# Patient Record
Sex: FEMALE | Race: WHITE | Employment: OTHER | URBAN - METROPOLITAN AREA
[De-identification: names, ages, dates, MRNs, and addresses within clinical notes are randomized per-mention and may not be internally consistent; named-entity substitution may affect disease eponyms.]

---

## 2017-01-06 ENCOUNTER — HOSPITAL ENCOUNTER (OUTPATIENT)
Dept: GENERAL RADIOLOGY | Facility: HOSPITAL | Age: 72
Discharge: HOME | End: 2017-01-06
Attending: FAMILY MEDICINE
Payer: MEDICARE

## 2017-01-06 DIAGNOSIS — R05.9 COUGH: ICD-10-CM

## 2017-01-06 DIAGNOSIS — J18.9 PNEUMONIA: ICD-10-CM

## 2017-01-06 PROCEDURE — 71020 XR CHEST PA + LAT CHEST (CPT=71020): CPT

## 2017-03-21 ENCOUNTER — HOSPITAL ENCOUNTER (OUTPATIENT)
Dept: ULTRASOUND IMAGING | Facility: HOSPITAL | Age: 72
Discharge: HOME | End: 2017-03-21
Attending: FAMILY MEDICINE
Payer: MEDICARE

## 2017-03-21 DIAGNOSIS — R42 DIZZINESS: ICD-10-CM

## 2017-03-21 PROCEDURE — 93880 EXTRACRANIAL BILAT STUDY: CPT

## 2017-04-13 PROBLEM — M70.62 GREATER TROCHANTERIC BURSITIS OF LEFT HIP: Status: ACTIVE | Noted: 2017-04-13

## 2017-04-13 PROBLEM — M17.12 PRIMARY OSTEOARTHRITIS OF LEFT KNEE: Status: ACTIVE | Noted: 2017-04-13

## 2017-05-03 ENCOUNTER — HOSPITAL ENCOUNTER (OUTPATIENT)
Dept: CT IMAGING | Facility: HOSPITAL | Age: 72
Discharge: HOME | End: 2017-05-03
Attending: FAMILY MEDICINE
Payer: MEDICARE

## 2017-05-03 DIAGNOSIS — I65.22 CAROTID STENOSIS, ASYMPTOMATIC, LEFT: ICD-10-CM

## 2017-05-03 PROCEDURE — 70498 CT ANGIOGRAPHY NECK: CPT

## 2017-06-26 ENCOUNTER — HOSPITAL ENCOUNTER (OUTPATIENT)
Dept: MAMMOGRAPHY | Age: 72
Discharge: HOME | End: 2017-06-26
Attending: OBSTETRICS & GYNECOLOGY
Payer: MEDICARE

## 2017-06-26 DIAGNOSIS — Z12.31 ENCOUNTER FOR SCREENING MAMMOGRAM FOR MALIGNANT NEOPLASM OF BREAST: ICD-10-CM

## 2017-06-26 DIAGNOSIS — Z12.31 SCREENING MAMMOGRAM, ENCOUNTER FOR: ICD-10-CM

## 2017-06-26 PROCEDURE — 77067 SCR MAMMO BI INCL CAD: CPT | Performed by: OBSTETRICS & GYNECOLOGY

## 2017-07-26 ENCOUNTER — HOSPITAL ENCOUNTER (OUTPATIENT)
Facility: HOSPITAL | Age: 72
Setting detail: HOSPITAL OUTPATIENT SURGERY
Discharge: HOME | End: 2017-07-26
Attending: INTERNAL MEDICINE | Admitting: INTERNAL MEDICINE
Payer: MEDICARE

## 2017-07-26 ENCOUNTER — SURGERY (OUTPATIENT)
Age: 72
End: 2017-07-26

## 2017-07-26 VITALS
DIASTOLIC BLOOD PRESSURE: 68 MMHG | OXYGEN SATURATION: 95 % | WEIGHT: 170 LBS | HEIGHT: 64 IN | SYSTOLIC BLOOD PRESSURE: 108 MMHG | TEMPERATURE: 98 F | BODY MASS INDEX: 29.02 KG/M2 | HEART RATE: 107 BPM | RESPIRATION RATE: 20 BRPM

## 2017-07-26 DIAGNOSIS — K62.5 RECTAL BLEEDING: ICD-10-CM

## 2017-07-26 LAB — GLUCOSE BLD-MCNC: 129 MG/DL (ref 65–99)

## 2017-07-26 PROCEDURE — 88305 TISSUE EXAM BY PATHOLOGIST: CPT | Performed by: INTERNAL MEDICINE

## 2017-07-26 PROCEDURE — 0DBM8ZX EXCISION OF DESCENDING COLON, VIA NATURAL OR ARTIFICIAL OPENING ENDOSCOPIC, DIAGNOSTIC: ICD-10-PCS | Performed by: INTERNAL MEDICINE

## 2017-07-26 PROCEDURE — 82962 GLUCOSE BLOOD TEST: CPT

## 2017-07-26 RX ORDER — SODIUM CHLORIDE, SODIUM LACTATE, POTASSIUM CHLORIDE, CALCIUM CHLORIDE 600; 310; 30; 20 MG/100ML; MG/100ML; MG/100ML; MG/100ML
INJECTION, SOLUTION INTRAVENOUS CONTINUOUS
Status: DISCONTINUED | OUTPATIENT
Start: 2017-07-26 | End: 2017-07-26

## 2017-07-26 RX ORDER — MIDAZOLAM HYDROCHLORIDE 1 MG/ML
INJECTION INTRAMUSCULAR; INTRAVENOUS
Status: DISCONTINUED | OUTPATIENT
Start: 2017-07-26 | End: 2017-07-26

## 2017-07-26 NOTE — H&P
[facility]  Pre-procedure History and Physical      Samantha Ferguson Patient Status:  Hospital Outpatient Surgery    1945 MRN [id number]   [facility] ENDOSCOPY Attending Henry Salmon MD   Hosp Day # 0 PCP Sue Langford MD       7

## 2017-07-26 NOTE — OPERATIVE REPORT
[facility]  Colonoscopy Report      Andrew Oneill Patient Status:  Hospital Outpatient Surgery    1945 MRN [id number]   [facility] ENDOSCOPY Attending Mendy Nunez MD       DATE OF OPERATION: 2017     PREOPERATIVE DIAGNOS

## 2017-08-01 NOTE — PROGRESS NOTES
Please send to the patient:    Dear  [facility],    I reviewed the results from your colonoscopy. You had one polyp removed. It is a benign polyp called a hyperplastic polyp. This polyp has no clinical significance.      I recommend increasing fiber intake and u

## 2017-08-15 ENCOUNTER — HOSPITAL ENCOUNTER (OUTPATIENT)
Dept: PHYSICAL THERAPY | Facility: HOSPITAL | Age: 72
Setting detail: THERAPIES SERIES
Discharge: HOME | End: 2017-08-15
Attending: INTERNAL MEDICINE
Payer: MEDICARE

## 2017-08-15 DIAGNOSIS — M62.89 PELVIC FLOOR DYSFUNCTION: ICD-10-CM

## 2017-08-15 PROCEDURE — 97112 NEUROMUSCULAR REEDUCATION: CPT

## 2017-08-15 PROCEDURE — 97110 THERAPEUTIC EXERCISES: CPT

## 2017-08-15 PROCEDURE — 97163 PT EVAL HIGH COMPLEX 45 MIN: CPT

## 2017-08-15 NOTE — PROGRESS NOTES
MUSCULOSKELETAL AND PELVIC FLOOR EVALUATION:   Referring Physician: Dr. Brien Mcdonald  Diagnosis: Pelvic floor dysfunction     Date of Service: 8/15/2017     PATIENT SUMMARY   Samantha Ferguson is a 74year old y/o female who presents to therapy today with complai relaxation, weakness, nocturia, poor toileting habits, UI, LUCIA, prolapse, and fecal incontinence with IBS that is triggered by stress which are affecting pt's ability to perform normal activities.  Pt also has multiple comorbidities including back issues wi floor isolation.    Charges: PT Eval High Complexity, NM Rohini      Total Timed Treatment: 70 min     Total Treatment Time: 70 min  In agreement with FOTO score and clinical rationale, this evaluation involved High Complexity decision making due to 3+ persona care.    X___________________________________________________ Date____________________    Certification From: 7/50/0006  To:11/13/2017

## 2017-08-21 ENCOUNTER — HOSPITAL ENCOUNTER (OUTPATIENT)
Dept: PHYSICAL THERAPY | Facility: HOSPITAL | Age: 72
Setting detail: THERAPIES SERIES
Discharge: HOME | End: 2017-08-21
Attending: INTERNAL MEDICINE
Payer: MEDICARE

## 2017-08-21 PROCEDURE — 97112 NEUROMUSCULAR REEDUCATION: CPT

## 2017-08-21 PROCEDURE — 97110 THERAPEUTIC EXERCISES: CPT

## 2017-08-21 NOTE — PROGRESS NOTES
Dx: Pelvic floor dysfunction         Authorized # of Visits:  -         Next MD visit: none scheduled  Fall Risk: standard         Precautions: n/a             Subjective: Had a stressful weekend due to 's eye issues.   Able to hold urine,but difficu

## 2017-09-06 ENCOUNTER — HOSPITAL ENCOUNTER (OUTPATIENT)
Dept: PHYSICAL THERAPY | Facility: HOSPITAL | Age: 72
Setting detail: THERAPIES SERIES
Discharge: HOME | End: 2017-09-06
Attending: INTERNAL MEDICINE
Payer: MEDICARE

## 2017-09-06 PROCEDURE — 97112 NEUROMUSCULAR REEDUCATION: CPT

## 2017-09-06 PROCEDURE — 97110 THERAPEUTIC EXERCISES: CPT

## 2017-09-06 NOTE — PROGRESS NOTES
Dx: Pelvic floor dysfunction         Authorized # of Visits:  -         Next MD visit: none scheduled  Fall Risk: standard         Precautions: L hip pain,  R shoulder RC    Subjective: Having less pain in on abdomen on the right.  Doing ILU massage but see

## 2017-09-07 ENCOUNTER — APPOINTMENT (OUTPATIENT)
Dept: PHYSICAL THERAPY | Facility: HOSPITAL | Age: 72
End: 2017-09-07
Attending: INTERNAL MEDICINE
Payer: MEDICARE

## 2017-09-11 ENCOUNTER — HOSPITAL ENCOUNTER (OUTPATIENT)
Dept: PHYSICAL THERAPY | Facility: HOSPITAL | Age: 72
Setting detail: THERAPIES SERIES
Discharge: HOME | End: 2017-09-11
Attending: INTERNAL MEDICINE
Payer: MEDICARE

## 2017-09-11 PROCEDURE — 97110 THERAPEUTIC EXERCISES: CPT

## 2017-09-11 PROCEDURE — 97112 NEUROMUSCULAR REEDUCATION: CPT

## 2017-09-11 NOTE — PROGRESS NOTES
Dx: Pelvic floor dysfunction         Authorized # of Visits:  -         Next MD visit: none scheduled  Fall Risk: standard         Precautions: L hip pain,  R shoulder RC    Subjective: No pain with urinating until yesterday afternoon, used to hurt every d ea    aeromat 3 laps       Shuttle  25# DLP w iso hip add x30 Shuttle  25# DLP w iso hip add x30 Shuttle  25# DLP w iso hip add x50        HSS x30 ILU massage          discussed variations with HEP SS, monster, retro YTB x10 ea         Charges: TEx2 NMx1

## 2017-09-18 ENCOUNTER — HOSPITAL ENCOUNTER (OUTPATIENT)
Dept: PHYSICAL THERAPY | Facility: HOSPITAL | Age: 72
Setting detail: THERAPIES SERIES
Discharge: HOME | End: 2017-09-18
Attending: INTERNAL MEDICINE
Payer: MEDICARE

## 2017-09-18 PROCEDURE — 97112 NEUROMUSCULAR REEDUCATION: CPT

## 2017-09-18 PROCEDURE — 97110 THERAPEUTIC EXERCISES: CPT

## 2017-09-18 NOTE — PROGRESS NOTES
Dx: Pelvic floor dysfunction         Authorized # of Visits:  -         Next MD visit: none scheduled  Fall Risk: standard         Precautions: L hip pain,  R shoulder RC, diverticulitis, DM    Subjective: Doing Kegel's throughout the day.  Able to defer donya laps kegel + abd brace     Supine-    iso hip add + bridge x15    hookly-RTB resisted ER x15    Sit on ball  U/LE lift x10    aeromat-  SS  3 laps    aeromat heel toe 3 laps      Shuttle  25# DLP w iso hip add x30 Shuttle  25# DLP w iso hip add x30 Shuttle

## 2017-09-25 ENCOUNTER — HOSPITAL ENCOUNTER (OUTPATIENT)
Dept: PHYSICAL THERAPY | Facility: HOSPITAL | Age: 72
Setting detail: THERAPIES SERIES
Discharge: HOME | End: 2017-09-25
Attending: INTERNAL MEDICINE
Payer: MEDICARE

## 2017-09-25 PROCEDURE — 97112 NEUROMUSCULAR REEDUCATION: CPT

## 2017-09-25 PROCEDURE — 97110 THERAPEUTIC EXERCISES: CPT

## 2017-09-25 NOTE — PROGRESS NOTES
Dx: Pelvic floor dysfunction         Authorized # of Visits:  -         Next MD visit: none scheduled  Fall Risk: standard         Precautions: L hip pain,  R shoulder RC, diverticulitis, DM    Subjective: Was standing a lot on Saturday, preparing for comp Shuttle  25# DLP w iso hip add x50 Shuttle  25# DLP w iso hip add x50      HSS x30 ILU massage   ILU massage aeromat-  SS  3 laps    aeromat heel toe 3 laps      discussed variations with HEP GEORGE, monster, retro YTB x10 ea airex march x10 airex march x10

## 2017-10-04 ENCOUNTER — HOSPITAL ENCOUNTER (OUTPATIENT)
Dept: PHYSICAL THERAPY | Facility: HOSPITAL | Age: 72
Setting detail: THERAPIES SERIES
Discharge: HOME | End: 2017-10-04
Attending: INTERNAL MEDICINE
Payer: MEDICARE

## 2017-10-04 PROCEDURE — 97112 NEUROMUSCULAR REEDUCATION: CPT

## 2017-10-04 PROCEDURE — 97110 THERAPEUTIC EXERCISES: CPT

## 2017-10-04 NOTE — PROGRESS NOTES
Discharge Summary    Pt has attended 7 visits in Physical Therapy. Dx: Pelvic floor dysfunction             Subjective: Doing HEP and has learned how pelvic floor is kacy via biofeedback. Bowel frequency still 8-10x/day.  Has done a dietary log (was:decreased)  Involuntary contraction: improved (was:decreased)  Involuntary relaxation: improved (was:decreased)     Skin condition: wnl     Sensory/Reflex:  Vestibule: normal bilaterally  Anal Salt Lick: normal bilaterally     Tissue Laxity Test:  Anterior physical therapy.   Pt instructed to call clinic if he/she has any further questions and to continue home exercise program.     Patient/Family/Caregiver was advised of these findings, precautions, and treatment options and has agreed to actively participate sitting  10/10  2/4  Baseline    Sit<>stand    w/wo iso hip add       biofeebackNM Re-ed-    kegel + abd brace     Supine and sitting  10/10  2/4  Baseline    Sit<>stand    w/wo iso hip add   Shuttle  25# DLP w iso hip add x30 Shuttle  25# DLP w iso hip ad

## 2017-10-06 ENCOUNTER — APPOINTMENT (OUTPATIENT)
Dept: ULTRASOUND IMAGING | Facility: HOSPITAL | Age: 72
End: 2017-10-06
Attending: EMERGENCY MEDICINE
Payer: MEDICARE

## 2017-10-06 ENCOUNTER — HOSPITAL ENCOUNTER (EMERGENCY)
Facility: HOSPITAL | Age: 72
Discharge: HOME | End: 2017-10-06
Attending: EMERGENCY MEDICINE
Payer: MEDICARE

## 2017-10-06 ENCOUNTER — APPOINTMENT (OUTPATIENT)
Dept: GENERAL RADIOLOGY | Facility: HOSPITAL | Age: 72
End: 2017-10-06
Attending: EMERGENCY MEDICINE
Payer: MEDICARE

## 2017-10-06 VITALS
TEMPERATURE: 97 F | RESPIRATION RATE: 16 BRPM | SYSTOLIC BLOOD PRESSURE: 110 MMHG | OXYGEN SATURATION: 96 % | WEIGHT: 170 LBS | HEIGHT: 64 IN | BODY MASS INDEX: 29.02 KG/M2 | HEART RATE: 78 BPM | DIASTOLIC BLOOD PRESSURE: 60 MMHG

## 2017-10-06 DIAGNOSIS — M54.9 BACK PAIN WITHOUT RADICULOPATHY: Primary | ICD-10-CM

## 2017-10-06 PROCEDURE — 99285 EMERGENCY DEPT VISIT HI MDM: CPT

## 2017-10-06 PROCEDURE — 72110 X-RAY EXAM L-2 SPINE 4/>VWS: CPT | Performed by: EMERGENCY MEDICINE

## 2017-10-06 PROCEDURE — 99284 EMERGENCY DEPT VISIT MOD MDM: CPT

## 2017-10-06 PROCEDURE — 76770 US EXAM ABDO BACK WALL COMP: CPT | Performed by: EMERGENCY MEDICINE

## 2017-10-06 PROCEDURE — 76706 US ABDL AORTA SCREEN AAA: CPT | Performed by: EMERGENCY MEDICINE

## 2017-10-06 PROCEDURE — 81003 URINALYSIS AUTO W/O SCOPE: CPT | Performed by: EMERGENCY MEDICINE

## 2017-10-06 RX ORDER — TRAMADOL HYDROCHLORIDE 50 MG/1
TABLET ORAL EVERY 4 HOURS PRN
Qty: 20 TABLET | Refills: 0 | Status: SHIPPED | OUTPATIENT
Start: 2017-10-06 | End: 2017-10-13

## 2017-10-06 RX ORDER — TRAMADOL HYDROCHLORIDE 50 MG/1
50 TABLET ORAL ONCE
Status: COMPLETED | OUTPATIENT
Start: 2017-10-06 | End: 2017-10-06

## 2017-10-06 RX ORDER — DIAZEPAM 5 MG/1
5 TABLET ORAL 3 TIMES DAILY PRN
Qty: 20 TABLET | Refills: 0 | Status: SHIPPED | OUTPATIENT
Start: 2017-10-06 | End: 2017-10-16

## 2017-10-06 RX ORDER — IBUPROFEN 600 MG/1
600 TABLET ORAL ONCE
Status: COMPLETED | OUTPATIENT
Start: 2017-10-06 | End: 2017-10-06

## 2017-10-06 RX ORDER — DIAZEPAM 5 MG/1
5 TABLET ORAL ONCE
Status: COMPLETED | OUTPATIENT
Start: 2017-10-06 | End: 2017-10-06

## 2017-10-06 RX ORDER — LIDOCAINE 50 MG/G
1 PATCH TOPICAL ONCE
Status: DISCONTINUED | OUTPATIENT
Start: 2017-10-06 | End: 2017-10-06

## 2017-10-06 RX ORDER — NAPROXEN 500 MG/1
500 TABLET ORAL 2 TIMES DAILY PRN
Qty: 20 TABLET | Refills: 0 | Status: SHIPPED | OUTPATIENT
Start: 2017-10-06 | End: 2018-03-12

## 2017-10-06 NOTE — ED INITIAL ASSESSMENT (HPI)
Pt c/o left mid-lower back pain x 2 days. Pt woke up with pain yesterday. Pt denies radiation down leg.

## 2017-10-06 NOTE — ED NOTES
Pt reevaluated by er physician. Informed of all test reports and plan of care. Pt verbalizing understanding.

## 2017-10-06 NOTE — ED PROVIDER NOTES
Patient Seen in: [facility] Emergency Department    History   Patient presents with:  Back Pain (musculoskeletal)    Stated Complaint: back pain    HPI    38-year-old female presents to the emergency department with complaints of left-sided lower ba MD;  Location: [facility] ENDOSCOPY  1999: OTHER      Comment: Sommer lopez right hand  1995: OTHER SURGICAL HISTORY      Comment: Fatty tumor from neck  1999: OTHER SURGICAL HISTORY      Comment: Arthroscopy right knee  2001: OTHER SURGICAL HISTORY      Comment: breath sounds normal. No stridor. She has no wheezes. Abdominal: Soft. Bowel sounds are normal. There is no tenderness. There is no rebound. Musculoskeletal: She exhibits no edema.         Lumbar back: She exhibits decreased range of motion, tenderness, diagnosis)    Disposition:  Discharge    Follow-up:  Torres Hernandez [address]  [phone]    Schedule an appointment as soon as possible for a visit        Medications Prescribed:  Current Discharge Medication List    START

## 2017-12-22 ENCOUNTER — HOSPITAL ENCOUNTER (OUTPATIENT)
Dept: CV DIAGNOSTICS | Facility: HOSPITAL | Age: 72
Discharge: HOME | End: 2017-12-22
Attending: FAMILY MEDICINE
Payer: MEDICARE

## 2017-12-22 DIAGNOSIS — I50.9 ACUTE CONGESTIVE HEART FAILURE, UNSPECIFIED CONGESTIVE HEART FAILURE TYPE: ICD-10-CM

## 2017-12-22 PROCEDURE — 93306 TTE W/DOPPLER COMPLETE: CPT | Performed by: FAMILY MEDICINE

## 2018-03-12 PROBLEM — I71.40 ABDOMINAL AORTIC ANEURYSM (AAA) WITHOUT RUPTURE (HCC): Status: ACTIVE | Noted: 2018-03-12

## 2018-03-12 PROBLEM — I71.40 ABDOMINAL AORTIC ANEURYSM (AAA) WITHOUT RUPTURE: Status: ACTIVE | Noted: 2018-03-12

## 2018-03-12 PROBLEM — I10 ESSENTIAL HYPERTENSION: Status: ACTIVE | Noted: 2018-03-12

## 2018-03-12 PROBLEM — I71.4 ABDOMINAL AORTIC ANEURYSM (AAA) WITHOUT RUPTURE (HCC): Status: ACTIVE | Noted: 2018-03-12

## 2018-03-12 PROBLEM — I65.23 CAROTID STENOSIS, ASYMPTOMATIC, BILATERAL: Status: ACTIVE | Noted: 2018-03-12

## 2018-06-29 ENCOUNTER — HOSPITAL ENCOUNTER (OUTPATIENT)
Dept: MAMMOGRAPHY | Age: 73
Discharge: HOME | End: 2018-06-29
Attending: OBSTETRICS & GYNECOLOGY
Payer: MEDICARE

## 2018-06-29 DIAGNOSIS — Z12.39 BREAST CANCER SCREENING: ICD-10-CM

## 2018-06-29 PROCEDURE — 77063 BREAST TOMOSYNTHESIS BI: CPT | Performed by: OBSTETRICS & GYNECOLOGY

## 2018-06-29 PROCEDURE — 77067 SCR MAMMO BI INCL CAD: CPT | Performed by: OBSTETRICS & GYNECOLOGY

## 2018-10-01 PROBLEM — I73.9 PAD (PERIPHERAL ARTERY DISEASE): Status: ACTIVE | Noted: 2018-10-01

## 2018-10-01 PROBLEM — I73.9 PAD (PERIPHERAL ARTERY DISEASE) (HCC): Status: ACTIVE | Noted: 2018-10-01

## 2019-03-25 ENCOUNTER — HOSPITAL ENCOUNTER (EMERGENCY)
Facility: HOSPITAL | Age: 74
Discharge: HOME | End: 2019-03-25
Attending: EMERGENCY MEDICINE
Payer: MEDICARE

## 2019-03-25 VITALS
OXYGEN SATURATION: 97 % | TEMPERATURE: 98 F | HEIGHT: 64 IN | BODY MASS INDEX: 29.02 KG/M2 | DIASTOLIC BLOOD PRESSURE: 72 MMHG | RESPIRATION RATE: 18 BRPM | HEART RATE: 92 BPM | SYSTOLIC BLOOD PRESSURE: 128 MMHG | WEIGHT: 170 LBS

## 2019-03-25 DIAGNOSIS — R33.9 URINARY RETENTION: Primary | ICD-10-CM

## 2019-03-25 DIAGNOSIS — N30.01 ACUTE CYSTITIS WITH HEMATURIA: ICD-10-CM

## 2019-03-25 LAB
BILIRUB UR QL STRIP.AUTO: NEGATIVE
COLOR UR AUTO: YELLOW
GLUCOSE UR STRIP.AUTO-MCNC: NEGATIVE MG/DL
KETONES UR STRIP.AUTO-MCNC: NEGATIVE MG/DL
NITRITE UR QL STRIP.AUTO: NEGATIVE
PH UR STRIP.AUTO: 6 [PH] (ref 4.5–8)
PROT UR STRIP.AUTO-MCNC: NEGATIVE MG/DL
RBC UR QL AUTO: NEGATIVE
SP GR UR STRIP.AUTO: 1.01 (ref 1–1.03)
UROBILINOGEN UR STRIP.AUTO-MCNC: <2 MG/DL
WBC CLUMPS UR QL AUTO: PRESENT

## 2019-03-25 PROCEDURE — 87186 SC STD MICRODIL/AGAR DIL: CPT | Performed by: EMERGENCY MEDICINE

## 2019-03-25 PROCEDURE — 81001 URINALYSIS AUTO W/SCOPE: CPT | Performed by: EMERGENCY MEDICINE

## 2019-03-25 PROCEDURE — 99283 EMERGENCY DEPT VISIT LOW MDM: CPT

## 2019-03-25 PROCEDURE — 87086 URINE CULTURE/COLONY COUNT: CPT | Performed by: EMERGENCY MEDICINE

## 2019-03-25 RX ORDER — SULFAMETHOXAZOLE AND TRIMETHOPRIM 800; 160 MG/1; MG/1
1 TABLET ORAL 2 TIMES DAILY
Qty: 20 TABLET | Refills: 0 | Status: SHIPPED | OUTPATIENT
Start: 2019-03-25 | End: 2019-04-04

## 2019-03-25 NOTE — ED PROVIDER NOTES
Patient Seen in: [facility] Emergency Department    History   Patient presents with:  Urinary Symptoms (urologic)    Stated Complaint: unable to urinate, no procudures    HPI    49-year-old female who presents to the emergency department complaining Tobacco Use      Smoking status: Former Smoker        Packs/day: 0.50        Quit date: 10/22/2014        Years since quittin.4      Smokeless tobacco: Never Used    Alcohol use: No      Alcohol/week: 0.0 oz    Drug use: No      Review of Systems    Po straight catheter-year-old be sent for urinalysis. The likely etiology is related to her recent use of pseudoephedrine/phenylephrine. I did explain how these medications can lead to urinary retention. She should refrain from their use.   Her urinalysis s

## 2019-03-25 NOTE — ED INITIAL ASSESSMENT (HPI)
Pt presents to ed ambulatory with steady gait c/o urinary retention. Pt states scant urine is cloudy, denies burning with urination. Pt is a&ox4, moves all extremities well, resps easy.

## 2019-04-18 ENCOUNTER — HOSPITAL ENCOUNTER (OUTPATIENT)
Dept: GENERAL RADIOLOGY | Age: 74
Discharge: HOME | End: 2019-04-18
Attending: PODIATRIST
Payer: MEDICARE

## 2019-04-18 ENCOUNTER — OFFICE VISIT (OUTPATIENT)
Dept: PODIATRY CLINIC | Facility: CLINIC | Age: 74
End: 2019-04-18
Payer: MEDICARE

## 2019-04-18 DIAGNOSIS — M20.41 HAMMER TOES OF BOTH FEET: ICD-10-CM

## 2019-04-18 DIAGNOSIS — M20.42 HAMMER TOES OF BOTH FEET: ICD-10-CM

## 2019-04-18 DIAGNOSIS — M20.41 HAMMER TOES OF BOTH FEET: Primary | ICD-10-CM

## 2019-04-18 DIAGNOSIS — L84 HELOMA DURUM: ICD-10-CM

## 2019-04-18 DIAGNOSIS — M79.675 PAIN OF TOE OF LEFT FOOT: ICD-10-CM

## 2019-04-18 DIAGNOSIS — M20.42 HAMMER TOES OF BOTH FEET: Primary | ICD-10-CM

## 2019-04-18 PROCEDURE — 99203 OFFICE O/P NEW LOW 30 MIN: CPT | Performed by: PODIATRIST

## 2019-04-18 PROCEDURE — 73630 X-RAY EXAM OF FOOT: CPT | Performed by: PODIATRIST

## 2019-04-18 RX ORDER — SIMVASTATIN 40 MG
TABLET ORAL
COMMUNITY
Start: 2019-03-21 | End: 2020-10-09

## 2019-04-25 NOTE — PROGRESS NOTES
Molly Ma is a 74year old female. Patient presents with:  Consult: Left middle toe pain -- Onset 4-6 weeks and denies injury. No xrays taken. Rates pain 5-6/10 with movement.          HPI:   This pleasant patient presents to the clinic with a chief c Laterality Date   • COLONOSCOPY  2003,     Diverticulosis   • COLONOSCOPY  2012    decreased anal sphincter tone, diverticulosis, internal hemorrhoids, normal random colon biopsies   • COLONOSCOPY N/A 7/26/2017    Performed by Bob Crisostomo MD at [facility] ENDO toe left foot   2. Vascular: Patient has palpable pulses both dorsalis pedis and posterior tibial bilateral feet   3. Neurologic: Patient has intact pain sensation   4.  Musculoskeletal: Patient has hammertoes on both feet the third toe on the left is the w

## 2019-05-16 ENCOUNTER — OFFICE VISIT (OUTPATIENT)
Dept: PODIATRY CLINIC | Facility: CLINIC | Age: 74
End: 2019-05-16
Payer: MEDICARE

## 2019-05-16 DIAGNOSIS — M79.675 PAIN OF TOE OF LEFT FOOT: ICD-10-CM

## 2019-05-16 DIAGNOSIS — M20.41 HAMMER TOES OF BOTH FEET: Primary | ICD-10-CM

## 2019-05-16 DIAGNOSIS — M20.42 HAMMER TOES OF BOTH FEET: Primary | ICD-10-CM

## 2019-05-16 DIAGNOSIS — L84 HELOMA DURUM: ICD-10-CM

## 2019-05-16 PROCEDURE — 99212 OFFICE O/P EST SF 10 MIN: CPT | Performed by: PODIATRIST

## 2019-05-16 NOTE — PROGRESS NOTES
Lashay Hayes is a 74year old female. Patient presents with: Toe Pain: left middle toe -- States pain is 99 % better. Occasional pain with pressure. Denies any pain today in toe.          HPI:   This pleasant patient returns to the clinic stating that s Diverticulosis   • COLONOSCOPY  2012    decreased anal sphincter tone, diverticulosis, internal hemorrhoids, normal random colon biopsies   • COLONOSCOPY N/A 7/26/2017    Performed by Ute Paredes MD at [address] [facility] toe left foot. There was no hemorrhaging. 2. Vascular: Patient has intact pedal circulation   3. Neurologic: Patient has good sensation   4.  Musculoskeletal: Again has rigid hammertoes 2-5 left foot which do not fully reduce on forefoot loading and anila

## 2019-07-03 ENCOUNTER — HOSPITAL ENCOUNTER (OUTPATIENT)
Dept: MAMMOGRAPHY | Age: 74
Discharge: HOME | End: 2019-07-03
Attending: OBSTETRICS & GYNECOLOGY
Payer: MEDICARE

## 2019-07-03 DIAGNOSIS — Z12.31 ENCOUNTER FOR SCREENING MAMMOGRAM FOR MALIGNANT NEOPLASM OF BREAST: ICD-10-CM

## 2019-07-03 PROCEDURE — 77063 BREAST TOMOSYNTHESIS BI: CPT | Performed by: OBSTETRICS & GYNECOLOGY

## 2019-07-03 PROCEDURE — 77067 SCR MAMMO BI INCL CAD: CPT | Performed by: OBSTETRICS & GYNECOLOGY

## 2019-12-01 ENCOUNTER — HOSPITAL ENCOUNTER (INPATIENT)
Facility: HOSPITAL | Age: 74
LOS: 2 days | Discharge: HOME | DRG: 191 | End: 2019-12-03
Attending: EMERGENCY MEDICINE | Admitting: FAMILY MEDICINE
Payer: MEDICARE

## 2019-12-01 ENCOUNTER — APPOINTMENT (OUTPATIENT)
Dept: GENERAL RADIOLOGY | Facility: HOSPITAL | Age: 74
DRG: 191 | End: 2019-12-01
Attending: EMERGENCY MEDICINE
Payer: MEDICARE

## 2019-12-01 DIAGNOSIS — R09.02 HYPOXIA: Primary | ICD-10-CM

## 2019-12-01 DIAGNOSIS — J20.9 ACUTE BRONCHITIS, UNSPECIFIED ORGANISM: ICD-10-CM

## 2019-12-01 DIAGNOSIS — J98.01 ACUTE BRONCHOSPASM: ICD-10-CM

## 2019-12-01 DIAGNOSIS — M25.50 ARTHRALGIA, UNSPECIFIED JOINT: ICD-10-CM

## 2019-12-01 PROCEDURE — 82962 GLUCOSE BLOOD TEST: CPT

## 2019-12-01 PROCEDURE — 85379 FIBRIN DEGRADATION QUANT: CPT | Performed by: FAMILY MEDICINE

## 2019-12-01 PROCEDURE — 87486 CHLMYD PNEUM DNA AMP PROBE: CPT | Performed by: FAMILY MEDICINE

## 2019-12-01 PROCEDURE — 87798 DETECT AGENT NOS DNA AMP: CPT | Performed by: FAMILY MEDICINE

## 2019-12-01 PROCEDURE — 94640 AIRWAY INHALATION TREATMENT: CPT

## 2019-12-01 PROCEDURE — 96375 TX/PRO/DX INJ NEW DRUG ADDON: CPT

## 2019-12-01 PROCEDURE — 96374 THER/PROPH/DIAG INJ IV PUSH: CPT

## 2019-12-01 PROCEDURE — 80053 COMPREHEN METABOLIC PANEL: CPT | Performed by: EMERGENCY MEDICINE

## 2019-12-01 PROCEDURE — 99285 EMERGENCY DEPT VISIT HI MDM: CPT

## 2019-12-01 PROCEDURE — 71046 X-RAY EXAM CHEST 2 VIEWS: CPT | Performed by: EMERGENCY MEDICINE

## 2019-12-01 PROCEDURE — 87999 UNLISTED MICROBIOLOGY PX: CPT

## 2019-12-01 PROCEDURE — 87581 M.PNEUMON DNA AMP PROBE: CPT | Performed by: FAMILY MEDICINE

## 2019-12-01 PROCEDURE — 94645 CONT INHLJ TX EACH ADDL HOUR: CPT

## 2019-12-01 PROCEDURE — 87633 RESP VIRUS 12-25 TARGETS: CPT | Performed by: FAMILY MEDICINE

## 2019-12-01 PROCEDURE — 85025 COMPLETE CBC W/AUTO DIFF WBC: CPT | Performed by: EMERGENCY MEDICINE

## 2019-12-01 RX ORDER — ACETAMINOPHEN 500 MG
500 TABLET ORAL EVERY 6 HOURS PRN
Status: DISCONTINUED | OUTPATIENT
Start: 2019-12-01 | End: 2019-12-03

## 2019-12-01 RX ORDER — DILTIAZEM HYDROCHLORIDE 180 MG/1
360 CAPSULE, EXTENDED RELEASE ORAL NIGHTLY
Status: DISCONTINUED | OUTPATIENT
Start: 2019-12-01 | End: 2019-12-03

## 2019-12-01 RX ORDER — IPRATROPIUM BROMIDE AND ALBUTEROL SULFATE 2.5; .5 MG/3ML; MG/3ML
3 SOLUTION RESPIRATORY (INHALATION)
Status: DISCONTINUED | OUTPATIENT
Start: 2019-12-01 | End: 2019-12-02

## 2019-12-01 RX ORDER — ATORVASTATIN CALCIUM 10 MG/1
20 TABLET, FILM COATED ORAL NIGHTLY
Status: DISCONTINUED | OUTPATIENT
Start: 2019-12-01 | End: 2019-12-03

## 2019-12-01 RX ORDER — KETOROLAC TROMETHAMINE 30 MG/ML
15 INJECTION, SOLUTION INTRAMUSCULAR; INTRAVENOUS EVERY 6 HOURS PRN
Status: DISCONTINUED | OUTPATIENT
Start: 2019-12-01 | End: 2019-12-01

## 2019-12-01 RX ORDER — GLIMEPIRIDE 2 MG/1
2 TABLET ORAL 2 TIMES DAILY
Status: DISCONTINUED | OUTPATIENT
Start: 2019-12-01 | End: 2019-12-03

## 2019-12-01 RX ORDER — KETOROLAC TROMETHAMINE 30 MG/ML
15 INJECTION, SOLUTION INTRAMUSCULAR; INTRAVENOUS ONCE
Status: COMPLETED | OUTPATIENT
Start: 2019-12-01 | End: 2019-12-01

## 2019-12-01 RX ORDER — SODIUM CHLORIDE 9 MG/ML
INJECTION, SOLUTION INTRAVENOUS CONTINUOUS
Status: DISCONTINUED | OUTPATIENT
Start: 2019-12-01 | End: 2019-12-03

## 2019-12-01 RX ORDER — IPRATROPIUM BROMIDE AND ALBUTEROL SULFATE 2.5; .5 MG/3ML; MG/3ML
3 SOLUTION RESPIRATORY (INHALATION) EVERY 2 HOUR PRN
Status: DISCONTINUED | OUTPATIENT
Start: 2019-12-01 | End: 2019-12-03

## 2019-12-01 RX ORDER — IPRATROPIUM BROMIDE AND ALBUTEROL SULFATE 2.5; .5 MG/3ML; MG/3ML
3 SOLUTION RESPIRATORY (INHALATION) ONCE
Status: COMPLETED | OUTPATIENT
Start: 2019-12-01 | End: 2019-12-01

## 2019-12-01 RX ORDER — ALBUTEROL SULFATE 2.5 MG/3ML
10 SOLUTION RESPIRATORY (INHALATION) CONTINUOUS
Status: DISCONTINUED | OUTPATIENT
Start: 2019-12-01 | End: 2019-12-03

## 2019-12-01 RX ORDER — PREDNISONE 20 MG/1
20 TABLET ORAL 2 TIMES DAILY WITH MEALS
Status: DISCONTINUED | OUTPATIENT
Start: 2019-12-02 | End: 2019-12-03

## 2019-12-01 RX ORDER — ENOXAPARIN SODIUM 100 MG/ML
40 INJECTION SUBCUTANEOUS DAILY
Status: DISCONTINUED | OUTPATIENT
Start: 2019-12-01 | End: 2019-12-03

## 2019-12-01 RX ORDER — METHYLPREDNISOLONE SODIUM SUCCINATE 125 MG/2ML
125 INJECTION, POWDER, LYOPHILIZED, FOR SOLUTION INTRAMUSCULAR; INTRAVENOUS ONCE
Status: COMPLETED | OUTPATIENT
Start: 2019-12-01 | End: 2019-12-01

## 2019-12-01 RX ORDER — KETOROLAC TROMETHAMINE 30 MG/ML
15 INJECTION, SOLUTION INTRAMUSCULAR; INTRAVENOUS EVERY 6 HOURS PRN
Status: DISPENSED | OUTPATIENT
Start: 2019-12-01 | End: 2019-12-03

## 2019-12-01 NOTE — ED NOTES
Alarm notification. Round on pt. Pt sleeping SPO2 83% on RA. MD notified. Pt placed on 2L O2.    Pt sts she does not wear oxygen at home

## 2019-12-01 NOTE — PROGRESS NOTES
[facility] Note:  Renal Dose Adjustment for Enoxaparin (LOVENOX)    Willi Rocha has been prescribed Enoxaparin (LOVENOX) 30 mg subcutaneously every 24 hours. Estimated Creatinine Clearance: 52.3 mL/min (based on SCr of 0.79 mg/dL).     Her calculat

## 2019-12-01 NOTE — H&P
[facility]    History & Physical    [facility]:  [facility]    1945 MRN [id number]   Location [address] [facility] Attending Liliana Coppola, [address] [facility] Day # 0 PCP Owen Cervantes MD     Date:  2019  Date of Adm Arthroscopy right knee   • OTHER SURGICAL HISTORY  2001    Carpal tunnel both hand     Family History   Problem Relation Age of Onset   • Cancer Other         Stomach cancer   • Heart Disease Father    • Hypertension Father    • High Blood Pressure Father Arm pain     RCT (rotator cuff tear)     Primary osteoarthritis of left knee     Greater trochanteric bursitis of left hip     Essential hypertension     Carotid stenosis, asymptomatic, bilateral     Abdominal aortic aneurysm (AAA) without rupture ([address]

## 2019-12-01 NOTE — ED PROVIDER NOTES
Patient Seen in: [facility] Emergency Department      History   Patient presents with:  Pain (neurologic)    Stated Complaint: Shoulder pain, cough, on antibx, swelling to hands and knees, forearm    HPI    70-year-old female presents emergency room tunnel both hand                    Social History    Tobacco Use      Smoking status: Former Smoker        Packs/day: 0.50        Quit date: 10/22/2014        Years since quittin.1      Smokeless tobacco: Never Used    Alcohol use: No      Alcohol/wee Ratio 0.8 (*)     All other components within normal limits   CBC W/ DIFFERENTIAL - Abnormal; Notable for the following components:    WBC 13.1 (*)     Neutrophil Absolute Prelim 9.44 (*)     Neutrophil Absolute 9.44 (*)     Monocyte Absolute 1.46 (*) bronchitis, unspecified organism  Arthralgia, unspecified joint    Disposition:  Admit  12/1/2019 11:26 am    Follow-up:  No follow-up provider specified.       Medications Prescribed:  Current Discharge Medication List                   Present on Admissio

## 2019-12-01 NOTE — ED NOTES
Call for report, RN sts not available for report. Request to give report to charge RN. Sts she will only need 2 minutes.

## 2019-12-01 NOTE — PROGRESS NOTES
NURSING ADMISSION NOTE      Patient admitted via Cart  Oriented to room. Safety precautions initiated. Bed in low position. Call light in reach. Received pt from ER. Admission navigator completed. VSS. Maintaining 02 sats on 2L per nasal cannula.

## 2019-12-01 NOTE — ED INITIAL ASSESSMENT (HPI)
Pt to ED with c/o pain to joints that started on Friday. +flu shot. Pt was started on levofloxacin on 11/26 for bronchitis. Afebrile. Pt took tylenol this am for pain.

## 2019-12-01 NOTE — ED NOTES
Round on pt. No distress noted. Pt updated on room number and eta to floor.  Denies any needs at this time   Sts back pain and joint pain have improved

## 2019-12-02 PROCEDURE — 94640 AIRWAY INHALATION TREATMENT: CPT

## 2019-12-02 PROCEDURE — 82962 GLUCOSE BLOOD TEST: CPT

## 2019-12-02 RX ORDER — FLUTICASONE PROPIONATE 50 MCG
1 SPRAY, SUSPENSION (ML) NASAL 2 TIMES DAILY
Status: DISCONTINUED | OUTPATIENT
Start: 2019-12-02 | End: 2019-12-03

## 2019-12-02 RX ORDER — IPRATROPIUM BROMIDE AND ALBUTEROL SULFATE 2.5; .5 MG/3ML; MG/3ML
3 SOLUTION RESPIRATORY (INHALATION)
Status: DISCONTINUED | OUTPATIENT
Start: 2019-12-02 | End: 2019-12-03

## 2019-12-02 RX ORDER — GUAIFENESIN 600 MG
600 TABLET, EXTENDED RELEASE 12 HR ORAL 2 TIMES DAILY
Status: DISCONTINUED | OUTPATIENT
Start: 2019-12-02 | End: 2019-12-03

## 2019-12-02 NOTE — PLAN OF CARE
Pt is A&Ox4. O2 sats WNL on Room air. Room air is baseline. Carb control diet. QID accu checks. Voids to bathroom with standby. IV fluids. Pt blood sugar was 318 paged doctor. Insulin pen ordered and insulin administered.  Pt c/o pain to the shoulder gave activity based on assessment  - Modify environment to reduce risk of injury  - Provide assistive devices as appropriate  - Consider OT/PT consult to assist with strengthening/mobility  - Encourage toileting schedule  Outcome: Progressing     Problem: Kristin Echeverria

## 2019-12-02 NOTE — CONSULTS
[facility]  Pulmonary/Critical Care Consult Note  [facility]  Report of Consultation    Tristen Lei Patient Status:  Inpatient    1945 MRN [id number]   [facility] 5NW-A Attending Vijay Forman Gallbladder disease 03/22/2013    thickened gallbladder with distended bile duct   • High blood pressure    • High cholesterol    • HYPERLIPIDEMIA    • HYPOTHYROIDISM    • IBS (irritable bowel syndrome)    • Osteoarthritis     hips, back and knees   • Visu for anorexia, chills, fatigue, fevers, malaise, night sweats and weight loss. Eyes: Negative for visual disturbance, irritation and redness.   Ears, nose, mouth, throat, and face: Negative for hearing loss, tinnitus, nasal congestion, snoring, sore throat, symmetric, no visible masses or scars, no crepitus, normal flexion/extension  CHEST: Normal chest excursion, no visible deformity or scars, no tenderness to palpation  PULMONARY: diminished breath sounds bilaterally, but clear.  No wheeze, rhonchi or crackl COPD given long standing tobacco abuse  · Transient hypoxia - now resolved  · Joint/ shoulder pains - improved, recent fluoroquinolone use  · Active tobacco abuse, 50+ pack years  · DM  · HTN  · chronic sinusitis    PLAN    · Continue bronchodilators nebul

## 2019-12-02 NOTE — PLAN OF CARE
Problem: Patient/Family Goals  Goal: Patient/Family Long Term Goal  Description  Patient's Long Term Goal:  To be discharged    Interventions:  - Follow MD orders  - See additional Care Plan goals for specific interventions   Outcome: Progressing  Goal: function  Description  INTERVENTIONS:  - Assess patient stability and activity tolerance for standing, transferring and ambulating w/ or w/o assistive devices  - Assist with transfers and ambulation using safe patient handling equipment as needed  - Ensure

## 2019-12-02 NOTE — PROGRESS NOTES
12/02/19 1359   Clinical Encounter Type   Referral To Nurse  (Arabella Gallo made a referral to the [employer] for [employer] as requested. )   [facility] Encounters   Spiritual Requests During Visit / Hospitalization [facility]

## 2019-12-03 ENCOUNTER — APPOINTMENT (OUTPATIENT)
Dept: GENERAL RADIOLOGY | Facility: HOSPITAL | Age: 74
DRG: 191 | End: 2019-12-03
Attending: INTERNAL MEDICINE
Payer: MEDICARE

## 2019-12-03 VITALS
WEIGHT: 172 LBS | SYSTOLIC BLOOD PRESSURE: 144 MMHG | HEIGHT: 64 IN | RESPIRATION RATE: 20 BRPM | TEMPERATURE: 98 F | DIASTOLIC BLOOD PRESSURE: 47 MMHG | OXYGEN SATURATION: 91 % | BODY MASS INDEX: 29.37 KG/M2 | HEART RATE: 98 BPM

## 2019-12-03 PROCEDURE — 94640 AIRWAY INHALATION TREATMENT: CPT

## 2019-12-03 PROCEDURE — 71045 X-RAY EXAM CHEST 1 VIEW: CPT | Performed by: INTERNAL MEDICINE

## 2019-12-03 PROCEDURE — 82962 GLUCOSE BLOOD TEST: CPT

## 2019-12-03 RX ORDER — FLUTICASONE PROPIONATE 50 MCG
1 SPRAY, SUSPENSION (ML) NASAL 2 TIMES DAILY
Qty: 1 BOTTLE | Refills: 1 | Status: SHIPPED | OUTPATIENT
Start: 2019-12-03

## 2019-12-03 RX ORDER — PREDNISONE 1 MG/1
TABLET ORAL
Qty: 63 TABLET | Refills: 0 | Status: SHIPPED | OUTPATIENT
Start: 2019-12-03 | End: 2020-08-19

## 2019-12-03 RX ORDER — IPRATROPIUM BROMIDE AND ALBUTEROL SULFATE 2.5; .5 MG/3ML; MG/3ML
3 SOLUTION RESPIRATORY (INHALATION)
Qty: 90 CONTAINER | Refills: 3 | Status: SHIPPED | OUTPATIENT
Start: 2019-12-03

## 2019-12-03 NOTE — PROGRESS NOTES
Pt reporting diarrhea this AM, cdiff ordered per protocol. Pt not able to provide sample for several hours, states she is not having diarrhea anymore. MD notified pt not able to provide sample. Per MD, pt still OK to discharge home without sample.

## 2019-12-03 NOTE — PROGRESS NOTES
NURSING DISCHARGE NOTE    Discharged Home via Wheelchair. Accompanied by Support staff  Belongings Taken by patient/family. Pt discharged home. Discharge instructions reviewed with pt at bedside. Prescriptions given to pt.  Nebulizer delivered by Ed

## 2019-12-03 NOTE — PROGRESS NOTES
[facility] Pulmonary/Critical Care Progress Note     SUBJECTIVE/Interval history: All events, procedures, notes reviewed.  No acute events overnight, per nursing, she had slight desaturation to 87-88% overnight and place CTAB no wheeze  Chest wall:No tenderness or deformity. Heart:RRR no murmur  Abdomen:Soft, non-tender. No masses. No guarding. No rebound. Extremities:no edema   Skin:No rashes or lesions.   Lymph nodes:Not examined          Lab Data Review:   Recent La meals   • dilTIAZem HCl ER Coated Beads  360 mg Oral Nightly   • glimepiride  2 mg Oral BID   • atorvastatin  20 mg Oral Nightly   • Insulin Aspart Pen  5-10 Units Subcutaneous TID CC   • Insulin Aspart Pen  5 Units Subcutaneous Once     ipratropium-albute

## 2019-12-03 NOTE — CM/SW NOTE
Spoke with patient regarding home nebulizer. States she is anxious to leave, spouse en route to . Offered to send order to DME company to bill under her medicare vs $40 cash price through [employer], delivery to room.  Patient requesting cash pa

## 2019-12-03 NOTE — PLAN OF CARE
Problem: Patient/Family Goals  Goal: Patient/Family Long Term Goal  Description  Patient's Long Term Goal:  To be discharged    Interventions:  - Follow MD orders  - See additional Care Plan goals for specific interventions   Outcome: Progressing  Goal: safest level of function  Description  INTERVENTIONS:  - Assess patient stability and activity tolerance for standing, transferring and ambulating w/ or w/o assistive devices  - Assist with transfers and ambulation using safe patient handling equipment as

## 2019-12-08 NOTE — DISCHARGE SUMMARY
[facility]    Discharge Summary    Eric Johnson Patient Status:  Inpatient    1945 MRN [id number]   [facility] 5NW-A Attending No att. providers found   [facility] Day # 2 PCP Rip Varner MD     Date of Admission: 2019 Dispos gets cortisone injections at times to control the pain , toda she is not aware of shortness of breath bur was desaturating down to 87 and improved dramatically on duo neb treatment and agreed to be admitted for therapy . initially did not want to be admitte Tabs  Commonly known as:  ZOCOR       Refills:  0     SUDAFED OR      Take by mouth as needed. Refills:  0     VITAMIN B COMPLEX OR      Take by mouth.    Refills:  0     vitamin C 1000 MG Tabs      1 TABLET DAILY   Refills:  0     VITAMIN D-3 OR      Marc

## 2019-12-11 ENCOUNTER — HOSPITAL ENCOUNTER (OUTPATIENT)
Dept: GENERAL RADIOLOGY | Facility: HOSPITAL | Age: 74
Discharge: HOME | End: 2019-12-11
Attending: INTERNAL MEDICINE
Payer: MEDICARE

## 2019-12-11 DIAGNOSIS — R09.02 HYPOXIA: ICD-10-CM

## 2019-12-11 DIAGNOSIS — J20.9 ACUTE BRONCHITIS, UNSPECIFIED ORGANISM: ICD-10-CM

## 2019-12-11 PROCEDURE — 71046 X-RAY EXAM CHEST 2 VIEWS: CPT | Performed by: INTERNAL MEDICINE

## 2019-12-19 ENCOUNTER — ORDER TRANSCRIPTION (OUTPATIENT)
Dept: ADMINISTRATIVE | Facility: HOSPITAL | Age: 74
End: 2019-12-19

## 2019-12-19 DIAGNOSIS — J44.1 COPD WITH EXACERBATION (HCC): Primary | ICD-10-CM

## 2020-03-16 ENCOUNTER — HOSPITAL ENCOUNTER (OUTPATIENT)
Dept: GENERAL RADIOLOGY | Age: 75
Discharge: HOME | End: 2020-03-16
Attending: INTERNAL MEDICINE
Payer: MEDICARE

## 2020-03-16 DIAGNOSIS — Z72.0 TOBACCO ABUSE: ICD-10-CM

## 2020-03-16 DIAGNOSIS — J44.1 COPD WITH EXACERBATION (HCC): ICD-10-CM

## 2020-03-16 PROCEDURE — 71046 X-RAY EXAM CHEST 2 VIEWS: CPT | Performed by: INTERNAL MEDICINE

## 2020-04-28 ENCOUNTER — ORDER TRANSCRIPTION (OUTPATIENT)
Dept: HEMATOLOGY/ONCOLOGY | Facility: HOSPITAL | Age: 75
End: 2020-04-28

## 2020-04-28 DIAGNOSIS — Z87.891 PERSONAL HISTORY OF TOBACCO USE, PRESENTING HAZARDS TO HEALTH: Primary | ICD-10-CM

## 2020-08-21 ENCOUNTER — HOSPITAL ENCOUNTER (OUTPATIENT)
Dept: CT IMAGING | Facility: HOSPITAL | Age: 75
Discharge: HOME | End: 2020-08-21
Attending: CLINICAL NURSE SPECIALIST
Payer: MEDICARE

## 2020-08-21 DIAGNOSIS — Z87.891 PERSONAL HISTORY OF TOBACCO USE, PRESENTING HAZARDS TO HEALTH: ICD-10-CM

## 2020-11-17 PROBLEM — N32.3 BLADDER DIVERTICULUM: Status: ACTIVE | Noted: 2020-11-17

## 2021-02-01 DIAGNOSIS — Z23 NEED FOR VACCINATION: ICD-10-CM

## 2021-02-15 ENCOUNTER — IMMUNIZATION (OUTPATIENT)
Dept: LAB | Age: 76
End: 2021-02-15
Attending: HOSPITALIST
Payer: MEDICARE

## 2021-02-15 DIAGNOSIS — Z23 NEED FOR VACCINATION: Primary | ICD-10-CM

## 2021-02-15 PROCEDURE — 0001A SARSCOV2 VAC 30MCG/0.3ML IM: CPT

## 2021-03-08 ENCOUNTER — IMMUNIZATION (OUTPATIENT)
Dept: LAB | Age: 76
End: 2021-03-08
Attending: HOSPITALIST
Payer: MEDICARE

## 2021-03-08 DIAGNOSIS — Z23 NEED FOR VACCINATION: Primary | ICD-10-CM

## 2021-03-08 PROCEDURE — 0002A SARSCOV2 VAC 30MCG/0.3ML IM: CPT

## 2021-04-26 ENCOUNTER — HOSPITAL ENCOUNTER (OUTPATIENT)
Dept: MRI IMAGING | Age: 76
Discharge: HOME | End: 2021-04-26
Attending: PHYSICIAN ASSISTANT
Payer: MEDICARE

## 2021-04-26 DIAGNOSIS — M47.816 LUMBAR SPONDYLOSIS: ICD-10-CM

## 2021-04-26 DIAGNOSIS — M43.16 SPONDYLOLISTHESIS OF LUMBAR REGION: ICD-10-CM

## 2021-04-26 DIAGNOSIS — M54.16 LUMBAR RADICULOPATHY: ICD-10-CM

## 2021-04-26 PROCEDURE — 72148 MRI LUMBAR SPINE W/O DYE: CPT | Performed by: PHYSICIAN ASSISTANT

## 2021-05-18 PROBLEM — M47.816 LUMBAR FACET ARTHROPATHY: Status: ACTIVE | Noted: 2021-05-18

## 2021-05-18 PROBLEM — M51.369 DDD (DEGENERATIVE DISC DISEASE), LUMBAR: Status: ACTIVE | Noted: 2021-05-18

## 2021-05-18 PROBLEM — M48.061 SPINAL STENOSIS OF LUMBAR REGION, UNSPECIFIED WHETHER NEUROGENIC CLAUDICATION PRESENT: Status: ACTIVE | Noted: 2021-05-18

## 2021-05-18 PROBLEM — M51.36 DDD (DEGENERATIVE DISC DISEASE), LUMBAR: Status: ACTIVE | Noted: 2021-05-18

## 2021-05-24 PROBLEM — M47.899 FACET SYNDROME: Status: ACTIVE | Noted: 2021-05-24

## 2021-09-27 ENCOUNTER — HOSPITAL ENCOUNTER (OUTPATIENT)
Dept: GENERAL RADIOLOGY | Age: 76
Discharge: HOME | End: 2021-09-27
Attending: FAMILY MEDICINE
Payer: MEDICARE

## 2021-09-27 DIAGNOSIS — M19.91 PRIMARY OSTEOARTHRITIS, UNSPECIFIED SITE: ICD-10-CM

## 2021-09-27 PROCEDURE — 73565 X-RAY EXAM OF KNEES: CPT | Performed by: FAMILY MEDICINE

## 2021-09-27 PROCEDURE — 73610 X-RAY EXAM OF ANKLE: CPT | Performed by: FAMILY MEDICINE

## 2021-10-19 ENCOUNTER — HOSPITAL ENCOUNTER (OUTPATIENT)
Age: 76
Discharge: HOME | End: 2021-10-19
Payer: MEDICARE

## 2021-10-19 VITALS
TEMPERATURE: 97 F | OXYGEN SATURATION: 95 % | HEART RATE: 85 BPM | WEIGHT: 200 LBS | RESPIRATION RATE: 18 BRPM | DIASTOLIC BLOOD PRESSURE: 74 MMHG | SYSTOLIC BLOOD PRESSURE: 130 MMHG | BODY MASS INDEX: 34 KG/M2

## 2021-10-19 DIAGNOSIS — M25.472 LEFT ANKLE SWELLING: ICD-10-CM

## 2021-10-19 DIAGNOSIS — M25.572 LEFT ANKLE PAIN, UNSPECIFIED CHRONICITY: Primary | ICD-10-CM

## 2021-10-19 PROCEDURE — 99203 OFFICE O/P NEW LOW 30 MIN: CPT | Performed by: NURSE PRACTITIONER

## 2021-10-19 RX ORDER — CETIRIZINE HYDROCHLORIDE 10 MG/1
10 TABLET ORAL DAILY
COMMUNITY

## 2021-10-19 RX ORDER — REVEFENACIN 175 UG/3ML
175 SOLUTION RESPIRATORY (INHALATION) DAILY
COMMUNITY

## 2021-10-19 RX ORDER — HYDROCODONE BITARTRATE AND ACETAMINOPHEN 5; 325 MG/1; MG/1
1 TABLET ORAL EVERY 6 HOURS PRN
Qty: 10 TABLET | Refills: 0 | Status: SHIPPED | OUTPATIENT
Start: 2021-10-19

## 2021-10-19 NOTE — ED INITIAL ASSESSMENT (HPI)
Pt c/o L ankle pain and swelling starting in early September pt treted for gout without improvement, xray was taken at that time showing arthritis, pain and swelling worsening and going into foot

## 2021-10-19 NOTE — ED PROVIDER NOTES
Patient Seen in: Immediate [address]      History   Patient presents with:  Leg or Foot Injury    Stated Complaint: left ankle pain/problem    Subjective: This is a 73-year-old female with below stated medical history.   [facility]   • OTHER SURGICAL HISTORY  2001    Carpal tunnel both hand   • OTHER SURGICAL HISTORY  12/22/2020    Cystoscopy, Dr Fuentes Camera History    Tobacco Use      Smoking status: Former Smoker        Packs/day: 0.50        Quit date: 10/22/201 Right Ear: External ear normal.      Left Ear: External ear normal.      Nose: Nose normal.      Mouth/Throat:      Mouth: Mucous membranes are moist.      Pharynx: Oropharynx is clear. Eyes:      General:         Right eye: No discharge.          Left ey this time. Likely the cause of symptoms is osteoarthritis in the ankle. I do not believe that repeating x-rays at this time would be beneficial.    DC home. Rice instructions given.   It was highly recommended patient to follow-up with orthopedics and/or

## 2021-10-21 PROBLEM — M81.0 AGE-RELATED OSTEOPOROSIS WITHOUT CURRENT PATHOLOGICAL FRACTURE: Status: ACTIVE | Noted: 2021-10-21

## 2021-10-26 ENCOUNTER — ORDER TRANSCRIPTION (OUTPATIENT)
Dept: ADMINISTRATIVE | Facility: HOSPITAL | Age: 76
End: 2021-10-26

## 2021-10-26 DIAGNOSIS — Z01.818 PREOP EXAMINATION: ICD-10-CM

## 2021-10-26 DIAGNOSIS — Z11.59 ENCOUNTER FOR SCREENING FOR OTHER VIRAL DISEASES: Primary | ICD-10-CM

## 2021-11-29 ENCOUNTER — OFFICE VISIT (OUTPATIENT)
Dept: PODIATRY CLINIC | Facility: CLINIC | Age: 76
End: 2021-11-29
Payer: MEDICARE

## 2021-11-29 ENCOUNTER — TELEPHONE (OUTPATIENT)
Dept: ORTHOPEDICS CLINIC | Facility: CLINIC | Age: 76
End: 2021-11-29

## 2021-11-29 VITALS — DIASTOLIC BLOOD PRESSURE: 81 MMHG | SYSTOLIC BLOOD PRESSURE: 132 MMHG

## 2021-11-29 DIAGNOSIS — M25.572 SINUS TARSI SYNDROME, LEFT: Primary | ICD-10-CM

## 2021-11-29 DIAGNOSIS — M77.50 TENDONITIS OF FOOT: ICD-10-CM

## 2021-11-29 PROCEDURE — 20605 DRAIN/INJ JOINT/BURSA W/O US: CPT | Performed by: PODIATRIST

## 2021-11-29 RX ORDER — TRIAMCINOLONE ACETONIDE 40 MG/ML
40 INJECTION, SUSPENSION INTRA-ARTICULAR; INTRAMUSCULAR ONCE
Status: COMPLETED | OUTPATIENT
Start: 2021-11-29 | End: 2021-11-29

## 2021-11-29 NOTE — PROGRESS NOTES
Trey Plunkett is a 64year old female. Patient presents with: Foot Pain: left foot pain, has been for 5 weeks now, xray in system, pain at 8/10 today        HPI:   This pleasant patient presents to the clinic with a chief complaint of left ankle pain. tablet (15 mg total) by mouth daily. (Patient not taking: Reported on 11/29/2021) 30 tablet 1   • gabapentin (NEURONTIN) 100 MG Oral Cap Take 1 capsule (100 mg total) by mouth 3 (three) times daily.  for 30 days (Patient not taking: Reported on 11/29/2021) 10/19/2021)     • Probiotic Product (PROBIOTIC DAILY OR) Take by mouth daily.    (Patient not taking: Reported on 10/19/2021)        Past Medical History:   Diagnosis Date   • Back problem    • Cataract    • Cholelithiasis    • DIABETES    • Diabetes ([facility] [address] Vaping Use      Vaping Use: Never used    Substance and Sexual Activity      Alcohol use: No        Alcohol/week: 0.0 standard drinks      Drug use: No      Sexual activity: Not Currently          REVIEW OF SYSTEMS:   Today reviewed systens as documented b consisting of 40 mg Kenalog and 1.0 cc of 0.5% Marcaine plain was injected into the symptomatic sinus tarsi of the left foot using aseptic technique and appropriate approach. A Band-Aid was applied to the injection site.   Patient was encouraged to wear a

## 2021-11-29 NOTE — TELEPHONE ENCOUNTER
Pt has an appt for Rt knee pain w/Dr Danette Canela. Last imaging 9/21. Please advice, thanks.   Future Appointments   Date Time Provider Ade Farias   11/29/2021  3:00 PM Samantha [id number]   12/15/2021  2:20 PM Ish Horton MD R

## 2021-12-13 ENCOUNTER — OFFICE VISIT (OUTPATIENT)
Dept: PODIATRY CLINIC | Facility: CLINIC | Age: 76
End: 2021-12-13
Payer: MEDICARE

## 2021-12-13 DIAGNOSIS — M25.572 SINUS TARSI SYNDROME, LEFT: ICD-10-CM

## 2021-12-13 DIAGNOSIS — M77.50 TENDONITIS OF FOOT: Primary | ICD-10-CM

## 2021-12-13 PROCEDURE — 99213 OFFICE O/P EST LOW 20 MIN: CPT | Performed by: PODIATRIST

## 2021-12-13 NOTE — PROGRESS NOTES
Samantha Ferguson is a 64year old female. Patient presents with: Foot Pain: Left foot pain, pt states is getting better, cortisone inj giving 11/29/21 with improvement, pt still has sharp pain on/off.         HPI:   Patient returns to the clinic stating dayana 800-160 MG Oral Tab per tablet      • ANORO ELLIPTA 62.5-25 MCG/INH Inhalation Aerosol Powder, Breath Activated      • LORazepam 1 MG Oral Tab Take 1 mg by mouth every 4 (four) hours as needed for Anxiety.      • Azelastine HCl 0.1 % Nasal Solution 2 sprays Diverticulosis   • COLONOSCOPY  2012    decreased anal sphincter tone, diverticulosis, internal hemorrhoids, normal random colon biopsies   • COLONOSCOPY N/A 7/26/2017    Procedure: COLONOSCOPY;  Surgeon: Thomas Guy MD;  Location: [address] [place] [address] ENDOSCOPY   • OTH Musculoskeletal: Only very mild tenderness on palpation of the sinus tarsi.     ASSESSMENT AND PLAN:   Diagnoses and all orders for this visit:    Tendonitis of foot    Sinus tarsi syndrome, left        Plan: Recommended to the patient that if it starts to

## 2022-01-12 ENCOUNTER — OFFICE VISIT (OUTPATIENT)
Dept: ORTHOPEDICS CLINIC | Facility: CLINIC | Age: 77
End: 2022-01-12
Payer: MEDICARE

## 2022-01-12 VITALS — HEART RATE: 96 BPM | OXYGEN SATURATION: 96 % | WEIGHT: 200.81 LBS | HEIGHT: 63.5 IN | BODY MASS INDEX: 35.14 KG/M2

## 2022-01-12 DIAGNOSIS — M17.0 PRIMARY OSTEOARTHRITIS OF BOTH KNEES: Primary | ICD-10-CM

## 2022-01-12 PROCEDURE — 20610 DRAIN/INJ JOINT/BURSA W/O US: CPT | Performed by: ORTHOPAEDIC SURGERY

## 2022-01-12 PROCEDURE — 99203 OFFICE O/P NEW LOW 30 MIN: CPT | Performed by: ORTHOPAEDIC SURGERY

## 2022-01-12 RX ORDER — TRIAMCINOLONE ACETONIDE 40 MG/ML
80 INJECTION, SUSPENSION INTRA-ARTICULAR; INTRAMUSCULAR ONCE
Status: COMPLETED | OUTPATIENT
Start: 2022-01-12 | End: 2022-01-12

## 2022-01-12 RX ADMIN — TRIAMCINOLONE ACETONIDE 80 MG: 40 INJECTION, SUSPENSION INTRA-ARTICULAR; INTRAMUSCULAR at 14:54:00

## 2022-01-12 NOTE — H&P
[facility] New Patient Note    CC: Patient presents with:  Knee Pain: bilateral knee pain the right knee is worse per patient       HPI: This 64year old female presents today with complaints of bilateral knee pain right worse than left.   Patient rep Arthroscopy right knee   • OTHER SURGICAL HISTORY  2001    Carpal tunnel both hand   • OTHER SURGICAL HISTORY  12/22/2020    Cystoscopy, Dr Corrinne Shoe     Current Outpatient Medications   Medication Sig Dispense Refill   • Meloxicam 15 MG Oral Tab Take 1 tablet ( Sodium 75 MG Oral Tab EC      • Fluticasone Propionate 50 MCG/ACT Nasal Suspension 1 spray by Each Nare route 2 (two) times daily. 1 Bottle 1   • ipratropium-albuterol 0.5-2.5 (3) MG/3ML Inhalation Solution Take 3 mL by nebulization 4 (four) times daily.  9 Unlabored breathing.   Bilateral lower extremity:  • Inspection: skin intact, mild effusion to the knees  • Palpation: Tender to palpation medial more than lateral joint line  • Range of motion: Extension of the knees is just about full with both knees  • K anti-inflammatory medication, viscosupplementation, or potentially radiofrequency ablation. Patient has had RFA for her back and feels this helped very much. She can follow-up in as little as 3 months as needed for repeat injection as long as it helps.

## 2022-01-19 ENCOUNTER — OFFICE VISIT (OUTPATIENT)
Dept: PODIATRY CLINIC | Facility: CLINIC | Age: 77
End: 2022-01-19
Payer: MEDICARE

## 2022-01-19 DIAGNOSIS — M77.50 TENDONITIS OF FOOT: Primary | ICD-10-CM

## 2022-01-19 DIAGNOSIS — M25.572 SINUS TARSI SYNDROME, LEFT: ICD-10-CM

## 2022-01-19 PROCEDURE — 99213 OFFICE O/P EST LOW 20 MIN: CPT | Performed by: PODIATRIST

## 2022-01-23 NOTE — PROGRESS NOTES
Rory Session is a 64year old female. Patient presents with: Foot Pain: Left foot pain f/u, pt had a cortisone inj, last visit per pt helped, still has pain rates 3/10.         HPI:   Patient had a cortisone injection for her left foot and ankle pain th Tab      • Sulfamethoxazole-TMP -160 MG Oral Tab per tablet      • ANORO ELLIPTA 62.5-25 MCG/INH Inhalation Aerosol Powder, Breath Activated      • LORazepam 1 MG Oral Tab Take 1 mg by mouth every 4 (four) hours as needed for Anxiety.      • Azelastin Thyroid disease    • Visual impairment     reading glasses      Past Surgical History:   Procedure Laterality Date   • CARPAL TUNNEL RELEASE     • COLONOSCOPY  2003,     Diverticulosis   • COLONOSCOPY  2012    decreased anal sphincter tone, diverticulosis, No defects are noted. 2. Vascular: The patient has palpable pulses on the left foot   3. Neurologic: The patient has intact sensorium on the left   4.  Musculoskeletal: Patient has good muscle strength but she has a little restricted range of motion inver

## 2022-03-15 ENCOUNTER — OFFICE VISIT (OUTPATIENT)
Dept: PHYSICAL THERAPY | Facility: HOSPITAL | Age: 77
End: 2022-03-15
Attending: ORTHOPAEDIC SURGERY
Payer: MEDICARE

## 2022-03-15 DIAGNOSIS — M17.0 PRIMARY OSTEOARTHRITIS OF BOTH KNEES: ICD-10-CM

## 2022-03-15 PROCEDURE — 97161 PT EVAL LOW COMPLEX 20 MIN: CPT

## 2022-03-15 PROCEDURE — 97530 THERAPEUTIC ACTIVITIES: CPT

## 2022-03-17 ENCOUNTER — OFFICE VISIT (OUTPATIENT)
Dept: PHYSICAL THERAPY | Facility: HOSPITAL | Age: 77
End: 2022-03-17
Attending: ORTHOPAEDIC SURGERY
Payer: MEDICARE

## 2022-03-17 PROCEDURE — 97140 MANUAL THERAPY 1/> REGIONS: CPT

## 2022-03-17 PROCEDURE — 97110 THERAPEUTIC EXERCISES: CPT

## 2022-03-22 ENCOUNTER — OFFICE VISIT (OUTPATIENT)
Dept: PHYSICAL THERAPY | Facility: HOSPITAL | Age: 77
End: 2022-03-22
Attending: ORTHOPAEDIC SURGERY
Payer: MEDICARE

## 2022-03-22 PROCEDURE — 97110 THERAPEUTIC EXERCISES: CPT

## 2022-03-22 PROCEDURE — 97530 THERAPEUTIC ACTIVITIES: CPT

## 2022-03-24 ENCOUNTER — OFFICE VISIT (OUTPATIENT)
Dept: PHYSICAL THERAPY | Facility: HOSPITAL | Age: 77
End: 2022-03-24
Attending: ORTHOPAEDIC SURGERY
Payer: MEDICARE

## 2022-03-24 PROCEDURE — 97110 THERAPEUTIC EXERCISES: CPT

## 2022-03-29 ENCOUNTER — APPOINTMENT (OUTPATIENT)
Dept: PHYSICAL THERAPY | Facility: HOSPITAL | Age: 77
End: 2022-03-29
Attending: ORTHOPAEDIC SURGERY
Payer: MEDICARE

## 2022-03-29 ENCOUNTER — TELEPHONE (OUTPATIENT)
Dept: PHYSICAL THERAPY | Facility: HOSPITAL | Age: 77
End: 2022-03-29

## 2022-03-31 ENCOUNTER — OFFICE VISIT (OUTPATIENT)
Dept: PHYSICAL THERAPY | Facility: HOSPITAL | Age: 77
End: 2022-03-31
Attending: ORTHOPAEDIC SURGERY
Payer: MEDICARE

## 2022-03-31 PROCEDURE — 97110 THERAPEUTIC EXERCISES: CPT

## 2022-04-06 ENCOUNTER — OFFICE VISIT (OUTPATIENT)
Dept: PHYSICAL THERAPY | Facility: HOSPITAL | Age: 77
End: 2022-04-06
Attending: FAMILY MEDICINE
Payer: MEDICARE

## 2022-04-06 PROCEDURE — 97140 MANUAL THERAPY 1/> REGIONS: CPT

## 2022-04-06 PROCEDURE — 97110 THERAPEUTIC EXERCISES: CPT

## 2022-04-13 ENCOUNTER — OFFICE VISIT (OUTPATIENT)
Dept: PHYSICAL THERAPY | Facility: HOSPITAL | Age: 77
End: 2022-04-13
Attending: FAMILY MEDICINE
Payer: MEDICARE

## 2022-04-13 PROCEDURE — 97110 THERAPEUTIC EXERCISES: CPT

## 2022-04-20 ENCOUNTER — OFFICE VISIT (OUTPATIENT)
Dept: ORTHOPEDICS CLINIC | Facility: CLINIC | Age: 77
End: 2022-04-20
Payer: MEDICARE

## 2022-04-20 VITALS — HEART RATE: 96 BPM | OXYGEN SATURATION: 90 %

## 2022-04-20 DIAGNOSIS — M17.0 PRIMARY OSTEOARTHRITIS OF BOTH KNEES: Primary | ICD-10-CM

## 2022-04-20 PROCEDURE — 20610 DRAIN/INJ JOINT/BURSA W/O US: CPT | Performed by: ORTHOPAEDIC SURGERY

## 2022-04-20 RX ORDER — TRIAMCINOLONE ACETONIDE 40 MG/ML
80 INJECTION, SUSPENSION INTRA-ARTICULAR; INTRAMUSCULAR ONCE
Status: COMPLETED | OUTPATIENT
Start: 2022-04-20 | End: 2022-04-20

## 2022-04-20 RX ADMIN — TRIAMCINOLONE ACETONIDE 80 MG: 40 INJECTION, SUSPENSION INTRA-ARTICULAR; INTRAMUSCULAR at 15:24:00

## 2022-04-20 NOTE — PROCEDURES
After informed consent, the patient's right and left knees were marked, locally anesthetized with skin refrigerant, prepped with topical antiseptic, and injected with a mixture of 1mL 40mg/mL Kenalog, 2mL 1% lidocaine and 2mL 0.5% marcaine through the inferolateral portal.  A band-aid was applied. The patient tolerated the procedure well.     Ramin Rdz MD, [address] Orthopedic Surgery  Phone [phone]  Fax [phone]

## 2022-04-26 ENCOUNTER — LAB ENCOUNTER (OUTPATIENT)
Dept: LAB | Age: 77
End: 2022-04-26
Attending: INTERNAL MEDICINE
Payer: MEDICARE

## 2022-04-26 DIAGNOSIS — Z11.59 ENCOUNTER FOR SCREENING FOR OTHER VIRAL DISEASES: ICD-10-CM

## 2022-04-26 DIAGNOSIS — Z01.818 PREOP EXAMINATION: ICD-10-CM

## 2022-04-27 LAB — SARS-COV-2 RNA RESP QL NAA+PROBE: NOT DETECTED

## 2022-07-25 ENCOUNTER — OFFICE VISIT (OUTPATIENT)
Dept: ORTHOPEDICS CLINIC | Facility: CLINIC | Age: 77
End: 2022-07-25
Payer: MEDICARE

## 2022-07-25 VITALS — HEART RATE: 71 BPM | OXYGEN SATURATION: 95 %

## 2022-07-25 DIAGNOSIS — M17.0 PRIMARY OSTEOARTHRITIS OF BOTH KNEES: Primary | ICD-10-CM

## 2022-07-25 RX ORDER — TRIAMCINOLONE ACETONIDE 40 MG/ML
80 INJECTION, SUSPENSION INTRA-ARTICULAR; INTRAMUSCULAR ONCE
Status: COMPLETED | OUTPATIENT
Start: 2022-07-25 | End: 2022-07-25

## 2022-07-25 RX ADMIN — TRIAMCINOLONE ACETONIDE 80 MG: 40 INJECTION, SUSPENSION INTRA-ARTICULAR; INTRAMUSCULAR at 11:48:00

## 2022-07-25 NOTE — PROCEDURES
After informed consent, the patient's right and left knees were marked, locally anesthetized with skin refrigerant, prepped with topical antiseptic, and injected with a mixture of 1mL 40mg/mL Kenalog, 2mL 1% lidocaine and 2mL 0.5% marcaine through the inferolateral portal.  A band-aid was applied. The patient tolerated the procedure well.     Diane Mary PA-C  [address] Orthopedic Surgery

## 2022-09-06 ENCOUNTER — LAB ENCOUNTER (OUTPATIENT)
Dept: LAB | Age: 77
End: 2022-09-06
Attending: INTERNAL MEDICINE
Payer: MEDICARE

## 2022-09-06 DIAGNOSIS — Z11.59 SCREENING FOR VIRAL DISEASE: ICD-10-CM

## 2022-09-06 DIAGNOSIS — Z01.818 PREOP EXAMINATION: ICD-10-CM

## 2022-09-07 LAB — SARS-COV-2 RNA RESP QL NAA+PROBE: NOT DETECTED

## 2022-09-08 ENCOUNTER — HOSPITAL ENCOUNTER (OUTPATIENT)
Dept: CV DIAGNOSTICS | Facility: HOSPITAL | Age: 77
Discharge: HOME | End: 2022-09-08
Attending: INTERNAL MEDICINE
Payer: MEDICARE

## 2022-09-08 DIAGNOSIS — I27.20 PULMONARY HTN (HCC): ICD-10-CM

## 2022-09-08 PROCEDURE — 93306 TTE W/DOPPLER COMPLETE: CPT | Performed by: INTERNAL MEDICINE

## 2022-09-09 ENCOUNTER — RT VISIT (OUTPATIENT)
Dept: RESPIRATORY THERAPY | Facility: HOSPITAL | Age: 77
End: 2022-09-09
Attending: INTERNAL MEDICINE
Payer: MEDICARE

## 2022-09-09 PROCEDURE — 94726 PLETHYSMOGRAPHY LUNG VOLUMES: CPT

## 2022-09-09 PROCEDURE — 94729 DIFFUSING CAPACITY: CPT

## 2022-09-09 PROCEDURE — 94060 EVALUATION OF WHEEZING: CPT

## 2022-09-12 NOTE — PROCEDURES
Findings:  Postbronchodilator FEV1 is 1.45L, 75% predicted. Postbronchodilator FVC is 2.30L, 91% predicted. FEV1/ FVC ratio is 0.63. There is no significant bronchodilator response after   administration of albuterol. The flow-volume loop suggests an obstructive pattern. The TLC is 5.34L, 110% predicted. The residual volume 2.82L, 129% predicted. The diffusion capacity is 32% predicted and 43% predicted when corrected for alveolar volume. Impression:  There is moderate airway obstruction on spirometry and visualized on flow-volume loop. There is no significant bronchodilator response, but this does not preclude treatment with bronchodilators. There is evidence of air trapping. Diffusion capacity is severely reduced with DLCO of 32%. This reduced diffusion capacity is out of proportion to the observed airway obstruction and can be seen in interstitial lung disease, pulmonary vascular disease (such as pulmonary hypertension) and anemia. If not already performed, would suggest further evaluation as determined clinically. There are no  previous pulmonary function tests available for comparison.

## 2022-10-17 ENCOUNTER — OFFICE VISIT (OUTPATIENT)
Facility: CLINIC | Age: 77
End: 2022-10-17
Payer: MEDICARE

## 2022-10-17 VITALS
SYSTOLIC BLOOD PRESSURE: 114 MMHG | WEIGHT: 198 LBS | RESPIRATION RATE: 16 BRPM | DIASTOLIC BLOOD PRESSURE: 64 MMHG | OXYGEN SATURATION: 96 % | HEIGHT: 64 IN | BODY MASS INDEX: 33.8 KG/M2 | HEART RATE: 83 BPM

## 2022-10-17 DIAGNOSIS — J43.2 CENTRILOBULAR EMPHYSEMA (HCC): Primary | ICD-10-CM

## 2022-10-17 DIAGNOSIS — R91.8 MULTIPLE PULMONARY NODULES: ICD-10-CM

## 2022-10-17 DIAGNOSIS — Z72.0 TOBACCO ABUSE: ICD-10-CM

## 2022-10-17 DIAGNOSIS — I27.20 PULMONARY HYPERTENSION (HCC): ICD-10-CM

## 2022-10-17 RX ORDER — ALBUTEROL SULFATE 90 UG/1
2 AEROSOL, METERED RESPIRATORY (INHALATION) EVERY 6 HOURS PRN
Qty: 1 EACH | Refills: 11 | Status: SHIPPED | OUTPATIENT
Start: 2022-10-17

## 2022-10-17 RX ORDER — AZELASTINE 1 MG/ML
1 SPRAY, METERED NASAL 2 TIMES DAILY
Qty: 1 EACH | Refills: 11 | Status: SHIPPED | OUTPATIENT
Start: 2022-10-17

## 2022-10-17 RX ORDER — PIOGLITAZONEHYDROCHLORIDE 15 MG/1
15 TABLET ORAL DAILY
COMMUNITY
Start: 2022-10-05

## 2022-10-17 RX ORDER — GLIMEPIRIDE 2 MG/1
2 TABLET ORAL 2 TIMES DAILY
COMMUNITY
Start: 2022-10-04

## 2022-10-19 ENCOUNTER — TELEPHONE (OUTPATIENT)
Facility: CLINIC | Age: 77
End: 2022-10-19

## 2022-10-19 NOTE — TELEPHONE ENCOUNTER
Pt calling back. Pt had questions about use of spacer with inhaler. All questions answered, pt verbalized understanding.

## 2022-10-26 ENCOUNTER — OFFICE VISIT (OUTPATIENT)
Dept: ORTHOPEDICS CLINIC | Facility: CLINIC | Age: 77
End: 2022-10-26
Payer: MEDICARE

## 2022-10-26 VITALS — HEART RATE: 85 BPM | OXYGEN SATURATION: 93 %

## 2022-10-26 DIAGNOSIS — M17.0 PRIMARY OSTEOARTHRITIS OF BOTH KNEES: Primary | ICD-10-CM

## 2022-10-26 PROCEDURE — 20610 DRAIN/INJ JOINT/BURSA W/O US: CPT | Performed by: ORTHOPAEDIC SURGERY

## 2022-10-26 RX ORDER — INDOMETHACIN 50 MG/1
50 CAPSULE ORAL 3 TIMES DAILY
COMMUNITY
Start: 2022-10-21

## 2022-10-26 RX ORDER — TRIAMCINOLONE ACETONIDE 40 MG/ML
80 INJECTION, SUSPENSION INTRA-ARTICULAR; INTRAMUSCULAR ONCE
Status: COMPLETED | OUTPATIENT
Start: 2022-10-26 | End: 2022-10-26

## 2022-10-26 RX ORDER — COLCHICINE 0.6 MG/1
0.6 TABLET ORAL DAILY
COMMUNITY
Start: 2022-10-21

## 2022-10-26 RX ADMIN — TRIAMCINOLONE ACETONIDE 80 MG: 40 INJECTION, SUSPENSION INTRA-ARTICULAR; INTRAMUSCULAR at 15:17:00

## 2022-10-26 NOTE — PROCEDURES
Last injections did not help as much. Pain started returning and was more severe or especially over the past 3 weeks. She also notes pain and swelling in both ankles. She was started on a medication for gout from her primary care doctor. After informed consent, the patient's right and left knees were marked, locally anesthetized with skin refrigerant, prepped with topical antiseptic, and injected with a mixture of 1mL 40mg/mL Kenalog, 2mL 1% lidocaine and 2mL 0.5% marcaine through the inferolateral portal.  A band-aid was applied. The patient tolerated the procedure well. Provided information for my partner Dr. Camelia Coley as patient does states she wants to be evaluated for her ankles and ankle pain. Advised her to contact us if knee injection does not provide sufficient relief, neck step would be to repeat imaging of both knees.     Tristan Chew MD, [address] [place] [address] Orthopedic Surgery  Phone [phone]  Fax [phone]

## 2022-11-01 ENCOUNTER — OFFICE VISIT (OUTPATIENT)
Dept: PODIATRY CLINIC | Facility: CLINIC | Age: 77
End: 2022-11-01
Payer: MEDICARE

## 2022-11-01 DIAGNOSIS — M19.072 ARTHRITIS OF BOTH SUBTALAR JOINTS: ICD-10-CM

## 2022-11-01 DIAGNOSIS — M19.071 ARTHRITIS OF BOTH SUBTALAR JOINTS: ICD-10-CM

## 2022-11-01 DIAGNOSIS — M19.079 ARTHRITIS OF ANKLE JOINT: ICD-10-CM

## 2022-11-01 DIAGNOSIS — M25.571 SINUS TARSI SYNDROME OF BOTH ANKLES: Primary | ICD-10-CM

## 2022-11-01 DIAGNOSIS — M25.572 SINUS TARSI SYNDROME OF BOTH ANKLES: Primary | ICD-10-CM

## 2022-11-01 PROCEDURE — 73610 X-RAY EXAM OF ANKLE: CPT | Performed by: PODIATRIST

## 2022-11-01 PROCEDURE — 99213 OFFICE O/P EST LOW 20 MIN: CPT | Performed by: PODIATRIST

## 2022-11-01 RX ORDER — METHYLPREDNISOLONE 4 MG/1
TABLET ORAL
Qty: 21 TABLET | Refills: 0 | Status: SHIPPED | OUTPATIENT
Start: 2022-11-01

## 2022-11-15 ENCOUNTER — OFFICE VISIT (OUTPATIENT)
Dept: PODIATRY CLINIC | Facility: CLINIC | Age: 77
End: 2022-11-15
Payer: MEDICARE

## 2022-11-15 DIAGNOSIS — M25.571 SINUS TARSI SYNDROME OF BOTH ANKLES: Primary | ICD-10-CM

## 2022-11-15 DIAGNOSIS — M19.072 ARTHRITIS OF BOTH SUBTALAR JOINTS: ICD-10-CM

## 2022-11-15 DIAGNOSIS — M77.50 TENDONITIS OF FOOT: ICD-10-CM

## 2022-11-15 DIAGNOSIS — M19.071 ARTHRITIS OF BOTH SUBTALAR JOINTS: ICD-10-CM

## 2022-11-15 DIAGNOSIS — M25.572 SINUS TARSI SYNDROME OF BOTH ANKLES: Primary | ICD-10-CM

## 2022-11-15 DIAGNOSIS — M19.079 ARTHRITIS OF ANKLE JOINT: ICD-10-CM

## 2022-11-15 DIAGNOSIS — M25.572 SINUS TARSI SYNDROME, LEFT: ICD-10-CM

## 2022-11-15 PROCEDURE — 99213 OFFICE O/P EST LOW 20 MIN: CPT | Performed by: PODIATRIST

## 2022-12-12 DIAGNOSIS — J43.2 CENTRILOBULAR EMPHYSEMA (HCC): Primary | ICD-10-CM

## 2022-12-13 RX ORDER — FLUTICASONE PROPIONATE 50 MCG
SPRAY, SUSPENSION (ML) NASAL
Qty: 16 G | Refills: 0 | Status: SHIPPED | OUTPATIENT
Start: 2022-12-13

## 2022-12-14 ENCOUNTER — OFFICE VISIT (OUTPATIENT)
Dept: ORTHOPEDICS CLINIC | Facility: CLINIC | Age: 77
End: 2022-12-14
Payer: MEDICARE

## 2022-12-14 VITALS — WEIGHT: 203.19 LBS | HEIGHT: 64 IN | BODY MASS INDEX: 34.69 KG/M2

## 2022-12-14 DIAGNOSIS — M17.11 PRIMARY OSTEOARTHRITIS OF RIGHT KNEE: Primary | ICD-10-CM

## 2022-12-14 PROCEDURE — 99213 OFFICE O/P EST LOW 20 MIN: CPT | Performed by: ORTHOPAEDIC SURGERY

## 2022-12-14 NOTE — PROGRESS NOTES
[facility] Progress Note    Subjective: Return patient for right knee clicking and pain. The steroid injection did give great relief for a few weeks, however symptoms returned. Her main issue today is popping and clicking on the outside of her knee. She reports that his present when she sits down, feels it anterolateral.  She does report multiple pains throughout the body, swelling and inflammation throughout the feet and ankles. She does report she is set up with a rheumatologist, however does not for 3 months. Objective: Patient is awake alert no distress. Right knee without effusion. She has full extension of the knee, there is some popping along the anterolateral and lateral aspect through range of motion, Becky's negative. Assessment/Plan: 17-year-old female with moderate right knee osteoarthritis and complaint of right knee mechanical symptoms. I did discuss with the patient that this would be expected given her degree of knee osteoarthritis. We did discuss treatment options again for knee osteoarthritis including injections, joint replacement surgery. She would like to avoid joint replacement surgery if possible. Did discuss that primary indication for injections is pain, and popping/clicking may not reliably be alleviated. She would like to attempt something if possible. We did discuss possible viscosupplementation which she is open to and this has been ordered. She will turn for the injection when it is approved and obtained. Did also encourage her to follow through with rheumatology as she does have multiple joint pains and swelling, and reports that she was told by her primary care that lab work-up thus far may indicate rheumatoid arthritis.     Araceli Dent MD, [address] Orthopedic Surgery  Phone [phone]  Fax [phone]

## 2022-12-26 ENCOUNTER — APPOINTMENT (OUTPATIENT)
Dept: GENERAL RADIOLOGY | Facility: HOSPITAL | Age: 77
End: 2022-12-26
Attending: EMERGENCY MEDICINE
Payer: MEDICARE

## 2022-12-26 ENCOUNTER — HOSPITAL ENCOUNTER (EMERGENCY)
Facility: HOSPITAL | Age: 77
Discharge: HOME | End: 2022-12-27
Attending: EMERGENCY MEDICINE
Payer: MEDICARE

## 2022-12-26 DIAGNOSIS — S92.002A CLOSED DISPLACED FRACTURE OF LEFT CALCANEUS, UNSPECIFIED PORTION OF CALCANEUS, INITIAL ENCOUNTER: Primary | ICD-10-CM

## 2022-12-26 LAB — SARS-COV-2 RNA RESP QL NAA+PROBE: NOT DETECTED

## 2022-12-26 PROCEDURE — 99285 EMERGENCY DEPT VISIT HI MDM: CPT

## 2022-12-26 PROCEDURE — 73610 X-RAY EXAM OF ANKLE: CPT | Performed by: EMERGENCY MEDICINE

## 2022-12-26 PROCEDURE — 73630 X-RAY EXAM OF FOOT: CPT | Performed by: EMERGENCY MEDICINE

## 2022-12-26 RX ORDER — GLIMEPIRIDE 2 MG/1
2 TABLET ORAL ONCE
Status: COMPLETED | OUTPATIENT
Start: 2022-12-26 | End: 2022-12-26

## 2022-12-26 RX ORDER — HYDROCODONE BITARTRATE AND ACETAMINOPHEN 5; 325 MG/1; MG/1
1 TABLET ORAL ONCE
Status: COMPLETED | OUTPATIENT
Start: 2022-12-26 | End: 2022-12-26

## 2022-12-26 RX ORDER — ROSUVASTATIN CALCIUM 10 MG/1
10 TABLET, COATED ORAL ONCE
Status: COMPLETED | OUTPATIENT
Start: 2022-12-26 | End: 2022-12-26

## 2022-12-26 RX ORDER — METOPROLOL SUCCINATE 25 MG/1
25 TABLET, EXTENDED RELEASE ORAL ONCE
Status: COMPLETED | OUTPATIENT
Start: 2022-12-26 | End: 2022-12-26

## 2022-12-26 NOTE — ED INITIAL ASSESSMENT (HPI)
Pt to ED via EMS from home due to left ankle injury. Pt reports she was walking from her kitchen to front foor and tripped over her boots, pt did not see them. No dizziness, no fall. A&Ox4. Pain 9/10.

## 2022-12-27 VITALS
BODY MASS INDEX: 34.15 KG/M2 | SYSTOLIC BLOOD PRESSURE: 133 MMHG | OXYGEN SATURATION: 100 % | HEIGHT: 64 IN | TEMPERATURE: 98 F | DIASTOLIC BLOOD PRESSURE: 74 MMHG | WEIGHT: 200 LBS | HEART RATE: 85 BPM | RESPIRATION RATE: 20 BRPM

## 2022-12-27 PROCEDURE — 97530 THERAPEUTIC ACTIVITIES: CPT

## 2022-12-27 PROCEDURE — 97161 PT EVAL LOW COMPLEX 20 MIN: CPT

## 2022-12-27 RX ORDER — METOPROLOL SUCCINATE 25 MG/1
25 TABLET, EXTENDED RELEASE ORAL ONCE
Status: DISCONTINUED | OUTPATIENT
Start: 2022-12-27 | End: 2022-12-27

## 2022-12-27 RX ORDER — PIOGLITAZONEHYDROCHLORIDE 15 MG/1
15 TABLET ORAL ONCE
Status: COMPLETED | OUTPATIENT
Start: 2022-12-27 | End: 2022-12-27

## 2022-12-27 RX ORDER — HYDROCODONE BITARTRATE AND ACETAMINOPHEN 5; 325 MG/1; MG/1
1 TABLET ORAL ONCE
Status: COMPLETED | OUTPATIENT
Start: 2022-12-27 | End: 2022-12-27

## 2022-12-27 RX ORDER — GLIMEPIRIDE 2 MG/1
2 TABLET ORAL ONCE
Status: COMPLETED | OUTPATIENT
Start: 2022-12-27 | End: 2022-12-27

## 2022-12-27 RX ORDER — LORATADINE 10 MG/1
10 TABLET ORAL DAILY
COMMUNITY

## 2022-12-27 NOTE — CM/SW NOTE
Received a call from Zainab Suazo requesting to see pt re safe dc planning. EDCM spoke to pt and discussed [facility] placement and [facility]PT. Pt was adamant on going to [facility] since Medicare will not cover the transportation from SNF going to her Ortho appt (pt will need a cast but still need appt w/ Ortho first). Another option was Home Health PT/OT was discussed but unable to decide at this pt and want this writer to comeback when pt's son arrives. Pt stated son unable to give her a ride from SNF to Ortho appts bec he lives far and also works. Gonzalez Connelly RN was made aware of the above.

## 2022-12-27 NOTE — CM/SW NOTE
Patient is going to [facility]. Aguila Salgado should be arranged to pick patient up at 1:30PM.  RN report to be called to [phone]. Patient is going to .

## 2022-12-27 NOTE — CM/SW NOTE
PT note sent to facilities. It looks like [facility] can accept patient and that is patient's first choice. Awaiting confirmation from [facility].

## 2022-12-27 NOTE — CM/SW NOTE
EDCM spoke to pt and pt's son re safe dc planning. Pt was able to ambulate w/ walker but was concerned that she has stairs at home, and her bathroom is upstairs. Pt and son decided to go to [facility]. Lucius Perez RN & Shelbie Snow RN made aware of the dc plan. PASRR completed. [facility] referral sent. [facility], [facility] called and left a message to [facility] ([phone]) voicemail at [phone] if they are able to take pt tonight. Awaiting for return call. Pt made aware [facility] is available but still waiting for a room number. Pt does not like [facility]. Pt's preference- #1 [facility], #2 [facility]. Will endorse the above to the incoming [facility] in the morning.

## 2022-12-28 ENCOUNTER — TELEPHONE (OUTPATIENT)
Dept: PODIATRY CLINIC | Facility: CLINIC | Age: 77
End: 2022-12-28

## 2022-12-28 ENCOUNTER — TELEPHONE (OUTPATIENT)
Dept: ORTHOPEDICS CLINIC | Facility: CLINIC | Age: 77
End: 2022-12-28

## 2022-12-28 NOTE — TELEPHONE ENCOUNTER
Lvm for patient to call back and schedule Right knee Synvisc Injection, with  or Jose Francisco at the [facility]. Patient needs to be scheduled 4 weeks ahead to assure that the medication will be there for the appt. Please forward TE back to me with Date,Time and Location.     Kaitlynn Rich

## 2022-12-28 NOTE — TELEPHONE ENCOUNTER
Per Mayank Mullins, from [facility], calling to schedule casting for Closed displaced fracture of left calcaneus. Asking for pt to be seen as soon as possible.  Please advise

## 2022-12-29 ENCOUNTER — OFFICE VISIT (OUTPATIENT)
Dept: PODIATRY CLINIC | Facility: CLINIC | Age: 77
End: 2022-12-29
Payer: MEDICARE

## 2022-12-29 DIAGNOSIS — S92.012A CLOSED DISPLACED FRACTURE OF BODY OF LEFT CALCANEUS, INITIAL ENCOUNTER: Primary | ICD-10-CM

## 2022-12-29 PROCEDURE — 99213 OFFICE O/P EST LOW 20 MIN: CPT | Performed by: PODIATRIST

## 2023-01-03 ENCOUNTER — TELEPHONE (OUTPATIENT)
Dept: ORTHOPEDICS CLINIC | Facility: CLINIC | Age: 78
End: 2023-01-03

## 2023-01-03 NOTE — TELEPHONE ENCOUNTER
Patient is requesting a call back from a nurse. She received a message to schedule her gel injection but is currently in a nursing home because she fractured a bone in foot. She has some questions before she schedules gel injection.  Please advise

## 2023-01-04 NOTE — TELEPHONE ENCOUNTER
Three voicemails have been left for patient, should patient be contacted again or should encounter be closed?

## 2023-01-12 ENCOUNTER — OFFICE VISIT (OUTPATIENT)
Dept: PODIATRY CLINIC | Facility: CLINIC | Age: 78
End: 2023-01-12

## 2023-01-12 DIAGNOSIS — S92.012D CLOSED DISPLACED FRACTURE OF BODY OF LEFT CALCANEUS WITH ROUTINE HEALING, SUBSEQUENT ENCOUNTER: Primary | ICD-10-CM

## 2023-01-12 PROCEDURE — 99213 OFFICE O/P EST LOW 20 MIN: CPT | Performed by: PODIATRIST

## 2023-01-25 ENCOUNTER — TELEPHONE (OUTPATIENT)
Dept: ORTHOPEDICS CLINIC | Facility: CLINIC | Age: 78
End: 2023-01-25

## 2023-01-25 ENCOUNTER — OFFICE VISIT (OUTPATIENT)
Dept: ORTHOPEDICS CLINIC | Facility: CLINIC | Age: 78
End: 2023-01-25
Payer: MEDICARE

## 2023-01-25 DIAGNOSIS — M17.12 PRIMARY OSTEOARTHRITIS OF LEFT KNEE: ICD-10-CM

## 2023-01-25 DIAGNOSIS — M17.11 PRIMARY OSTEOARTHRITIS OF RIGHT KNEE: Primary | ICD-10-CM

## 2023-01-25 PROCEDURE — 20610 DRAIN/INJ JOINT/BURSA W/O US: CPT | Performed by: ORTHOPAEDIC SURGERY

## 2023-01-25 NOTE — TELEPHONE ENCOUNTER
Patient saw Dr. Roslyn Severance this morning 1/25/2023 didn't receive an after visit summary and nurse at [address] [place] [address] wants to know what type of treatment patient received.     Requesting after visit summary fax to facility [phone]

## 2023-01-25 NOTE — PROCEDURES
After informed consent, the patient's right knee was marked, locally anesthetized with skin refrigerant, prepped with topical antiseptic, and injected with 6mL of 48mg/6mL Synvisc One through the inferolateral portal.  A band-aid was applied. The patient tolerated the procedure well.     Mckinley Hernandez MD, [address] Orthopedic Surgery  Phone [phone]  Fax [phone]

## 2023-01-25 NOTE — TELEPHONE ENCOUNTER
Spoke to Skip Kimble with [facility] and informed that patient was seen for administration of Synvisc One today. She will make a notation of this on patients chart. No further questions at this time.

## 2023-01-26 ENCOUNTER — OFFICE VISIT (OUTPATIENT)
Dept: PODIATRY CLINIC | Facility: CLINIC | Age: 78
End: 2023-01-26

## 2023-01-26 ENCOUNTER — TELEPHONE (OUTPATIENT)
Dept: PODIATRY CLINIC | Facility: CLINIC | Age: 78
End: 2023-01-26

## 2023-01-26 DIAGNOSIS — S92.012D CLOSED DISPLACED FRACTURE OF BODY OF LEFT CALCANEUS WITH ROUTINE HEALING, SUBSEQUENT ENCOUNTER: Primary | ICD-10-CM

## 2023-01-26 PROCEDURE — 99213 OFFICE O/P EST LOW 20 MIN: CPT | Performed by: PODIATRIST

## 2023-01-26 NOTE — TELEPHONE ENCOUNTER
Spoke with Paradise Barron at [facility] requesting we fax over patients note from office visit today 1/26/23. [place] stated the fax number is [phone]. Office note sent.

## 2023-01-26 NOTE — TELEPHONE ENCOUNTER
Pt is at [facility] trying to sched appt for physical therapy but they did not receive order from our office and they will also need to get clinical notes and find out about the weight bearing? Fax # [phone]. I tried to reach RN but not avail.

## 2023-01-30 ENCOUNTER — TELEPHONE (OUTPATIENT)
Dept: PODIATRY CLINIC | Facility: CLINIC | Age: 78
End: 2023-01-30

## 2023-01-30 NOTE — TELEPHONE ENCOUNTER
S/w pt and she is at [facility] and they have chairs in the shower and someone can assist her getting into shower and then she can sit. She denies any open wounds. Informed her ok to shower as long as she can sit in the shower chair and doesn't WB on the left foot. She verbalized understanding.

## 2023-02-06 ENCOUNTER — TELEPHONE (OUTPATIENT)
Dept: ORTHOPEDICS CLINIC | Facility: CLINIC | Age: 78
End: 2023-02-06

## 2023-02-06 NOTE — TELEPHONE ENCOUNTER
Lvm for patient to call back and schedule Lt knee synvisc Injection,Patient needs to be scheduled 2 weeks ahead to assure that the medication will be there for the appt. Please forward TE back to me with Date,Time and Location.     Lilly Blood

## 2023-02-09 ENCOUNTER — OFFICE VISIT (OUTPATIENT)
Dept: PODIATRY CLINIC | Facility: CLINIC | Age: 78
End: 2023-02-09

## 2023-02-09 ENCOUNTER — TELEPHONE (OUTPATIENT)
Dept: PODIATRY CLINIC | Facility: CLINIC | Age: 78
End: 2023-02-09

## 2023-02-09 DIAGNOSIS — S92.012D CLOSED DISPLACED FRACTURE OF BODY OF LEFT CALCANEUS WITH ROUTINE HEALING, SUBSEQUENT ENCOUNTER: Primary | ICD-10-CM

## 2023-02-09 PROCEDURE — 99213 OFFICE O/P EST LOW 20 MIN: CPT | Performed by: PODIATRIST

## 2023-02-09 NOTE — TELEPHONE ENCOUNTER
Per [facility] pt was seen today, states orders brought in with pt do not state weightbearing status, asking what status is. Please call thank you.

## 2023-02-11 NOTE — TELEPHONE ENCOUNTER
Patient can get as tolerated supportive stability shoe any pain discomfort swelling patient should return to the cam boot

## 2023-02-27 ENCOUNTER — OFFICE VISIT (OUTPATIENT)
Dept: RHEUMATOLOGY | Facility: CLINIC | Age: 78
End: 2023-02-27
Payer: MEDICARE

## 2023-02-27 VITALS
WEIGHT: 203 LBS | SYSTOLIC BLOOD PRESSURE: 126 MMHG | TEMPERATURE: 98 F | OXYGEN SATURATION: 94 % | RESPIRATION RATE: 22 BRPM | HEART RATE: 89 BPM | BODY MASS INDEX: 35 KG/M2 | DIASTOLIC BLOOD PRESSURE: 72 MMHG

## 2023-02-27 DIAGNOSIS — Z51.81 THERAPEUTIC DRUG MONITORING: ICD-10-CM

## 2023-02-27 DIAGNOSIS — M25.541 ARTHRALGIA OF BOTH HANDS: ICD-10-CM

## 2023-02-27 DIAGNOSIS — L60.1 ONYCHOLYSIS: ICD-10-CM

## 2023-02-27 DIAGNOSIS — M25.542 ARTHRALGIA OF BOTH HANDS: ICD-10-CM

## 2023-02-27 DIAGNOSIS — R76.8 CENTROMERE ANTIBODY POSITIVE: Primary | ICD-10-CM

## 2023-02-27 DIAGNOSIS — J84.9 ILD (INTERSTITIAL LUNG DISEASE) (HCC): ICD-10-CM

## 2023-02-27 RX ORDER — RIBOFLAVIN (VITAMIN B2) 100 MG
1000 TABLET ORAL DAILY
COMMUNITY

## 2023-02-27 RX ORDER — CETIRIZINE HYDROCHLORIDE 10 MG/1
10 TABLET ORAL DAILY
COMMUNITY

## 2023-03-01 ENCOUNTER — TELEPHONE (OUTPATIENT)
Dept: PODIATRY CLINIC | Facility: CLINIC | Age: 78
End: 2023-03-01

## 2023-03-01 NOTE — TELEPHONE ENCOUNTER
I do not know if I have any openings tomorrow we will try to get her in double book if necessary thank you

## 2023-03-01 NOTE — TELEPHONE ENCOUNTER
Patient c/o left foot pain increasing. States as soon as she starts doing activities her pain increases. States she started putting boot back on last week. Pain is felt even with the boot. Denies any re injuries or falls. is wearing ankle brace as well. Rates pain 8/10. States pain goes away when resting and or when not walking. Currently icing and elevating. Patient takes Tramadol 50mg PRN for her knee pain, but states this does not help with her foot pain. Patient requesting pain medication to be sent to pharmacy. Please advise. Thank you.

## 2023-03-01 NOTE — TELEPHONE ENCOUNTER
S/w pt and offered her appt on 3/2 and she states she cannot get a ride for an appt. I advised her that Dr Wm Espinoza would need to see her to evaluate her increase in pain. Pt was willing to accept appt and if she cannot find a ride she will cancel appt on 3/2 morning.  appt made on 3/2 @ 245pm.

## 2023-03-01 NOTE — TELEPHONE ENCOUNTER
Per pt she is in a lot of pain and states tramadol is not helping. Per pt asking for pain medication.  Please advise

## 2023-03-09 ENCOUNTER — OFFICE VISIT (OUTPATIENT)
Dept: PODIATRY CLINIC | Facility: CLINIC | Age: 78
End: 2023-03-09

## 2023-03-09 DIAGNOSIS — S92.012D CLOSED DISPLACED FRACTURE OF BODY OF LEFT CALCANEUS WITH ROUTINE HEALING, SUBSEQUENT ENCOUNTER: Primary | ICD-10-CM

## 2023-03-09 PROCEDURE — 73650 X-RAY EXAM OF HEEL: CPT | Performed by: PODIATRIST

## 2023-03-09 PROCEDURE — 99213 OFFICE O/P EST LOW 20 MIN: CPT | Performed by: PODIATRIST

## 2023-03-15 ENCOUNTER — HOSPITAL ENCOUNTER (OUTPATIENT)
Dept: GENERAL RADIOLOGY | Age: 78
Discharge: HOME | End: 2023-03-15
Attending: INTERNAL MEDICINE
Payer: MEDICARE

## 2023-03-15 DIAGNOSIS — M25.542 ARTHRALGIA OF BOTH HANDS: ICD-10-CM

## 2023-03-15 DIAGNOSIS — M25.541 ARTHRALGIA OF BOTH HANDS: ICD-10-CM

## 2023-03-15 PROCEDURE — 73130 X-RAY EXAM OF HAND: CPT | Performed by: INTERNAL MEDICINE

## 2023-03-28 ENCOUNTER — HOSPITAL ENCOUNTER (OUTPATIENT)
Dept: CT IMAGING | Facility: HOSPITAL | Age: 78
Discharge: HOME | End: 2023-03-28
Attending: INTERNAL MEDICINE
Payer: MEDICARE

## 2023-03-28 DIAGNOSIS — M25.541 ARTHRALGIA OF BOTH HANDS: ICD-10-CM

## 2023-03-28 DIAGNOSIS — M19.041 ARTHRITIS OF RIGHT HAND: ICD-10-CM

## 2023-03-28 DIAGNOSIS — M25.542 ARTHRALGIA OF BOTH HANDS: ICD-10-CM

## 2023-03-28 DIAGNOSIS — M1A.0411 IDIOPATHIC CHRONIC GOUT OF RIGHT HAND WITH TOPHUS: ICD-10-CM

## 2023-03-28 PROCEDURE — 73200 CT UPPER EXTREMITY W/O DYE: CPT | Performed by: INTERNAL MEDICINE

## 2023-03-29 RX ORDER — REVEFENACIN 175 UG/3ML
SOLUTION RESPIRATORY (INHALATION)
Qty: 90 ML | Refills: 11 | Status: SHIPPED | OUTPATIENT
Start: 2023-03-29

## 2023-03-29 NOTE — TELEPHONE ENCOUNTER
Call placed to pt to find out if she is using Yupelri or Anoro as she was instructed by Dr. Leona Lamas at her visit on 10-17-22 not to use both simultaneously. Pt states that she switches back and forth between the two when the Anoro does not seem pt be working. Pt reminded not to use both at the same time. Pt reminded regarding follow up appt in one year in October. Rx for Jacey Molina pended for Dr. Kong Witt.

## 2023-04-04 ENCOUNTER — OFFICE VISIT (OUTPATIENT)
Dept: RHEUMATOLOGY | Facility: CLINIC | Age: 78
End: 2023-04-04
Payer: MEDICARE

## 2023-04-04 VITALS
RESPIRATION RATE: 16 BRPM | WEIGHT: 200.81 LBS | BODY MASS INDEX: 34.28 KG/M2 | HEART RATE: 84 BPM | HEIGHT: 64 IN | DIASTOLIC BLOOD PRESSURE: 58 MMHG | SYSTOLIC BLOOD PRESSURE: 134 MMHG | TEMPERATURE: 97 F

## 2023-04-04 DIAGNOSIS — G89.29 CHRONIC MUSCULOSKELETAL PAIN: ICD-10-CM

## 2023-04-04 DIAGNOSIS — R76.8 CENTROMERE ANTIBODY POSITIVE: ICD-10-CM

## 2023-04-04 DIAGNOSIS — M10.9 GOUT, UNSPECIFIED CAUSE, UNSPECIFIED CHRONICITY, UNSPECIFIED SITE: ICD-10-CM

## 2023-04-04 DIAGNOSIS — M79.18 CHRONIC MUSCULOSKELETAL PAIN: ICD-10-CM

## 2023-04-04 DIAGNOSIS — M25.541 ARTHRALGIA OF BOTH HANDS: Primary | ICD-10-CM

## 2023-04-04 DIAGNOSIS — Z51.81 THERAPEUTIC DRUG MONITORING: ICD-10-CM

## 2023-04-04 DIAGNOSIS — J84.9 ILD (INTERSTITIAL LUNG DISEASE) (HCC): ICD-10-CM

## 2023-04-04 DIAGNOSIS — M25.542 ARTHRALGIA OF BOTH HANDS: Primary | ICD-10-CM

## 2023-04-04 RX ORDER — PREDNISONE 1 MG/1
TABLET ORAL
Qty: 32 TABLET | Refills: 0 | Status: SHIPPED | OUTPATIENT
Start: 2023-04-04 | End: 2023-04-14

## 2023-04-04 RX ORDER — COLCHICINE 0.6 MG/1
0.6 TABLET ORAL DAILY
Qty: 90 TABLET | Refills: 1 | Status: SHIPPED | OUTPATIENT
Start: 2023-04-04

## 2023-04-04 RX ORDER — DULOXETIN HYDROCHLORIDE 30 MG/1
CAPSULE, DELAYED RELEASE ORAL
Qty: 150 CAPSULE | Refills: 0 | Status: SHIPPED | OUTPATIENT
Start: 2023-04-04 | End: 2023-07-03

## 2023-04-04 RX ORDER — ALLOPURINOL 300 MG/1
TABLET ORAL
Qty: 195 TABLET | Refills: 1 | Status: SHIPPED | OUTPATIENT
Start: 2023-04-04 | End: 2023-07-03

## 2023-04-04 NOTE — PATIENT INSTRUCTIONS
Repeat blood work at [facility] in mid-June    For your pain: Start cymbalta (duloxetine) 30 mg daily x 4 weeks, then 60 mg daily. I prescribed it to your pharmacy. See the reading below. Sometimes the medication will give you more energy to complete acts, including suicide, in the first month, this risk goes down after the first month. Be very careful of this. If you have these thoughts, then stop the medication.       ==============================================================================================================      Allopurinol 300 mg tablet up-titration (this will cure your gout)  1/2 tablet (150 mg total) daily for 60 days  Then a full pill (300 mg) daily thereafter    Colchicine (to prevent gout flares while starting on allopurinol). Generally, you will need to be on colchicine for 6-12 months, when the risk of gout flares is highest during allopurinol initiation. Colchicine 0.6 mg tab: 1 tablet daily. If you have diarrhea, then stop this medication. Prednisone taper given (only as needed for gout flares):    6 tabs (30 mg) daily for 2 days  THEN 4 tabs (20 mg) daily for 2 days,   THEN 3 tabs (15 mg) daily for 2 day  THEN 2 tabs (10 mg ) daily for 2 days  THEN 1 tablet (5 mg total) daily for 2 days.

## 2023-04-06 ENCOUNTER — TELEPHONE (OUTPATIENT)
Dept: RHEUMATOLOGY | Facility: CLINIC | Age: 78
End: 2023-04-06

## 2023-04-06 NOTE — TELEPHONE ENCOUNTER
Pt called office, wondering if she will need to take her medication at a certain time of day. Explained pt can take medication at any time,but should be consistent at time of day, and should take prednisone with food. Pt voices understanding.

## 2023-05-03 ENCOUNTER — APPOINTMENT (OUTPATIENT)
Dept: CT IMAGING | Facility: HOSPITAL | Age: 78
End: 2023-05-03
Attending: EMERGENCY MEDICINE
Payer: MEDICARE

## 2023-05-03 ENCOUNTER — HOSPITAL ENCOUNTER (INPATIENT)
Facility: HOSPITAL | Age: 78
LOS: 1 days | Discharge: HOME | End: 2023-05-05
Attending: EMERGENCY MEDICINE | Admitting: INTERNAL MEDICINE
Payer: MEDICARE

## 2023-05-03 ENCOUNTER — APPOINTMENT (OUTPATIENT)
Dept: GENERAL RADIOLOGY | Facility: HOSPITAL | Age: 78
End: 2023-05-03
Attending: EMERGENCY MEDICINE
Payer: MEDICARE

## 2023-05-03 DIAGNOSIS — A04.72 INTESTINAL INFECTION DUE TO CLOSTRIDIUM DIFFICILE: ICD-10-CM

## 2023-05-03 DIAGNOSIS — R10.9 ABDOMINAL PAIN, ACUTE: Primary | ICD-10-CM

## 2023-05-03 DIAGNOSIS — K29.80 DUODENITIS: ICD-10-CM

## 2023-05-03 DIAGNOSIS — E86.0 DEHYDRATION: ICD-10-CM

## 2023-05-03 LAB
ADENOVIRUS F 40/41 PCR: NEGATIVE
ALBUMIN SERPL-MCNC: 3.5 G/DL (ref 3.4–5)
ALBUMIN/GLOB SERPL: 1.1 {RATIO} (ref 1–2)
ALP LIVER SERPL-CCNC: 108 U/L
ALT SERPL-CCNC: 17 U/L
ANION GAP SERPL CALC-SCNC: 7 MMOL/L (ref 0–18)
AST SERPL-CCNC: 16 U/L (ref 15–37)
ASTROVIRUS PCR: NEGATIVE
ATRIAL RATE: 91 BPM
BASOPHILS # BLD AUTO: 0.09 X10(3) UL (ref 0–0.2)
BASOPHILS NFR BLD AUTO: 1 %
BILIRUB SERPL-MCNC: 0.6 MG/DL (ref 0.1–2)
BILIRUB UR QL STRIP.AUTO: NEGATIVE
BUN BLD-MCNC: 23 MG/DL (ref 7–18)
C CAYETANENSIS DNA SPEC QL NAA+PROBE: NEGATIVE
C DIFF GDH + TOXINS A+B STL QL IA.RAPID: DETECTED
C DIFF TOX B STL QL: POSITIVE
CALCIUM BLD-MCNC: 9.2 MG/DL (ref 8.5–10.1)
CAMPY SP DNA.DIARRHEA STL QL NAA+PROBE: NEGATIVE
CHLORIDE SERPL-SCNC: 106 MMOL/L (ref 98–112)
CO2 SERPL-SCNC: 20 MMOL/L (ref 21–32)
COLOR UR AUTO: YELLOW
CREAT BLD-MCNC: 1.17 MG/DL
CRYPTOSP DNA SPEC QL NAA+PROBE: NEGATIVE
EAEC PAA PLAS AGGR+AATA ST NAA+NON-PRB: NEGATIVE
EC STX1+STX2 + H7 FLIC SPEC NAA+PROBE: NEGATIVE
ENTAMOEBA HISTOLYTICA PCR: NEGATIVE
EOSINOPHIL # BLD AUTO: 0.21 X10(3) UL (ref 0–0.7)
EOSINOPHIL NFR BLD AUTO: 2.4 %
EPEC EAE GENE STL QL NAA+NON-PROBE: NEGATIVE
ERYTHROCYTE [DISTWIDTH] IN BLOOD BY AUTOMATED COUNT: 14.6 %
EST. AVERAGE GLUCOSE BLD GHB EST-MCNC: 131 MG/DL (ref 68–126)
ETEC LTA+ST1A+ST1B TOX ST NAA+NON-PROBE: NEGATIVE
GFR SERPLBLD BASED ON 1.73 SQ M-ARVRAT: 47 ML/MIN/1.73M2 (ref 60–?)
GIARDIA LAMBLIA PCR: NEGATIVE
GLOBULIN PLAS-MCNC: 3.3 G/DL (ref 2.8–4.4)
GLUCOSE BLD-MCNC: 113 MG/DL (ref 70–99)
GLUCOSE BLD-MCNC: 187 MG/DL (ref 70–99)
GLUCOSE BLD-MCNC: 218 MG/DL (ref 70–99)
GLUCOSE BLD-MCNC: 32 MG/DL (ref 70–99)
GLUCOSE BLD-MCNC: 46 MG/DL (ref 70–99)
GLUCOSE BLD-MCNC: 46 MG/DL (ref 70–99)
GLUCOSE BLD-MCNC: 97 MG/DL (ref 70–99)
GLUCOSE UR STRIP.AUTO-MCNC: NEGATIVE MG/DL
HBA1C MFR BLD: 6.2 % (ref ?–5.7)
HCT VFR BLD AUTO: 43.5 %
HGB BLD-MCNC: 14 G/DL
IMM GRANULOCYTES # BLD AUTO: 0.03 X10(3) UL (ref 0–1)
IMM GRANULOCYTES NFR BLD: 0.3 %
KETONES UR STRIP.AUTO-MCNC: NEGATIVE MG/DL
LIPASE SERPL-CCNC: 34 U/L (ref 13–75)
LYMPHOCYTES # BLD AUTO: 1.02 X10(3) UL (ref 1–4)
LYMPHOCYTES NFR BLD AUTO: 11.5 %
MCH RBC QN AUTO: 27.9 PG (ref 26–34)
MCHC RBC AUTO-ENTMCNC: 32.2 G/DL (ref 31–37)
MCV RBC AUTO: 86.7 FL
MONOCYTES # BLD AUTO: 0.87 X10(3) UL (ref 0.1–1)
MONOCYTES NFR BLD AUTO: 9.8 %
NEUTROPHILS # BLD AUTO: 6.66 X10 (3) UL (ref 1.5–7.7)
NEUTROPHILS # BLD AUTO: 6.66 X10(3) UL (ref 1.5–7.7)
NEUTROPHILS NFR BLD AUTO: 75 %
NITRITE UR QL STRIP.AUTO: NEGATIVE
NOROVIRUS GI/GII PCR: NEGATIVE
OSMOLALITY SERPL CALC.SUM OF ELEC: 286 MOSM/KG (ref 275–295)
P AXIS: 140 DEGREES
P SHIGELLOIDES DNA STL QL NAA+PROBE: NEGATIVE
P-R INTERVAL: 128 MS
PH UR STRIP.AUTO: 5 [PH] (ref 5–8)
PLATELET # BLD AUTO: 210 10(3)UL (ref 150–450)
POTASSIUM SERPL-SCNC: 4.7 MMOL/L (ref 3.5–5.1)
PROT SERPL-MCNC: 6.8 G/DL (ref 6.4–8.2)
PROT UR STRIP.AUTO-MCNC: NEGATIVE MG/DL
Q-T INTERVAL: 346 MS
QRS DURATION: 84 MS
QTC CALCULATION (BEZET): 425 MS
R AXIS: 22 DEGREES
RBC # BLD AUTO: 5.02 X10(6)UL
RBC #/AREA URNS AUTO: >10 /HPF
ROTAVIRUS A PCR: NEGATIVE
SALMONELLA DNA SPEC QL NAA+PROBE: NEGATIVE
SAPOVIRUS PCR: NEGATIVE
SHIGELLA SP+EIEC IPAH ST NAA+NON-PROBE: NEGATIVE
SODIUM SERPL-SCNC: 133 MMOL/L (ref 136–145)
SP GR UR STRIP.AUTO: >1.03 (ref 1–1.03)
T AXIS: 138 DEGREES
TROPONIN I HIGH SENSITIVITY: 11 NG/L
UROBILINOGEN UR STRIP.AUTO-MCNC: <2 MG/DL
V CHOLERAE DNA SPEC QL NAA+PROBE: NEGATIVE
VENTRICULAR RATE: 91 BPM
VIBRIO DNA SPEC NAA+PROBE: NEGATIVE
WBC # BLD AUTO: 8.9 X10(3) UL (ref 4–11)
WBC #/AREA URNS AUTO: >50 /HPF
YERSINIA DNA SPEC NAA+PROBE: NEGATIVE

## 2023-05-03 PROCEDURE — 99223 1ST HOSP IP/OBS HIGH 75: CPT | Performed by: INTERNAL MEDICINE

## 2023-05-03 PROCEDURE — 74177 CT ABD & PELVIS W/CONTRAST: CPT | Performed by: EMERGENCY MEDICINE

## 2023-05-03 PROCEDURE — 71045 X-RAY EXAM CHEST 1 VIEW: CPT | Performed by: EMERGENCY MEDICINE

## 2023-05-03 RX ORDER — TRAMADOL HYDROCHLORIDE 50 MG/1
50 TABLET ORAL EVERY 12 HOURS PRN
Status: DISCONTINUED | OUTPATIENT
Start: 2023-05-03 | End: 2023-05-05

## 2023-05-03 RX ORDER — HEPARIN SODIUM 5000 [USP'U]/ML
5000 INJECTION, SOLUTION INTRAVENOUS; SUBCUTANEOUS EVERY 12 HOURS SCHEDULED
Status: DISCONTINUED | OUTPATIENT
Start: 2023-05-03 | End: 2023-05-05

## 2023-05-03 RX ORDER — DULOXETIN HYDROCHLORIDE 30 MG/1
30 CAPSULE, DELAYED RELEASE ORAL DAILY
Status: DISCONTINUED | OUTPATIENT
Start: 2023-05-03 | End: 2023-05-03

## 2023-05-03 RX ORDER — ROSUVASTATIN CALCIUM 10 MG/1
10 TABLET, COATED ORAL NIGHTLY
Status: DISCONTINUED | OUTPATIENT
Start: 2023-05-03 | End: 2023-05-05

## 2023-05-03 RX ORDER — DULOXETIN HYDROCHLORIDE 30 MG/1
30 CAPSULE, DELAYED RELEASE ORAL ONCE
Status: COMPLETED | OUTPATIENT
Start: 2023-05-03 | End: 2023-05-03

## 2023-05-03 RX ORDER — DULOXETIN HYDROCHLORIDE 30 MG/1
60 CAPSULE, DELAYED RELEASE ORAL DAILY
Status: DISCONTINUED | OUTPATIENT
Start: 2023-05-04 | End: 2023-05-05

## 2023-05-03 RX ORDER — ALLOPURINOL 300 MG/1
150 TABLET ORAL DAILY
Status: DISCONTINUED | OUTPATIENT
Start: 2023-05-03 | End: 2023-05-05

## 2023-05-03 RX ORDER — NICOTINE POLACRILEX 4 MG
15 LOZENGE BUCCAL
Status: DISCONTINUED | OUTPATIENT
Start: 2023-05-03 | End: 2023-05-05

## 2023-05-03 RX ORDER — VANCOMYCIN HYDROCHLORIDE 125 MG/1
125 CAPSULE ORAL 4 TIMES DAILY
Status: DISCONTINUED | OUTPATIENT
Start: 2023-05-03 | End: 2023-05-05

## 2023-05-03 RX ORDER — FLUTICASONE PROPIONATE 50 MCG
2 SPRAY, SUSPENSION (ML) NASAL DAILY
Status: DISCONTINUED | OUTPATIENT
Start: 2023-05-03 | End: 2023-05-05

## 2023-05-03 RX ORDER — VANCOMYCIN HYDROCHLORIDE 125 MG/1
250 CAPSULE ORAL ONCE
Status: DISCONTINUED | OUTPATIENT
Start: 2023-05-03 | End: 2023-05-03

## 2023-05-03 RX ORDER — LORAZEPAM 1 MG/1
1 TABLET ORAL NIGHTLY
Status: DISCONTINUED | OUTPATIENT
Start: 2023-05-03 | End: 2023-05-05

## 2023-05-03 RX ORDER — UMECLIDINIUM BROMIDE AND VILANTEROL TRIFENATATE 62.5; 25 UG/1; UG/1
POWDER RESPIRATORY (INHALATION)
Qty: 1 EACH | Refills: 5 | Status: SHIPPED | OUTPATIENT
Start: 2023-05-03

## 2023-05-03 RX ORDER — DEXTROSE MONOHYDRATE 25 G/50ML
50 INJECTION, SOLUTION INTRAVENOUS
Status: DISCONTINUED | OUTPATIENT
Start: 2023-05-03 | End: 2023-05-05

## 2023-05-03 RX ORDER — ACETAMINOPHEN 500 MG
500 TABLET ORAL EVERY 4 HOURS PRN
Status: DISCONTINUED | OUTPATIENT
Start: 2023-05-03 | End: 2023-05-05

## 2023-05-03 RX ORDER — ALBUTEROL SULFATE 90 UG/1
2 AEROSOL, METERED RESPIRATORY (INHALATION) EVERY 6 HOURS PRN
Status: DISCONTINUED | OUTPATIENT
Start: 2023-05-03 | End: 2023-05-05

## 2023-05-03 RX ORDER — CETIRIZINE HYDROCHLORIDE 10 MG/1
10 TABLET ORAL DAILY
Status: DISCONTINUED | OUTPATIENT
Start: 2023-05-03 | End: 2023-05-05

## 2023-05-03 RX ORDER — VANCOMYCIN HYDROCHLORIDE 125 MG/1
125 CAPSULE ORAL ONCE
Status: COMPLETED | OUTPATIENT
Start: 2023-05-03 | End: 2023-05-03

## 2023-05-03 RX ORDER — KETOROLAC TROMETHAMINE 15 MG/ML
15 INJECTION, SOLUTION INTRAMUSCULAR; INTRAVENOUS ONCE
Status: COMPLETED | OUTPATIENT
Start: 2023-05-03 | End: 2023-05-03

## 2023-05-03 RX ORDER — METOPROLOL SUCCINATE 25 MG/1
25 TABLET, EXTENDED RELEASE ORAL DAILY
Status: DISCONTINUED | OUTPATIENT
Start: 2023-05-03 | End: 2023-05-05

## 2023-05-03 RX ORDER — NICOTINE POLACRILEX 4 MG
30 LOZENGE BUCCAL
Status: DISCONTINUED | OUTPATIENT
Start: 2023-05-03 | End: 2023-05-05

## 2023-05-03 RX ORDER — SODIUM CHLORIDE 9 MG/ML
INJECTION, SOLUTION INTRAVENOUS CONTINUOUS
Status: DISCONTINUED | OUTPATIENT
Start: 2023-05-03 | End: 2023-05-05

## 2023-05-03 NOTE — TELEPHONE ENCOUNTER
Received request for Anoro refill. Last office visit:10/17/22- per Dr Jimbo South office note, \"Will proceed with daily anoro and use albuterol prn with spacer\"  Patient last refilled Anoro- 08/05/22. Patients next scheduled office visit:10/18/23. Order pended and routed to dr Jimbo South for Review.

## 2023-05-03 NOTE — ED INITIAL ASSESSMENT (HPI)
PT states that she has been having pain on her upper abdomen and lower chest since Sunday ( three days ago). PT states that shew is also having diarrhea and she thinks that it is caused by prescribed antibiotics to treat a sinus infection. PT states that she did stop the antibiotics a few days ago due to these symptoms.

## 2023-05-03 NOTE — PLAN OF CARE
Pt is a&ox4. Glasses. [place]. RA sating >90%. Tele NSR, [facility] (cardiac). PO vanco. Heparin. IVF's/ Voids per restroom, up SBA w/ cane. Tolerating diabetic diet w/ QID accuchecks. Pt had a critical blood glucose result of 47- MD notified PRN meds given see MAR. Pt updated on poc. Safety precautions in place. No further needs at this time.     Problem: Diabetes/Glucose Control  Goal: Glucose maintained within prescribed range  Description: INTERVENTIONS:  - Monitor Blood Glucose as ordered  - Assess for signs and symptoms of hyperglycemia and hypoglycemia  - Administer ordered medications to maintain glucose within target range  - Assess barriers to adequate nutritional intake and initiate nutrition consult as needed  - Instruct patient on self management of diabetes  Outcome: Progressing

## 2023-05-04 LAB
ANION GAP SERPL CALC-SCNC: 6 MMOL/L (ref 0–18)
BASOPHILS # BLD AUTO: 0.08 X10(3) UL (ref 0–0.2)
BASOPHILS NFR BLD AUTO: 1.2 %
BUN BLD-MCNC: 21 MG/DL (ref 7–18)
CALCIUM BLD-MCNC: 9 MG/DL (ref 8.5–10.1)
CHLORIDE SERPL-SCNC: 113 MMOL/L (ref 98–112)
CO2 SERPL-SCNC: 20 MMOL/L (ref 21–32)
CREAT BLD-MCNC: 0.92 MG/DL
EOSINOPHIL # BLD AUTO: 0.33 X10(3) UL (ref 0–0.7)
EOSINOPHIL NFR BLD AUTO: 5 %
ERYTHROCYTE [DISTWIDTH] IN BLOOD BY AUTOMATED COUNT: 14.5 %
GFR SERPLBLD BASED ON 1.73 SQ M-ARVRAT: 63 ML/MIN/1.73M2 (ref 60–?)
GLUCOSE BLD-MCNC: 106 MG/DL (ref 70–99)
GLUCOSE BLD-MCNC: 115 MG/DL (ref 70–99)
GLUCOSE BLD-MCNC: 134 MG/DL (ref 70–99)
GLUCOSE BLD-MCNC: 137 MG/DL (ref 70–99)
GLUCOSE BLD-MCNC: 140 MG/DL (ref 70–99)
HCT VFR BLD AUTO: 41.2 %
HGB BLD-MCNC: 12.4 G/DL
IMM GRANULOCYTES # BLD AUTO: 0.03 X10(3) UL (ref 0–1)
IMM GRANULOCYTES NFR BLD: 0.5 %
LYMPHOCYTES # BLD AUTO: 1.36 X10(3) UL (ref 1–4)
LYMPHOCYTES NFR BLD AUTO: 20.6 %
MAGNESIUM SERPL-MCNC: 2 MG/DL (ref 1.6–2.6)
MCH RBC QN AUTO: 27.2 PG (ref 26–34)
MCHC RBC AUTO-ENTMCNC: 30.1 G/DL (ref 31–37)
MCV RBC AUTO: 90.4 FL
MONOCYTES # BLD AUTO: 0.71 X10(3) UL (ref 0.1–1)
MONOCYTES NFR BLD AUTO: 10.8 %
NEUTROPHILS # BLD AUTO: 4.08 X10 (3) UL (ref 1.5–7.7)
NEUTROPHILS # BLD AUTO: 4.08 X10(3) UL (ref 1.5–7.7)
NEUTROPHILS NFR BLD AUTO: 61.9 %
OSMOLALITY SERPL CALC.SUM OF ELEC: 293 MOSM/KG (ref 275–295)
PLATELET # BLD AUTO: 181 10(3)UL (ref 150–450)
POTASSIUM SERPL-SCNC: 4.6 MMOL/L (ref 3.5–5.1)
RBC # BLD AUTO: 4.56 X10(6)UL
SODIUM SERPL-SCNC: 139 MMOL/L (ref 136–145)
WBC # BLD AUTO: 6.6 X10(3) UL (ref 4–11)

## 2023-05-04 PROCEDURE — 99232 SBSQ HOSP IP/OBS MODERATE 35: CPT | Performed by: INTERNAL MEDICINE

## 2023-05-04 NOTE — PLAN OF CARE
Pt is AOx4, Hard of hearing, on RA sats>90, tele NSR, heparin. Pt is strict I/O and has a hat in the toilet. Denies pain. PA [place], PO samsono, IV fluids [email or web address].  On isolation for CDiff  Problem: Diabetes/Glucose Control  Goal: Glucose maintained within prescribed range  Description: INTERVENTIONS:  - Monitor Blood Glucose as ordered  - Assess for signs and symptoms of hyperglycemia and hypoglycemia  - Administer ordered medications to maintain glucose within target range  - Assess barriers to adequate nutritional intake and initiate nutrition consult as needed  - Instruct patient on self management of diabetes  Outcome: Progressing     Problem: Patient/Family Goals  Goal: Patient/Family Long Term Goal  Description: Patient's Long Term Goal: To dc home    Interventions:  - Increase strength  - continue antibiotics  - See additional Care Plan goals for specific interventions  Outcome: Progressing  Goal: Patient/Family Short Term Goal  Description: Patient's Short Term Goal: Increase strength    Interventions:   - ambulate TID  - Use IS/Flutter  - See additional Care Plan goals for specific interventions  Outcome: Progressing

## 2023-05-04 NOTE — CM/SW NOTE
05/04/23 1100   CM/SW Referral Data   Referral Source Physician   Reason for Referral Advance Directives/POLST   Informant Patient;Clinical Staff Member;EMR   Patient Info   Patient's Current Mental Status at Time of Assessment Oriented; Alert   Patient's [address]   Patient lives with Alone   Patient Status Prior to Admission   Independent with ADLs and Mobility Yes   Discharge Needs   Anticipated D/C needs No anticipated discharge needs     Pt is a 76year old female admitted with abdominal pain. SW received order for POLST. Noted PT recommendation for home. SW met with pt at bedside. Pt stated she completed DNR form 4 years ago whilst at [place] [address] and does not wish to complete a new POLST at this time. Pt stated she provided a copy of her DNR to [facility], DNR form not on chart. POLST form provided to pt. Pt denied any further needs at this time. SW will continue to remain available.      DAVID Montana  Discharge Planner

## 2023-05-04 NOTE — PLAN OF CARE
Pt is a&ox4. Glasses. [place]. RA sating >92%. Raynauds syndrome in hands noted. Tele NSR,  [facility] (cardiac). PO vanco. Heparin. IVF's. Voids per restroom, up SBA w/ cane. Tolerating diabetic diet w/ QID accuchecks. Safety precautions in place. No further needs at this time.   Problem: Diabetes/Glucose Control  Goal: Glucose maintained within prescribed range  Description: INTERVENTIONS:  - Monitor Blood Glucose as ordered  - Assess for signs and symptoms of hyperglycemia and hypoglycemia  - Administer ordered medications to maintain glucose within target range  - Assess barriers to adequate nutritional intake and initiate nutrition consult as needed  - Instruct patient on self management of diabetes  Outcome: Progressing

## 2023-05-05 VITALS
TEMPERATURE: 98 F | SYSTOLIC BLOOD PRESSURE: 137 MMHG | HEIGHT: 64 IN | WEIGHT: 187.31 LBS | DIASTOLIC BLOOD PRESSURE: 63 MMHG | HEART RATE: 81 BPM | OXYGEN SATURATION: 91 % | RESPIRATION RATE: 18 BRPM | BODY MASS INDEX: 31.98 KG/M2

## 2023-05-05 LAB
GLUCOSE BLD-MCNC: 116 MG/DL (ref 70–99)
GLUCOSE BLD-MCNC: 129 MG/DL (ref 70–99)

## 2023-05-05 PROCEDURE — 99239 HOSP IP/OBS DSCHRG MGMT >30: CPT | Performed by: HOSPITALIST

## 2023-05-05 RX ORDER — PANTOPRAZOLE SODIUM 40 MG/1
40 TABLET, DELAYED RELEASE ORAL
Qty: 30 TABLET | Refills: 0 | Status: SHIPPED | OUTPATIENT
Start: 2023-06-02

## 2023-05-05 RX ORDER — PANTOPRAZOLE SODIUM 40 MG/1
40 TABLET, DELAYED RELEASE ORAL
Qty: 60 TABLET | Refills: 0 | Status: SHIPPED | OUTPATIENT
Start: 2023-05-05

## 2023-05-05 RX ORDER — VANCOMYCIN HYDROCHLORIDE 125 MG/1
125 CAPSULE ORAL 4 TIMES DAILY
Qty: 36 CAPSULE | Refills: 0 | Status: SHIPPED | OUTPATIENT
Start: 2023-05-05 | End: 2023-05-05

## 2023-05-05 RX ORDER — VANCOMYCIN HYDROCHLORIDE 125 MG/1
125 CAPSULE ORAL 4 TIMES DAILY
Qty: 52 CAPSULE | Refills: 0 | Status: SHIPPED | OUTPATIENT
Start: 2023-05-05 | End: 2023-05-18

## 2023-05-05 NOTE — PLAN OF CARE
Pt is AOx4, Hard of hearing, on RA sats>90, tele NSR, heparin. Pt is strict I/O and has a hat in the toilet. Denies pain. PA [place], MADHU schmitt, IV fluids [email or web address]  On isolation for CDiff  Problem: Diabetes/Glucose Control  Goal: Glucose maintained within prescribed range  Description: INTERVENTIONS:  - Monitor Blood Glucose as ordered  - Assess for signs and symptoms of hyperglycemia and hypoglycemia  - Administer ordered medications to maintain glucose within target range  - Assess barriers to adequate nutritional intake and initiate nutrition consult as needed  - Instruct patient on self management of diabetes  Outcome: Progressing     Problem: Patient/Family Goals  Goal: Patient/Family Long Term Goal  Description: Patient's Long Term Goal: To dc home    Interventions:  - Increase strength  - continue antibiotics  - See additional Care Plan goals for specific interventions  Outcome: Progressing  Goal: Patient/Family Short Term Goal  Description: Patient's Short Term Goal: Increase strength    Interventions:   - ambulate TID  - Use IS/Flutter  - See additional Care Plan goals for specific interventions  Outcome: Progressing

## 2023-05-05 NOTE — DISCHARGE INSTRUCTIONS
40mg pantoprazole twice daily for 4 weeks, then go to 40mg daily, as per GI's instructions. Any changes from this regimen should come from your PCP or GI.

## 2023-05-05 NOTE — PROGRESS NOTES
Patient seen and examined. Discharge if ok with consultants. Hospitalist portion of med rec completed.     Duncan Blanc MD  [facility]  Internal Medicine Hospitalist

## 2023-05-05 NOTE — CM/SW NOTE
05/05/23 1200   Discharge disposition   Expected discharge disposition Home or Self   Discharge transportation Private car     Pt cleared for DC today, if tolerates diet. Pt eating soft lunch and reports she is feeling good. Pt reports [facility] and will pick her up when she is ready to dc. RN informed.     Moose Newell, JUNGN, [facility]    [id number]

## 2023-05-18 NOTE — PROGRESS NOTES
[address] Patient Status:  Inpatient    1945 MRN [id number]   [facility] 5NW-A Attending Rico Rees MD   [facility] Day # 2 PCP MD [place] Mofaheem is a 66year old female patient.     Patient Acti (36.7 °C), temperature source Oral, resp. rate 20, height 162.6 cm (5' 4\"), weight 172 lb (78 kg), last menstrual period 06/07/1999, SpO2 97 %, not currently breastfeeding.        Subjective:  Symptoms:  She reports weakness and anxiety.  (C/o being unable Azithromycin Pregnancy And Lactation Text: This medication is considered safe during pregnancy and is also secreted in breast milk.

## 2023-05-19 ENCOUNTER — TELEPHONE (OUTPATIENT)
Dept: ORTHOPEDICS CLINIC | Facility: CLINIC | Age: 78
End: 2023-05-19

## 2023-05-19 DIAGNOSIS — M25.562 PAIN IN BOTH KNEES, UNSPECIFIED CHRONICITY: Primary | ICD-10-CM

## 2023-05-19 DIAGNOSIS — M25.561 PAIN IN BOTH KNEES, UNSPECIFIED CHRONICITY: Primary | ICD-10-CM

## 2023-05-19 NOTE — TELEPHONE ENCOUNTER
xr's ordered and scheduled for upcoming appointment please call and inform her to come 15-20 minutes earlier to complete images

## 2023-05-22 ENCOUNTER — OFFICE VISIT (OUTPATIENT)
Dept: ORTHOPEDICS CLINIC | Facility: CLINIC | Age: 78
End: 2023-05-22
Payer: MEDICARE

## 2023-05-22 ENCOUNTER — HOSPITAL ENCOUNTER (OUTPATIENT)
Dept: GENERAL RADIOLOGY | Age: 78
Discharge: HOME | End: 2023-05-22
Attending: ORTHOPAEDIC SURGERY
Payer: MEDICARE

## 2023-05-22 VITALS — WEIGHT: 195 LBS | HEIGHT: 64 IN | BODY MASS INDEX: 33.29 KG/M2

## 2023-05-22 DIAGNOSIS — M25.562 PAIN IN BOTH KNEES, UNSPECIFIED CHRONICITY: ICD-10-CM

## 2023-05-22 DIAGNOSIS — M25.561 PAIN IN BOTH KNEES, UNSPECIFIED CHRONICITY: ICD-10-CM

## 2023-05-22 DIAGNOSIS — M17.0 PRIMARY OSTEOARTHRITIS OF BOTH KNEES: Primary | ICD-10-CM

## 2023-05-22 PROCEDURE — 73564 X-RAY EXAM KNEE 4 OR MORE: CPT | Performed by: ORTHOPAEDIC SURGERY

## 2023-05-22 RX ORDER — TRIAMCINOLONE ACETONIDE 40 MG/ML
80 INJECTION, SUSPENSION INTRA-ARTICULAR; INTRAMUSCULAR ONCE
Status: COMPLETED | OUTPATIENT
Start: 2023-05-22 | End: 2023-05-22

## 2023-05-22 RX ADMIN — TRIAMCINOLONE ACETONIDE 80 MG: 40 INJECTION, SUSPENSION INTRA-ARTICULAR; INTRAMUSCULAR at 15:39:00

## 2023-05-22 NOTE — PROGRESS NOTES
[facility] Progress Note    Subjective: Patient returns to discuss her knees. She states her symptoms of gotten significantly worse. The Visco injection performed in February did not provide any relief. Right knee is gotten significantly worse especially, more clicking and pain. She has begun using a cane for ambulation. Objective: Patient is very pleasant. She demonstrates full extension of both knees, flexion to around 110 120. Right knee with valgus alignment, incompletely correctable. Imaging: X-rays of both knees personally viewed, independently interpreted and radiology report read. Right knee with severe valgus osteoarthritis progressed from films 1 to 2 years ago. She has complete loss of lateral joint space/bone-on-bone, tricompartmental osteophytes and subchondral sclerosis. Left knee with mild to moderate joint space narrowing. Assessment/Plan: 64-year-old female with symptomatic radiographically severe right and moderate left knee osteoarthritis. We revisited the discussion of treatment options. She has not had relief with Visco.  Therapy, assistive ice and medications have not proven for providing relief. We did discuss surgery with knee replacement. Discussed expectations and timeframe for recovery. She does not feel ready for this yet. She would like to attempt steroid injection, this was performed in clinic today for both knees. Advised that this can be repeated as often as every 3 months as needed. Also advised that surgery should not be performed within 3 months of injection given increased risk of infection. She expressed understanding and agreement. Should the injection not provide sufficient relief, she may want to consider knee replacement.   I did discuss risks and benefits of surgery, with risks including but not limited to infection, blood clots, fracture, bleeding/need for blood transfusion, injury to blood vessels and nerves, loosening or failure of components, stiffness, continued pain, need for further intervention or revision surgery. Additionally I discussed expectations with regards to the postoperative recovery timeframe, the possibility of mechanical clicking with the knee, numbness on the lateral side of the knee/leg from sacrifice of the infrapatellar branch of the saphenous nerve, and potential for difficulty/pain with kneeling and performing deep flexion activities. The patient understands this discussion. If she wants to proceed she will contact us to schedule surgery. We can do this over the phone. Paul Riki was provided today. She would need primary care as well as pulmonary clearance as she reports history of COPD. Additionally we would get full-length lower extremity standing films.     Forest Ramirez MD, [address] [facility]  Phone [phone]  Fax [phone]

## 2023-05-22 NOTE — PROCEDURES
After informed consent, the patient's right and left knees were marked, locally anesthetized with skin refrigerant, prepped with topical antiseptic, and injected with a mixture of 1mL 40mg/mL Kenalog, 2mL 1% lidocaine and 2mL 0.5% marcaine through the inferolateral portal.  A band-aid was applied. The patient tolerated the procedure well.     Irene Bonilla MD, [address] [place] [facility]  Phone [phone]  Fax [phone]

## 2023-05-30 DIAGNOSIS — J43.2 CENTRILOBULAR EMPHYSEMA (HCC): ICD-10-CM

## 2023-05-30 RX ORDER — FLUTICASONE PROPIONATE 50 MCG
SPRAY, SUSPENSION (ML) NASAL
Qty: 16 G | Refills: 5 | Status: SHIPPED | OUTPATIENT
Start: 2023-05-30

## 2023-05-30 NOTE — TELEPHONE ENCOUNTER
Received RX refill Request for FLUTICASONE PROPIONATE 50 MCG/ACT Nasal Suspension. Pt last office visit 10/17/23. Next office visit scheduled 10/18/23. RX pended and routed to Dr. Marisela Haas for review.

## 2023-05-31 ENCOUNTER — TELEPHONE (OUTPATIENT)
Dept: PODIATRY CLINIC | Facility: CLINIC | Age: 78
End: 2023-05-31

## 2023-05-31 NOTE — TELEPHONE ENCOUNTER
Patient states the one week ago, she fell and felt that her left foot got injured when she took the fall. She states that there is bruising by proximal portions of the toe. She is able to walk on it, but states that it is swollen. She has been using ice with good relief. We had seen patient in clinic on 3/9/23 for left foot. Patient wondering if she would be able to be seen next week to have foot assessed.      Please advise on if okay to OB next week

## 2023-05-31 NOTE — TELEPHONE ENCOUNTER
Yes but its better if she goes to an urgent care for x-ray to make sure that she is not walking on a fractured foot or ankle

## 2023-06-01 ENCOUNTER — OFFICE VISIT (OUTPATIENT)
Dept: PODIATRY CLINIC | Facility: CLINIC | Age: 78
End: 2023-06-01

## 2023-06-01 DIAGNOSIS — S99.922A FOOT INJURY, LEFT, INITIAL ENCOUNTER: ICD-10-CM

## 2023-06-01 DIAGNOSIS — T14.90XA INJURY: Primary | ICD-10-CM

## 2023-06-01 PROCEDURE — 1111F DSCHRG MED/CURRENT MED MERGE: CPT | Performed by: PODIATRIST

## 2023-06-01 PROCEDURE — 99213 OFFICE O/P EST LOW 20 MIN: CPT | Performed by: PODIATRIST

## 2023-06-15 LAB
ALBUMIN/GLOBULIN RATIO: 1.7 (CALC) (ref 1–2.5)
ALBUMIN: 3.9 G/DL (ref 3.6–5.1)
ALKALINE PHOSPHATASE: 119 U/L (ref 37–153)
ALT: 22 U/L (ref 6–29)
AST: 19 U/L (ref 10–35)
BILIRUBIN, TOTAL: 0.5 MG/DL (ref 0.2–1.2)
BUN/CREATININE RATIO: 32 (CALC) (ref 6–22)
BUN: 39 MG/DL (ref 7–25)
CALCIUM: 9.4 MG/DL (ref 8.6–10.4)
CARBON DIOXIDE: 30 MMOL/L (ref 20–32)
CHLORIDE: 103 MMOL/L (ref 98–110)
CREATININE: 1.22 MG/DL (ref 0.6–1)
EGFR: 45 ML/MIN/1.73M2
GLOBULIN: 2.3 G/DL (CALC) (ref 1.9–3.7)
GLUCOSE: 123 MG/DL (ref 65–99)
POTASSIUM: 4.9 MMOL/L (ref 3.5–5.3)
PROTEIN, TOTAL: 6.2 G/DL (ref 6.1–8.1)
SODIUM: 139 MMOL/L (ref 135–146)
URIC ACID: 3.5 MG/DL (ref 2.5–7)

## 2023-06-29 ENCOUNTER — OFFICE VISIT (OUTPATIENT)
Dept: PODIATRY CLINIC | Facility: CLINIC | Age: 78
End: 2023-06-29

## 2023-06-29 DIAGNOSIS — T14.90XA INJURY: Primary | ICD-10-CM

## 2023-06-29 DIAGNOSIS — S92.012A CLOSED DISPLACED FRACTURE OF BODY OF LEFT CALCANEUS, INITIAL ENCOUNTER: ICD-10-CM

## 2023-06-29 DIAGNOSIS — S92.012D CLOSED DISPLACED FRACTURE OF BODY OF LEFT CALCANEUS WITH ROUTINE HEALING, SUBSEQUENT ENCOUNTER: ICD-10-CM

## 2023-06-29 DIAGNOSIS — S99.922A FOOT INJURY, LEFT, INITIAL ENCOUNTER: ICD-10-CM

## 2023-06-29 PROCEDURE — 99213 OFFICE O/P EST LOW 20 MIN: CPT | Performed by: PODIATRIST

## 2023-06-30 ENCOUNTER — OFFICE VISIT (OUTPATIENT)
Dept: RHEUMATOLOGY | Facility: CLINIC | Age: 78
End: 2023-06-30
Payer: MEDICARE

## 2023-06-30 VITALS
SYSTOLIC BLOOD PRESSURE: 124 MMHG | TEMPERATURE: 98 F | BODY MASS INDEX: 33 KG/M2 | WEIGHT: 190 LBS | DIASTOLIC BLOOD PRESSURE: 84 MMHG | OXYGEN SATURATION: 99 % | HEART RATE: 83 BPM

## 2023-06-30 DIAGNOSIS — J84.9 ILD (INTERSTITIAL LUNG DISEASE) (HCC): ICD-10-CM

## 2023-06-30 DIAGNOSIS — M10.9 GOUT, UNSPECIFIED CAUSE, UNSPECIFIED CHRONICITY, UNSPECIFIED SITE: Primary | ICD-10-CM

## 2023-06-30 DIAGNOSIS — R76.8 CENTROMERE ANTIBODY POSITIVE: ICD-10-CM

## 2023-06-30 DIAGNOSIS — G89.29 CHRONIC MUSCULOSKELETAL PAIN: ICD-10-CM

## 2023-06-30 DIAGNOSIS — M79.18 CHRONIC MUSCULOSKELETAL PAIN: ICD-10-CM

## 2023-06-30 PROCEDURE — 99214 OFFICE O/P EST MOD 30 MIN: CPT | Performed by: INTERNAL MEDICINE

## 2023-06-30 RX ORDER — CLONAZEPAM 1 MG/1
1 TABLET ORAL NIGHTLY PRN
COMMUNITY
Start: 2023-06-20

## 2023-06-30 RX ORDER — BLOOD SUGAR DIAGNOSTIC
STRIP MISCELLANEOUS 2 TIMES DAILY
COMMUNITY
Start: 2023-03-22

## 2023-06-30 RX ORDER — DICLOFENAC SODIUM 75 MG/1
TABLET, DELAYED RELEASE ORAL
COMMUNITY
Start: 2023-06-30

## 2023-07-14 DIAGNOSIS — M79.18 CHRONIC MUSCULOSKELETAL PAIN: Primary | ICD-10-CM

## 2023-07-14 DIAGNOSIS — G89.29 CHRONIC MUSCULOSKELETAL PAIN: Primary | ICD-10-CM

## 2023-07-14 RX ORDER — DULOXETIN HYDROCHLORIDE 30 MG/1
60 CAPSULE, DELAYED RELEASE ORAL DAILY
Qty: 180 CAPSULE | Refills: 2 | Status: SHIPPED | OUTPATIENT
Start: 2023-07-14 | End: 2024-04-09

## 2023-07-14 NOTE — TELEPHONE ENCOUNTER
Pt called office,  Pt did not feel the cymbalta was helping and medication discontinued. However after stopping the medication, has noticed that it was helping. Pt would like to restart medication , and would like a new prescription sent to [facility].    Future Appointments   Date Time Provider Ade Farias   8/28/2023 10:40 AM Kwame Neal MD [facility]   10/18/2023 11:00 AM Ricardo Reed MD  [facility]   4/1/2024  1:00 PM Joelle Cohen MD [facility]     LOV 7/2/23  RTO in 4/24

## 2023-08-09 ENCOUNTER — OFFICE VISIT (OUTPATIENT)
Dept: WOUND CARE | Facility: HOSPITAL | Age: 78
End: 2023-08-09
Attending: INTERNAL MEDICINE
Payer: MEDICARE

## 2023-08-09 VITALS
RESPIRATION RATE: 16 BRPM | HEART RATE: 88 BPM | BODY MASS INDEX: 33.29 KG/M2 | TEMPERATURE: 99 F | DIASTOLIC BLOOD PRESSURE: 73 MMHG | WEIGHT: 195 LBS | HEIGHT: 64 IN | SYSTOLIC BLOOD PRESSURE: 114 MMHG

## 2023-08-09 DIAGNOSIS — M79.89 LEFT LEG SWELLING: ICD-10-CM

## 2023-08-09 DIAGNOSIS — L97.222 NON-PRESSURE CHRONIC ULCER OF LEFT CALF WITH FAT LAYER EXPOSED (HCC): Primary | ICD-10-CM

## 2023-08-09 DIAGNOSIS — L98.499 DIABETES MELLITUS WITH SKIN ULCER (HCC): ICD-10-CM

## 2023-08-09 DIAGNOSIS — I87.332 IDIOPATHIC CHRONIC VENOUS HYPERTENSION OF LEFT LOWER EXTREMITY WITH ULCER AND INFLAMMATION (HCC): ICD-10-CM

## 2023-08-09 DIAGNOSIS — E11.622 DIABETES MELLITUS WITH SKIN ULCER (HCC): ICD-10-CM

## 2023-08-09 LAB — GLUCOSE BLD-MCNC: 173 MG/DL (ref 70–99)

## 2023-08-09 PROCEDURE — 87070 CULTURE OTHR SPECIMN AEROBIC: CPT | Performed by: INTERNAL MEDICINE

## 2023-08-09 PROCEDURE — 29581 APPL MULTLAYER CMPRN SYS LEG: CPT

## 2023-08-09 PROCEDURE — 87205 SMEAR GRAM STAIN: CPT | Performed by: INTERNAL MEDICINE

## 2023-08-09 PROCEDURE — 87147 CULTURE TYPE IMMUNOLOGIC: CPT | Performed by: INTERNAL MEDICINE

## 2023-08-09 PROCEDURE — 87186 SC STD MICRODIL/AGAR DIL: CPT | Performed by: INTERNAL MEDICINE

## 2023-08-09 PROCEDURE — 11042 DBRDMT SUBQ TIS 1ST 20SQCM/<: CPT | Performed by: INTERNAL MEDICINE

## 2023-08-09 PROCEDURE — 99214 OFFICE O/P EST MOD 30 MIN: CPT

## 2023-08-09 PROCEDURE — 82962 GLUCOSE BLOOD TEST: CPT | Performed by: INTERNAL MEDICINE

## 2023-08-09 NOTE — PATIENT INSTRUCTIONS
Wound Cleaning and Dressings:    Shower with protection   DRESSINGS: endoform collagen / HF transfer / kerramax  Change dressing only in clinic    Compression Therapy : coflex-2    Compression Therapy Instructions:  1. Put on first thing in the morning and may remove at bedside. Okay to wear overnight      if comfortable. Do not let stockings roll up/down and kink. Hand wash stockings and      hang dry as needed. 2.  Avoid prolonged standing in one place. It is better to have your calf muscles moving       to pump fluid out of the legs. 3.  Elevate leg(s) above the level of the heart when sitting or as much as possible. 4.  Take your diuretics as directed by your provider. Do not skip doses or change doses      unless instructed to do so by your provider. 5. Do not get leg(s) with compression wrap wet. If wraps are too tight as indicated        By pain, numbness/tingling or discoloration of toes remove wrap completely       and call the   wound center. Miscellaneous Instructions:  Supplement with a daily multivitamin   Low salt diet  Intense blood sugar control - Goal Blood sugar below 180 at all times recommended. Increase protein intake / consider protein supplements - see below  Elevate extremities at all times when sitting / laying down. DIETARY MODIFICATIONS TO HELP WITH WOUND HEALING:    Protein: Meats, beans, eggs, milk and yogurt particularly [place] yogurt), tofu, soy nuts, soy protein products    Vitamin C: Citrus fruits and juices, strawberries, tomatoes, tomato juice, peppers, baked potatoes, spinach, broccoli, cauliflower, [place] sprouts, cabbage    Vitamin A: Dark green, leafy vegetables, orange or yellow vegetables, cantaloupe, fortified dairy products, liver, fortified cereals    Zinc: Fortified cereals, red meats, seafood    Consider Jacob by [employer] (These are essential branch chain amino acids that help with tissue building and wound healing) and take 2 packets/day. you can order online at [employer] or [employer] [facility] [employer] Ch[employer]one REMINDERS:    The treatment plan has been discussed at length with you and your provider. Follow all instructions carefully, it is very important. If you do not follow all instructions, you are at  risk of your wound not healing, infection, possible loss of limb and even end of life. Please call the clinic during regular business hours ( 7:30 AM - 5:30 PM) if you notice increased bleeding, redness, warmth, pain or pus like drainage or start running a fever greater than 100.3. For after hour emergencies, please call your primary physician or go to the nearest emergency room.

## 2023-08-09 NOTE — PROGRESS NOTES
Patient ID: Martina Caban is a [de-identified]year old female. Debridement   Wound 08/09/23 #1 Left anterior leg Venous Ulcer Leg Left; Anterior    Consent obtained? verbal  Consent given by: patient  Risks discussed? procedural risks discussed  Performed by: provider  Debridement type: surgical  Level of debridement: subcutaneous tissue  Pain control: lidocaine 4%  Pre-debridement measurements  Length (cm): 2  Width (cm): 1.5  Depth (cm): 0.1  Surface Area (cm^2):  3  Volume (cm^3): 0.3    Post-debridement measurements  Length (cm): 2.1  Width (cm): 1.5  Depth (cm): 0.2  Percent debrided: 100%  Surface Area (cm^2): 3.2  Area debrided (cm^2): 3.2  Volume (cm^3): 0.6  Tissue and other material debrided: subcutaneous tissue  Devitalized tissue debrided: biofilm, necrotic debris and slough  Instrument(s) utilized: curette  Bleeding: medium  Hemostasis obtained with: pressure  Procedural pain (0-10): 3  Post-procedural pain: 0   Response to treatment: procedure was tolerated well

## 2023-08-09 NOTE — PROGRESS NOTES
Weekly Wound Education Note    Teaching Provided To: Patient  Training Topics: Cleasing and general instructions; Compression; Discharge instructions;Dressing;Edema control  Training Method: Explain/Verbal  Training Response: Patient responds and understands; Reinforcement needed        Notes: Initial visit: for wound to left leg. wound cultured. Endoform, hydrofera transfer, kerramax, coflex2. Went over importance of compression and compression wrap handout provided, spandagrip E and bordered gauze also provided if wrap were to be removed.

## 2023-08-11 ENCOUNTER — OFFICE VISIT (OUTPATIENT)
Dept: WOUND CARE | Facility: HOSPITAL | Age: 78
End: 2023-08-11
Attending: INTERNAL MEDICINE
Payer: MEDICARE

## 2023-08-11 VITALS
SYSTOLIC BLOOD PRESSURE: 121 MMHG | TEMPERATURE: 98 F | DIASTOLIC BLOOD PRESSURE: 74 MMHG | RESPIRATION RATE: 16 BRPM | HEART RATE: 89 BPM

## 2023-08-11 DIAGNOSIS — E11.622 DIABETES MELLITUS WITH SKIN ULCER (HCC): ICD-10-CM

## 2023-08-11 DIAGNOSIS — L97.222 NON-PRESSURE CHRONIC ULCER OF LEFT CALF WITH FAT LAYER EXPOSED (HCC): Primary | ICD-10-CM

## 2023-08-11 DIAGNOSIS — L98.499 DIABETES MELLITUS WITH SKIN ULCER (HCC): ICD-10-CM

## 2023-08-11 LAB — GLUCOSE BLD-MCNC: 164 MG/DL (ref 70–99)

## 2023-08-11 PROCEDURE — 29581 APPL MULTLAYER CMPRN SYS LEG: CPT

## 2023-08-11 PROCEDURE — 82962 GLUCOSE BLOOD TEST: CPT

## 2023-08-14 NOTE — PROGRESS NOTES
Spoke to patient and informed her of culture results and new topical ABT. Pt stated understanding and will bring it to her next appointment.

## 2023-08-16 ENCOUNTER — OFFICE VISIT (OUTPATIENT)
Dept: WOUND CARE | Facility: HOSPITAL | Age: 78
End: 2023-08-16
Attending: INTERNAL MEDICINE
Payer: MEDICARE

## 2023-08-16 ENCOUNTER — APPOINTMENT (OUTPATIENT)
Dept: GENERAL RADIOLOGY | Facility: HOSPITAL | Age: 78
End: 2023-08-16
Payer: MEDICARE

## 2023-08-16 ENCOUNTER — APPOINTMENT (OUTPATIENT)
Dept: GENERAL RADIOLOGY | Facility: HOSPITAL | Age: 78
DRG: 196 | End: 2023-08-16
Payer: MEDICARE

## 2023-08-16 ENCOUNTER — HOSPITAL ENCOUNTER (INPATIENT)
Facility: HOSPITAL | Age: 78
LOS: 4 days | Discharge: HOME | End: 2023-08-20
Attending: EMERGENCY MEDICINE | Admitting: HOSPITALIST
Payer: MEDICARE

## 2023-08-16 ENCOUNTER — HOSPITAL ENCOUNTER (INPATIENT)
Facility: HOSPITAL | Age: 78
LOS: 4 days | Discharge: HOME | DRG: 196 | End: 2023-08-20
Attending: EMERGENCY MEDICINE | Admitting: HOSPITALIST
Payer: MEDICARE

## 2023-08-16 VITALS
SYSTOLIC BLOOD PRESSURE: 117 MMHG | RESPIRATION RATE: 18 BRPM | DIASTOLIC BLOOD PRESSURE: 56 MMHG | HEART RATE: 100 BPM | TEMPERATURE: 98 F

## 2023-08-16 DIAGNOSIS — J84.9 ILD (INTERSTITIAL LUNG DISEASE) (HCC): ICD-10-CM

## 2023-08-16 DIAGNOSIS — G89.29 CHRONIC MUSCULOSKELETAL PAIN: ICD-10-CM

## 2023-08-16 DIAGNOSIS — L98.499 DIABETES MELLITUS WITH SKIN ULCER (HCC): ICD-10-CM

## 2023-08-16 DIAGNOSIS — E11.622 DIABETES MELLITUS WITH SKIN ULCER (HCC): ICD-10-CM

## 2023-08-16 DIAGNOSIS — M79.18 CHRONIC MUSCULOSKELETAL PAIN: ICD-10-CM

## 2023-08-16 DIAGNOSIS — L97.222 NON-PRESSURE CHRONIC ULCER OF LEFT CALF WITH FAT LAYER EXPOSED (HCC): Primary | ICD-10-CM

## 2023-08-16 DIAGNOSIS — J44.1 COPD EXACERBATION (HCC): Primary | ICD-10-CM

## 2023-08-16 DIAGNOSIS — R09.02 HYPOXIA: ICD-10-CM

## 2023-08-16 DIAGNOSIS — M10.9 GOUT, UNSPECIFIED CAUSE, UNSPECIFIED CHRONICITY, UNSPECIFIED SITE: ICD-10-CM

## 2023-08-16 LAB
ALBUMIN SERPL-MCNC: 3.2 G/DL (ref 3.4–5)
ALBUMIN/GLOB SERPL: 0.8 {RATIO} (ref 1–2)
ALP LIVER SERPL-CCNC: 112 U/L
ALT SERPL-CCNC: 18 U/L
ANION GAP SERPL CALC-SCNC: 2 MMOL/L (ref 0–18)
AST SERPL-CCNC: 19 U/L (ref 15–37)
ATRIAL RATE: 98 BPM
BASOPHILS # BLD AUTO: 0.05 X10(3) UL (ref 0–0.2)
BASOPHILS NFR BLD AUTO: 0.6 %
BILIRUB SERPL-MCNC: 0.6 MG/DL (ref 0.1–2)
BUN BLD-MCNC: 24 MG/DL (ref 7–18)
CALCIUM BLD-MCNC: 9.2 MG/DL (ref 8.5–10.1)
CHLORIDE SERPL-SCNC: 107 MMOL/L (ref 98–112)
CO2 SERPL-SCNC: 26 MMOL/L (ref 21–32)
CREAT BLD-MCNC: 1.03 MG/DL
EGFRCR SERPLBLD CKD-EPI 2021: 55 ML/MIN/1.73M2 (ref 60–?)
EOSINOPHIL # BLD AUTO: 1.15 X10(3) UL (ref 0–0.7)
EOSINOPHIL NFR BLD AUTO: 13.1 %
ERYTHROCYTE [DISTWIDTH] IN BLOOD BY AUTOMATED COUNT: 14.6 %
GLOBULIN PLAS-MCNC: 3.8 G/DL (ref 2.8–4.4)
GLUCOSE BLD-MCNC: 139 MG/DL (ref 70–99)
GLUCOSE BLD-MCNC: 144 MG/DL (ref 70–99)
GLUCOSE BLD-MCNC: 259 MG/DL (ref 70–99)
HCT VFR BLD AUTO: 41.6 %
HGB BLD-MCNC: 13 G/DL
IMM GRANULOCYTES # BLD AUTO: 0.07 X10(3) UL (ref 0–1)
IMM GRANULOCYTES NFR BLD: 0.8 %
LYMPHOCYTES # BLD AUTO: 1.15 X10(3) UL (ref 1–4)
LYMPHOCYTES NFR BLD AUTO: 13.1 %
MCH RBC QN AUTO: 29.4 PG (ref 26–34)
MCHC RBC AUTO-ENTMCNC: 31.3 G/DL (ref 31–37)
MCV RBC AUTO: 94.1 FL
MONOCYTES # BLD AUTO: 0.97 X10(3) UL (ref 0.1–1)
MONOCYTES NFR BLD AUTO: 11 %
NEUTROPHILS # BLD AUTO: 5.39 X10 (3) UL (ref 1.5–7.7)
NEUTROPHILS # BLD AUTO: 5.39 X10(3) UL (ref 1.5–7.7)
NEUTROPHILS NFR BLD AUTO: 61.4 %
OSMOLALITY SERPL CALC.SUM OF ELEC: 286 MOSM/KG (ref 275–295)
P AXIS: 65 DEGREES
P-R INTERVAL: 136 MS
PLATELET # BLD AUTO: 179 10(3)UL (ref 150–450)
POTASSIUM SERPL-SCNC: 4.5 MMOL/L (ref 3.5–5.1)
PROT SERPL-MCNC: 7 G/DL (ref 6.4–8.2)
Q-T INTERVAL: 346 MS
QRS DURATION: 76 MS
QTC CALCULATION (BEZET): 441 MS
R AXIS: 27 DEGREES
RBC # BLD AUTO: 4.42 X10(6)UL
SARS-COV-2 RNA RESP QL NAA+PROBE: NOT DETECTED
SODIUM SERPL-SCNC: 135 MMOL/L (ref 136–145)
T AXIS: 31 DEGREES
TROPONIN I HIGH SENSITIVITY: 15 NG/L
VENTRICULAR RATE: 98 BPM
WBC # BLD AUTO: 8.8 X10(3) UL (ref 4–11)

## 2023-08-16 PROCEDURE — 71045 X-RAY EXAM CHEST 1 VIEW: CPT | Performed by: EMERGENCY MEDICINE

## 2023-08-16 PROCEDURE — 99223 1ST HOSP IP/OBS HIGH 75: CPT | Performed by: HOSPITALIST

## 2023-08-16 PROCEDURE — 82962 GLUCOSE BLOOD TEST: CPT

## 2023-08-16 PROCEDURE — 29581 APPL MULTLAYER CMPRN SYS LEG: CPT

## 2023-08-16 RX ORDER — DOXEPIN HYDROCHLORIDE 50 MG/1
1 CAPSULE ORAL ONCE
Status: DISCONTINUED | OUTPATIENT
Start: 2023-08-16 | End: 2023-08-16

## 2023-08-16 RX ORDER — NICOTINE POLACRILEX 4 MG
30 LOZENGE BUCCAL
Status: DISCONTINUED | OUTPATIENT
Start: 2023-08-16 | End: 2023-08-20

## 2023-08-16 RX ORDER — METOCLOPRAMIDE HYDROCHLORIDE 5 MG/ML
5 INJECTION INTRAMUSCULAR; INTRAVENOUS EVERY 8 HOURS PRN
Status: DISCONTINUED | OUTPATIENT
Start: 2023-08-16 | End: 2023-08-20

## 2023-08-16 RX ORDER — IPRATROPIUM BROMIDE AND ALBUTEROL SULFATE 2.5; .5 MG/3ML; MG/3ML
3 SOLUTION RESPIRATORY (INHALATION)
Status: DISCONTINUED | OUTPATIENT
Start: 2023-08-17 | End: 2023-08-16

## 2023-08-16 RX ORDER — ENOXAPARIN SODIUM 100 MG/ML
40 INJECTION SUBCUTANEOUS DAILY
Status: DISCONTINUED | OUTPATIENT
Start: 2023-08-17 | End: 2023-08-20

## 2023-08-16 RX ORDER — HYDROCODONE BITARTRATE AND ACETAMINOPHEN 5; 325 MG/1; MG/1
1 TABLET ORAL DAILY
COMMUNITY

## 2023-08-16 RX ORDER — PHENOBARBITAL SODIUM 130 MG/ML
65 INJECTION INTRAMUSCULAR ONCE
Status: DISCONTINUED | OUTPATIENT
Start: 2023-08-16 | End: 2023-08-16

## 2023-08-16 RX ORDER — NICOTINE POLACRILEX 4 MG
15 LOZENGE BUCCAL
Status: DISCONTINUED | OUTPATIENT
Start: 2023-08-16 | End: 2023-08-16

## 2023-08-16 RX ORDER — NICOTINE POLACRILEX 4 MG
15 LOZENGE BUCCAL
Status: DISCONTINUED | OUTPATIENT
Start: 2023-08-16 | End: 2023-08-20

## 2023-08-16 RX ORDER — ONDANSETRON 2 MG/ML
4 INJECTION INTRAMUSCULAR; INTRAVENOUS EVERY 6 HOURS PRN
Status: DISCONTINUED | OUTPATIENT
Start: 2023-08-16 | End: 2023-08-20

## 2023-08-16 RX ORDER — IPRATROPIUM BROMIDE AND ALBUTEROL SULFATE 2.5; .5 MG/3ML; MG/3ML
3 SOLUTION RESPIRATORY (INHALATION)
Status: DISCONTINUED | OUTPATIENT
Start: 2023-08-16 | End: 2023-08-18

## 2023-08-16 RX ORDER — ACETAMINOPHEN 500 MG
500 TABLET ORAL EVERY 4 HOURS PRN
Status: DISCONTINUED | OUTPATIENT
Start: 2023-08-16 | End: 2023-08-20

## 2023-08-16 RX ORDER — DEXTROSE MONOHYDRATE 25 G/50ML
50 INJECTION, SOLUTION INTRAVENOUS
Status: DISCONTINUED | OUTPATIENT
Start: 2023-08-16 | End: 2023-08-20

## 2023-08-16 RX ORDER — METHYLPREDNISOLONE SODIUM SUCCINATE 40 MG/ML
40 INJECTION, POWDER, LYOPHILIZED, FOR SOLUTION INTRAMUSCULAR; INTRAVENOUS EVERY 8 HOURS
Status: DISCONTINUED | OUTPATIENT
Start: 2023-08-17 | End: 2023-08-18

## 2023-08-16 RX ORDER — IPRATROPIUM BROMIDE AND ALBUTEROL SULFATE 2.5; .5 MG/3ML; MG/3ML
3 SOLUTION RESPIRATORY (INHALATION) ONCE
Status: COMPLETED | OUTPATIENT
Start: 2023-08-16 | End: 2023-08-16

## 2023-08-16 RX ORDER — NICOTINE POLACRILEX 4 MG
30 LOZENGE BUCCAL
Status: DISCONTINUED | OUTPATIENT
Start: 2023-08-16 | End: 2023-08-16

## 2023-08-16 RX ORDER — METHYLPREDNISOLONE SODIUM SUCCINATE 125 MG/2ML
60 INJECTION, POWDER, LYOPHILIZED, FOR SOLUTION INTRAMUSCULAR; INTRAVENOUS ONCE
Status: COMPLETED | OUTPATIENT
Start: 2023-08-16 | End: 2023-08-16

## 2023-08-16 RX ORDER — DEXTROSE MONOHYDRATE 25 G/50ML
50 INJECTION, SOLUTION INTRAVENOUS
Status: DISCONTINUED | OUTPATIENT
Start: 2023-08-16 | End: 2023-08-16

## 2023-08-16 RX ORDER — THIAMINE HYDROCHLORIDE 100 MG/ML
100 INJECTION, SOLUTION INTRAMUSCULAR; INTRAVENOUS ONCE
Status: DISCONTINUED | OUTPATIENT
Start: 2023-08-16 | End: 2023-08-16

## 2023-08-16 NOTE — ED QUICK NOTES
Orders for admission, patient is aware of plan and ready to go upstairs. Any questions, please call ED RN Antonia Ha at extension [phone]. Patient Covid vaccination status: Fully vaccinated     COVID Test Ordered in ED: Rapid SARS-CoV-2 by PCR    COVID Suspicion at Admission: N/A    Running Infusions:    albuterol      ipratropium          Mental Status/LOC at time of transport: x4    Other pertinent information: 71% on RA from wound clinic, COPD but no oxygen at baseline. Pt currently on 4L nasal cannula.    CIWA score: N/A   NIH score:  N/A

## 2023-08-16 NOTE — ED INITIAL ASSESSMENT (HPI)
Pt went to wound care today, was found to be 85% on RA, sent to ER for evaluation. Pt states feeling SOB x a few days. Pt was 81% on RA in triage, placed on 4L O2 with improvement to 94%.

## 2023-08-17 ENCOUNTER — APPOINTMENT (OUTPATIENT)
Dept: CT IMAGING | Facility: HOSPITAL | Age: 78
End: 2023-08-17
Attending: INTERNAL MEDICINE
Payer: MEDICARE

## 2023-08-17 ENCOUNTER — APPOINTMENT (OUTPATIENT)
Dept: CT IMAGING | Facility: HOSPITAL | Age: 78
DRG: 196 | End: 2023-08-17
Attending: INTERNAL MEDICINE
Payer: MEDICARE

## 2023-08-17 PROBLEM — J96.01 ACUTE RESPIRATORY FAILURE WITH HYPOXIA (HCC): Status: ACTIVE | Noted: 2023-08-17

## 2023-08-17 PROBLEM — E11.65 TYPE 2 DIABETES MELLITUS WITH HYPERGLYCEMIA, WITHOUT LONG-TERM CURRENT USE OF INSULIN (HCC): Status: ACTIVE | Noted: 2023-08-17

## 2023-08-17 LAB
ADENOVIRUS PCR:: NOT DETECTED
ANION GAP SERPL CALC-SCNC: 6 MMOL/L (ref 0–18)
B PARAPERT DNA SPEC QL NAA+PROBE: NOT DETECTED
B PERT DNA SPEC QL NAA+PROBE: NOT DETECTED
BUN BLD-MCNC: 21 MG/DL (ref 7–18)
C PNEUM DNA SPEC QL NAA+PROBE: NOT DETECTED
CALCIUM BLD-MCNC: 9.2 MG/DL (ref 8.5–10.1)
CHLORIDE SERPL-SCNC: 106 MMOL/L (ref 98–112)
CO2 SERPL-SCNC: 26 MMOL/L (ref 21–32)
CORONAVIRUS 229E PCR:: NOT DETECTED
CORONAVIRUS HKU1 PCR:: NOT DETECTED
CORONAVIRUS NL63 PCR:: NOT DETECTED
CORONAVIRUS OC43 PCR:: NOT DETECTED
CREAT BLD-MCNC: 1 MG/DL
CRP SERPL-MCNC: 4.45 MG/DL (ref ?–0.3)
EGFRCR SERPLBLD CKD-EPI 2021: 57 ML/MIN/1.73M2 (ref 60–?)
ERYTHROCYTE [SEDIMENTATION RATE] IN BLOOD: 46 MM/HR
EST. AVERAGE GLUCOSE BLD GHB EST-MCNC: 160 MG/DL (ref 68–126)
FLUAV RNA SPEC QL NAA+PROBE: NOT DETECTED
FLUBV RNA SPEC QL NAA+PROBE: NOT DETECTED
GLUCOSE BLD-MCNC: 272 MG/DL (ref 70–99)
GLUCOSE BLD-MCNC: 283 MG/DL (ref 70–99)
GLUCOSE BLD-MCNC: 293 MG/DL (ref 70–99)
GLUCOSE BLD-MCNC: 294 MG/DL (ref 70–99)
GLUCOSE BLD-MCNC: 316 MG/DL (ref 70–99)
HBA1C MFR BLD: 7.2 % (ref ?–5.7)
METAPNEUMOVIRUS PCR:: NOT DETECTED
MYCOPLASMA PNEUMONIA PCR:: NOT DETECTED
OSMOLALITY SERPL CALC.SUM OF ELEC: 300 MOSM/KG (ref 275–295)
PARAINFLUENZA 1 PCR:: NOT DETECTED
PARAINFLUENZA 2 PCR:: NOT DETECTED
PARAINFLUENZA 3 PCR:: NOT DETECTED
PARAINFLUENZA 4 PCR:: NOT DETECTED
POTASSIUM SERPL-SCNC: 3.8 MMOL/L (ref 3.5–5.1)
PROCALCITONIN SERPL-MCNC: <0.05 NG/ML (ref ?–0.16)
RHINOVIRUS/ENTERO PCR:: NOT DETECTED
RSV RNA SPEC QL NAA+PROBE: NOT DETECTED
SARS-COV-2 RNA NPH QL NAA+NON-PROBE: NOT DETECTED
SODIUM SERPL-SCNC: 138 MMOL/L (ref 136–145)

## 2023-08-17 PROCEDURE — 99232 SBSQ HOSP IP/OBS MODERATE 35: CPT | Performed by: INTERNAL MEDICINE

## 2023-08-17 PROCEDURE — 71250 CT THORAX DX C-: CPT | Performed by: INTERNAL MEDICINE

## 2023-08-17 RX ORDER — PANTOPRAZOLE SODIUM 40 MG/1
40 TABLET, DELAYED RELEASE ORAL
Status: DISCONTINUED | OUTPATIENT
Start: 2023-08-17 | End: 2023-08-20

## 2023-08-17 RX ORDER — METOPROLOL SUCCINATE 25 MG/1
25 TABLET, EXTENDED RELEASE ORAL DAILY
Status: DISCONTINUED | OUTPATIENT
Start: 2023-08-17 | End: 2023-08-20

## 2023-08-17 RX ORDER — FLUTICASONE PROPIONATE 50 MCG
2 SPRAY, SUSPENSION (ML) NASAL DAILY
Status: DISCONTINUED | OUTPATIENT
Start: 2023-08-17 | End: 2023-08-20

## 2023-08-17 RX ORDER — AZITHROMYCIN 250 MG/1
500 TABLET, FILM COATED ORAL
Status: COMPLETED | OUTPATIENT
Start: 2023-08-17 | End: 2023-08-19

## 2023-08-17 RX ORDER — POTASSIUM CHLORIDE 20 MEQ/1
40 TABLET, EXTENDED RELEASE ORAL ONCE
Status: COMPLETED | OUTPATIENT
Start: 2023-08-17 | End: 2023-08-17

## 2023-08-17 RX ORDER — GENTAMICIN SULFATE 1 MG/G
CREAM TOPICAL 3 TIMES DAILY
Status: DISCONTINUED | OUTPATIENT
Start: 2023-08-17 | End: 2023-08-20

## 2023-08-17 RX ORDER — HYDROCODONE BITARTRATE AND ACETAMINOPHEN 5; 325 MG/1; MG/1
1 TABLET ORAL DAILY
Status: DISCONTINUED | OUTPATIENT
Start: 2023-08-17 | End: 2023-08-20

## 2023-08-17 RX ORDER — DULOXETIN HYDROCHLORIDE 30 MG/1
30 CAPSULE, DELAYED RELEASE ORAL DAILY
Status: DISCONTINUED | OUTPATIENT
Start: 2023-08-17 | End: 2023-08-20

## 2023-08-17 RX ORDER — ROSUVASTATIN CALCIUM 10 MG/1
10 TABLET, COATED ORAL NIGHTLY
Status: DISCONTINUED | OUTPATIENT
Start: 2023-08-17 | End: 2023-08-20

## 2023-08-17 RX ORDER — CLONAZEPAM 0.5 MG/1
1 TABLET ORAL NIGHTLY PRN
Status: DISCONTINUED | OUTPATIENT
Start: 2023-08-17 | End: 2023-08-20

## 2023-08-17 NOTE — PLAN OF CARE
Received patient on NC 3L, saturating well. VSS. Medications administered per the [facility] and patient needs addressed. Wound care completed. Patient is resting in locked bedside chair with call light within reach.

## 2023-08-17 NOTE — CM/SW NOTE
SW received order for POLST. SW met with pt at bedside who is alert and oriented to discuss POLST form. Pt stated she has a completed POLST form at home. SW encouraged pt to have family bring in completed POLST form to place on pt's chart. Pt verbalized understanding and denied any further needs at this time. SW will continue to remain available.      DAVID Saeed  Discharge Planner

## 2023-08-17 NOTE — CONSULTS
[facility]  Report of Inpatient Wound Care Consultation    Ilana Kumar Patient Status:  Inpatient    1945 MRN [id number]   [facility] 5NW-A Attending Connie Tao, [address] Day # 1 PCP Hailey Cruz MD     Reason for Consultation:  Left leg     History of Present Illness:  Alisha Hinton is a a(n) [de-identified]year old female. Patient presents with a full thickness left lower leg wound. Patient follows up at the outpatient wound clniic. Denies pain at this time. History:  Past Medical History:   Diagnosis Date    Back problem     Cataract     Cholelithiasis     DIABETES     Diabetes ([address]     Diverticulitis     suggestion of possible wall thickening involving sigmoid, possible mild diverticulitis    Diverticulosis 2012    Essential hypertension     Gallbladder disease 2013    thickened gallbladder with distended bile duct    High blood pressure     High cholesterol     HYPERLIPIDEMIA     Hyperlipidemia     IBS (irritable bowel syndrome)     Osteoarthritis     hips, back and knees    Thyroid disease     Visual impairment     reading glasses     Past Surgical History:   Procedure Laterality Date    CARPAL TUNNEL RELEASE      COLONOSCOPY  ,     Diverticulosis    COLONOSCOPY      decreased anal sphincter tone, diverticulosis, internal hemorrhoids, normal random colon biopsies    COLONOSCOPY N/A 2017    Procedure: COLONOSCOPY;  Surgeon: Lindy Umanzor MD;  Location: [address] [place] [address] right hand    OTHER SURGICAL HISTORY      Fatty tumor from neck    OTHER SURGICAL HISTORY      Arthroscopy right knee    OTHER SURGICAL HISTORY      Carpal tunnel both hand    OTHER SURGICAL HISTORY  2020    Cystoscopy, Dr Karine Patterson      reports that she quit smoking about 8 years ago. Her smoking use included cigarettes. She smoked an average of .5 packs per day.  She has never used smokeless tobacco. She reports that she does not drink alcohol and does not use drugs. Allergies:  @ALLERGY    Laboratory Data:  Lab Results   Component Value Date    CREATSERUM 1.00 08/17/2023    BUN 21 08/17/2023     08/17/2023    K 3.8 08/17/2023     08/17/2023    CO2 26.0 08/17/2023     08/17/2023    CA 9.2 08/17/2023    ESRML 46 08/17/2023    CRP 4.45 08/17/2023    PGLU 272 08/17/2023         Impression:  Wound 08/09/23 #1 Left anterior leg Venous Ulcer Leg Left; Anterior (Active)   Date First Assessed/Time First Assessed: 08/09/23 1413    Wound Number (Wound Clinic Only): #1 Left anterior leg  Primary Wound Type: Venous Ulcer  Location: Leg  Wound Location Orientation: Left; Anterior      Assessments 8/17/2023  5:04 PM   Wound Image     Drainage Amount Small   Drainage Description Serosanguineous   Wound Length (cm) 2.6 cm   Wound Width (cm) 0.9 cm   Wound Surface Area (cm^2) 2.34 cm^2   Wound Depth (cm) 0.1 cm   Wound Volume (cm^3) 0.234 cm^3   Wound Healing % 22   Margins Well-defined edges   Non-staged Wound Description Full thickness   Pippa-wound Assessment Edema; Hemosiderin staining   Wound Granulation Tissue Pink;Firm   Wound Bed Granulation (%) 15 %   Wound Bed Slough (%) 85 %   Wound Odor None       Wound Cleaning and Dressings:  Showering directions: May shower with protection  Wound product: Cleanse with saline. Apply gentamicin 0.1 % cream [ previously ordered at the wound clinic]. Cover with bordered gauze or bordered foam.  Dressing change frequency: Three times  a day      Compression Therapy:   Spandagrip and Elevate leg(s) as much as possible- apply Spandagrip E  at the base of the toes to below the knee area    See flow sheet on edema for lower extremity assessment . Miscellaneous/Additional Orders:  Offloading: Turn Q 2 hours and as needed- as tolerated  Miscellaneous orders: Increase dietary protein intake. Care Summary:  Care Summary: Discussed Plan of Care at beside with patient.  Patient verbally acknowledges understanding of all instructions and all questions were answered. Recommendations:  Continue to follow up at the wound clinic if discharge to home. Thank you for allowing me to participate in the care of your patient. Time Spent 30 minutes , Thank you.     Venice Greenwood RN, BSN [facility]   Wound Care pager [phone]  8/17/2023  5:39 PM

## 2023-08-17 NOTE — PLAN OF CARE
Problem: Patient/Family Goals  Goal: Patient/Family Long Term Goal  Description: Patient's Long Term Goal: discharge home    Interventions:  - Consult to [address], Pulm  - Scheduled nebs  - IV Steroids  - Wean oxygen  - See additional Care Plan goals for specific interventions  Outcome: Progressing  Goal: Patient/Family Short Term Goal  Description: Patient's Short Term Goal: improve SOB    Interventions:   - Consult to Hosp, Pulm  - Wean oxygen  - Scheduled nebs  - IV Steroids   - See additional Care Plan goals for specific interventions  Outcome: Progressing     Problem: PAIN - ADULT  Goal: Verbalizes/displays adequate comfort level or patient's stated pain goal  Description: INTERVENTIONS:  - Encourage pt to monitor pain and request assistance  - Assess pain using appropriate pain scale  - Administer analgesics based on type and severity of pain and evaluate response  - Implement non-pharmacological measures as appropriate and evaluate response  - Consider cultural and social influences on pain and pain management  - Manage/alleviate anxiety  - Utilize distraction and/or relaxation techniques  - Monitor for opioid side effects  - Notify MD/LIP if interventions unsuccessful or patient reports new pain  - Anticipate increased pain with activity and pre-medicate as appropriate  Outcome: Progressing     Problem: RISK FOR INFECTION - ADULT  Goal: Absence of fever/infection during anticipated neutropenic period  Description: INTERVENTIONS  - Monitor WBC  - Administer growth factors as ordered  - Implement neutropenic guidelines  Outcome: Progressing     Problem: SAFETY ADULT - FALL  Goal: Free from fall injury  Description: INTERVENTIONS:  - Assess pt frequently for physical needs  - Identify cognitive and physical deficits and behaviors that affect risk of falls.   - [place] fall precautions as indicated by assessment.  - Educate pt/family on patient safety including physical limitations  - Instruct pt to call for assistance with activity based on assessment  - Modify environment to reduce risk of injury  - Provide assistive devices as appropriate  - Consider OT/PT consult to assist with strengthening/mobility  - Encourage toileting schedule  Outcome: Progressing     Problem: DISCHARGE PLANNING  Goal: Discharge to home or other facility with appropriate resources  Description: INTERVENTIONS:  - Identify barriers to discharge w/pt and caregiver  - Include patient/family/discharge partner in discharge planning  - Arrange for needed discharge resources and transportation as appropriate  - Identify discharge learning needs (meds, wound care, etc)  - Arrange for interpreters to assist at discharge as needed  - Consider post-discharge preferences of patient/family/discharge partner  - Complete POLST form as appropriate  - Assess patient's ability to be responsible for managing their own health  - Refer to Case Management Department for coordinating discharge planning if the patient needs post-hospital services based on physician/LIP order or complex needs related to functional status, cognitive ability or social support system  Outcome: Progressing

## 2023-08-17 NOTE — PROGRESS NOTES
NURSING ADMISSION NOTE      Patient admitted via Cart  Oriented to room 514. Safety precautions initiated. Bed in low position. Call light in reach. Received Patient 31 75 62. VSS. Pt A&O X 4. [place], bilateral hearing aids at home. 3L of O2. Room air is baseline. IV steroids. NSR; ST on Tele. EP Cardiac. Lovenox. Voids. Carb controlled diet, QID Accucheck. Wound to left leg, consult to wound care. SBA with cane. Call light within reach. PTA med list complete, MD notified. No further needs at this time.

## 2023-08-18 ENCOUNTER — APPOINTMENT (OUTPATIENT)
Dept: WOUND CARE | Facility: HOSPITAL | Age: 78
End: 2023-08-18
Attending: INTERNAL MEDICINE
Payer: MEDICARE

## 2023-08-18 LAB
GLUCOSE BLD-MCNC: 173 MG/DL (ref 70–99)
GLUCOSE BLD-MCNC: 199 MG/DL (ref 70–99)
GLUCOSE BLD-MCNC: 286 MG/DL (ref 70–99)
GLUCOSE BLD-MCNC: 293 MG/DL (ref 70–99)
POTASSIUM SERPL-SCNC: 4.6 MMOL/L (ref 3.5–5.1)

## 2023-08-18 PROCEDURE — 99232 SBSQ HOSP IP/OBS MODERATE 35: CPT | Performed by: INTERNAL MEDICINE

## 2023-08-18 RX ORDER — IPRATROPIUM BROMIDE AND ALBUTEROL SULFATE 2.5; .5 MG/3ML; MG/3ML
3 SOLUTION RESPIRATORY (INHALATION)
Status: DISCONTINUED | OUTPATIENT
Start: 2023-08-18 | End: 2023-08-20

## 2023-08-18 NOTE — PLAN OF CARE
Pt A&Ox4, VSS, afebrile, O2 sat 88-94% on 2 LNC. Pt refusing lovenox, up ambulating in room with steady gait. Pt tolerating diet, voiding without difficulty. Dressing change to left shin wound TID. Call light in reach, bed low & locked, sr up x 2.

## 2023-08-18 NOTE — PROGRESS NOTES
Patient assessed at this time. On 3 lpm nasal cannula with SpO2 93%. Patient states and appears with no shortness of breath. BS: crackles with good aeration, and no wheezing noted. RN reported earlier lungs did not have wheezing as well. Patient informed and is now changed order to qid nebs from q4. RN notified of change.         08/18/23 0023   Respiratory Therapy / Neb Tx   $ RT Standby Charge (per 15 min) 1   $ Initial Tx & Set up Charge Nebulizer   MD Order duoneb   Pre-Treatment Pulse 87   Pre-Treatment Respirations 20   Pre-Treatment O2 Saturation 93 %   Solution Normal saline   Flow rate 8   Delivery device Aerosol mask   Duration (minutes) 10 min   Respiratory Pattern Regular   Breath Sounds Pre-Treatment Bilateral Diminished;Crackles   Breath Sounds Post-Treatment Bilateral Diminished;Crackles   Post-Treatment Pulse 80   Post-Treatment Respirations 20   Post-Treatment O2 Saturation 100 %   Delivery Source Oxygen   Cough None   Position Semi fowlers   Treatment Tolerance Well   Time of treatment 0023   Protocol Recommendations Continue present management   Action Continue present management

## 2023-08-18 NOTE — PLAN OF CARE
Assumed care at 1930  On O2 2L/NC, ambulates in the bathroom with mild SOB. IV antibiotic, nebs, solumedrol  Left leg wound,c./d/i  Problem: Patient/Family Goals  Goal: Patient/Family Long Term Goal  Description: Patient's Long Term Goal: discharge home    Interventions:  - Consult to [facility], Pulm  - Scheduled nebs  - IV Steroids  - Wean oxygen  - See additional Care Plan goals for specific interventions  Outcome: Progressing  Goal: Patient/Family Short Term Goal  Description: Patient's Short Term Goal: improve SOB    Interventions:   - Consult to Hosp, Pulm  - Wean oxygen  - Scheduled nebs  - IV Steroids   - See additional Care Plan goals for specific interventions  Outcome: Progressing     Problem: PAIN - ADULT  Goal: Verbalizes/displays adequate comfort level or patient's stated pain goal  Description: INTERVENTIONS:  - Encourage pt to monitor pain and request assistance  - Assess pain using appropriate pain scale  - Administer analgesics based on type and severity of pain and evaluate response  - Implement non-pharmacological measures as appropriate and evaluate response  - Consider cultural and social influences on pain and pain management  - Manage/alleviate anxiety  - Utilize distraction and/or relaxation techniques  - Monitor for opioid side effects  - Notify MD/LIP if interventions unsuccessful or patient reports new pain  - Anticipate increased pain with activity and pre-medicate as appropriate  Outcome: Progressing     Problem: RISK FOR INFECTION - ADULT  Goal: Absence of fever/infection during anticipated neutropenic period  Description: INTERVENTIONS  - Monitor WBC  - Administer growth factors as ordered  - Implement neutropenic guidelines  Outcome: Progressing     Problem: SAFETY ADULT - FALL  Goal: Free from fall injury  Description: INTERVENTIONS:  - Assess pt frequently for physical needs  - Identify cognitive and physical deficits and behaviors that affect risk of falls.   - [place] fall precautions as indicated by assessment.  - Educate pt/family on patient safety including physical limitations  - Instruct pt to call for assistance with activity based on assessment  - Modify environment to reduce risk of injury  - Provide assistive devices as appropriate  - Consider OT/PT consult to assist with strengthening/mobility  - Encourage toileting schedule  Outcome: Progressing     Problem: DISCHARGE PLANNING  Goal: Discharge to home or other facility with appropriate resources  Description: INTERVENTIONS:  - Identify barriers to discharge w/pt and caregiver  - Include patient/family/discharge partner in discharge planning  - Arrange for needed discharge resources and transportation as appropriate  - Identify discharge learning needs (meds, wound care, etc)  - Arrange for interpreters to assist at discharge as needed  - Consider post-discharge preferences of patient/family/discharge partner  - Complete POLST form as appropriate  - Assess patient's ability to be responsible for managing their own health  - Refer to Case Management Department for coordinating discharge planning if the patient needs post-hospital services based on physician/LIP order or complex needs related to functional status, cognitive ability or social support system  Outcome: Progressing

## 2023-08-18 NOTE — CDS QUERY
Leg wound specificity   CLINICAL DOCUMENTATION CLARIFICATION FORM  Dear Jamee Miller ,  Clinical information (provided below) includes documentation of a wound to the left leg by Nursing . Please clarify . PLEASE CHECK THE DIAGNOSIS THAT APPLIES  SITE  : Left anterior leg       Type of Ulcer-    [ x ] Venous ulcer  [  ] Other (please specify): __________________________________________________________      Documentation from the Medical Record:   Risk; DM2, PAD  Clinical indicators; Wound to left leg documented on avatar to left leg . Wound care to see per Nursing  . H&P; HPI She states that today she went to the wound care clinic for a chronic wound. Wound Care Consult; 8/17/23; HPI-presents with a full thickness left lower leg wound. Patient follows up at the outpatient wound clniic. Denies pain at this time. Left anterior leg. 2.6cmx09. cmx0.1cm. full thickness . 15% granulation, 85% slough. Treatment; gentamicin cream and bordered gauze or foam.          .    For questions regarding this query, please contact Clinical : Vinicio Cosme RN, BSN ext. [phone] or Samantha [email or web address]. [email or web address]    THIS FORM IS A PERMANENT PART OF THE MEDICAL RECORD

## 2023-08-19 LAB
GLUCOSE BLD-MCNC: 120 MG/DL (ref 70–99)
GLUCOSE BLD-MCNC: 134 MG/DL (ref 70–99)
GLUCOSE BLD-MCNC: 211 MG/DL (ref 70–99)
GLUCOSE BLD-MCNC: 294 MG/DL (ref 70–99)

## 2023-08-19 PROCEDURE — 99232 SBSQ HOSP IP/OBS MODERATE 35: CPT | Performed by: INTERNAL MEDICINE

## 2023-08-19 NOTE — PLAN OF CARE
Patient is A+Ox4. Maintaining sats >90% between RA-1L @ rest, 5L w/ exertion. PO steriods. NSR/ST on tele, refusing Lovenox. PO Abx. Voids. No c/o pain. Up self w/ cane. Tolerating diet, QID accu check. Patient updated on POC, will wean O2 as tolerated.      Problem: Patient/Family Goals  Goal: Patient/Family Long Term Goal  Description: Patient's Long Term Goal: discharge home    Interventions:  - Consult to [facility], Pul  - Scheduled nebs  - IV Steroids  - Wean oxygen  - See additional Care Plan goals for specific interventions  Outcome: Progressing  Goal: Patient/Family Short Term Goal  Description: Patient's Short Term Goal: improve SOB    Interventions:   - Consult to [facility], Pulm  - Wean oxygen  - Scheduled nebs  - IV Steroids   - See additional Care Plan goals for specific interventions  Outcome: Progressing     Problem: PAIN - ADULT  Goal: Verbalizes/displays adequate comfort level or patient's stated pain goal  Description: INTERVENTIONS:  - Encourage pt to monitor pain and request assistance  - Assess pain using appropriate pain scale  - Administer analgesics based on type and severity of pain and evaluate response  - Implement non-pharmacological measures as appropriate and evaluate response  - Consider cultural and social influences on pain and pain management  - Manage/alleviate anxiety  - Utilize distraction and/or relaxation techniques  - Monitor for opioid side effects  - Notify MD/LIP if interventions unsuccessful or patient reports new pain  - Anticipate increased pain with activity and pre-medicate as appropriate  Outcome: Progressing     Problem: RISK FOR INFECTION - ADULT  Goal: Absence of fever/infection during anticipated neutropenic period  Description: INTERVENTIONS  - Monitor WBC  - Administer growth factors as ordered  - Implement neutropenic guidelines  Outcome: Progressing     Problem: SAFETY ADULT - FALL  Goal: Free from fall injury  Description: INTERVENTIONS:  - Assess pt frequently for physical needs  - Identify cognitive and physical deficits and behaviors that affect risk of falls.   - [place] fall precautions as indicated by assessment.  - Educate pt/family on patient safety including physical limitations  - Instruct pt to call for assistance with activity based on assessment  - Modify environment to reduce risk of injury  - Provide assistive devices as appropriate  - Consider OT/PT consult to assist with strengthening/mobility  - Encourage toileting schedule  Outcome: Progressing     Problem: DISCHARGE PLANNING  Goal: Discharge to home or other facility with appropriate resources  Description: INTERVENTIONS:  - Identify barriers to discharge w/pt and caregiver  - Include patient/family/discharge partner in discharge planning  - Arrange for needed discharge resources and transportation as appropriate  - Identify discharge learning needs (meds, wound care, etc)  - Arrange for interpreters to assist at discharge as needed  - Consider post-discharge preferences of patient/family/discharge partner  - Complete POLST form as appropriate  - Assess patient's ability to be responsible for managing their own health  - Refer to Case Management Department for coordinating discharge planning if the patient needs post-hospital services based on physician/LIP order or complex needs related to functional status, cognitive ability or social support system  Outcome: Progressing

## 2023-08-19 NOTE — PLAN OF CARE
Assumed care 2300. AxO x4, klonopin at bedtime. ON 2L at rest, 10L exertion, +LOTT, diminished lungs. PO steroids in AM. NSR/ST on tele, refused lovenox and SCDs. PO abx. Voids. Denies pain. Up self with cane. Diabetic with accuchecks. POC reviewed, call light within reach.      Problem: Patient/Family Goals  Goal: Patient/Family Long Term Goal  Description: Patient's Long Term Goal: discharge home    Interventions:  - Consult to [facility], Pul  - Scheduled nebs  - IV Steroids  - Wean oxygen  - See additional Care Plan goals for specific interventions  Outcome: Progressing  Goal: Patient/Family Short Term Goal  Description: Patient's Short Term Goal: improve SOB    Interventions:   - Consult to [facility], Pulm  - Wean oxygen  - Scheduled nebs  - IV Steroids   - See additional Care Plan goals for specific interventions  Outcome: Progressing     Problem: PAIN - ADULT  Goal: Verbalizes/displays adequate comfort level or patient's stated pain goal  Description: INTERVENTIONS:  - Encourage pt to monitor pain and request assistance  - Assess pain using appropriate pain scale  - Administer analgesics based on type and severity of pain and evaluate response  - Implement non-pharmacological measures as appropriate and evaluate response  - Consider cultural and social influences on pain and pain management  - Manage/alleviate anxiety  - Utilize distraction and/or relaxation techniques  - Monitor for opioid side effects  - Notify MD/LIP if interventions unsuccessful or patient reports new pain  - Anticipate increased pain with activity and pre-medicate as appropriate  Outcome: Progressing     Problem: RISK FOR INFECTION - ADULT  Goal: Absence of fever/infection during anticipated neutropenic period  Description: INTERVENTIONS  - Monitor WBC  - Administer growth factors as ordered  - Implement neutropenic guidelines  Outcome: Progressing     Problem: SAFETY ADULT - FALL  Goal: Free from fall injury  Description: INTERVENTIONS:  - Assess pt frequently for physical needs  - Identify cognitive and physical deficits and behaviors that affect risk of falls.   - [place] fall precautions as indicated by assessment.  - Educate pt/family on patient safety including physical limitations  - Instruct pt to call for assistance with activity based on assessment  - Modify environment to reduce risk of injury  - Provide assistive devices as appropriate  - Consider OT/PT consult to assist with strengthening/mobility  - Encourage toileting schedule  Outcome: Progressing     Problem: DISCHARGE PLANNING  Goal: Discharge to home or other facility with appropriate resources  Description: INTERVENTIONS:  - Identify barriers to discharge w/pt and caregiver  - Include patient/family/discharge partner in discharge planning  - Arrange for needed discharge resources and transportation as appropriate  - Identify discharge learning needs (meds, wound care, etc)  - Arrange for interpreters to assist at discharge as needed  - Consider post-discharge preferences of patient/family/discharge partner  - Complete POLST form as appropriate  - Assess patient's ability to be responsible for managing their own health  - Refer to Case Management Department for coordinating discharge planning if the patient needs post-hospital services based on physician/LIP order or complex needs related to functional status, cognitive ability or social support system  Outcome: Progressing

## 2023-08-19 NOTE — PROGRESS NOTES
SPO2% ON OXYGEN AT REST 98% AT 2 LITERS PER MINUTE    SPO2% AMBULATION ON O2 88% AT 10 LITERS PER MINUTE

## 2023-08-20 VITALS
BODY MASS INDEX: 33.84 KG/M2 | WEIGHT: 198.19 LBS | RESPIRATION RATE: 18 BRPM | HEART RATE: 78 BPM | TEMPERATURE: 98 F | HEIGHT: 64 IN | DIASTOLIC BLOOD PRESSURE: 53 MMHG | OXYGEN SATURATION: 90 % | SYSTOLIC BLOOD PRESSURE: 109 MMHG

## 2023-08-20 LAB
GLUCOSE BLD-MCNC: 135 MG/DL (ref 70–99)
GLUCOSE BLD-MCNC: 73 MG/DL (ref 70–99)

## 2023-08-20 PROCEDURE — 99239 HOSP IP/OBS DSCHRG MGMT >30: CPT | Performed by: INTERNAL MEDICINE

## 2023-08-20 RX ORDER — DULOXETIN HYDROCHLORIDE 30 MG/1
CAPSULE, DELAYED RELEASE ORAL
Qty: 150 CAPSULE | Refills: 0 | Status: SHIPPED | COMMUNITY
Start: 2023-08-20

## 2023-08-20 RX ORDER — PREDNISONE 10 MG/1
TABLET ORAL
Qty: 15 TABLET | Refills: 0 | Status: SHIPPED | OUTPATIENT
Start: 2023-08-21 | End: 2023-08-29

## 2023-08-20 RX ORDER — IPRATROPIUM BROMIDE AND ALBUTEROL SULFATE 2.5; .5 MG/3ML; MG/3ML
3 SOLUTION RESPIRATORY (INHALATION) EVERY 4 HOURS PRN
Status: DISCONTINUED | OUTPATIENT
Start: 2023-08-20 | End: 2023-08-20

## 2023-08-20 NOTE — PLAN OF CARE
Received pt alert and oriented, ALEXANDER, remains on 2LNC overnight, lungs diminished, no coughing, SR, bp stable, sleeping, denies pain. PIV intact, maintained on isolation.

## 2023-08-20 NOTE — CM/SW NOTE
I had a face to face visit with this patient and I evaluated the patient's O2 saturations. The patient is mobile in their home and is in need of a portable oxygen tank. The home oxygen will improve the patient's condition.

## 2023-08-20 NOTE — PROGRESS NOTES
NURSING DISCHARGE NOTE    Discharged Home via Wheelchair. Accompanied by Support staff  Belongings Taken by patient/family. Discharge instructions explained to patient at bedside. IV removed. Patient's belongings taken by patient. O2 tank instructions explained, patient verbalizes understanding. No further questions at this time.

## 2023-08-20 NOTE — CM/SW NOTE
[facility] consult order received for home oxygen. CM sent referral and supporting documentation in [facility] system; [facility] can provide home oxygen for discharge. CM asked Respiratory Therapist to provide portable oxygen tank to patient for discharge (per agency request). [facility] contact information placed in patient discharge summary. RN updated.      Ash Reaves RN Case Manager [phone]

## 2023-08-20 NOTE — DISCHARGE INSTRUCTIONS
It was recommended that you use oxygen at home to facilitate your recovery and compliment your treatment. The [facility] team has set up delivery with [facility].  Contact information: [address], [address] Phone: [phone]. .  The estimated delivery is 8/20/23. If you experienced any difficulties with the delivery, please contact the [facility] Case Management department at [phone].

## 2023-08-20 NOTE — PROGRESS NOTES
08/20/23 1406   Mobility   O2 walk?  Yes   SPO2% on Room Air at Rest 94   At rest oxygen flow (liters per minute) 0   SPO2% Ambulation on Room Air 78   SPO2% Ambulation on Oxygen 92   Ambulation oxygen flow (liters per minute) 4

## 2023-08-20 NOTE — PLAN OF CARE
Patient is A+Ox4. Maintaining sats >90% between RA-1L @ rest, 5L w/ exertion. PO steriods. NSR/ST on tele, refusing Lovenox. PO Abx. Voids. No c/o pain. Up self w/ cane. Tolerating diet, QID accu check. Patient updated on POC, will wean O2 as tolerated.       Problem: Patient/Family Goals  Goal: Patient/Family Long Term Goal  Description: Patient's Long Term Goal: discharge home    Interventions:  - Consult to [address] [place] [address]  - Scheduled nebs  - IV Steroids  - Wean oxygen  - See additional Care Plan goals for specific interventions  8/19/2023 2104 by Klaudia Rivera RN  Outcome: Progressing  8/19/2023 1024 by Klaudia Rivera RN  Outcome: Progressing  Goal: Patient/Family Short Term Goal  Description: Patient's Short Term Goal: improve SOB    Interventions:   - Consult to [address] [place] [address], [facility] [address] oxygen  - Scheduled nebs  - IV Steroids   - See additional Care Plan goals for specific interventions  8/19/2023 2104 by Klaudia Rivera RN  Outcome: Progressing  8/19/2023 1024 by Klaudia Rivera RN  Outcome: Progressing     Problem: PAIN - ADULT  Goal: Verbalizes/displays adequate comfort level or patient's stated pain goal  Description: INTERVENTIONS:  - Encourage pt to monitor pain and request assistance  - Assess pain using appropriate pain scale  - Administer analgesics based on type and severity of pain and evaluate response  - Implement non-pharmacological measures as appropriate and evaluate response  - Consider cultural and social influences on pain and pain management  - Manage/alleviate anxiety  - Utilize distraction and/or relaxation techniques  - Monitor for opioid side effects  - Notify MD/LIP if interventions unsuccessful or patient reports new pain  - Anticipate increased pain with activity and pre-medicate as appropriate  8/19/2023 2104 by Klaudia Rivera RN  Outcome: Progressing  8/19/2023 1024 by Klaudia Rivera RN  Outcome: Progressing     Problem: RISK FOR INFECTION - ADULT  Goal: Absence of fever/infection during anticipated neutropenic period  Description: INTERVENTIONS  - Monitor WBC  - Administer growth factors as ordered  - Implement neutropenic guidelines  8/19/2023 2104 by Alexandria Carpenter RN  Outcome: Progressing  8/19/2023 1024 by Alexandria Carpenter RN  Outcome: Progressing     Problem: SAFETY ADULT - FALL  Goal: Free from fall injury  Description: INTERVENTIONS:  - Assess pt frequently for physical needs  - Identify cognitive and physical deficits and behaviors that affect risk of falls.   - [place] fall precautions as indicated by assessment.  - Educate pt/family on patient safety including physical limitations  - Instruct pt to call for assistance with activity based on assessment  - Modify environment to reduce risk of injury  - Provide assistive devices as appropriate  - Consider OT/PT consult to assist with strengthening/mobility  - Encourage toileting schedule  8/19/2023 2104 by Alexandria Carpenter RN  Outcome: Progressing  8/19/2023 1024 by Alexandria Carpenter RN  Outcome: Progressing     Problem: DISCHARGE PLANNING  Goal: Discharge to home or other facility with appropriate resources  Description: INTERVENTIONS:  - Identify barriers to discharge w/pt and caregiver  - Include patient/family/discharge partner in discharge planning  - Arrange for needed discharge resources and transportation as appropriate  - Identify discharge learning needs (meds, wound care, etc)  - Arrange for interpreters to assist at discharge as needed  - Consider post-discharge preferences of patient/family/discharge partner  - Complete POLST form as appropriate  - Assess patient's ability to be responsible for managing their own health  - Refer to Case Management Department for coordinating discharge planning if the patient needs post-hospital services based on physician/LIP order or complex needs related to functional status, cognitive ability or social support system  8/19/2023 2104 by Alexandria Carpenter RN  Outcome: Progressing  8/19/2023 1024 by Jeremy Alcazar RN  Outcome: Progressing

## 2023-08-23 ENCOUNTER — OFFICE VISIT (OUTPATIENT)
Dept: WOUND CARE | Facility: HOSPITAL | Age: 78
End: 2023-08-23
Attending: INTERNAL MEDICINE
Payer: MEDICARE

## 2023-08-23 VITALS
RESPIRATION RATE: 14 BRPM | HEART RATE: 88 BPM | SYSTOLIC BLOOD PRESSURE: 125 MMHG | DIASTOLIC BLOOD PRESSURE: 75 MMHG | TEMPERATURE: 97 F

## 2023-08-23 DIAGNOSIS — L97.222 NON-PRESSURE CHRONIC ULCER OF LEFT CALF WITH FAT LAYER EXPOSED (HCC): Primary | ICD-10-CM

## 2023-08-23 DIAGNOSIS — I87.332 IDIOPATHIC CHRONIC VENOUS HYPERTENSION OF LEFT LOWER EXTREMITY WITH ULCER AND INFLAMMATION (HCC): ICD-10-CM

## 2023-08-23 DIAGNOSIS — L98.499 DIABETES MELLITUS WITH SKIN ULCER (HCC): ICD-10-CM

## 2023-08-23 DIAGNOSIS — E11.622 DIABETES MELLITUS WITH SKIN ULCER (HCC): ICD-10-CM

## 2023-08-23 DIAGNOSIS — A49.02 MRSA (METHICILLIN RESISTANT STAPHYLOCOCCUS AUREUS) INFECTION: ICD-10-CM

## 2023-08-23 DIAGNOSIS — L08.9 INFECTED WOUND: ICD-10-CM

## 2023-08-23 DIAGNOSIS — T14.8XXA INFECTED WOUND: ICD-10-CM

## 2023-08-23 DIAGNOSIS — M79.89 LEFT LEG SWELLING: ICD-10-CM

## 2023-08-23 LAB — GLUCOSE BLD-MCNC: 281 MG/DL (ref 70–99)

## 2023-08-23 PROCEDURE — 82962 GLUCOSE BLOOD TEST: CPT | Performed by: INTERNAL MEDICINE

## 2023-08-23 PROCEDURE — 11042 DBRDMT SUBQ TIS 1ST 20SQCM/<: CPT | Performed by: INTERNAL MEDICINE

## 2023-08-23 NOTE — PROGRESS NOTES
Patient ID: Daljit Viera is a [de-identified]year old female. Debridement   Wound 08/09/23 #1 Left anterior leg Venous Ulcer Leg Left; Anterior    Consent obtained? verbal  Consent given by: patient  Risks discussed?  procedural risks discussed  Performed by: provider  Debridement type: surgical  Level of debridement: subcutaneous tissue  Pain control: lidocaine 4%  Pre-debridement measurements  Length (cm): 2.1  Width (cm): 0.9  Depth (cm): 0.1  Surface Area (cm^2): 1.9  Volume (cm^3): 0.2    Post-debridement measurements  Length (cm): 2.2  Width (cm): 1  Depth (cm): 0.2  Percent debrided: 100%  Surface Area (cm^2): 2.2  Area debrided (cm^2): 2.2  Volume (cm^3): 0.4  Tissue and other material debrided: subcutaneous tissue  Devitalized tissue debrided: biofilm, fibrin and slough  Instrument(s) utilized: curette  Bleeding: medium  Hemostasis obtained with: pressure  Procedural pain (0-10): 4  Post-procedural pain: 0   Response to treatment: procedure was tolerated well

## 2023-08-23 NOTE — PROGRESS NOTES
Weekly Wound Education Note    Teaching Provided To: Patient  Training Topics: Cleasing and general instructions; Compression; Discharge instructions;Dressing;Edema control  Training Method: Explain/Verbal  Training Response: Patient responds and understands; Reinforcement needed        Notes: Stable. Gentamycin ointment course completed - switched to endoform, hydrofera transfer, bordered gauze - 3 times a week. Patient refusing compression wrap, spandagrip E x2. Pt educated on dressing changes and compression.

## 2023-08-24 ENCOUNTER — TELEPHONE (OUTPATIENT)
Dept: ORTHOPEDICS CLINIC | Facility: CLINIC | Age: 78
End: 2023-08-24

## 2023-08-25 NOTE — TELEPHONE ENCOUNTER
Spoke to patient and informed her per LILIYA Mckeon it is okay to come in and get injections done while taking prednisone .     Patient aware and had no further questions at this time

## 2023-08-28 ENCOUNTER — OFFICE VISIT (OUTPATIENT)
Dept: ORTHOPEDICS CLINIC | Facility: CLINIC | Age: 78
End: 2023-08-28
Payer: MEDICARE

## 2023-08-28 VITALS — HEIGHT: 63.5 IN | BODY MASS INDEX: 34.54 KG/M2 | WEIGHT: 197.38 LBS

## 2023-08-28 DIAGNOSIS — M17.0 PRIMARY OSTEOARTHRITIS OF BOTH KNEES: Primary | ICD-10-CM

## 2023-08-28 RX ORDER — TRIAMCINOLONE ACETONIDE 40 MG/ML
80 INJECTION, SUSPENSION INTRA-ARTICULAR; INTRAMUSCULAR ONCE
Status: COMPLETED | OUTPATIENT
Start: 2023-08-28 | End: 2023-08-28

## 2023-08-28 RX ADMIN — TRIAMCINOLONE ACETONIDE 80 MG: 40 INJECTION, SUSPENSION INTRA-ARTICULAR; INTRAMUSCULAR at 11:21:00

## 2023-08-28 NOTE — PROCEDURES
Risks and benefits of knee injection discussed with the patient, with risks including but not limited to pain and swelling at the injection site and/or within the knee joint, infection, elevation in blood pressure and/or glucose levels, facial flushing. After informed consent, the patient's right and left knees were marked, locally anesthetized with skin refrigerant, prepped with topical antiseptic, and injected with a mixture of 1mL 40mg/mL Kenalog, 2mL 1% lidocaine and 2mL 0.5% marcaine through the inferolateral portal.  A band-aid was applied. The patient tolerated the procedure well.     Chilo Flores MD, [address] [facility]  Phone [phone]  Fax [phone]

## 2023-08-29 DIAGNOSIS — J43.2 CENTRILOBULAR EMPHYSEMA (HCC): Primary | ICD-10-CM

## 2023-08-29 DIAGNOSIS — R91.8 MULTIPLE PULMONARY NODULES: ICD-10-CM

## 2023-08-29 RX ORDER — IPRATROPIUM BROMIDE AND ALBUTEROL SULFATE 2.5; .5 MG/3ML; MG/3ML
3 SOLUTION RESPIRATORY (INHALATION) EVERY 6 HOURS PRN
Qty: 180 ML | Refills: 12 | Status: SHIPPED | OUTPATIENT
Start: 2023-08-29

## 2023-08-30 ENCOUNTER — OFFICE VISIT (OUTPATIENT)
Dept: WOUND CARE | Facility: HOSPITAL | Age: 78
End: 2023-08-30
Attending: INTERNAL MEDICINE
Payer: MEDICARE

## 2023-08-30 VITALS
RESPIRATION RATE: 16 BRPM | TEMPERATURE: 98 F | SYSTOLIC BLOOD PRESSURE: 126 MMHG | HEART RATE: 81 BPM | DIASTOLIC BLOOD PRESSURE: 74 MMHG

## 2023-08-30 DIAGNOSIS — M79.89 LEFT LEG SWELLING: ICD-10-CM

## 2023-08-30 DIAGNOSIS — L08.9 INFECTED WOUND: ICD-10-CM

## 2023-08-30 DIAGNOSIS — I87.332 IDIOPATHIC CHRONIC VENOUS HYPERTENSION OF LEFT LOWER EXTREMITY WITH ULCER AND INFLAMMATION (HCC): ICD-10-CM

## 2023-08-30 DIAGNOSIS — L98.499 DIABETES MELLITUS WITH SKIN ULCER (HCC): ICD-10-CM

## 2023-08-30 DIAGNOSIS — E11.622 DIABETES MELLITUS WITH SKIN ULCER (HCC): ICD-10-CM

## 2023-08-30 DIAGNOSIS — L97.222 NON-PRESSURE CHRONIC ULCER OF LEFT CALF WITH FAT LAYER EXPOSED (HCC): Primary | ICD-10-CM

## 2023-08-30 DIAGNOSIS — T14.8XXA INFECTED WOUND: ICD-10-CM

## 2023-08-30 DIAGNOSIS — A49.02 MRSA (METHICILLIN RESISTANT STAPHYLOCOCCUS AUREUS) INFECTION: ICD-10-CM

## 2023-08-30 LAB — GLUCOSE BLD-MCNC: 145 MG/DL (ref 70–99)

## 2023-08-30 PROCEDURE — 97597 DBRDMT OPN WND 1ST 20 CM/<: CPT | Performed by: INTERNAL MEDICINE

## 2023-08-30 PROCEDURE — 82962 GLUCOSE BLOOD TEST: CPT | Performed by: INTERNAL MEDICINE

## 2023-08-31 ENCOUNTER — OFFICE VISIT (OUTPATIENT)
Facility: CLINIC | Age: 78
End: 2023-08-31
Payer: MEDICARE

## 2023-08-31 VITALS
HEART RATE: 83 BPM | RESPIRATION RATE: 16 BRPM | HEIGHT: 63.5 IN | WEIGHT: 194 LBS | DIASTOLIC BLOOD PRESSURE: 70 MMHG | SYSTOLIC BLOOD PRESSURE: 110 MMHG | BODY MASS INDEX: 33.95 KG/M2 | OXYGEN SATURATION: 94 %

## 2023-08-31 DIAGNOSIS — R09.02 HYPOXEMIA: ICD-10-CM

## 2023-08-31 DIAGNOSIS — J43.2 CENTRILOBULAR EMPHYSEMA (HCC): Primary | ICD-10-CM

## 2023-08-31 PROCEDURE — 99214 OFFICE O/P EST MOD 30 MIN: CPT | Performed by: NURSE PRACTITIONER

## 2023-08-31 PROCEDURE — 1111F DSCHRG MED/CURRENT MED MERGE: CPT | Performed by: NURSE PRACTITIONER

## 2023-08-31 RX ORDER — NITROFURANTOIN 25; 75 MG/1; MG/1
100 CAPSULE ORAL 2 TIMES DAILY
COMMUNITY
Start: 2023-08-04

## 2023-09-08 ENCOUNTER — TELEPHONE (OUTPATIENT)
Dept: RHEUMATOLOGY | Facility: CLINIC | Age: 78
End: 2023-09-08

## 2023-09-08 DIAGNOSIS — M10.9 GOUT, UNSPECIFIED CAUSE, UNSPECIFIED CHRONICITY, UNSPECIFIED SITE: Primary | ICD-10-CM

## 2023-09-08 RX ORDER — ALLOPURINOL 300 MG/1
300 TABLET ORAL DAILY
Qty: 90 TABLET | Refills: 3 | Status: SHIPPED | OUTPATIENT
Start: 2023-09-08

## 2023-09-08 NOTE — TELEPHONE ENCOUNTER
Called pt and informed her that, yes, she needs to stay on the allopurinol. Confirmed pharmacy and pended 300 mg tablets for 90 days.

## 2023-09-08 NOTE — TELEPHONE ENCOUNTER
Patient should be on allopurinol 300 mg daily. Please call patient and let her know. Send to pharmacy of choice.

## 2023-09-13 ENCOUNTER — OFFICE VISIT (OUTPATIENT)
Dept: WOUND CARE | Facility: HOSPITAL | Age: 78
End: 2023-09-13
Attending: INTERNAL MEDICINE
Payer: MEDICARE

## 2023-09-13 VITALS
SYSTOLIC BLOOD PRESSURE: 114 MMHG | RESPIRATION RATE: 16 BRPM | HEART RATE: 88 BPM | TEMPERATURE: 97 F | DIASTOLIC BLOOD PRESSURE: 75 MMHG

## 2023-09-13 DIAGNOSIS — I87.332 IDIOPATHIC CHRONIC VENOUS HYPERTENSION OF LEFT LOWER EXTREMITY WITH ULCER AND INFLAMMATION (HCC): ICD-10-CM

## 2023-09-13 DIAGNOSIS — A49.02 MRSA (METHICILLIN RESISTANT STAPHYLOCOCCUS AUREUS) INFECTION: ICD-10-CM

## 2023-09-13 DIAGNOSIS — L98.499 DIABETES MELLITUS WITH SKIN ULCER (HCC): ICD-10-CM

## 2023-09-13 DIAGNOSIS — L97.222 NON-PRESSURE CHRONIC ULCER OF LEFT CALF WITH FAT LAYER EXPOSED (HCC): Primary | ICD-10-CM

## 2023-09-13 DIAGNOSIS — L08.9 INFECTED WOUND: ICD-10-CM

## 2023-09-13 DIAGNOSIS — E11.622 DIABETES MELLITUS WITH SKIN ULCER (HCC): ICD-10-CM

## 2023-09-13 DIAGNOSIS — T14.8XXA INFECTED WOUND: ICD-10-CM

## 2023-09-13 LAB — GLUCOSE BLD-MCNC: 191 MG/DL (ref 70–99)

## 2023-09-13 PROCEDURE — 82962 GLUCOSE BLOOD TEST: CPT | Performed by: INTERNAL MEDICINE

## 2023-09-13 PROCEDURE — 97597 DBRDMT OPN WND 1ST 20 CM/<: CPT | Performed by: INTERNAL MEDICINE

## 2023-09-27 ENCOUNTER — OFFICE VISIT (OUTPATIENT)
Dept: WOUND CARE | Facility: HOSPITAL | Age: 78
End: 2023-09-27
Attending: INTERNAL MEDICINE
Payer: MEDICARE

## 2023-09-27 VITALS
SYSTOLIC BLOOD PRESSURE: 128 MMHG | TEMPERATURE: 98 F | RESPIRATION RATE: 16 BRPM | DIASTOLIC BLOOD PRESSURE: 75 MMHG | HEART RATE: 84 BPM

## 2023-09-27 DIAGNOSIS — T14.8XXA INFECTED WOUND: ICD-10-CM

## 2023-09-27 DIAGNOSIS — L98.499 DIABETES MELLITUS WITH SKIN ULCER (HCC): ICD-10-CM

## 2023-09-27 DIAGNOSIS — I87.332 IDIOPATHIC CHRONIC VENOUS HYPERTENSION OF LEFT LOWER EXTREMITY WITH ULCER AND INFLAMMATION (HCC): ICD-10-CM

## 2023-09-27 DIAGNOSIS — L08.9 INFECTED WOUND: ICD-10-CM

## 2023-09-27 DIAGNOSIS — M79.89 LEFT LEG SWELLING: ICD-10-CM

## 2023-09-27 DIAGNOSIS — L97.222 NON-PRESSURE CHRONIC ULCER OF LEFT CALF WITH FAT LAYER EXPOSED (HCC): Primary | ICD-10-CM

## 2023-09-27 DIAGNOSIS — E11.622 DIABETES MELLITUS WITH SKIN ULCER (HCC): ICD-10-CM

## 2023-09-27 LAB — GLUCOSE BLD-MCNC: 89 MG/DL (ref 70–99)

## 2023-09-27 PROCEDURE — 82962 GLUCOSE BLOOD TEST: CPT | Performed by: INTERNAL MEDICINE

## 2023-09-27 PROCEDURE — 99213 OFFICE O/P EST LOW 20 MIN: CPT

## 2023-10-18 ENCOUNTER — OFFICE VISIT (OUTPATIENT)
Facility: CLINIC | Age: 78
End: 2023-10-18
Payer: MEDICARE

## 2023-10-18 VITALS
BODY MASS INDEX: 34.65 KG/M2 | DIASTOLIC BLOOD PRESSURE: 62 MMHG | HEART RATE: 83 BPM | OXYGEN SATURATION: 93 % | WEIGHT: 198 LBS | HEIGHT: 63.5 IN | RESPIRATION RATE: 16 BRPM | SYSTOLIC BLOOD PRESSURE: 120 MMHG

## 2023-10-18 DIAGNOSIS — J84.9 ILD (INTERSTITIAL LUNG DISEASE) (HCC): Primary | ICD-10-CM

## 2023-10-18 PROCEDURE — 99214 OFFICE O/P EST MOD 30 MIN: CPT | Performed by: INTERNAL MEDICINE

## 2023-10-18 NOTE — PATIENT INSTRUCTIONS
Obtain a pulmonary function test to check your lung function in the next few weeks   I will contact Dr. Jose Hartman to review your lungs and possible medicines to help the lungs and arthritis  Price trelegy with your insurance. This would replace the anoro and hopefully is the same price. There is a slight risk of pneumonia with using trelegy but hopefully this will help your breathing and cough.    Let me know if you change your mind about using oxygen  Plan on following up in 2-3months or earlier if needed  Call/message with questions/concerns

## 2023-10-19 ENCOUNTER — TELEPHONE (OUTPATIENT)
Facility: CLINIC | Age: 78
End: 2023-10-19

## 2023-10-19 RX ORDER — FLUTICASONE FUROATE, UMECLIDINIUM BROMIDE AND VILANTEROL TRIFENATATE 100; 62.5; 25 UG/1; UG/1; UG/1
1 POWDER RESPIRATORY (INHALATION) DAILY
Qty: 1 EACH | Refills: 1 | Status: SHIPPED | OUTPATIENT
Start: 2023-10-19

## 2023-10-19 NOTE — TELEPHONE ENCOUNTER
Per Dr. Rivas Jackie note on 10/18/23. Price trelegy with your insurance. This would replace the anoro and hopefully is the same price. Pt called to notify Dr. Kirk Tejeda entered order for Trelegy. Pt verbalized understanding.

## 2023-10-19 NOTE — TELEPHONE ENCOUNTER
Pt talked to her ins and the change from Anoro to Trelegy is the same co-pay so she agrees to the switch. She has 9 more uses on the current inhaler. Pt prefers to switch only after this runs out.

## 2023-10-23 ENCOUNTER — OFFICE VISIT (OUTPATIENT)
Dept: RHEUMATOLOGY | Facility: CLINIC | Age: 78
End: 2023-10-23
Payer: MEDICARE

## 2023-10-23 VITALS
HEART RATE: 57 BPM | DIASTOLIC BLOOD PRESSURE: 84 MMHG | WEIGHT: 199 LBS | SYSTOLIC BLOOD PRESSURE: 122 MMHG | RESPIRATION RATE: 22 BRPM | HEIGHT: 63.5 IN | BODY MASS INDEX: 34.82 KG/M2 | OXYGEN SATURATION: 93 % | TEMPERATURE: 98 F

## 2023-10-23 DIAGNOSIS — M10.9 GOUT, UNSPECIFIED CAUSE, UNSPECIFIED CHRONICITY, UNSPECIFIED SITE: ICD-10-CM

## 2023-10-23 DIAGNOSIS — Z11.1 SCREENING FOR TUBERCULOSIS: ICD-10-CM

## 2023-10-23 DIAGNOSIS — R76.8 CENTROMERE ANTIBODY POSITIVE: Primary | ICD-10-CM

## 2023-10-23 DIAGNOSIS — Z11.59 NEED FOR HEPATITIS B SCREENING TEST: ICD-10-CM

## 2023-10-23 DIAGNOSIS — J84.9 ILD (INTERSTITIAL LUNG DISEASE) (HCC): ICD-10-CM

## 2023-10-23 DIAGNOSIS — Z11.59 NEED FOR HEPATITIS C SCREENING TEST: ICD-10-CM

## 2023-10-23 DIAGNOSIS — Z51.81 THERAPEUTIC DRUG MONITORING: ICD-10-CM

## 2023-10-23 PROCEDURE — 99214 OFFICE O/P EST MOD 30 MIN: CPT | Performed by: INTERNAL MEDICINE

## 2023-10-23 RX ORDER — COLCHICINE 0.6 MG/1
TABLET ORAL
COMMUNITY
Start: 2023-10-19

## 2023-10-23 NOTE — PATIENT INSTRUCTIONS
Continue allopurinol 300 mg daily as you are doing. Continue colchicine 0.6 mg daily until the end of the year then stop. Will talk to Dr. Vahid Gordon about reimaging the lungs at some time.

## 2023-10-25 ENCOUNTER — TELEPHONE (OUTPATIENT)
Dept: RHEUMATOLOGY | Facility: CLINIC | Age: 78
End: 2023-10-25

## 2023-10-25 NOTE — TELEPHONE ENCOUNTER
Call patient. Tell her I discussed the case with Dr. Bertha Burton. He agrees use the pollen/debris from that oak tree falling may have contributed to the findings on the CT scan from August.  Recommend repeating the CT scan in late December 2023 for a reevaluation to understand our original question, how is her interstitial lung disease doing?

## 2023-10-25 NOTE — TELEPHONE ENCOUNTER
Called patient:     She is doing well. Yesterday she was sneezing but she has opened the windows. She will schedule her next CT at the end of December. Call patient. Tell her I discussed the case with Dr. Kelsy Mandujano. He agrees use the pollen/debris from that oak tree falling may have contributed to the findings on the CT scan from August.  Recommend repeating the CT scan in late December 2023 for a reevaluation to understand our original question, how is her interstitial lung disease doing?

## 2023-10-30 ENCOUNTER — HOSPITAL ENCOUNTER (OUTPATIENT)
Dept: MAMMOGRAPHY | Age: 78
Discharge: HOME | End: 2023-10-30
Attending: FAMILY MEDICINE
Payer: MEDICARE

## 2023-10-30 DIAGNOSIS — Z12.31 ENCOUNTER FOR SCREENING MAMMOGRAM FOR MALIGNANT NEOPLASM OF BREAST: ICD-10-CM

## 2023-10-30 PROCEDURE — 77067 SCR MAMMO BI INCL CAD: CPT | Performed by: FAMILY MEDICINE

## 2023-10-30 PROCEDURE — 77063 BREAST TOMOSYNTHESIS BI: CPT | Performed by: FAMILY MEDICINE

## 2023-11-17 ENCOUNTER — RT VISIT (OUTPATIENT)
Dept: RESPIRATORY THERAPY | Facility: HOSPITAL | Age: 78
End: 2023-11-17
Attending: INTERNAL MEDICINE
Payer: MEDICARE

## 2023-11-17 DIAGNOSIS — J84.9 ILD (INTERSTITIAL LUNG DISEASE) (HCC): ICD-10-CM

## 2023-11-17 PROCEDURE — 94726 PLETHYSMOGRAPHY LUNG VOLUMES: CPT | Performed by: INTERNAL MEDICINE

## 2023-11-17 PROCEDURE — 94729 DIFFUSING CAPACITY: CPT | Performed by: INTERNAL MEDICINE

## 2023-11-17 PROCEDURE — 94060 EVALUATION OF WHEEZING: CPT | Performed by: INTERNAL MEDICINE

## 2023-11-17 NOTE — PROCEDURES
Findings:  Postbronchodilator FEV1 is 1.65L, 87% predicted. Postbronchodilator FVC is 2.48L, 100% predicted. FEV1/ FVC ratio is 0.67. There is no significant bronchodilator response after administration of albuterol. The flow-volume loop suggests an obstructive pattern. The TLC is 5.03L, 104% predicted. The residual volume 2.52L, 114% predicted. The diffusion capacity is 34% predicted and 39% predicted when corrected for alveolar volume. Impression:  By ATS/ERS criteria, there is no airway obstruction on spirometry, but is suggested by flow-volume loop and reduced NRO33-08%. There is no significant bronchodilator response, but this does not preclude treatment with bronchodilators. Lung volumes are normal.  Diffusion capacity is severely reduced with DLCO of 34%. The decrease in diffusion capacity can be seen in emphysema, interstitial lung disease, pulmonary vascular disease (such as pulmonary hypertension) and anemia. If not already performed, would suggest further evaluation as determined clinically. Additionally, given the severity of the reduced diffusion capacity, would recommend evaluation for hypoxia and supplemental oxygenation if not already performed. When compared to previous pulmonary function testing dated 9/9/2022, there has been an INCREASE in FEV1 (previously 1.53L, 80% predicted), FVC (previously 2.41L, 96% predicted) and total lung capacity (previously 5.34L, 110% predicted). Diffusion capacity is not changed.

## 2023-12-11 ENCOUNTER — TELEPHONE (OUTPATIENT)
Dept: PAIN CLINIC | Facility: CLINIC | Age: 78
End: 2023-12-11

## 2023-12-11 NOTE — TELEPHONE ENCOUNTER
Received incoming fax from [employer] for pt to be seen by DAVID PAIN for back pain. Kati Celis Referral and records have been placed in RN bin for MD review.

## 2023-12-12 ENCOUNTER — APPOINTMENT (OUTPATIENT)
Dept: GENERAL RADIOLOGY | Facility: HOSPITAL | Age: 78
End: 2023-12-12
Attending: EMERGENCY MEDICINE
Payer: MEDICARE

## 2023-12-12 ENCOUNTER — ANESTHESIA (OUTPATIENT)
Dept: MEDSURG UNIT | Facility: HOSPITAL | Age: 78
End: 2023-12-12
Payer: MEDICARE

## 2023-12-12 ENCOUNTER — HOSPITAL ENCOUNTER (INPATIENT)
Facility: HOSPITAL | Age: 78
LOS: 3 days | Discharge: NURSING, REHAB OR LONG-TERM CARE | End: 2023-12-15
Attending: EMERGENCY MEDICINE | Admitting: INTERNAL MEDICINE
Payer: MEDICARE

## 2023-12-12 ENCOUNTER — ANESTHESIA EVENT (OUTPATIENT)
Dept: MEDSURG UNIT | Facility: HOSPITAL | Age: 78
End: 2023-12-12
Payer: MEDICARE

## 2023-12-12 ENCOUNTER — ANESTHESIA EVENT (OUTPATIENT)
Dept: SURGERY | Facility: HOSPITAL | Age: 78
End: 2023-12-12
Payer: MEDICARE

## 2023-12-12 DIAGNOSIS — S72.002A CLOSED FRACTURE OF LEFT HIP, INITIAL ENCOUNTER (HCC): Primary | ICD-10-CM

## 2023-12-12 LAB
ALBUMIN SERPL-MCNC: 3.5 G/DL (ref 3.4–5)
ALBUMIN/GLOB SERPL: 1.3 {RATIO} (ref 1–2)
ALP LIVER SERPL-CCNC: 116 U/L
ALT SERPL-CCNC: 21 U/L
ANION GAP SERPL CALC-SCNC: 6 MMOL/L (ref 0–18)
APTT PPP: 30.4 SECONDS (ref 23.3–35.6)
AST SERPL-CCNC: 22 U/L (ref 15–37)
BASOPHILS # BLD AUTO: 0.09 X10(3) UL (ref 0–0.2)
BASOPHILS NFR BLD AUTO: 0.7 %
BILIRUB SERPL-MCNC: 0.5 MG/DL (ref 0.1–2)
BUN BLD-MCNC: 22 MG/DL (ref 9–23)
CALCIUM BLD-MCNC: 8.9 MG/DL (ref 8.5–10.1)
CHLORIDE SERPL-SCNC: 106 MMOL/L (ref 98–112)
CO2 SERPL-SCNC: 27 MMOL/L (ref 21–32)
CREAT BLD-MCNC: 1 MG/DL
EGFRCR SERPLBLD CKD-EPI 2021: 57 ML/MIN/1.73M2 (ref 60–?)
EOSINOPHIL # BLD AUTO: 0.31 X10(3) UL (ref 0–0.7)
EOSINOPHIL NFR BLD AUTO: 2.5 %
ERYTHROCYTE [DISTWIDTH] IN BLOOD BY AUTOMATED COUNT: 14.1 %
GLOBULIN PLAS-MCNC: 2.8 G/DL (ref 2.8–4.4)
GLUCOSE BLD-MCNC: 108 MG/DL (ref 70–99)
GLUCOSE BLD-MCNC: 162 MG/DL (ref 70–99)
GLUCOSE BLD-MCNC: 221 MG/DL (ref 70–99)
GLUCOSE BLD-MCNC: 222 MG/DL (ref 70–99)
HCT VFR BLD AUTO: 43.3 %
HGB BLD-MCNC: 14.2 G/DL
IMM GRANULOCYTES # BLD AUTO: 0.11 X10(3) UL (ref 0–1)
IMM GRANULOCYTES NFR BLD: 0.9 %
INR BLD: 1.03 (ref 0.8–1.2)
LYMPHOCYTES # BLD AUTO: 0.84 X10(3) UL (ref 1–4)
LYMPHOCYTES NFR BLD AUTO: 6.9 %
MCH RBC QN AUTO: 29.6 PG (ref 26–34)
MCHC RBC AUTO-ENTMCNC: 32.8 G/DL (ref 31–37)
MCV RBC AUTO: 90.4 FL
MONOCYTES # BLD AUTO: 0.85 X10(3) UL (ref 0.1–1)
MONOCYTES NFR BLD AUTO: 6.9 %
MRSA NASAL: NEGATIVE
NEUTROPHILS # BLD AUTO: 10.06 X10 (3) UL (ref 1.5–7.7)
NEUTROPHILS # BLD AUTO: 10.06 X10(3) UL (ref 1.5–7.7)
NEUTROPHILS NFR BLD AUTO: 82.1 %
OSMOLALITY SERPL CALC.SUM OF ELEC: 295 MOSM/KG (ref 275–295)
PLATELET # BLD AUTO: 157 10(3)UL (ref 150–450)
POTASSIUM SERPL-SCNC: 4 MMOL/L (ref 3.5–5.1)
PROT SERPL-MCNC: 6.3 G/DL (ref 6.4–8.2)
PROTHROMBIN TIME: 13.5 SECONDS (ref 11.6–14.8)
RBC # BLD AUTO: 4.79 X10(6)UL
SODIUM SERPL-SCNC: 139 MMOL/L (ref 136–145)
STAPH A BY PCR: POSITIVE
WBC # BLD AUTO: 12.3 X10(3) UL (ref 4–11)

## 2023-12-12 PROCEDURE — 99223 1ST HOSP IP/OBS HIGH 75: CPT | Performed by: INTERNAL MEDICINE

## 2023-12-12 PROCEDURE — 73552 X-RAY EXAM OF FEMUR 2/>: CPT | Performed by: EMERGENCY MEDICINE

## 2023-12-12 PROCEDURE — 71045 X-RAY EXAM CHEST 1 VIEW: CPT | Performed by: EMERGENCY MEDICINE

## 2023-12-12 PROCEDURE — 73502 X-RAY EXAM HIP UNI 2-3 VIEWS: CPT | Performed by: EMERGENCY MEDICINE

## 2023-12-12 RX ORDER — MORPHINE SULFATE 4 MG/ML
4 INJECTION, SOLUTION INTRAMUSCULAR; INTRAVENOUS EVERY 2 HOUR PRN
Status: DISCONTINUED | OUTPATIENT
Start: 2023-12-12 | End: 2023-12-15

## 2023-12-12 RX ORDER — ENEMA 19; 7 G/133ML; G/133ML
1 ENEMA RECTAL ONCE AS NEEDED
Status: DISCONTINUED | OUTPATIENT
Start: 2023-12-12 | End: 2023-12-13

## 2023-12-12 RX ORDER — ALLOPURINOL 300 MG/1
300 TABLET ORAL DAILY
Status: DISCONTINUED | OUTPATIENT
Start: 2023-12-13 | End: 2023-12-15

## 2023-12-12 RX ORDER — METOPROLOL SUCCINATE 25 MG/1
25 TABLET, EXTENDED RELEASE ORAL NIGHTLY
Status: DISCONTINUED | OUTPATIENT
Start: 2023-12-12 | End: 2023-12-15

## 2023-12-12 RX ORDER — CETIRIZINE HYDROCHLORIDE 10 MG/1
10 TABLET ORAL DAILY
Status: DISCONTINUED | OUTPATIENT
Start: 2023-12-13 | End: 2023-12-15

## 2023-12-12 RX ORDER — NICOTINE POLACRILEX 4 MG
15 LOZENGE BUCCAL
Status: DISCONTINUED | OUTPATIENT
Start: 2023-12-12 | End: 2023-12-15

## 2023-12-12 RX ORDER — HYDROCODONE BITARTRATE AND ACETAMINOPHEN 5; 325 MG/1; MG/1
1 TABLET ORAL EVERY 4 HOURS PRN
Status: DISCONTINUED | OUTPATIENT
Start: 2023-12-12 | End: 2023-12-15

## 2023-12-12 RX ORDER — MORPHINE SULFATE 2 MG/ML
1 INJECTION, SOLUTION INTRAMUSCULAR; INTRAVENOUS EVERY 2 HOUR PRN
Status: DISCONTINUED | OUTPATIENT
Start: 2023-12-12 | End: 2023-12-15

## 2023-12-12 RX ORDER — ROSUVASTATIN CALCIUM 5 MG/1
10 TABLET, COATED ORAL NIGHTLY
Status: DISCONTINUED | OUTPATIENT
Start: 2023-12-12 | End: 2023-12-15

## 2023-12-12 RX ORDER — ACETAMINOPHEN 325 MG/1
650 TABLET ORAL EVERY 4 HOURS PRN
Status: DISCONTINUED | OUTPATIENT
Start: 2023-12-12 | End: 2023-12-15

## 2023-12-12 RX ORDER — ECHINACEA PURPUREA EXTRACT 125 MG
1 TABLET ORAL
Status: DISCONTINUED | OUTPATIENT
Start: 2023-12-12 | End: 2023-12-15

## 2023-12-12 RX ORDER — NICOTINE POLACRILEX 4 MG
30 LOZENGE BUCCAL
Status: DISCONTINUED | OUTPATIENT
Start: 2023-12-12 | End: 2023-12-15

## 2023-12-12 RX ORDER — DEXTROSE MONOHYDRATE 25 G/50ML
50 INJECTION, SOLUTION INTRAVENOUS
Status: DISCONTINUED | OUTPATIENT
Start: 2023-12-12 | End: 2023-12-15

## 2023-12-12 RX ORDER — CLONAZEPAM 1 MG/1
1 TABLET ORAL NIGHTLY PRN
Status: DISCONTINUED | OUTPATIENT
Start: 2023-12-12 | End: 2023-12-15

## 2023-12-12 RX ORDER — ONDANSETRON 2 MG/ML
4 INJECTION INTRAMUSCULAR; INTRAVENOUS EVERY 6 HOURS PRN
Status: DISCONTINUED | OUTPATIENT
Start: 2023-12-12 | End: 2023-12-13

## 2023-12-12 RX ORDER — DEXAMETHASONE SODIUM PHOSPHATE 10 MG/ML
10 INJECTION, SOLUTION INTRAMUSCULAR; INTRAVENOUS ONCE
Status: COMPLETED | OUTPATIENT
Start: 2023-12-12 | End: 2023-12-12

## 2023-12-12 RX ORDER — PANTOPRAZOLE SODIUM 40 MG/1
40 TABLET, DELAYED RELEASE ORAL
Status: DISCONTINUED | OUTPATIENT
Start: 2023-12-13 | End: 2023-12-15

## 2023-12-12 RX ORDER — FLUTICASONE PROPIONATE 50 MCG
2 SPRAY, SUSPENSION (ML) NASAL DAILY
Status: DISCONTINUED | OUTPATIENT
Start: 2023-12-13 | End: 2023-12-15

## 2023-12-12 RX ORDER — BISACODYL 10 MG
10 SUPPOSITORY, RECTAL RECTAL
Status: DISCONTINUED | OUTPATIENT
Start: 2023-12-12 | End: 2023-12-13

## 2023-12-12 RX ORDER — METOCLOPRAMIDE HYDROCHLORIDE 5 MG/ML
5 INJECTION INTRAMUSCULAR; INTRAVENOUS EVERY 8 HOURS PRN
Status: DISCONTINUED | OUTPATIENT
Start: 2023-12-12 | End: 2023-12-15

## 2023-12-12 RX ORDER — MORPHINE SULFATE 2 MG/ML
2 INJECTION, SOLUTION INTRAMUSCULAR; INTRAVENOUS EVERY 2 HOUR PRN
Status: DISCONTINUED | OUTPATIENT
Start: 2023-12-12 | End: 2023-12-15

## 2023-12-12 RX ORDER — SENNOSIDES 8.6 MG
17.2 TABLET ORAL NIGHTLY PRN
Status: DISCONTINUED | OUTPATIENT
Start: 2023-12-12 | End: 2023-12-15

## 2023-12-12 RX ORDER — DULOXETIN HYDROCHLORIDE 30 MG/1
60 CAPSULE, DELAYED RELEASE ORAL DAILY
Status: DISCONTINUED | OUTPATIENT
Start: 2023-12-13 | End: 2023-12-15

## 2023-12-12 RX ORDER — HYDROCODONE BITARTRATE AND ACETAMINOPHEN 5; 325 MG/1; MG/1
2 TABLET ORAL EVERY 4 HOURS PRN
Status: DISCONTINUED | OUTPATIENT
Start: 2023-12-12 | End: 2023-12-15

## 2023-12-12 RX ORDER — LIDOCAINE HYDROCHLORIDE 10 MG/ML
1 INJECTION, SOLUTION INFILTRATION; PERINEURAL ONCE
Status: COMPLETED | OUTPATIENT
Start: 2023-12-12 | End: 2023-12-12

## 2023-12-12 RX ORDER — DEXAMETHASONE SODIUM PHOSPHATE 10 MG/ML
INJECTION, SOLUTION INTRAMUSCULAR; INTRAVENOUS AS NEEDED
Status: DISCONTINUED | OUTPATIENT
Start: 2023-12-12 | End: 2023-12-12

## 2023-12-12 RX ORDER — DEXAMETHASONE SODIUM PHOSPHATE 10 MG/ML
INJECTION, SOLUTION INTRAMUSCULAR; INTRAVENOUS
Status: COMPLETED
Start: 2023-12-12 | End: 2023-12-12

## 2023-12-12 RX ORDER — MELATONIN
3 NIGHTLY PRN
Status: DISCONTINUED | OUTPATIENT
Start: 2023-12-12 | End: 2023-12-15

## 2023-12-12 RX ORDER — POLYETHYLENE GLYCOL 3350 17 G/17G
17 POWDER, FOR SOLUTION ORAL DAILY PRN
Status: DISCONTINUED | OUTPATIENT
Start: 2023-12-12 | End: 2023-12-13

## 2023-12-12 RX ORDER — ROPIVACAINE HYDROCHLORIDE 5 MG/ML
30 INJECTION, SOLUTION EPIDURAL; INFILTRATION; PERINEURAL ONCE
Status: COMPLETED | OUTPATIENT
Start: 2023-12-12 | End: 2023-12-12

## 2023-12-12 RX ORDER — LIDOCAINE HYDROCHLORIDE 10 MG/ML
INJECTION, SOLUTION INFILTRATION; PERINEURAL
Status: COMPLETED
Start: 2023-12-12 | End: 2023-12-12

## 2023-12-12 RX ORDER — ROPIVACAINE HYDROCHLORIDE 5 MG/ML
INJECTION, SOLUTION EPIDURAL; INFILTRATION; PERINEURAL
Status: COMPLETED
Start: 2023-12-12 | End: 2023-12-12

## 2023-12-12 RX ORDER — MORPHINE SULFATE 4 MG/ML
4 INJECTION, SOLUTION INTRAMUSCULAR; INTRAVENOUS ONCE
Status: COMPLETED | OUTPATIENT
Start: 2023-12-12 | End: 2023-12-12

## 2023-12-12 RX ORDER — ALBUTEROL SULFATE 90 UG/1
2 AEROSOL, METERED RESPIRATORY (INHALATION) EVERY 6 HOURS PRN
Status: DISCONTINUED | OUTPATIENT
Start: 2023-12-12 | End: 2023-12-15

## 2023-12-12 RX ORDER — IPRATROPIUM BROMIDE AND ALBUTEROL SULFATE 2.5; .5 MG/3ML; MG/3ML
3 SOLUTION RESPIRATORY (INHALATION) EVERY 6 HOURS PRN
Status: DISCONTINUED | OUTPATIENT
Start: 2023-12-12 | End: 2023-12-15

## 2023-12-12 RX ADMIN — DEXAMETHASONE SODIUM PHOSPHATE 2 MG: 10 INJECTION, SOLUTION INTRAMUSCULAR; INTRAVENOUS at 15:00:00

## 2023-12-12 NOTE — ED INITIAL ASSESSMENT (HPI)
Per EMS Pt fell and hit her L Hip Pain 9/10, external rotation of L foot. 45mcg of Fentanyl given prior to arrival. No anticoagulant use.

## 2023-12-12 NOTE — PLAN OF CARE
NURSING ADMISSION NOTE      Patient admitted via Cart from ER  Oriented to room. Safety precautions initiated. Bed in low position. Call light in reach. Pt Aox4, 2L O2, . SCD to RLE. LLE shortened and internally rotated, pt reports improvement in pain with nerve block. IV SL. NPO p midnight for OR tomorrow.

## 2023-12-12 NOTE — ED QUICK NOTES
Orders for admission, patient is aware of plan and ready to go upstairs. Any questions, please call ED RN Jose Narvaez at extension [phone].      Patient Covid vaccination status: Fully vaccinated     COVID Test Ordered in ED: None    COVID Suspicion at Admission: N/A    Running Infusions:  None    Mental Status/LOC at time of transport:   A&Ox4    Other pertinent information:   CIWA score: N/A   NIH score:  N/A

## 2023-12-13 ENCOUNTER — APPOINTMENT (OUTPATIENT)
Dept: GENERAL RADIOLOGY | Facility: HOSPITAL | Age: 78
End: 2023-12-13
Attending: ORTHOPAEDIC SURGERY
Payer: MEDICARE

## 2023-12-13 ENCOUNTER — ANESTHESIA (OUTPATIENT)
Dept: SURGERY | Facility: HOSPITAL | Age: 78
End: 2023-12-13
Payer: MEDICARE

## 2023-12-13 PROBLEM — E11.65 TYPE 2 DIABETES MELLITUS WITH HYPERGLYCEMIA (HCC): Status: ACTIVE | Noted: 2023-08-17

## 2023-12-13 LAB
ANION GAP SERPL CALC-SCNC: 1 MMOL/L (ref 0–18)
ATRIAL RATE: 76 BPM
BASOPHILS # BLD AUTO: 0.03 X10(3) UL (ref 0–0.2)
BASOPHILS NFR BLD AUTO: 0.3 %
BUN BLD-MCNC: 19 MG/DL (ref 9–23)
CALCIUM BLD-MCNC: 8.6 MG/DL (ref 8.5–10.1)
CHLORIDE SERPL-SCNC: 108 MMOL/L (ref 98–112)
CO2 SERPL-SCNC: 30 MMOL/L (ref 21–32)
CREAT BLD-MCNC: 0.93 MG/DL
EGFRCR SERPLBLD CKD-EPI 2021: 62 ML/MIN/1.73M2 (ref 60–?)
EOSINOPHIL # BLD AUTO: 0.01 X10(3) UL (ref 0–0.7)
EOSINOPHIL NFR BLD AUTO: 0.1 %
ERYTHROCYTE [DISTWIDTH] IN BLOOD BY AUTOMATED COUNT: 13.8 %
GLUCOSE BLD-MCNC: 106 MG/DL (ref 70–99)
GLUCOSE BLD-MCNC: 129 MG/DL (ref 70–99)
GLUCOSE BLD-MCNC: 147 MG/DL (ref 70–99)
GLUCOSE BLD-MCNC: 190 MG/DL (ref 70–99)
GLUCOSE BLD-MCNC: 194 MG/DL (ref 70–99)
GLUCOSE BLD-MCNC: 206 MG/DL (ref 70–99)
HCT VFR BLD AUTO: 39.9 %
HGB BLD-MCNC: 12.6 G/DL
IMM GRANULOCYTES # BLD AUTO: 0.06 X10(3) UL (ref 0–1)
IMM GRANULOCYTES NFR BLD: 0.6 %
LYMPHOCYTES # BLD AUTO: 0.74 X10(3) UL (ref 1–4)
LYMPHOCYTES NFR BLD AUTO: 7.4 %
MCH RBC QN AUTO: 29.6 PG (ref 26–34)
MCHC RBC AUTO-ENTMCNC: 31.6 G/DL (ref 31–37)
MCV RBC AUTO: 93.9 FL
MONOCYTES # BLD AUTO: 0.79 X10(3) UL (ref 0.1–1)
MONOCYTES NFR BLD AUTO: 7.9 %
NEUTROPHILS # BLD AUTO: 8.4 X10 (3) UL (ref 1.5–7.7)
NEUTROPHILS # BLD AUTO: 8.4 X10(3) UL (ref 1.5–7.7)
NEUTROPHILS NFR BLD AUTO: 83.7 %
OSMOLALITY SERPL CALC.SUM OF ELEC: 295 MOSM/KG (ref 275–295)
P AXIS: 69 DEGREES
P-R INTERVAL: 138 MS
PLATELET # BLD AUTO: 147 10(3)UL (ref 150–450)
POTASSIUM SERPL-SCNC: 4.3 MMOL/L (ref 3.5–5.1)
Q-T INTERVAL: 406 MS
QRS DURATION: 88 MS
QTC CALCULATION (BEZET): 456 MS
R AXIS: 26 DEGREES
RBC # BLD AUTO: 4.25 X10(6)UL
SODIUM SERPL-SCNC: 139 MMOL/L (ref 136–145)
T AXIS: 45 DEGREES
VENTRICULAR RATE: 76 BPM
WBC # BLD AUTO: 10 X10(3) UL (ref 4–11)

## 2023-12-13 PROCEDURE — 76000 FLUOROSCOPY <1 HR PHYS/QHP: CPT | Performed by: ORTHOPAEDIC SURGERY

## 2023-12-13 PROCEDURE — 3E0T3BZ INTRODUCTION OF ANESTHETIC AGENT INTO PERIPHERAL NERVES AND PLEXI, PERCUTANEOUS APPROACH: ICD-10-PCS | Performed by: INTERNAL MEDICINE

## 2023-12-13 PROCEDURE — 0QS736Z REPOSITION LEFT UPPER FEMUR WITH INTRAMEDULLARY INTERNAL FIXATION DEVICE, PERCUTANEOUS APPROACH: ICD-10-PCS | Performed by: ORTHOPAEDIC SURGERY

## 2023-12-13 PROCEDURE — 99232 SBSQ HOSP IP/OBS MODERATE 35: CPT | Performed by: INTERNAL MEDICINE

## 2023-12-13 PROCEDURE — 73552 X-RAY EXAM OF FEMUR 2/>: CPT | Performed by: ORTHOPAEDIC SURGERY

## 2023-12-13 RX ORDER — DEXAMETHASONE SODIUM PHOSPHATE 4 MG/ML
VIAL (ML) INJECTION AS NEEDED
Status: DISCONTINUED | OUTPATIENT
Start: 2023-12-13 | End: 2023-12-13 | Stop reason: SURG

## 2023-12-13 RX ORDER — NALOXONE HYDROCHLORIDE 0.4 MG/ML
0.08 INJECTION, SOLUTION INTRAMUSCULAR; INTRAVENOUS; SUBCUTANEOUS AS NEEDED
Status: DISCONTINUED | OUTPATIENT
Start: 2023-12-13 | End: 2023-12-13 | Stop reason: HOSPADM

## 2023-12-13 RX ORDER — INSULIN ASPART 100 [IU]/ML
INJECTION, SOLUTION INTRAVENOUS; SUBCUTANEOUS ONCE
Status: DISCONTINUED | OUTPATIENT
Start: 2023-12-13 | End: 2023-12-13 | Stop reason: HOSPADM

## 2023-12-13 RX ORDER — METOCLOPRAMIDE HYDROCHLORIDE 5 MG/ML
10 INJECTION INTRAMUSCULAR; INTRAVENOUS EVERY 8 HOURS PRN
Status: DISCONTINUED | OUTPATIENT
Start: 2023-12-13 | End: 2023-12-13 | Stop reason: HOSPADM

## 2023-12-13 RX ORDER — HYDROMORPHONE HYDROCHLORIDE 1 MG/ML
0.6 INJECTION, SOLUTION INTRAMUSCULAR; INTRAVENOUS; SUBCUTANEOUS EVERY 5 MIN PRN
Status: DISCONTINUED | OUTPATIENT
Start: 2023-12-13 | End: 2023-12-13 | Stop reason: HOSPADM

## 2023-12-13 RX ORDER — VANCOMYCIN HYDROCHLORIDE
15 ONCE
Qty: 250 ML | Refills: 0 | Status: COMPLETED | OUTPATIENT
Start: 2023-12-13 | End: 2023-12-14

## 2023-12-13 RX ORDER — DOCUSATE SODIUM 100 MG/1
100 CAPSULE, LIQUID FILLED ORAL 2 TIMES DAILY
Status: DISCONTINUED | OUTPATIENT
Start: 2023-12-13 | End: 2023-12-15

## 2023-12-13 RX ORDER — LIDOCAINE HYDROCHLORIDE 10 MG/ML
INJECTION, SOLUTION EPIDURAL; INFILTRATION; INTRACAUDAL; PERINEURAL AS NEEDED
Status: DISCONTINUED | OUTPATIENT
Start: 2023-12-13 | End: 2023-12-13 | Stop reason: SURG

## 2023-12-13 RX ORDER — BUPIVACAINE HYDROCHLORIDE AND EPINEPHRINE 2.5; 5 MG/ML; UG/ML
INJECTION, SOLUTION EPIDURAL; INFILTRATION; INTRACAUDAL; PERINEURAL AS NEEDED
Status: DISCONTINUED | OUTPATIENT
Start: 2023-12-13 | End: 2023-12-13 | Stop reason: HOSPADM

## 2023-12-13 RX ORDER — ENEMA 19; 7 G/133ML; G/133ML
1 ENEMA RECTAL ONCE AS NEEDED
Status: DISCONTINUED | OUTPATIENT
Start: 2023-12-13 | End: 2023-12-15

## 2023-12-13 RX ORDER — HYDROMORPHONE HYDROCHLORIDE 1 MG/ML
0.2 INJECTION, SOLUTION INTRAMUSCULAR; INTRAVENOUS; SUBCUTANEOUS EVERY 5 MIN PRN
Status: DISCONTINUED | OUTPATIENT
Start: 2023-12-13 | End: 2023-12-13 | Stop reason: HOSPADM

## 2023-12-13 RX ORDER — BISACODYL 10 MG
10 SUPPOSITORY, RECTAL RECTAL
Status: DISCONTINUED | OUTPATIENT
Start: 2023-12-13 | End: 2023-12-15

## 2023-12-13 RX ORDER — SENNOSIDES 8.6 MG
17.2 TABLET ORAL NIGHTLY
Status: DISCONTINUED | OUTPATIENT
Start: 2023-12-13 | End: 2023-12-15

## 2023-12-13 RX ORDER — SODIUM CHLORIDE, SODIUM LACTATE, POTASSIUM CHLORIDE, CALCIUM CHLORIDE 600; 310; 30; 20 MG/100ML; MG/100ML; MG/100ML; MG/100ML
INJECTION, SOLUTION INTRAVENOUS CONTINUOUS PRN
Status: DISCONTINUED | OUTPATIENT
Start: 2023-12-13 | End: 2023-12-13 | Stop reason: SURG

## 2023-12-13 RX ORDER — SODIUM CHLORIDE, SODIUM LACTATE, POTASSIUM CHLORIDE, CALCIUM CHLORIDE 600; 310; 30; 20 MG/100ML; MG/100ML; MG/100ML; MG/100ML
INJECTION, SOLUTION INTRAVENOUS CONTINUOUS
Status: DISCONTINUED | OUTPATIENT
Start: 2023-12-13 | End: 2023-12-13 | Stop reason: HOSPADM

## 2023-12-13 RX ORDER — METOCLOPRAMIDE HYDROCHLORIDE 5 MG/ML
10 INJECTION INTRAMUSCULAR; INTRAVENOUS EVERY 8 HOURS PRN
Status: DISCONTINUED | OUTPATIENT
Start: 2023-12-13 | End: 2023-12-13

## 2023-12-13 RX ORDER — ROCURONIUM BROMIDE 10 MG/ML
INJECTION, SOLUTION INTRAVENOUS AS NEEDED
Status: DISCONTINUED | OUTPATIENT
Start: 2023-12-13 | End: 2023-12-13 | Stop reason: SURG

## 2023-12-13 RX ORDER — TRANEXAMIC ACID 10 MG/ML
INJECTION, SOLUTION INTRAVENOUS AS NEEDED
Status: DISCONTINUED | OUTPATIENT
Start: 2023-12-13 | End: 2023-12-13 | Stop reason: SURG

## 2023-12-13 RX ORDER — POLYETHYLENE GLYCOL 3350 17 G/17G
17 POWDER, FOR SOLUTION ORAL DAILY PRN
Status: DISCONTINUED | OUTPATIENT
Start: 2023-12-13 | End: 2023-12-15

## 2023-12-13 RX ORDER — CEFAZOLIN SODIUM/WATER 2 G/20 ML
SYRINGE (ML) INTRAVENOUS AS NEEDED
Status: DISCONTINUED | OUTPATIENT
Start: 2023-12-13 | End: 2023-12-13 | Stop reason: SURG

## 2023-12-13 RX ORDER — ONDANSETRON 2 MG/ML
INJECTION INTRAMUSCULAR; INTRAVENOUS AS NEEDED
Status: DISCONTINUED | OUTPATIENT
Start: 2023-12-13 | End: 2023-12-13 | Stop reason: SURG

## 2023-12-13 RX ORDER — ONDANSETRON 2 MG/ML
4 INJECTION INTRAMUSCULAR; INTRAVENOUS EVERY 6 HOURS PRN
Status: DISCONTINUED | OUTPATIENT
Start: 2023-12-13 | End: 2023-12-13 | Stop reason: HOSPADM

## 2023-12-13 RX ORDER — HYDROMORPHONE HYDROCHLORIDE 1 MG/ML
0.4 INJECTION, SOLUTION INTRAMUSCULAR; INTRAVENOUS; SUBCUTANEOUS EVERY 5 MIN PRN
Status: DISCONTINUED | OUTPATIENT
Start: 2023-12-13 | End: 2023-12-13 | Stop reason: HOSPADM

## 2023-12-13 RX ORDER — HYDROMORPHONE HYDROCHLORIDE 1 MG/ML
INJECTION, SOLUTION INTRAMUSCULAR; INTRAVENOUS; SUBCUTANEOUS
Status: COMPLETED
Start: 2023-12-13 | End: 2023-12-13

## 2023-12-13 RX ORDER — DOXEPIN HYDROCHLORIDE 50 MG/1
1 CAPSULE ORAL DAILY
Status: DISCONTINUED | OUTPATIENT
Start: 2023-12-14 | End: 2023-12-15

## 2023-12-13 RX ORDER — ONDANSETRON 2 MG/ML
4 INJECTION INTRAMUSCULAR; INTRAVENOUS EVERY 6 HOURS PRN
Status: DISCONTINUED | OUTPATIENT
Start: 2023-12-13 | End: 2023-12-15

## 2023-12-13 RX ADMIN — LIDOCAINE HYDROCHLORIDE 50 MG: 10 INJECTION, SOLUTION EPIDURAL; INFILTRATION; INTRACAUDAL; PERINEURAL at 15:25:00

## 2023-12-13 RX ADMIN — ROCURONIUM BROMIDE 50 MG: 10 INJECTION, SOLUTION INTRAVENOUS at 15:25:00

## 2023-12-13 RX ADMIN — ONDANSETRON 4 MG: 2 INJECTION INTRAMUSCULAR; INTRAVENOUS at 16:51:00

## 2023-12-13 RX ADMIN — DEXAMETHASONE SODIUM PHOSPHATE 4 MG: 4 MG/ML VIAL (ML) INJECTION at 15:30:00

## 2023-12-13 RX ADMIN — TRANEXAMIC ACID 1000 MG: 10 INJECTION, SOLUTION INTRAVENOUS at 15:51:00

## 2023-12-13 RX ADMIN — CEFAZOLIN SODIUM/WATER 2 G: 2 G/20 ML SYRINGE (ML) INTRAVENOUS at 15:30:00

## 2023-12-13 RX ADMIN — SODIUM CHLORIDE, SODIUM LACTATE, POTASSIUM CHLORIDE, CALCIUM CHLORIDE: 600; 310; 30; 20 INJECTION, SOLUTION INTRAVENOUS at 15:15:00

## 2023-12-13 NOTE — ANESTHESIA PROCEDURE NOTES
Regional Block    Date/Time: 12/12/2023 2:55 PM    Performed by: Beryle Dingwall, MD  Authorized by: Beryle Dingwall, MD      General Information and Staff    Start Time:  12/12/2023 2:55 PM  End Time:  12/12/2023 3:00 PM  Anesthesiologist:  Beryle Dingwall, MD  Performed by: Anesthesiologist  Patient Location:  ED      Site Identification: real time ultrasound guided and image stored and retrievable    Block site/laterality marked before start: site marked  Reason for Block: procedure for pain and at request of ED Physician    Preanesthetic Checklist: 2 patient identifers, IV checked, site marked, risks and benefits discussed, monitors and equipment checked, pre-op evaluation, timeout performed, anesthesia consent, sterile technique used, no prohibitive neurological deficits and no local skin infection at insertion site      Procedure Details    Patient Position:  Supine  Prep: ChloraPrep    Monitoring:  Cardiac monitor, continuous pulse ox and blood pressure cuff  Block Type:  Femoral  Laterality:  Left  Injection Technique:  Single-shot    Needle    Needle Type:  Short-bevel and echogenic  Needle Gauge:  20 G  Needle Length:  100 mm  Needle Localization:  Nerve stimulator  Reason for Ultrasound Use: appropriate spread of the medication was noted in real time and no ultrasound evidence of intravascular and/or intraneural injection      Muscle Twitch Response: quadriceps response      Assessment    Injection Assessment:  Good spread noted, negative aspiration for heme, negative resistance, no pain on injection, incremental injection and local visualized surrounding nerve on ultrasound  Heart Rate Change: No    - Patient tolerated block procedure well without evidence of immediate block related complications.      Medications  12/12/2023 2:55 PM      Additional Comments    Medication:  Ropivacaine 0.375% 30mL + 2mg PF Dexamethasone

## 2023-12-13 NOTE — ANESTHESIA PROCEDURE NOTES
Airway  Date/Time: 12/13/2023 3:28 PM  Urgency: elective      General Information and Staff    Patient location during procedure: OR  Anesthesiologist: Noni Hutchinson MD  Performed: anesthesiologist   Performed by: Noni Hutchinson MD  Authorized by: Noni Hutchinson MD      Indications and Patient Condition  Indications for airway management: anesthesia  Sedation level: deep  Preoxygenated: yes  Patient position: sniffing  Mask difficulty assessment: 1 - vent by mask    Final Airway Details  Final airway type: endotracheal airway      Successful airway: ETT  Cuffed: yes   Successful intubation technique: Video laryngoscopy  Endotracheal tube insertion site: oral  Blade: GlideScope  Blade size: #3  ETT size (mm): 7.0    Placement verified by: capnometry   Cuff volume (mL): 7  Measured from: lips  ETT to lips (cm): 22  Number of attempts at approach: 1    Additional Comments  atraumatic

## 2023-12-13 NOTE — ANESTHESIA PROCEDURE NOTES
Peripheral IV  Date/Time: 12/13/2023 3:54 PM  Inserted by: Faviola Nava MD    Placement  Needle size: 18 G  Laterality: right  Location: antecubital  Local anesthetic: none  Site prep: chlorhexidine  Technique: ultrasound guided  Attempts: 1

## 2023-12-13 NOTE — OPERATIVE REPORT
OPERATIVE REPORT        Facility: [facility]     Patient name: Evaristo Kumar  : 1945        Medical record: [id number]     Date of procedure: 2023     Pre-operative diagnosis: Left hip intertrochanteric femur fracture     Post-operative diagnosis: Left hip intertrochanteric femur fracture     Procedure performed: Intramedullary nailing right hip     Implants: Samantha Natural Nail cephalomedullary nail, 130 degree neck ankle  11.5 mm diameter  400 cm length  100 mm lag screw     Surgeon: Sadia Pride M.D. Assistant(s):   Chelita Eastman PA-C      Anesthesia: general     Estimated blood loss: 150 mL     Drains: None     Specimens: None     Complications: None apparent        Indications: Evaristo Good is a pleasant [de-identified]year old who was admitted from the ED after a fall. X-rays showed an intertrochanteric femur fracture. We discussed surgical treatment options including intramedullary nailing. The risks, benefits, and alternatives of hip nailing were discussed extensively. The risks include but are not limited to infection, DVT, PE, damage to nerves or blood vessels, continued pain, hip stiffness/loss of motion, malunion/nonunion, possibility of AVN or future arthrosis of the hip, traction injury including pudendal nerve palsy and ankle pain from traction boot, and the risks of anesthesia. The typical post-operative course and rehab plan were discussed as well. Activity restrictions following the surgery were emphasized. An informed consent form was signed and placed on the chart prior to coming to the Operating Room. Description of Procedure: The patient was identified in the preop holding area and the left hip was marked as the correct operative site. The patient was given a dose of perioperative antibiotics and then brought to the Operating Room and placed supine on the operating table.  After adequate anesthesia was obtained, the patient was positioned on the [facility] fracture table, with a well-padded post in the perineum. A C-arm was used to confirm reduction of the hip fracture with traction. Once fracture reduction was achieved, the operative extremity was prepped and draped in the standard sterile fashion. A surgical time out was performed confirming that the left hip was the correct operative site. A longitudinal 5cm incision was made proximal to the tip of the greater trochanter. Curved blunt clamp were used to bluntly dissect the soft tissues including the IT band and the gluteus tendons down to the tip of the greater trochanter. A threaded guide wire was passed through the tip of the trochanter with fluoroscopic assistance to ensure the wire was down the center of the femoral canal in both AP and lateral planes. An accessory lateral incision was made at the level of the lateral fracture spike that was held reduced with use of ball spike pusher. A starting reamer was used over the guide wire to create a starting point in the trochanter. The guide wire was removed and replaced with a ball-tipped guide wire. The wire was passed down the center of the femoral canal to the end of the femur just above the proximal pole of the patella, and a measuring device was used to measure the length of the canal. The next shortest nail size was selected. The femur was then reamed with flexible reamers over the guide wire. An 8mm reamer was used to start, and the femur was reamed in a stepwise fashion up to a size of 13.0 mm. An 11.5 mm diameter nail was selected and opened on the back table. The nail was attached to the  guide and then passed over the guide wire down the shaft of the femur. Once the nail was advanced to its final position and confirmed with C-arm, the guide wire was removed. The 130 degree guide was used and an incision was made in the skin and the IT band.  The guide was placed down to bone and correct anteversion of the wire was confirmed using C-arm in the lateral plane. The wire was passed up the femoral neck, in the inferior portion of the neck on the AP view and in the center of the neck on the lateral view. The wire was taken within millimeters of the subchondral bone but not into the cartilage. The wire was measured using the measuring device and over-reamed with the step reamer. The lag screw was passed manually to the proper depth and this was checked with fluoroscopy on both AP and lateral views. The set screw was then placed and tightened with the flexible screwdriver and placed into sliding compression mode. The guide arm was removed with the wrench and C-arm shots were taken to confirm proper fracture reduction and nail placement. A distal locking screw was placed in the distal slot of the nail to control rotation of the fracture. The C-arm was used with a perfect [place]ajo technique to pass a drill bit through the slot. This was measured using a guide and a locking screw was placed. C-arm shots were taken in both AP and lateral views to confirm proper screw placement and length. Final fluoroscopic shots were taken of the femur. The wounds were irrigated and attention was turned to wound closure. The subcutaneous tissue was closed with 2-0 Moncryl suture and the skin was closed with skin staples. Sterile dressings were applied consisting of aquacel dressings. The patient was then awakened from anesthesia and transferred to the Recovery Room in stable condition. A surgical first assistant was required and necessary for the completion of this case to aid in manipulation and positioning of the extremity while the surgeon operated. Their assistance was vital to the safety and success of the procedure.       mobilize with PT, WBAT on operative extremity with use of protective walker at all times  pain controlled with meds  Lovenox for DVT prophylaxis for 2 weeks, then transition to ECASA 81mg BID for 4 weeks  24 hours post-operative prophyllactic antibiotics    Kendy Sousa MD  12/13/23

## 2023-12-13 NOTE — PLAN OF CARE
A&oriented x4 . VSS. . IS encouraged. SCDs. Ankle pumps encouraged. NPO. Voiding per blanquita. Pain managed with IV Morphine. Bedrest. Plan is OR later today. Patient updated and in agreement with plan of care. Safety precautions in place. Instructed patient to call for assistance, call light within reach.

## 2023-12-13 NOTE — PHYSICAL THERAPY NOTE
PT orders received and chart reviewed. Pt going for surgery today for L hip fracture. Will need new orders and updated WB status post op.

## 2023-12-13 NOTE — OCCUPATIONAL THERAPY NOTE
Received order for OT evaluation. Pt is scheduled for surgery tonight. Left intertrochanteric femur fracture. Please place new order post-op.

## 2023-12-13 NOTE — ANESTHESIA POSTPROCEDURE EVALUATION
[id number] [facility] Patient Status:  Inpatient   Age/Gender [de-identified]year old female MRN [id number]   [facility] SURGERY Attending Cynda Spatz, MD   [facility] Day # 1 PCP Connie Miranda MD       Anesthesia Post-op Note    LEFT HIP [address] NAIL    Procedure Summary       Date: 12/13/23 Room / Location: [facility] MAIN OR  / [facility] MAIN OR    Anesthesia Start: 6854 Anesthesia Stop: 1718    Procedure: LEFT HIP [address] NAIL (Left: Hip) Diagnosis: (Closed fracture of left hip, initial encounter ([facility] [address] [place]y [place])    Surgeons: Joaquim Maciel MD Anesthesiologist: Ashleigh Adame MD    Anesthesia Type: general ASA Status: 3            Anesthesia Type: general    Vitals Value Taken Time   /72 12/13/23 1716   Temp 97 12/13/23 1718   Pulse 89 12/13/23 1718   Resp 10 12/13/23 1718   SpO2 94 % 12/13/23 1718   Vitals shown include unfiled device data. Patient Location: PACU    Anesthesia Type: general    Airway Patency: patent    Postop Pain Control: adequate    Mental Status: mildly sedated but able to meaningfully participate in the post-anesthesia evaluation    Nausea/Vomiting: none    Cardiopulmonary/Hydration status: stable euvolemic    Complications: no apparent anesthesia related complications    Postop vital signs: stable    Dental Exam: Unchanged from Preop    Patient to be discharged from PACU when criteria met.

## 2023-12-14 LAB
GLUCOSE BLD-MCNC: 130 MG/DL (ref 70–99)
GLUCOSE BLD-MCNC: 135 MG/DL (ref 70–99)
GLUCOSE BLD-MCNC: 230 MG/DL (ref 70–99)
GLUCOSE BLD-MCNC: 236 MG/DL (ref 70–99)
GLUCOSE BLD-MCNC: 244 MG/DL (ref 70–99)

## 2023-12-14 PROCEDURE — 99232 SBSQ HOSP IP/OBS MODERATE 35: CPT | Performed by: INTERNAL MEDICINE

## 2023-12-14 RX ORDER — ENOXAPARIN SODIUM 100 MG/ML
40 INJECTION SUBCUTANEOUS DAILY
Status: DISCONTINUED | OUTPATIENT
Start: 2023-12-14 | End: 2023-12-15

## 2023-12-14 RX ORDER — CALCIUM CARBONATE 500 MG/1
500 TABLET, CHEWABLE ORAL 3 TIMES DAILY PRN
Status: DISCONTINUED | OUTPATIENT
Start: 2023-12-14 | End: 2023-12-15

## 2023-12-14 NOTE — CM/SW NOTE
12/14/23 1200   CM/SW Referral Data   Referral Source Social Work (self-referral)   Reason for Referral Discharge planning   Informant Patient;EMR;Clinical Staff Member   Patient Info   Patient's Current Mental Status at Time of Assessment Alert;Oriented   Discharge Needs   Anticipated D/C needs Subacute rehab;Transportation services   Services Requested   PASRR Level 1 Submitted Yes   Choice of Post-Acute Provider   Informed patient of right to choose their preferred provider Yes   List of appropriate post-acute services provided to patient/family with quality data Yes   Patient/family choice [address] given to Patient       Patient is an [de-identified] y/o woman admitted with hip fracture now s/p surgical repair. Informed by PT of their recommendation for [facility]. Referral sent to facilities via [address] by [facility]. PASRR completed and added to referral.    Met with pt to discuss DC planning. Pt agreeable with planning for [facility]. LIst of accepting facilities given and pt chose [facility] as she has been there in the past and had a good experience. Reviewed Medicare coverage for [facility] including need for medically necessary 3 midnight inpatient hospital stay. Pt was made inpatient status on 12/12 and would need to have a medical need to remain in the hospital until 12/15 in order to have Medicare coverage for [facility]. Anticipate possible DC tomorrow. No other DC concerns identified at this time. [facility] reserved in [address]. / to remain available for support and/or discharge planning.      [facility]  S:[phone]   [place] [address] [place] [address]  Discharge Planner  [phone]

## 2023-12-14 NOTE — CM/SW NOTE
Department  notified of request for raul [facility] referrals started. Assigned CM/SW to follow up with pt/family on further discharge planning.      Liliane Sofia [facility]

## 2023-12-14 NOTE — PLAN OF CARE
Pt AOx4. VSS on 1L NC O2, hx COPD. Tele. SCD's on. Voiding with purewick. C/o pain to left hip this AM, see MAR for PO pain medication. WBAT, pending PT/OT eval. Saline locked. Aquacel dressing x3 dry and intact. Regular diet, tolerating well. Plan of care updated with pt. Call light within reach. Will continue to monitor.

## 2023-12-14 NOTE — PHYSICAL THERAPY NOTE
PHYSICAL THERAPY EVALUATION - INPATIENT     Room Number: 373/373-A  Evaluation Date: 12/14/2023  Type of Evaluation: Initial  Physician Order: PT Eval and Treat    Presenting Problem: s/p L hip IM nailing on 12/13/23  Co-Morbidities : DM, HTN, OA, L calcaneal fracture, diverticulosis, back pain  Reason for Therapy: Mobility Dysfunction and Discharge Planning    History related to current admission: Patient is a [de-identified]year old female admitted on 12/12/2023 from home for fall. Pt diagnosed with L hip fracture. Pt is s/p L hip IM nailing on 12/13/23. ASSESSMENT   In this PT evaluation, the patient presents with the following impairments pain, decreased ROM, strength, balance, and tolerance to activity. These impairments and comorbidities manifest themselves as functional limitations in independent bed mobility, transfers, and gait. The patient is below baseline and would benefit from skilled inpatient PT to address the above deficits to assist patient in returning to prior to level of function. Functional outcome measures completed include [facility]. The AM-PAC '6-Clicks' Inpatient Basic Mobility Short Form was completed and this patient is demonstrating a Approx Degree of Impairment: 57.7%  degree of impairment in mobility. Research supports that patients with this level of impairment may benefit from [facility]. DISCHARGE RECOMMENDATIONS  PT Discharge Recommendations: Sub-acute rehabilitation    PLAN  PT Treatment Plan: Bed mobility; Endurance; Energy conservation;Patient education; Family education;Gait training;Range of motion;Strengthening;Stoop training;Stair training;Balance training;Transfer training  Rehab Potential : Good  Frequency (Obs): Daily  Number of Visits to Meet Established Goals: 5      CURRENT GOALS    Goal #1 Patient is able to demonstrate supine - sit EOB @ level: modified independent     Goal #2 Patient is able to demonstrate transfers Sit to/from Stand at assistance level: modified independent     Goal #3 Patient is able to ambulate 150 feet with assist device: walker - rolling at assistance level: modified independent     Goal #4 Consider stair goal if/when appropriate    Goal #5    Goal #6    Goal Comments: Goals established on 12/14/2023    HOME SITUATION  Type of Home: House   Home Layout: Multi-level  Stairs to Enter : 2     Stairs to Bedroom: 7  Railing: Yes    Lives With: Alone     Patient Owned Equipment: Rolling walker;Rollator       Prior Level of [place]: Pt is typically independent with ADLs and ambulates with no AD. Pt occasionally uses a cane. SUBJECTIVE  \"I thought it would feel better now that it's fixed\"       OBJECTIVE  Precautions: Bed/chair alarm  Fall Risk: High fall risk    WEIGHT BEARING RESTRICTION  Weight Bearing Restriction: L lower extremity           L Lower Extremity: Weight Bearing as Tolerated    PAIN ASSESSMENT  Rating: Unable to rate  Location: L hip  Management Techniques: Activity promotion;Relaxation;Repositioning;Breathing techniques    COGNITION  Overall Cognitive Status:  WFL - within functional limits    RANGE OF MOTION AND STRENGTH ASSESSMENT  Upper extremity ROM and strength are within functional limits     Lower extremity ROM is within functional limits, except L hip s/p L hip IM nailing    Lower extremity strength is within functional limits, except L hip s/p L hip IM nailing      BALANCE  Static Sitting: Fair +  Dynamic Sitting: Fair -  Static Standing: Poor +  Dynamic Standing: Poor +    ADDITIONAL TESTS                                  ACTIVITY TOLERANCE   Vitals stable                      O2 WALK       NEUROLOGICAL FINDINGS                        AM-PAC '6-Clicks' INPATIENT SHORT FORM - BASIC MOBILITY  How much difficulty does the patient currently have. ..   Patient Difficulty: Turning over in bed (including adjusting bedclothes, sheets and blankets)?: A Little   Patient Difficulty: Sitting down on and standing up from a chair with arms (e.g., wheelchair, bedside commode, etc.): A Little   Patient Difficulty: Moving from lying on back to sitting on the side of the bed?: A Little   How much help from another person does the patient currently need. .. Help from Another: Moving to and from a bed to a chair (including a wheelchair)?: A Little   Help from Another: Need to walk in hospital room?: A Lot   Help from Another: Climbing 3-5 steps with a railing?: Total       AM-PAC Score:  Raw Score: 15   Approx Degree of Impairment: 57.7%   Standardized Score (AM-PAC Scale): 39.45   CMS Modifier (G-Code): CK    FUNCTIONAL ABILITY STATUS  Gait Assessment   Functional Mobility/Gait Assessment  Gait Assistance: Minimum assistance  Distance (ft):  (a few shuffled side steps towards HOB)  Assistive Device: Rolling walker  Pattern: L Foot drag;R Foot drag    Skilled Therapy Provided: Per RN okay to work with pt. Pt received in supine and was agreeable to PT session. Bed Mobility:  Rolling: NT  Supine to sit: min A to LLE, HOB upright    Sit to supine: max A of 2, dependent to boost in bed     Transfer Mobility:  Sit to stand: min A   Stand to sit: min A   Gait = Pt unable to tolerate walking in the room due to pain. Pt tolerated taking several steps with RW and min A towards the HOB. Therapist's Comments: Pt educated on role of therapy, goals for session, safety, fall prevention, WBAT, and discharge planning. Exercise/Education Provided:  Bed mobility  Energy conservation  Functional activity tolerated  Gait training  Posture  Strengthening  Transfer training    Patient End of Session: In bed;Needs met;Call light within reach;RN aware of session/findings; All patient questions and concerns addressed; Alarm set; Ice applied; Discussed recommendations with /      Patient Evaluation Complexity Level:  History Moderate - 1 or 2 personal factors and/or co-morbidities   Examination of body systems Low - addressing 1-2 elements Clinical Presentation Low - Stable   Clinical Decision Making Low - Stable       PT Session Time: 20 minutes  Therapeutic Activity: 8 minutes [Negative] : Heme/Lymph

## 2023-12-14 NOTE — PLAN OF CARE
Pt returned from PACU, IVF infusing, O2 6L per nc, . Tele. Pt drowsy but easily arousable. Aquacel dressing CDI. Family at bedside.

## 2023-12-14 NOTE — PLAN OF CARE
A&Ox 4. VSS. On 3L O2. . IS encouraged. Telemetry monitoring. SCDs on BLE. Ankle pumps encouraged. Tolerating diet. Purewick in place. Pain controlled with. Dressing to left hip C/D/I. PT/OT eval. Safety precautions maintained. Call light within reach.

## 2023-12-15 VITALS
BODY MASS INDEX: 34 KG/M2 | SYSTOLIC BLOOD PRESSURE: 113 MMHG | OXYGEN SATURATION: 82 % | RESPIRATION RATE: 15 BRPM | TEMPERATURE: 98 F | HEART RATE: 89 BPM | WEIGHT: 197 LBS | DIASTOLIC BLOOD PRESSURE: 65 MMHG

## 2023-12-15 LAB
EST. AVERAGE GLUCOSE BLD GHB EST-MCNC: 154 MG/DL (ref 68–126)
GLUCOSE BLD-MCNC: 160 MG/DL (ref 70–99)
GLUCOSE BLD-MCNC: 181 MG/DL (ref 70–99)
GLUCOSE BLD-MCNC: 197 MG/DL (ref 70–99)
HBA1C MFR BLD: 7 % (ref ?–5.7)

## 2023-12-15 PROCEDURE — 99239 HOSP IP/OBS DSCHRG MGMT >30: CPT | Performed by: INTERNAL MEDICINE

## 2023-12-15 RX ORDER — ENOXAPARIN SODIUM 100 MG/ML
40 INJECTION SUBCUTANEOUS DAILY
Qty: 5.6 ML | Refills: 0 | Status: SHIPPED | OUTPATIENT
Start: 2023-12-16 | End: 2023-12-30

## 2023-12-15 RX ORDER — HYDROCODONE BITARTRATE AND ACETAMINOPHEN 5; 325 MG/1; MG/1
1 TABLET ORAL EVERY 4 HOURS PRN
Qty: 20 TABLET | Refills: 0 | Status: SHIPPED | OUTPATIENT
Start: 2023-12-15

## 2023-12-15 RX ORDER — ASPIRIN 81 MG/1
81 TABLET ORAL 2 TIMES DAILY
Qty: 60 TABLET | Refills: 0 | Status: SHIPPED | OUTPATIENT
Start: 2023-12-31 | End: 2024-01-30

## 2023-12-15 RX ORDER — PANTOPRAZOLE SODIUM 40 MG/1
40 TABLET, DELAYED RELEASE ORAL
Status: SHIPPED | COMMUNITY
Start: 2023-12-15

## 2023-12-15 NOTE — PLAN OF CARE
Pt A&Ox4. VSS on RA. Tele. SCD's on. Voiding with purewick. C/o pain to left hip this AM, see MAR for PO pain medication. WBAT to LLE. Saline locked. Aquacel dressing x3 dry and intact. Regular diet, tolerating well. Plan of care updated with pt. Call light within reach. Will continue to monitor.

## 2023-12-15 NOTE — PROGRESS NOTES
AVS reviewed, IV dc'd by Angelina Banks RN , will dc to [facility] via [id number] at 2 pm , script for [employer] on chart , PCS form on envelope.

## 2023-12-15 NOTE — DISCHARGE SUMMARY
[facility] HOSPITALIST  DISCHARGE SUMMARY     Primitivo Kumar Patient Status:  Inpatient    1945 MRN [id number]   [facility] 3SW-A Attending No att. providers found   Hosp Day # 3 PCP Ortiz Lira MD     Date of Admission: 2023  Date of Discharge:  12/15/2023     Discharge Disposition: SNF Subacute Rehab    Discharge Diagnosis:    # Mechanical fall with left intertrochanteric fracture  - sp left hip  IM nailing      #Reactive leukocytosis    #Hypertension     #Hyperlipidemia    #DM type II with A1c 7.2    #Gout     #GERD    #COPD  #ILD  -Sees Dr. 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 She was trying open up the leg and lost her  and fell backwards. No head trauma or loss of consciousness. No neck pain or back pain. She had immediate pain in her left hip. She had no preceding lightheadedness, dizziness, chest pain, shortness of breath or palpitations. No recent fever, chills, nausea, vomiting, diarrhea or dysuria. She was brought to the ER by EMS who noted shortening and external rotation of her left lower extremity. She was given fentanyl en route. In the ER, she was found to have intertrochanteric fracture of the left hip. She had femoral block done by anesthesia in the ER. Brief Synopsis: patient admitted and underwent surgery for her fracture. No post op complication and pkan to transfer to [facility] [place] today. Lace+ Score: 76  59-90 High Risk  29-58 Medium Risk  0-28   Low Risk       TCM Follow-Up Recommendation:  LACE > 58:  High Risk of readmission after discharge from the hospital.        Consultants:  Orthopedic surgery     Discharge Medication List:     Discharge Medications        START taking these medications        Instructions Prescription details   aspirin 81 MG Tbec  Start taking on: December 31, 2023      Take 1 tablet (81 mg total) by mouth in the morning and 1 tablet (81 mg total) before bedtime. Stop taking on: January 30, 2024  Quantity: 60 tablet  Refills: 0     enoxaparin 40 MG/0.4ML Sosy  Commonly known as: Lovenox      Inject 0.4 mL (40 mg total) into the skin daily for 14 days. Stop taking on: December 30, 2023  Quantity: 5.6 mL  Refills: 0     HYDROcodone-acetaminophen 5-325 MG Tabs  Commonly known as: Norco      Take 1 tablet by mouth every 4 (four) hours as needed. Quantity: 20 tablet  Refills: 0            CONTINUE taking these medications        Instructions Prescription details   albuterol 108 (90 Base) MCG/ACT Aers  Commonly known as: Ventolin HFA      Inhale 2 puffs into the lungs every 6 (six) hours as needed for Wheezing or Shortness of Breath. inhale 2 puff by inhalation route  every 4 - 6 hours as needed   Quantity: 1 each  Refills: 11     allopurinol 300 MG Tabs  Commonly known as: Zyloprim      Take 1 tablet (300 mg total) by mouth daily. Quantity: 90 tablet  Refills: 3     Centrum Tabs      Take 1 tablet by mouth daily. Refills: 0     cetirizine 10 MG Tabs  Commonly known as: ZyrTEC      Take 1 tablet (10 mg total) by mouth daily. Refills: 0     clonazePAM 1 MG Tabs  Commonly known as: KlonoPIN      Take 1 tablet (1 mg total) by mouth nightly as needed. Refills: 0     colchicine 0.6 MG Tabs      Take 1 tablet (0.6 mg total) by mouth daily. Refills: 0     DULoxetine 30 MG Cpep  Commonly known as: Cymbalta      Take 2 capsules (60 mg total) by mouth daily.    Quantity: 180 capsule  Refills: 2     fluticasone propionate 50 MCG/ACT Susp  Commonly known as: Flonase      spray 2 sprays into each nostril daily   Quantity: 16 g  Refills: 5     glimepiride 2 MG Tabs  Commonly known as: Amaryl      Take 1 tablet (2 mg total) by mouth 2 (two) times daily. Refills: 0     ipratropium-albuterol 0.5-2.5 (3) MG/3ML Soln  Commonly known as: Duoneb      Take 3 mL by nebulization every 6 (six) hours as needed. Quantity: 180 mL  Refills: 12     metoprolol succinate ER 25 MG Tb24  Commonly known as: Toprol XL      TAKE ONE TABLET BY MOUTH ONE TIME DAILY   Quantity: 90 tablet  Refills: 1     OneTouch Ultra Strp  Generic drug: Glucose Blood      2 (two) times daily. Refills: 0     pantoprazole 40 MG Tbec  Commonly known as: Protonix      Take 1 tablet (40 mg total) by mouth every morning before breakfast.   Refills: 0     pioglitazone 15 MG Tabs  Commonly known as: Actos      Take 1 tablet (15 mg total) by mouth daily. Refills: 0     rosuvastatin 10 MG Tabs  Commonly known as: Crestor      Take 1 tablet (10 mg total) by mouth nightly. Replaces simvastatin   Quantity: 90 tablet  Refills: 3     Trelegy Ellipta 100-62.5-25 MCG/ACT Aepb  Generic drug: fluticasone-umeclidin-vilant      Inhale 1 puff into the lungs daily. Quantity: 1 each  Refills: 1     vitamin C 100 MG Tabs      Take 10 tablets (1,000 mg total) by mouth daily. Refills: 0     VITAMIN D-3 OR      Take 1 capsule by mouth once daily.    Refills: 0     Yupelri 175 MCG/3ML Soln nebulizer solution  Generic drug: revefenacin      ADMINISTER ONE (1) VIAL VIA NEBULIZER MACHINE DAILY   Quantity: 90 mL  Refills: 11            STOP taking these medications      Anoro Ellipta 62.5-25 MCG/ACT Aepb  Generic drug: umeclidinium-vilanterol                  Where to Get Your Medications        These medications were sent to [address], [address] [facility]  [address] [facility], [phone]  [address] [place] [address]      Phone: [phone]   aspirin 81 MG Tbec  enoxaparin 40 MG/0.4ML Sosy       Please  your prescriptions at the location directed by your doctor or nurse    Bring a paper prescription for each of these medications  HYDROcodone-acetaminophen 5-325 MG Tabs         [place]    Follow-up appointment:   MD Sourav Glez [id number]  [address] [place] [address] [facility] [phone]    Schedule an appointment as soon as possible for a visit in 2 week(s)      Joan Castaneda, [address] [place] [address] [address]  [phone]    Follow up in 1 week(s)      Appointments for Next 30 Days 12/17/2023 - 1/16/2024        Date Arrival Time Visit Type Length Department Provider     12/29/2023  3:00 PM  [facility] CT CHEST HI RES [1273] 30 min [facility] CT [address] [facility] [address] MAIN RM4    Patient Instructions:     Please arrive 15 minutes prior to your scheduled appointment time. Location Instructions: Your appointment will be at [facility] located at [address]. [place] [address].&nbsp; To reach Outpatient Registration, turn onto [address] from [address] [facility]. &nbsp; You may park or [facility] in the [address] &nbsp; Enter the double doors located on the ground floor and proceed to the lobby past the Information Desk to Outpatient Registration. &nbsp; The phone number for this location is [phone]. If you suspect you may be pregnant please consult with your physician prior to your exam.&nbsp; If you have a continuous glucose monitor you will be asked to remove it during the exam.  Please bring your insurance card and photo ID. You will also need to bring your doctor's order unless your physician's office submitted the order electronically or faxed the order. Without the order, your test may be delayed or postponed. &nbsp; Certain health screening tests may not require an order. Because of the nature of the Emergency Department, please be advised of the possibility that your appointment may be delayed. Children: Children under the age of 15 must have another adult caregiver with them.&nbsp; Please do not bring your child/children without a caregiver. &nbsp; Because of the highly sensitive equipment and privacy of all our patients, children will not be permitted in the exam rooms, unless otherwise noted and in accordance with departmental policy. PATIENT RESPONSIBILITY ESTIMATE  - (Estimate) We will provide you with your estimated remaining deductible and coinsurance balance for your services at the time of check in.  - (Payment) Please be aware that you may be asked for payment at the time of service.  - (Questions) If you would like more information about your Patient Responsibility Estimate in advance of your appointment, you can speak to a  ([phone], option 5). Masks are optional for all patients and visitors, unless otherwise indicated. 1/3/2024  2:45 PM  EXAM - NEW PATIENT [2845] 30 min Tiago Helm MD    Patient Instructions:         Location Instructions:     Masks are optional for all patients and visitors, unless otherwise indicated. 2024 11:00 AM  EXAM - ESTABLISHED [2844] 30 min Rodolfo Truong, Yesenia Lantigua, Brandie Barajas MD    Patient Instructions:         Location Instructions:     Masks are optional for all patients and visitors, unless otherwise indicated. Vital signs:       Physical Exam:    General: No acute distress   Lungs: clear to auscultation  Cardiovascular: S1, S2  Abdomen: Soft NTND    -----------------------------------------------------------------------------------------------  PATIENT DISCHARGE INSTRUCTIONS: See electronic chart    Stalin Wright MD    Total time spent on discharge plannin minutes     The [facility] makes medical notes like these available to patients in the interest of transparency. Please be advised this is a medical document. Medical documents are intended to carry relevant information, facts as evident, and the clinical opinion of the practitioner.  The medical note is intended as peer to peer communication and may appear blunt or direct. It is written in medical language and may contain abbreviations or verbiage that are unfamiliar.

## 2023-12-15 NOTE — CM/SW NOTE
Spoke with pt's RN who stated pt can discharge to [facility] today. [address] and received confirmation that pt can be accepted for admission today. They requested 2pm discharge. Spoke with Lore from PT who stated pt appropriate for medicar transport. Medicar scheduled for 2pm.  PCS form completed and available for RN to print. Updated pt at bedside and informed her of costs for medicar transport. Left a message to update pt's son at pt's request.  Updated RN. No further needs at this time. / to remain available for support and/or discharge planning.      [facility]  [address] [facility]    Yolanda Coto LCSW  Discharge Planner  [phone]

## 2023-12-15 NOTE — PLAN OF CARE
A/O x4, confused at times, able to be re oriented. VSS. On 3L NC. . IS encouraged. Telemetry monitoring. SCDs on BLE. Ankle pumps encouraged. Tolerating diet. Voiding with purewick in place. C/o pain to left hip, pain managed with prn meds. Aquacel dressings to left hip intact, minimal drainage noted. Patient updated on plan of care. Safety precautions maintained. Call light within reach.

## 2024-02-12 ENCOUNTER — TELEPHONE (OUTPATIENT)
Facility: CLINIC | Age: 79
End: 2024-02-12

## 2024-02-12 RX ORDER — UMECLIDINIUM BROMIDE AND VILANTEROL TRIFENATATE 62.5; 25 UG/1; UG/1
1 POWDER RESPIRATORY (INHALATION) DAILY
Qty: 1 EACH | Refills: 1 | Status: SHIPPED | OUTPATIENT
Start: 2024-02-12 | End: 2024-04-09

## 2024-02-12 NOTE — TELEPHONE ENCOUNTER
Pt called. Made aware of APRN orders. Made aware to stop Trelegy. Rx's entered as ordered. Phone appt scheduled with Shireen SWANSON on Monday, Feb 26 at 1030 am.

## 2024-02-12 NOTE — TELEPHONE ENCOUNTER
Pt called. States she was prescribed Trelegy. However, she broke her hip 2 months ago and was in a rehab facility. She was given Anoro at the nursing facility and that's what she's been on. Pt now home and since she doesn't have Anoro on hand, she started using her Trelegy. Pt reports she has developed thrush from it and would like to go back to using Anoro. Pt states her tongue and mouth are dry and is not able to spit,pt reports white and red stripes on her tongue. Denies fever, denies pain. Pt made aware when there's a change in medication, she needs to be seen. Pt states she's not able to come in due to transportation issues. Pt doesn't have MyChart. TE routed to Shireen SWANSON.

## 2024-02-12 NOTE — TELEPHONE ENCOUNTER
Ok to switch back to Anoro and send rx for nystatin 5x day for 1 week. Please schedule phone followup in 1-2 weeks. Pt never had 3 month followup; please schedule as able. Thanks

## 2024-02-14 ENCOUNTER — OFFICE VISIT (OUTPATIENT)
Dept: ORTHOPEDICS CLINIC | Facility: CLINIC | Age: 79
End: 2024-02-14
Payer: MEDICARE

## 2024-02-14 VITALS — BODY MASS INDEX: 33.7 KG/M2 | HEIGHT: 62.5 IN | WEIGHT: 187.81 LBS

## 2024-02-14 DIAGNOSIS — M17.0 PRIMARY OSTEOARTHRITIS OF BOTH KNEES: Primary | ICD-10-CM

## 2024-02-14 NOTE — PROGRESS NOTES
[facility] Progress Note    Subjective: Patient returns to clinic for both of her knees.  She was last seen by me 6 months ago at which time we performed bilateral knee injections.  Since that time, she sustained a hip fracture that has undergone open reduction internal fixation, and states that she recently was discharged from rehab.  She reports that she feels she is recovering from her hip but her knee arthritis bilaterally is limiting her.  She states that the last steroid injection helped only for a few weeks.  She does not recall how previous Visco injections have helped her.    Objective: Patient is ambulating slowly with a walker.  She is accompanied by a family member today.      Assessment/Plan: 78-year-old female with symptomatic radiographically severe right and moderate left knee osteoarthritis.  Revisited the discussion of treatment options.  She feels limited in her recovery from her hip fracture due to the knee arthritis pain.  She has had decreasing relief from steroid injections, only a few weeks following the last injection, and does not recall Visco whether it helped or not.  We did discuss possibility of controlled release steroid/Zilretta, potential for prolonged relief but given the advanced arthritis we may be limited with nonsurgical treatments.  They did inquire about knee replacement, and we discussed the importance of recovery from her hip fracture surgery, potential time frames and expectations ultimately, and the need to be able to postoperatively rehabilitate following a major surgery such as knee replacement.  She may be a candidate for this down the road, but at this time would recommend continued nonsurgical treatments.  They expressed complete understanding with this.  Zilretta has been ordered for both knees.    Note that more than 20 minutes was spent today in face-to-face time, counseling, chart review.    Scott Sheehan MD, [facility]  [facility] Orthopedic  Surgery  Phone [phone]  Fax [phone]

## 2024-02-15 ENCOUNTER — TELEPHONE (OUTPATIENT)
Dept: ORTHOPEDICS CLINIC | Facility: CLINIC | Age: 79
End: 2024-02-15

## 2024-02-21 NOTE — TELEPHONE ENCOUNTER
Patient is scheduled with  2/26/24 at 3:40     Medication placed in cabinet in [place] for upcoming appointment

## 2024-02-26 ENCOUNTER — OFFICE VISIT (OUTPATIENT)
Dept: ORTHOPEDICS CLINIC | Facility: CLINIC | Age: 79
End: 2024-02-26
Payer: MEDICARE

## 2024-02-26 DIAGNOSIS — M17.0 PRIMARY OSTEOARTHRITIS OF BOTH KNEES: Primary | ICD-10-CM

## 2024-02-26 NOTE — PROCEDURES
Risks and benefits of knee injection discussed with the patient, with risks including but not limited to pain and swelling at the injection site and/or within the knee joint, infection, elevation in blood pressure and/or glucose levels, facial flushing.  After informed consent, the patient's right and left knees were marked, locally anesthetized with skin refrigerant, prepped with topical antiseptic, and injected with 5mL of 32mg/5mL Zilretta through the inferolateral portal.  A band-aid was applied.  The patient tolerated the procedure well.    Scott Sheehan MD, FAAOS  [facility] Orthopedic Surgery  Phone [phone]  Fax [phone]

## 2024-02-27 ENCOUNTER — VIRTUAL PHONE E/M (OUTPATIENT)
Facility: CLINIC | Age: 79
End: 2024-02-27
Payer: MEDICARE

## 2024-02-27 DIAGNOSIS — R09.02 HYPOXEMIA: ICD-10-CM

## 2024-02-27 DIAGNOSIS — B37.0 THRUSH: ICD-10-CM

## 2024-02-27 DIAGNOSIS — J43.2 CENTRILOBULAR EMPHYSEMA (HCC): Primary | ICD-10-CM

## 2024-02-27 PROCEDURE — 99443 PHONE E/M BY PHYS 21-30 MIN: CPT | Performed by: NURSE PRACTITIONER

## 2024-02-27 NOTE — PROGRESS NOTES
[facility] General Pulmonary Progress Note    History of Present Illness:  Gabi Kumar is a 78 year old female with significant PMH COPD who presents today for follow up s/p medication for thrush and switching back to Anoro. Pt had been in rehab s/p hip surgery and received Anoro and tolerated well. After returning home she restarted Trelegy and developed thrush. Thrush is now resolved. Breathing is good on Anoro. Continues with home PT. Rarely using ODALIS.   Unable to connect to video    Previously 10/2023 Dr Maya   a 78 year old female former smoker (quit 11/2019, 50 pack years) with significant PMH moderate COPD, CAD, PAD, DM, HTN who presents today for follow up of COPD.  Since last visit she self discontinued her oxygen against medical advice.  She denies new concerns today. Having chronic back pain, joint pains.  Stable LOTT.  Occasional cough with white/yellow phlegm. No f/c/s. No chest pain.  Using anoro off/on through the week, alternates with yupelri. Has albuterol MDI and neb PRN     August 2023 previously  who presents for hospital f/u for COPD exacerbation. Pt reports using Anoro daily without incident until a tree fell outside her house with her windows open and may have sent dust, dirt into her house.  She experienced increased SOB and started using Yupelri and rescue inhaler without relief and went to the hospital. She was hospitalized 8/16-8/20/23,  received steroids and found to need oxygen at discharge. Oxygen titration at discharge showed she needed 4L with activity (78% on RA with activity; 94% on RA at rest). Pt has not been using it at all. Today reports she is feeling better than she has in months and would like the oxygen picked up from her home. Reprots SOB at times but she paces herself.     Previously 10/2022 Dr Maya   77 year old female former smoker (quit 11/2019, 50 pack years) with significant PMH moderate COPD, CAD, PAD, DM, HTN who presents today for follow up of COPD. She denies  new concerns today, has chronic dyspnea and limited exertion due to joint pain. Had improvement in symptoms with spinal cord simulator but since has been removed.  Using anoro daily - enrolled in program through [employer]. Also using yupelri daily. Rarely uses albuterol. Occasional cough related to sinus congestion/drainage. Noted mild epistaxis last week, seems better now. No hemoptysis. No pain. No pleurisy. No fever.    Past Medical History:   Past Medical History:   Diagnosis Date    Back problem     Cataract     Cholelithiasis     DIABETES     Diabetes (HCC)     Diverticulitis 2013    suggestion of possible wall thickening involving sigmoid, possible mild diverticulitis    Diverticulosis 01/2012    Essential hypertension     Gallbladder disease 03/22/2013    thickened gallbladder with distended bile duct    High blood pressure     High cholesterol     HYPERLIPIDEMIA     Hyperlipidemia     IBS (irritable bowel syndrome)     Osteoarthritis     hips, back and knees    Thyroid disease     Visual impairment     reading glasses        Past Surgical History:   Past Surgical History:   Procedure Laterality Date    CARPAL TUNNEL RELEASE      COLONOSCOPY  2003,     Diverticulosis    COLONOSCOPY  2012    decreased anal sphincter tone, diverticulosis, internal hemorrhoids, normal random colon biopsies    COLONOSCOPY N/A 7/26/2017    Procedure: COLONOSCOPY;  Surgeon: Garrett Moody MD;  Location:  ENDOSCOPY    OTHER  1999    Carpel tnnel right hand    OTHER SURGICAL HISTORY  1995    Fatty tumor from neck    OTHER SURGICAL HISTORY  1999    Arthroscopy right knee    OTHER SURGICAL HISTORY  2001    Carpal tunnel both hand    OTHER SURGICAL HISTORY  12/22/2020    Cystoscopy, Dr Chun         Family Medical History:   Family History   Problem Relation Age of Onset    Cancer Other         Stomach cancer    Heart Disease Father     Hypertension Father     High Blood Pressure Father     Diabetes Father     Heart Disease Mother      Hypertension Mother     Lung Disorder Mother         Pulmonary fibrosis        Social History:   Social History     Socioeconomic History    Marital status:      Spouse name: Not on file    Number of children: Not on file    Years of education: Not on file    Highest education level: Not on file   Occupational History    Not on file   Tobacco Use    Smoking status: Former     Packs/day: 0.50     Years: 50.00     Additional pack years: 0.00     Total pack years: 25.00     Types: Cigarettes     Quit date: 10/22/2014     Years since quittin.3    Smokeless tobacco: Never   Vaping Use    Vaping Use: Never used   Substance and Sexual Activity    Alcohol use: No     Alcohol/week: 0.0 standard drinks of alcohol    Drug use: No    Sexual activity: Not Currently   Other Topics Concern    Not on file   Social History Narrative    Not on file     Social Determinants of Health     Financial Resource Strain: Not on file   Food Insecurity: No Food Insecurity (2023)    Food Insecurity     Food Insecurity: Never true   Transportation Needs: No Transportation Needs (2023)    Transportation Needs     Lack of Transportation: No   Physical Activity: Not on file   Stress: Not on file   Social Connections: Not on file   Housing Stability: Low Risk  (2023)    Housing Stability     Housing Instability: No     Housing Instability Emergency: Not on file        Medications:   Current Outpatient Medications   Medication Sig Dispense Refill    umeclidinium-vilanterol (ANORO ELLIPTA) 62.5-25 MCG/ACT Inhalation Aerosol Powder, Breath Activated Inhale 1 puff into the lungs daily. 1 each 1    pantoprazole 40 MG Oral Tab EC Take 1 tablet (40 mg total) by mouth every morning before breakfast.      HYDROcodone-acetaminophen 5-325 MG Oral Tab Take 1 tablet by mouth every 4 (four) hours as needed. 20 tablet 0    colchicine 0.6 MG Oral Tab Take 1 tablet (0.6 mg total) by mouth daily.      allopurinol 300 MG Oral Tab Take 1  tablet (300 mg total) by mouth daily. 90 tablet 3    ipratropium-albuterol 0.5-2.5 (3) MG/3ML Inhalation Solution Take 3 mL by nebulization every 6 (six) hours as needed. 180 mL 12    DULoxetine (CYMBALTA) 30 MG Oral Cap DR Particles Take 2 capsules (60 mg total) by mouth daily. 180 capsule 2    clonazePAM 1 MG Oral Tab Take 1 tablet (1 mg total) by mouth nightly as needed.      ONETOUCH ULTRA In Vitro Strip 2 (two) times daily.      FLUTICASONE PROPIONATE 50 MCG/ACT Nasal Suspension spray 2 sprays into each nostril daily 16 g 5    YUPELRI 175 MCG/3ML Inhalation Solution nebulizer solution ADMINISTER ONE (1) VIAL VIA NEBULIZER MACHINE DAILY 90 mL 11    Ascorbic Acid (VITAMIN C) 100 MG Oral Tab Take 10 tablets (1,000 mg total) by mouth daily.      cetirizine 10 MG Oral Tab Take 1 tablet (10 mg total) by mouth daily.      pioglitazone 15 MG Oral Tab Take 1 tablet (15 mg total) by mouth daily.      glimepiride 2 MG Oral Tab Take 1 tablet (2 mg total) by mouth 2 (two) times daily.      albuterol 108 (90 Base) MCG/ACT Inhalation Aero Soln Inhale 2 puffs into the lungs every 6 (six) hours as needed for Wheezing or Shortness of Breath. inhale 2 puff by inhalation route  every 4 - 6 hours as needed 1 each 11    METOPROLOL SUCCINATE ER 25 MG Oral Tablet 24 Hr TAKE ONE TABLET BY MOUTH ONE TIME DAILY 90 tablet 1    rosuvastatin 10 MG Oral Tab Take 1 tablet (10 mg total) by mouth nightly. Replaces simvastatin 90 tablet 3    Cholecalciferol (VITAMIN D-3 OR) Take 1 capsule by mouth once daily.      Multiple Vitamins-Minerals (CENTRUM) Oral Tab Take 1 tablet by mouth daily.         Review of Systems: Review of Systems   Constitutional:  Negative for activity change, appetite change, chills, diaphoresis, fatigue, fever and unexpected weight change.   HENT:  Negative for congestion, postnasal drip, rhinorrhea, sinus pressure, sinus pain, sneezing, sore throat, trouble swallowing and voice change.    Respiratory:  Negative for apnea,  cough, choking, chest tightness, shortness of breath, wheezing and stridor.    Cardiovascular:  Negative for chest pain, palpitations and leg swelling.   Musculoskeletal:  Negative for arthralgias.        Limited movement   Skin:  Negative for pallor and rash.   Allergic/Immunologic: Negative for immunocompromised state.   Neurological:  Negative for dizziness and headaches.   Hematological:  Negative for adenopathy.          Results:  Personally reviewed    WBC: 10, done on 12/13/2023.  HGB: 12.6, done on 12/13/2023.  PLT: 147, done on 12/13/2023.     Glucose: 190, done on 12/13/2023.  Cr: 0.93, done on 12/13/2023.  GFR(AA): 84, done on 12/1/2019.  GFR (non-AA): 73, done on 12/1/2019.  CA: 8.6, done on 12/13/2023.  Na: 139, done on 12/13/2023.  K: 4.3, done on 12/13/2023.  Cl: 108, done on 12/13/2023.  CO2: 30, done on 12/13/2023.  Last ALB was 3.5% done on 12/12/2023.     XR FEMUR MIN 2 VIEWS LEFT (CPT=73552)    Result Date: 12/13/2023  CONCLUSION:    Intramedullary nail placement in the femur, with proximal interlocking hip screw and 1 distal interlocking screw.  Typical tissue gas seen.  Fracture maintains in near anatomic alignment and position with slight mild continued displacement.  No dislocation.  LOCATION:  [facility]   Dictated by (CST): Jabari Currie MD on 12/13/2023 at 10:05 PM     Finalized by (CST): Jabari Currie MD on 12/13/2023 at 10:06 PM       XR FLUOROSCOPY C-ARM TIME LESS THAN 1 HOUR (CPT=76000)    Result Date: 12/13/2023  CONCLUSION:  Eighteen images obtained during various stages of hip surgery  Images obtained for the purposes of surgical planning. Correlation should also be made with real-time imaging as viewed during the surgical procedure. If additional diagnostic imaging needed, consider followup with standard imaging exams.   LOCATION:  [facility]    Dictated by (CST): Jabari Currie MD on 12/13/2023 at 5:11 PM     Finalized by (CST): Jabari Currie MD on 12/13/2023 at 5:11 PM       XR  CHEST AP/PA (1 VIEW) (CPT=71045)    Result Date: 12/12/2023  CONCLUSION:  No focal consolidation.   LOCATION:  [facility]      Dictated by (CST): Beatriz Mas MD on 12/12/2023 at 1:37 PM     Finalized by (CST): Beatriz Mas MD on 12/12/2023 at 1:38 PM       XR FEMUR MIN 2 VIEWS LEFT (CPT=73552)    Result Date: 12/12/2023  CONCLUSION:  1. Left intertrochanteric fracture as detailed above.  Please refer to left hip radiographs for further details.   LOCATION:  [facility]   Dictated by (CST): Beatriz Mas MD on 12/12/2023 at 1:35 PM     Finalized by (CST): Beatriz Mas MD on 12/12/2023 at 1:37 PM       XR HIP W OR WO PELVIS 2 OR 3 VIEWS, LEFT (CPT=73502)    Result Date: 12/12/2023  CONCLUSION:  1. Impacted displaced left intratrochanteric fracture as detailed above.   LOCATION:  [facility]   Dictated by (CST): Beatriz Mas MD on 12/12/2023 at 1:31 PM     Finalized by (CST): Beatriz Mas MD on 12/12/2023 at 1:35 PM         Assessment/Plan:  #1. Moderate COPD  Former smoker, quit 11/2019, 50 pack years  Exacerbations: March 2020, ?August 2023  Stable symptoms including LOTT  Last PFTs: 9/2022 with FEV1 1.45L, 75%, %, DLCO 32% --> DLCO out of proportion to obstruction and suggests pulmonary hypertension or ILD  9/2022 echo with +RVSP 34; will review with cardiology re: elevated RVSP pulm hypertension, if not then obtain HRCT to evaluate for pulmonary fibrosis  8/2023 HRCT with +peripheral reticular opacities with some honeycombing. +emphysema. Prominent nodes  Presently on anoro but occasionally will take yupelri (and hold the anoro).  She is aware to not take both anoro yupelri. Advised to consider changing to trelegy and just albuterol MDI or neb PRN.  She will price trelegy with her insurance  Encouraged exercise and discussed pulmonary rehab but she is hesitant to go given her ongoing joint symptoms limiting her ability to exercise. Advised to consider pulm rehab in the future  2/2024 Recommend to continue Anoro daily and albuterol prn.       #2. ILD  9/2022 with FEV1 1.45L, 75%, %, DLCO 32% --> DLCO out of proportion to obstruction and suggests pulmonary hypertension or ILD  8/2023 HRCT with +peripheral reticular opacities with some honeycombing. +emphysema. Prominent nodes..  The reticular changes were only minimally present in RLL base in 2020 so this has certainly progressed  Imaging is possible UIP pattern.  She does have +ALLAN (centromere) in the past and following with DR. Croft raising concern for CTD related ILD  Obtain PFTs  Depending on treatment choices, repeat CT chest in 3-6months  We reviewed her imaging in detail including ddx and management.  We did discuss that treatment if CTD related would be with Dr Croft.  Alteratively she has had progressive disease since 2020 and we discussed antifibrotics including nintedinib and pirfenidone, including pros/cons. She should see Dr. Croft and assess candidacy for DMARD.  Also advised she could consider ILD clinic at [facility] or [facility]     #3. Pulmonary hypertension  Noted on echo originally 2017 and again 2020; 9/2022 echo with +RVSP 34; to see her cardiologist later this month     #4. Pulmonary nodules  Noted on LDCT  No new nodules on 8/2023 HRCT  No need for f/u imaging     #5. Tobacco abuse  50 pack years, quit 11/2019  Congratulated on sustained smoking cessation  Qualifies for lung cancer screening by USPSTF guidelines however not per CMS criteria.     #5. Hypoxemia  Pt found to need oxygen 4L with activity at discharge from [facility] 8/20/23 for oxygen sats at 78%  She self discontinued the oxygen against medical advice. She is aware supplemental oxygen can provide mortality benefit and lack of treatment portends worse prognosis.  She explains she values her quality of life rather than quantity and refuses to use oxygen.  Advised to contact me should she change her mind.   2/2024 Reports she is off oxygen; sats > 90%     #6. Thrush  S/p nystatin  resolved    SKY Mari  [facility]  Pulmonary   2/27/2024    This visit is conducted using Telemedicine with live, interactive  audio.    Patient has been referred to the [facility] website at [email or web address] to review the yearly Consent to Treat document.    Patient understands and accepts financial responsibility for any deductible, co-insurance and/or co-pays associated with this service.

## 2024-02-27 NOTE — PATIENT INSTRUCTIONS
Continue Anoro daily and albuterol inhaler as directed for shortness of breath  Followup in 3 months or sooner if needed  Please contact office at [phone] with any decline in respiratory status, questions or concerns

## 2024-03-25 ENCOUNTER — TELEPHONE (OUTPATIENT)
Dept: PAIN CLINIC | Facility: CLINIC | Age: 79
End: 2024-03-25

## 2024-03-25 NOTE — TELEPHONE ENCOUNTER
Received incoming fax from [facility] with patient records addressed to Dr. Rich. Endorsed to RN for provider review.

## 2024-04-09 ENCOUNTER — HOSPITAL ENCOUNTER (OUTPATIENT)
Age: 79
Discharge: STILL ADMITTED OR ADMINISTRATIVE | End: 2024-04-09
Payer: COMMERCIAL

## 2024-04-09 ENCOUNTER — LAB ENCOUNTER (OUTPATIENT)
Dept: LAB | Age: 79
End: 2024-04-09
Attending: INTERNAL MEDICINE
Payer: MEDICARE

## 2024-04-09 DIAGNOSIS — Z11.1 SCREENING FOR TUBERCULOSIS: ICD-10-CM

## 2024-04-09 DIAGNOSIS — Z11.59 NEED FOR HEPATITIS C SCREENING TEST: ICD-10-CM

## 2024-04-09 DIAGNOSIS — J84.9 ILD (INTERSTITIAL LUNG DISEASE) (HCC): ICD-10-CM

## 2024-04-09 DIAGNOSIS — R76.8 CENTROMERE ANTIBODY POSITIVE: ICD-10-CM

## 2024-04-09 DIAGNOSIS — Z11.59 NEED FOR HEPATITIS B SCREENING TEST: ICD-10-CM

## 2024-04-09 DIAGNOSIS — M10.9 GOUT, UNSPECIFIED CAUSE, UNSPECIFIED CHRONICITY, UNSPECIFIED SITE: ICD-10-CM

## 2024-04-09 LAB
ALBUMIN SERPL-MCNC: 3.8 G/DL (ref 3.4–5)
ALBUMIN/GLOB SERPL: 1.3 {RATIO} (ref 1–2)
ALP LIVER SERPL-CCNC: 127 U/L
ALT SERPL-CCNC: 22 U/L
ANION GAP SERPL CALC-SCNC: 9 MMOL/L (ref 0–18)
AST SERPL-CCNC: 21 U/L (ref 15–37)
BILIRUB SERPL-MCNC: 0.7 MG/DL (ref 0.1–2)
BUN BLD-MCNC: 26 MG/DL (ref 9–23)
CALCIUM BLD-MCNC: 9.6 MG/DL (ref 8.5–10.1)
CHLORIDE SERPL-SCNC: 107 MMOL/L (ref 98–112)
CO2 SERPL-SCNC: 23 MMOL/L (ref 21–32)
CREAT BLD-MCNC: 0.93 MG/DL
EGFRCR SERPLBLD CKD-EPI 2021: 62 ML/MIN/1.73M2 (ref 60–?)
FASTING STATUS PATIENT QL REPORTED: NO
GLOBULIN PLAS-MCNC: 3 G/DL (ref 2.8–4.4)
GLUCOSE BLD-MCNC: 151 MG/DL (ref 70–99)
HBV CORE AB SERPL QL IA: NONREACTIVE
HBV SURFACE AB SER QL: NONREACTIVE
HBV SURFACE AB SERPL IA-ACNC: <3.1 MIU/ML
HBV SURFACE AG SER-ACNC: <0.1 [IU]/L
HBV SURFACE AG SERPL QL IA: NONREACTIVE
HCV AB SERPL QL IA: NONREACTIVE
OSMOLALITY SERPL CALC.SUM OF ELEC: 296 MOSM/KG (ref 275–295)
POTASSIUM SERPL-SCNC: 4.1 MMOL/L (ref 3.5–5.1)
PROT SERPL-MCNC: 6.8 G/DL (ref 6.4–8.2)
SODIUM SERPL-SCNC: 139 MMOL/L (ref 136–145)
URATE SERPL-MCNC: 3.2 MG/DL

## 2024-04-09 PROCEDURE — 36415 COLL VENOUS BLD VENIPUNCTURE: CPT

## 2024-04-09 PROCEDURE — 87340 HEPATITIS B SURFACE AG IA: CPT

## 2024-04-09 PROCEDURE — 86704 HEP B CORE ANTIBODY TOTAL: CPT

## 2024-04-09 PROCEDURE — 86706 HEP B SURFACE ANTIBODY: CPT

## 2024-04-09 PROCEDURE — 86480 TB TEST CELL IMMUN MEASURE: CPT

## 2024-04-09 PROCEDURE — 80053 COMPREHEN METABOLIC PANEL: CPT

## 2024-04-09 PROCEDURE — 86803 HEPATITIS C AB TEST: CPT

## 2024-04-09 PROCEDURE — 84550 ASSAY OF BLOOD/URIC ACID: CPT

## 2024-04-09 RX ORDER — UMECLIDINIUM BROMIDE AND VILANTEROL TRIFENATATE 62.5; 25 UG/1; UG/1
1 POWDER RESPIRATORY (INHALATION) DAILY
Qty: 1 EACH | Refills: 3 | Status: SHIPPED | OUTPATIENT
Start: 2024-04-09

## 2024-04-09 NOTE — TELEPHONE ENCOUNTER
Pt last seen by Shireen SWANSON on 2-27-24.  Per OV notes:  Continue Anoro daily and albuterol inhaler as directed for shortness of breath.  Pt advised to follow up in 3 months.  Appt scheduled for 6-5-24 with Dr. Maya.  Refill for Anoro sent.

## 2024-04-11 LAB
M TB IFN-G CD4+ T-CELLS BLD-ACNC: 0.05 IU/ML
M TB TUBERC IFN-G BLD QL: NEGATIVE
M TB TUBERC IGNF/MITOGEN IGNF CONTROL: >10 IU/ML
QFT TB1 AG MINUS NIL: 0 IU/ML
QFT TB2 AG MINUS NIL: 0 IU/ML

## 2024-04-15 ENCOUNTER — OFFICE VISIT (OUTPATIENT)
Dept: RHEUMATOLOGY | Facility: CLINIC | Age: 79
End: 2024-04-15
Payer: MEDICARE

## 2024-04-15 VITALS
DIASTOLIC BLOOD PRESSURE: 68 MMHG | OXYGEN SATURATION: 93 % | TEMPERATURE: 97 F | WEIGHT: 187 LBS | BODY MASS INDEX: 32.72 KG/M2 | HEART RATE: 84 BPM | SYSTOLIC BLOOD PRESSURE: 116 MMHG | HEIGHT: 63.5 IN | RESPIRATION RATE: 16 BRPM

## 2024-04-15 DIAGNOSIS — M79.18 CHRONIC MUSCULOSKELETAL PAIN: ICD-10-CM

## 2024-04-15 DIAGNOSIS — Z51.81 THERAPEUTIC DRUG MONITORING: ICD-10-CM

## 2024-04-15 DIAGNOSIS — M81.0 AGE-RELATED OSTEOPOROSIS WITHOUT CURRENT PATHOLOGICAL FRACTURE: ICD-10-CM

## 2024-04-15 DIAGNOSIS — G89.29 CHRONIC MUSCULOSKELETAL PAIN: ICD-10-CM

## 2024-04-15 DIAGNOSIS — R76.8 CENTROMERE ANTIBODY POSITIVE: Primary | ICD-10-CM

## 2024-04-15 DIAGNOSIS — M1A.0411 IDIOPATHIC CHRONIC GOUT OF RIGHT HAND WITH TOPHUS: ICD-10-CM

## 2024-04-15 PROCEDURE — 99214 OFFICE O/P EST MOD 30 MIN: CPT | Performed by: INTERNAL MEDICINE

## 2024-04-15 RX ORDER — MEDROXYPROGESTERONE ACETATE 150 MG/ML
50 INJECTION, SUSPENSION INTRAMUSCULAR WEEKLY
Qty: 2 EACH | Refills: 0 | COMMUNITY
Start: 2024-04-15 | End: 2024-04-15 | Stop reason: CLARIF

## 2024-04-15 RX ORDER — DULOXETIN HYDROCHLORIDE 60 MG/1
60 CAPSULE, DELAYED RELEASE ORAL DAILY
Qty: 90 CAPSULE | Refills: 1 | Status: SHIPPED | OUTPATIENT
Start: 2024-04-15
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
## 2024-04-15 NOTE — PATIENT INSTRUCTIONS
Continue allopurinol 300 mg daily    If you have pain again, try a thumb brace (CMC brace), you can get this on [employer].   -try daily for 1 month    Try voltaren gel on the thumbs    Get shingles vaccine

## 2024-04-15 NOTE — PROGRESS NOTES
Patient came in for office visit and needs Enbrel teaching. Name and  verified. Patient educated on proper handling/storage/disposal of medications. Patient informed of proper injection and aseptic technique. Patient educated on potential side effects. Educational materials given to patient on medication.  Patient practiced several times with demo pen with great technique. Patient then injected in to left upper leg with excellent technique under RN supervision. Patient tolerated medication well. Patient questions and concerns answered. Patient instructed to call us with any further questions. Patient given Enbrel starter pack, was instructed to call and activate copay card. 25 min spent in education session. Patient aware to complete monitoring labs in 1 month and then follow up with provider. LOT# [id number] was sample provided to patient in office. Another sample given to her to take home for injection next week.

## 2024-04-17 ENCOUNTER — OFFICE VISIT (OUTPATIENT)
Dept: PAIN CLINIC | Facility: CLINIC | Age: 79
End: 2024-04-17
Payer: MEDICARE

## 2024-04-17 ENCOUNTER — TELEPHONE (OUTPATIENT)
Dept: PAIN CLINIC | Facility: CLINIC | Age: 79
End: 2024-04-17

## 2024-04-17 VITALS — HEART RATE: 90 BPM | SYSTOLIC BLOOD PRESSURE: 126 MMHG | OXYGEN SATURATION: 95 % | DIASTOLIC BLOOD PRESSURE: 80 MMHG

## 2024-04-17 DIAGNOSIS — M47.899 FACET SYNDROME: ICD-10-CM

## 2024-04-17 DIAGNOSIS — M48.062 SPINAL STENOSIS OF LUMBAR REGION WITH NEUROGENIC CLAUDICATION: Primary | ICD-10-CM

## 2024-04-17 DIAGNOSIS — M47.816 LUMBAR FACET ARTHROPATHY: ICD-10-CM

## 2024-04-17 DIAGNOSIS — M48.061 SPINAL STENOSIS OF LUMBAR REGION, UNSPECIFIED WHETHER NEUROGENIC CLAUDICATION PRESENT: Primary | ICD-10-CM

## 2024-04-17 DIAGNOSIS — M51.36 DDD (DEGENERATIVE DISC DISEASE), LUMBAR: ICD-10-CM

## 2024-04-17 PROCEDURE — 99204 OFFICE O/P NEW MOD 45 MIN: CPT | Performed by: ANESTHESIOLOGY

## 2024-04-17 RX ORDER — POTASSIUM CHLORIDE 750 MG/1
TABLET, FILM COATED, EXTENDED RELEASE ORAL
COMMUNITY
Start: 2024-02-10

## 2024-04-17 RX ORDER — OMEPRAZOLE 20 MG/1
CAPSULE, DELAYED RELEASE ORAL
COMMUNITY
Start: 2024-02-10

## 2024-04-17 RX ORDER — METOPROLOL SUCCINATE 50 MG/1
TABLET, EXTENDED RELEASE ORAL
COMMUNITY
Start: 2024-02-10

## 2024-04-17 RX ORDER — MELOXICAM 15 MG/1
15 TABLET ORAL DAILY
COMMUNITY
Start: 2024-02-16

## 2024-04-17 RX ORDER — ATORVASTATIN CALCIUM 40 MG/1
TABLET, FILM COATED ORAL
COMMUNITY
Start: 2024-02-10

## 2024-04-17 NOTE — PROGRESS NOTES
Patient presents in office today with reported pain in bilateral lower back pain    Current pain level reported = 5/10    Last reported dose of 2 Aleve around 8a    Narcotic Contract renewal NA    Urine Drug screen NA

## 2024-04-17 NOTE — TELEPHONE ENCOUNTER
Order Questions    Question Answer   Anesthesia Type Local   Provider [facility] Procedure Lab   Procedure Lumbar GEOVANNY   CPT (Hit enter after each entry) NJX INTERLAMINAR LMBR/SAC   Medical clearance requested (will send to Pain Navigator) No   Patient has Medicare coverage? Yes   Comments (Please list entire procedure name here.) lumbar epidural steroid injection

## 2024-04-17 NOTE — H&P
Name: Gabi Kumar   : 1945   DOS: 2024     Chief complaint: Low back pain    History of present illness:  Gabi Kumar is a 78 year old female with a history of hypertension, diabetes, hyperlipidemia, who presents today for evaluation of low back pain.  The patient reports a longstanding history of back pain and has been seen by multiple pain specialist including Dr. Payan and Dr. Cristobal in the past.  Patient complains of axial back pain but also complains of low back and claudication type symptoms.  This is made worse by standing and walking.  Rates the pain as 5 out of 10 with exacerbation of 10 out of 10.  From a treatment standpoint has had multiple injections and had radiofrequency ablation with modest improvement.  Also has spinal cord stimulator trial but did not complete permanent implant.  Was also assessed by orthopedic spine surgery and told that she was not a good surgical candidate.    She denies any chills, fever or weakness. She denies any bladder or bowel incontinence.      Past Medical History:    Back problem    Cataract    Cholelithiasis    DIABETES    Diabetes (HCC)    Diverticulitis    suggestion of possible wall thickening involving sigmoid, possible mild diverticulitis    Diverticulosis    Essential hypertension    Gallbladder disease    thickened gallbladder with distended bile duct    High blood pressure    High cholesterol    HYPERLIPIDEMIA    Hyperlipidemia    IBS (irritable bowel syndrome)    Osteoarthritis    hips, back and knees    Thyroid disease    Visual impairment    reading glasses      Current Outpatient Medications   Medication Sig Dispense Refill    metoprolol succinate ER 50 MG Oral Tablet 24 Hr       Potassium Chloride ER 10 MEQ Oral Tab CR       omeprazole 20 MG Oral Capsule Delayed Release       DULoxetine (CYMBALTA) 60 MG Oral Cap DR Particles Take 1 capsule (60 mg total) by mouth daily. 90 capsule 1    umeclidinium-vilanterol (ANORO ELLIPTA)  62.5-25 MCG/ACT Inhalation Aerosol Powder, Breath Activated Inhale 1 puff into the lungs daily. 1 each 3    pantoprazole 40 MG Oral Tab EC Take 1 tablet (40 mg total) by mouth every morning before breakfast.      HYDROcodone-acetaminophen 5-325 MG Oral Tab Take 1 tablet by mouth every 4 (four) hours as needed. 20 tablet 0    colchicine 0.6 MG Oral Tab Take 1 tablet (0.6 mg total) by mouth daily.      allopurinol 300 MG Oral Tab Take 1 tablet (300 mg total) by mouth daily. 90 tablet 3    ipratropium-albuterol 0.5-2.5 (3) MG/3ML Inhalation Solution Take 3 mL by nebulization every 6 (six) hours as needed. 180 mL 12    clonazePAM 1 MG Oral Tab Take 1 tablet (1 mg total) by mouth nightly as needed.      ONETOUCH ULTRA In Vitro Strip 2 (two) times daily.      FLUTICASONE PROPIONATE 50 MCG/ACT Nasal Suspension spray 2 sprays into each nostril daily 16 g 5    YUPELRI 175 MCG/3ML Inhalation Solution nebulizer solution ADMINISTER ONE (1) VIAL VIA NEBULIZER MACHINE DAILY 90 mL 11    Ascorbic Acid (VITAMIN C) 100 MG Oral Tab Take 10 tablets (1,000 mg total) by mouth daily.      cetirizine 10 MG Oral Tab Take 1 tablet (10 mg total) by mouth daily.      pioglitazone 15 MG Oral Tab Take 1 tablet (15 mg total) by mouth daily.      glimepiride 2 MG Oral Tab Take 1 tablet (2 mg total) by mouth 2 (two) times daily.      albuterol 108 (90 Base) MCG/ACT Inhalation Aero Soln Inhale 2 puffs into the lungs every 6 (six) hours as needed for Wheezing or Shortness of Breath. inhale 2 puff by inhalation route  every 4 - 6 hours as needed 1 each 11    METOPROLOL SUCCINATE ER 25 MG Oral Tablet 24 Hr TAKE ONE TABLET BY MOUTH ONE TIME DAILY 90 tablet 1    rosuvastatin 10 MG Oral Tab Take 1 tablet (10 mg total) by mouth nightly. Replaces simvastatin 90 tablet 3    Cholecalciferol (VITAMIN D-3 OR) Take 1 capsule by mouth once daily.      Multiple Vitamins-Minerals (CENTRUM) Oral Tab Take 1 tablet by mouth daily.      Meloxicam 15 MG Oral Tab Take 1  tablet (15 mg total) by mouth daily. (Patient not taking: Reported on 2024)      atorvastatin 40 MG Oral Tab  (Patient not taking: Reported on 2024)       Past Surgical History:   Procedure Laterality Date    Carpal tunnel release      Colonoscopy  ,     Diverticulosis    Colonoscopy      decreased anal sphincter tone, diverticulosis, internal hemorrhoids, normal random colon biopsies    Colonoscopy N/A 2017    Procedure: COLONOSCOPY;  Surgeon: Garrett Moody MD;  Location:  ENDOSCOPY    Other      Carpel tnnel right hand    Other surgical history      Fatty tumor from neck    Other surgical history      Arthroscopy right knee    Other surgical history      Carpal tunnel both hand    Other surgical history  2020    Cystoscopy, Dr Chun      Family History   Problem Relation Age of Onset    Cancer Other         Stomach cancer    Heart Disease Father     Hypertension Father     High Blood Pressure Father     Diabetes Father     Heart Disease Mother     Hypertension Mother     Lung Disorder Mother         Pulmonary fibrosis     Social History     Socioeconomic History    Marital status:    Tobacco Use    Smoking status: Former     Current packs/day: 0.00     Average packs/day: 0.5 packs/day for 50.0 years (25.0 ttl pk-yrs)     Types: Cigarettes     Start date: 10/22/1964     Quit date: 10/22/2014     Years since quittin.4    Smokeless tobacco: Never   Vaping Use    Vaping status: Never Used   Substance and Sexual Activity    Alcohol use: No     Alcohol/week: 0.0 standard drinks of alcohol    Drug use: No    Sexual activity: Not Currently   Other Topics Concern    Caffeine Concern Yes    Exercise Yes     Social Determinants of Health     Food Insecurity: No Food Insecurity (2023)    Food Insecurity     Food Insecurity: Never true   Transportation Needs: No Transportation Needs (2023)    Transportation Needs     Lack of Transportation: No   Housing  Stability: Low Risk  (12/12/2023)    Housing Stability     Housing Instability: No       Review of  other systems:  10 point ROS otherwise negative    Physical examination: Gabi is a 78 year old female not in acute distress  /80 (BP Location: Left arm, Patient Position: Sitting)   Pulse 90   LMP 06/07/1999   SpO2 95%    The patient is awake, alert, oriented and corporative. She has a normal affect. The patient ambulates with normal gait.  Does use cane  HEENT: No gross lesion noted. PEERL. No icterus.  Neck and Upper Extremity: Supple. No thyromegaly or lymphadenopathy.   Motor Examination:    (R)   (L)  Deltoid:      5    5  Biceps:       5    5  Triceps:      5    5  Wrist Extension:     5    5  Wrist Flexsion:     5    5  Finger Extension:     5    5  Finger Flexsion:     5    5       Cardiovascular system: Regular rate and rhythm.    Respiratory system: Breath sounds equal bilaterally .  Abdomen: Soft, nontender   Neurologic:  Cranial nerves II through XII are grossly intact.       Examination of the lower back:    Motor Examination:   (R)   (L)   Hip Abduction:   5    5   Hip Flexion:    5    5   Knee Extension:   5    5   Knee Flexion:    5    5   Ant. Tibialis:    5    5  Extensor Hallucis Longus:   5    5  Peroneals:     5    5  Gastrocsoleus:     5    5    Radiology diagnostic studies:   Lumbar MRI report from 2021 reviewed with evidence of extensive multilevel lumbar degenerative disc disease with disc desiccation, bilateral facet arthropathy, and ligamentum flavum hypertrophy.  This leads to both central canal and neuroforaminal stenosis    Assessment:  Encounter Diagnoses   Name Primary?    Spinal stenosis of lumbar region, unspecified whether neurogenic claudication present Yes    Lumbar facet arthropathy     DDD (degenerative disc disease), lumbar     Facet syndrome    .      Plan:     The patient is a pleasant 78-year-old female with longstanding history of back pain.  Has both axial back  pain along with spinal stenosis type symptoms.  Has been treated with multiple interventions in the past with modest improvement.  Patient here to discuss additional therapies.  Discussed interventional and surgical approaches.  Patient is not interested in any surgical consideration.  Recommended trial of lumbar epidural steroid injection.  Additionally, may benefit from seeing Dr. Odell at [facility] pain [facility] for MILD procedure.  This is not on formulary here at [facility].      Jose Rich MD MPH  Pain Management

## 2024-04-17 NOTE — PROGRESS NOTES
Location of Pain: Bilateral lower back    Date Pain Began: 2012          Work Related:   No        Receiving Work Comp/Disability:   No    Numeric Rating Scale:  Pain at Present:  5                                                                                                            (No Pain) 0  to  10 (Worst Pain)                 Minimum Pain:   5  Maximum Pain  10    Distribution of Pain:    bilateral    Quality of Pain:   aching and sharp/stabbing    Origin of Pain:    Degenerative    Aggravating Factors:    Standing and Walking    Past Treatments for Current Pain Condition:   Other steroid injection, temp SCS    Prior diagnostic testing for your pain:  mri

## 2024-04-17 NOTE — TELEPHONE ENCOUNTER
Patient advised of insurance approval to proceed with injections and is agreeable to scheduling. Patient scheduled for procedure, pre-procedure instructions reviewed. Patient prefers Local sedation. Reviewed sedation instructions including No Fasting and No  Required. Patient encouraged but not required to hold NSAIDs for 24 hours prior to procedure. Patient verbalized understanding of instructions, no further needs at this time.    Pre Procedure Instruction Sheet provided to patient at office visit.       [facility]  PRE-PROCEDURE INSTRUCTIONS WITHOUT SEDATION    Procedure: DAVID       Appointment Date: 04/30/2024  Check-In Time: 12:30 PM    Follow-Up Date/Time w/ : 05/15/24 @ 01:00 PM    Prior to the procedure:  Please update us prior to the procedure if you are experiencing any symptoms of infection such as cough, fever, chills, urinary symptoms, or have recently been prescribed antibiotics, have open wounds, have recently had surgery or dental procedures.    Day of Procedure:  **Drivers will be required for patients who receive prescriptions for Valium.    NO FASTING REQUIRED  Please bring your Insurance Card, Photo ID, List of Current Medications and Referral (if applicable) to your appointment.  Please park in the [address] and follow the signs to the [facility].  Check in at [facility] ([address] [place] [address]) outpatient registration in the [facility].  Please note-No prescriptions will be written by Pain Clinic in [place] on the day of procedure. If you require a refill of medications, please contact the office 48 hours prior to your procedure.  If you have an implanted Spinal Cord or Peripheral Nerve Stimulator: Please remember to turn device off for procedure.        Medication Hold:    Number of days you need to be off for the following medications:    Aggrenox 10 days   Agrylin (Anagrelide) 10 days  Brilinta (Ticagrelor) 7 days  Imbruvica  (Ibrutinib) 3 days   Enbrel (Etanercept) 24 hours   Fragmin (Dalteparin) 24 hours   Pletal (Cilostazol) 7 days  Effient (Prasugrel) 7 days  Pradaxa 10 days  Trental 7 days  Eliquis (Apixaban) 3 days  Xarelto (Rivaroxaban) 3 days  Lovenox (Enoxaparin) 24 hours  Aspirin  Greater than 81mg but less than 325mg   5 days  325mg and greater                  7 days  Coumadin       5 days  Procedure may be cancelled if INR is elevated.   Excedrin (with aspirin) 7 days  Plavix (Clopidogrel)                            7 days    NSAIDs: 24 hours preferred      Ibuprofen (Motrin, Advil, Vicoprofen), Naproxen (Naprosyn, Aleve), Piroxcam (Feldene), Meloxicam (Mobic), Oxaprozin (Daypro), Diclofenac (Voltaren), Indomethacin (Indocin), Etodolac (Lodine), Nabumetone (Relafen), Celebrex (Celecoxib)           HERBAL SUPPLEMENTS  5 days preferred  Fish oil, krill oil, Omega-3, Vascepa, Vitamin E, Turmeric, Garlic                       Insurance Authorization:   Most insurances are now requiring a preauthorization for all procedures.  In the event that your insurance does not authorize your procedure within 48 hours of the scheduled date, your procedure will be cancelled and rescheduled to a later date.  Please contact your insurance carrier to determine what your financial responsibility will be for the procedure(s).      Cancellation/Rescheduling Appointment:   In the event you need to cancel or reschedule your appointment, you must notify the office 24 hours prior.    Post-procedure instructions:        Please schedule a follow up visit within 2 to 4 weeks after your last procedure date   Please call our office with any questions or concerns before or after your procedure at  [phone].  If you are a diabetic, please increase the frequency of your glucose monitoring after the procedure as this may cause a temporary increase in your blood sugar.  Contact your primary care physician if your blood sugar rises as you may require some  medication adjustment.  It is normal to have increased pain at injection site for up to 3-5 days after procedure, you can use heat or ice (20 minutes on 20 minutes off) for comfort.    **To hear a recorded version of these instructions, please call [phone] and follow the prompts.  **Para escuchar las instrucciones en Español, por favor de llamar el gina [phone] opción 4.

## 2024-04-17 NOTE — PATIENT INSTRUCTIONS
Refill policies:    Allow 2-3 business days for refills; controlled substances may take longer.  Contact your pharmacy at least 5 days prior to running out of medication and have them send an electronic request or submit request through the “request refill” option in your [employer] account.  Refills are not addressed on weekends; covering physicians do not authorize routine medications on weekends.  No narcotics or controlled substances are refilled after noon on Fridays or by on call physicians.  By law, narcotics must be electronically prescribed.  A 30 day supply with no refills is the maximum allowed.  If your prescription is due for a refill, you may be due for a follow up appointment.  To best provide you care, patients receiving routine medications need to be seen at least once a year.  Patients receiving narcotic/controlled substance medications need to be seen at least once every 3 months.  In the event that your preferred pharmacy does not have the requested medication in stock (e.g. Backordered), it is your responsibility to find another pharmacy that has the requested medication available.  We will gladly send a new prescription to that pharmacy at your request.    Scheduling Tests:    If your physician has ordered radiology tests such as MRI or CT scans, please contact Central Scheduling at [phone] right away to schedule the test.  Once scheduled, the [facility] Centralized Referral Team will work with your insurance carrier to obtain pre-certification or prior authorization.  Depending on your insurance carrier, approval may take 3-10 days.  It is highly recommended patients assure they have received an authorization before having a test performed.  If test is done without insurance authorization, patient may be responsible for the entire amount billed.      Precertification and Prior Authorizations:  If your physician has recommended that you have a procedure or additional testing performed the [facility]  Centralized Referral Team will contact your insurance carrier to obtain pre-certification or prior authorization.    You are strongly encouraged to contact your insurance carrier to verify that your procedure/test has been approved and is a COVERED benefit.  Although the [facility] Centralized Referral Team does its due diligence, the insurance carrier gives the disclaimer that \"Although the procedure is authorized, this does not guarantee payment.\"    Ultimately the patient is responsible for payment.   Thank you for your understanding in this matter.  Paperwork Completion:  If you require FMLA or disability paperwork for your recovery, please make sure to either drop it off or have it faxed to our office at [phone]. Be sure the form has your name and date of birth on it.  The form will be faxed to our Forms Department and they will complete it for you.  There is a 25$ fee for all forms that need to be filled out.  Please be aware there is a 10-14 day turnaround time.  You will need to sign a release of information (BEATRICE) form if your paperwork does not come with one.  You may call the Forms Department with any questions at [phone].  Their fax number is [phone].

## 2024-04-30 ENCOUNTER — HOSPITAL ENCOUNTER (OUTPATIENT)
Facility: HOSPITAL | Age: 79
Setting detail: HOSPITAL OUTPATIENT SURGERY
Discharge: HOME | End: 2024-04-30
Attending: ANESTHESIOLOGY | Admitting: ANESTHESIOLOGY
Payer: MEDICARE

## 2024-04-30 ENCOUNTER — APPOINTMENT (OUTPATIENT)
Dept: GENERAL RADIOLOGY | Facility: HOSPITAL | Age: 79
End: 2024-04-30
Attending: ANESTHESIOLOGY
Payer: MEDICARE

## 2024-04-30 VITALS
WEIGHT: 187 LBS | OXYGEN SATURATION: 92 % | BODY MASS INDEX: 32.72 KG/M2 | SYSTOLIC BLOOD PRESSURE: 121 MMHG | HEART RATE: 87 BPM | DIASTOLIC BLOOD PRESSURE: 70 MMHG | TEMPERATURE: 97 F | HEIGHT: 63.5 IN | RESPIRATION RATE: 20 BRPM

## 2024-04-30 LAB — GLUCOSE BLD-MCNC: 95 MG/DL (ref 70–99)

## 2024-04-30 PROCEDURE — 62323 NJX INTERLAMINAR LMBR/SAC: CPT | Performed by: ANESTHESIOLOGY

## 2024-04-30 PROCEDURE — 3E0R33Z INTRODUCTION OF ANTI-INFLAMMATORY INTO SPINAL CANAL, PERCUTANEOUS APPROACH: ICD-10-PCS | Performed by: ANESTHESIOLOGY

## 2024-04-30 RX ORDER — LIDOCAINE HYDROCHLORIDE 10 MG/ML
INJECTION, SOLUTION EPIDURAL; INFILTRATION; INTRACAUDAL; PERINEURAL
Status: DISCONTINUED | OUTPATIENT
Start: 2024-04-30 | End: 2024-04-30

## 2024-04-30 RX ORDER — SODIUM CHLORIDE 9 MG/ML
INJECTION, SOLUTION INTRAMUSCULAR; INTRAVENOUS; SUBCUTANEOUS
Status: DISCONTINUED | OUTPATIENT
Start: 2024-04-30 | End: 2024-04-30

## 2024-04-30 RX ORDER — DEXAMETHASONE SODIUM PHOSPHATE 10 MG/ML
INJECTION, SOLUTION INTRAMUSCULAR; INTRAVENOUS
Status: DISCONTINUED | OUTPATIENT
Start: 2024-04-30 | End: 2024-04-30

## 2024-04-30 NOTE — OPERATIVE REPORT
[facility]  Operative Report  2024     Gabi Kumar Patient Status:  Hospital Outpatient Surgery    1945 MRN [id number]   Location [facility] Attending Jose Rich MD   Hosp Day # 0 PCP Bismark Cabrera MD     Indication: Gabi is a 78 year old female with lumbar spinal stenosis    Preoperative Diagnosis:  Spinal stenosis of lumbar region with neurogenic claudication [M48.062]    Postoperative Diagnosis: Same as above.    Procedure performed: LUMBAR INTERLAMINAR EPIDURAL INJECTION with local    Anesthesia: Local      EBL: Less than 1 ml.    Procedure Description:  After reviewing the patient's history and performing a focused physical examination, the diagnosis and positive previous diagnostic tests were confirmed and contraindications such as infection and coagulopathy were ruled out.  Following review of allergies and potential side effects and complications, including but not necessarily limited to infection, allergic reaction, local tissue breakdown, nerve injury, post-dural puncture headache and paresis, the patient consented for the procedure.    The patient was brought to the procedure room in prone position.  After sterile prep of the lumbar spine, the L5-S1 interspace was identified with the help of fluoroscopy. Local anesthetic was given by a 25 gauge 1.5 inch needle with 1% lidocaine in that space level.  Thereafter, a 20 gauge Tuohy needle was introduced and advanced under fluoroscopy.  The epidural space was accessed by using loss of resistance to air technique.  The needle position was confirmed with AP and lateral view under fluoroscopy.  Omnipaque 180 was injected in 1 mL volume. A good epidurogram was obtained.  Thereafter, dexamethasone 10 mg with normal saline of 5 mL in total volume of 6 mL was injected through the Tuohy needle.  The needle was flushed with 1 mL lidocaine.  The needle was withdrawn with the stylet intact in situ.  The needle's  tip was intact.  The patient tolerated the procedure very well without significant immediate complication.  The patient's back was cleaned and sterile dressing was applied.  The patient was discharged with an instruction to a responsible adult after discharge criteria were met.  The patient was advised to contact us should any problems happen.  The patient was also informed to go to the emergency room immediately if experiencing severe numbness or weakness in the extremities or experiencing bowel or bladder incontinence.            Complications: None.    Follow up: The patient was followed in the pain clinic as needed basis.          Jose Rich MD

## 2024-04-30 NOTE — H&P
History & Physical Examination    Patient Name: Gabi Kumar  MRN: [id number]  CSN: [id number]  YOB: 1945    Pre-Operative Diagnosis:  Spinal stenosis of lumbar region with neurogenic claudication [M48.062]    Present Illness: Spinal stenosis    Facility-Administered Medications Prior to Admission   Medication Dose Route Frequency Provider Last Rate Last Admin    [COMPLETED] triamcinolone acetonide (ZILRETTA) ER 32 mg injection  64 mg Intra-articular Once Scott Sheehan MD   64 mg at 24 1623     Medications Prior to Admission   Medication Sig Dispense Refill Last Dose    metoprolol succinate ER 50 MG Oral Tablet 24 Hr        Potassium Chloride ER 10 MEQ Oral Tab CR        omeprazole 20 MG Oral Capsule Delayed Release        Meloxicam 15 MG Oral Tab Take 1 tablet (15 mg total) by mouth daily. (Patient not taking: Reported on 2024)       atorvastatin 40 MG Oral Tab  (Patient not taking: Reported on 2024)       DULoxetine (CYMBALTA) 60 MG Oral Cap DR Particles Take 1 capsule (60 mg total) by mouth daily. 90 capsule 1     umeclidinium-vilanterol (ANORO ELLIPTA) 62.5-25 MCG/ACT Inhalation Aerosol Powder, Breath Activated Inhale 1 puff into the lungs daily. 1 each 3     [] nystatin 526214 UNIT/ML Mouth/Throat Suspension Take 5 mL (500,000 Units total) by mouth 4 (four) times daily for 5 days. 100 mL 0     pantoprazole 40 MG Oral Tab EC Take 1 tablet (40 mg total) by mouth every morning before breakfast.       HYDROcodone-acetaminophen 5-325 MG Oral Tab Take 1 tablet by mouth every 4 (four) hours as needed. 20 tablet 0     colchicine 0.6 MG Oral Tab Take 1 tablet (0.6 mg total) by mouth daily.       allopurinol 300 MG Oral Tab Take 1 tablet (300 mg total) by mouth daily. 90 tablet 3     ipratropium-albuterol 0.5-2.5 (3) MG/3ML Inhalation Solution Take 3 mL by nebulization every 6 (six) hours as needed. 180 mL 12     [] DULoxetine (CYMBALTA) 30 MG Oral Cap DR Particles Take 2  capsules (60 mg total) by mouth daily. 180 capsule 2     clonazePAM 1 MG Oral Tab Take 1 tablet (1 mg total) by mouth nightly as needed.       ONETOUCH ULTRA In Vitro Strip 2 (two) times daily.       FLUTICASONE PROPIONATE 50 MCG/ACT Nasal Suspension spray 2 sprays into each nostril daily 16 g 5     YUPELRI 175 MCG/3ML Inhalation Solution nebulizer solution ADMINISTER ONE (1) VIAL VIA NEBULIZER MACHINE DAILY 90 mL 11     Ascorbic Acid (VITAMIN C) 100 MG Oral Tab Take 10 tablets (1,000 mg total) by mouth daily.       cetirizine 10 MG Oral Tab Take 1 tablet (10 mg total) by mouth daily.       pioglitazone 15 MG Oral Tab Take 1 tablet (15 mg total) by mouth daily.       glimepiride 2 MG Oral Tab Take 1 tablet (2 mg total) by mouth 2 (two) times daily.       albuterol 108 (90 Base) MCG/ACT Inhalation Aero Soln Inhale 2 puffs into the lungs every 6 (six) hours as needed for Wheezing or Shortness of Breath. inhale 2 puff by inhalation route  every 4 - 6 hours as needed 1 each 11     METOPROLOL SUCCINATE ER 25 MG Oral Tablet 24 Hr TAKE ONE TABLET BY MOUTH ONE TIME DAILY 90 tablet 1     rosuvastatin 10 MG Oral Tab Take 1 tablet (10 mg total) by mouth nightly. Replaces simvastatin 90 tablet 3     Cholecalciferol (VITAMIN D-3 OR) Take 1 capsule by mouth once daily.       Multiple Vitamins-Minerals (CENTRUM) Oral Tab Take 1 tablet by mouth daily.        No current facility-administered medications for this encounter.       Allergies:   Allergies   Allergen Reactions    Pcn [Bicillin C-R,] SWELLING     itching    Levofloxacin MYALGIA     Severe muscle and bone pain    Penicillins OTHER (SEE COMMENTS)       Past Medical History:    Back problem    Cataract    Cholelithiasis    DIABETES    Diabetes (HCC)    Diverticulitis    suggestion of possible wall thickening involving sigmoid, possible mild diverticulitis    Diverticulosis    Essential hypertension    Gallbladder disease    thickened gallbladder with distended bile duct     High blood pressure    High cholesterol    HYPERLIPIDEMIA    Hyperlipidemia    IBS (irritable bowel syndrome)    Osteoarthritis    hips, back and knees    Thyroid disease    Visual impairment    reading glasses     Past Surgical History:   Procedure Laterality Date    Carpal tunnel release      Colonoscopy  ,     Diverticulosis    Colonoscopy      decreased anal sphincter tone, diverticulosis, internal hemorrhoids, normal random colon biopsies    Colonoscopy N/A 2017    Procedure: COLONOSCOPY;  Surgeon: Garrett Moody MD;  Location:  ENDOSCOPY    Other      Carpel tnnel right hand    Other surgical history      Fatty tumor from neck    Other surgical history      Arthroscopy right knee    Other surgical history      Carpal tunnel both hand    Other surgical history  2020    Cystoscopy, Dr Chun     Family History   Problem Relation Age of Onset    Cancer Other         Stomach cancer    Heart Disease Father     Hypertension Father     High Blood Pressure Father     Diabetes Father     Heart Disease Mother     Hypertension Mother     Lung Disorder Mother         Pulmonary fibrosis     Social History     Tobacco Use    Smoking status: Former     Current packs/day: 0.00     Average packs/day: 0.5 packs/day for 50.0 years (25.0 ttl pk-yrs)     Types: Cigarettes     Start date: 10/22/1964     Quit date: 10/22/2014     Years since quittin.5    Smokeless tobacco: Never   Substance Use Topics    Alcohol use: No     Alcohol/week: 0.0 standard drinks of alcohol       SYSTEM Check if Review is Normal Check if Physical Exam is Normal If not normal, please explain:   HEENT [x ] [x ]    NECK & BACK [x ] [x ]    HEART [x ] [x ]    LUNGS [x ] [x ]    ABDOMEN [x ] [x ]    UROGENITAL [x ] [x ]    EXTREMITIES [x ] [x ]    OTHER        [ x ] I have discussed the risks and benefits and alternatives with the patient/family.  They understand and agree to proceed with plan of care.  [ x ] I have  reviewed the History and Physical done within the last 30 days.  Any changes noted above.    Jose Rich MD          Self

## 2024-04-30 NOTE — DISCHARGE INSTRUCTIONS
Home Care Instructions Following Your Pain Procedure     Gabi,  It has been a pleasure to have you as our patient. To help you at home, you must follow these general discharge instructions. We will review these with you before you are discharged. It is our hope that you have a complete and uneventful recovery from our procedure.     General Instructions:  What to Expect:  Bandages from your procedure today can be removed when you get home.  Please avoid soaking and/or swimming for 24 hours.  Showering is okay  It is normal to have increased pain symptoms and/or pain at injection site for up to 3-5 days after procedure, you can use heat or ice (20 minutes on 20 minutes off) for comfort.  You may experience some temporary side effects which may include restlessness or insomnia, flushing of the face, or heart palpitations.  Please contact the provider if these symptoms do not resolve within 3-4 days.  Lightheadedness or nausea may occur and should resolve within 24 to 48 hours.  If you develop a headache after treatment, rest, drink fluids (with caffeine, if possible) and take mild over-the-counter pain medication.  If the headache does not improve with the above treatment, contact the physician.  Home Medications:  Resume all previously prescribed medication.  Please avoid taking NSAIDs (Non-Steriodal Anti-Inflammatory Drugs) such as:  Ibuprofen ( Advil, Motrin) Aleve (Naproxen), Diclofenac, Meloxicam for 6 hours after procedure.   If you are on Coumadin (Warfarin) or any other anti-coagulant (or \"blood thinning\") medication such as Plavix (Clopidogrel), Xarelto (Rivaroxaban), Eliquis (Apixaban), Effient (Prasugrel) etc., restart on the following day from the procedure unless otherwise directed by your provider.  If you are a diabetic, please increase the frequency of your glucose monitoring after the procedure as steroids may cause a temporary (2-3 day) increase in your blood sugar.  Contact your primary care  physician if your blood sugar remains elevated as you may require some medication adjustment.  Diet:  Resume your regular diet as tolerated.  Activity:  We recommend that you relax and rest during the rest of your procedure day.  If you feel weakness in your arms or legs do not drive.  Follow-up Appointment  Please schedule a follow-up visit within 3 to 4 weeks after your last procedure date.  Question or Concerns:  Feel free to call our office with any questions or concerns at [phone] (option #2)    Gabi  Thank you for coming to [facility] for your procedure.  The nurses try very hard to make sure you receive the best care possible.  Your trust in them as well as us is greatly appreciated.    Thanks so much,   Dr. Jose Rich

## 2024-05-01 ENCOUNTER — TELEPHONE (OUTPATIENT)
Dept: PAIN CLINIC | Facility: CLINIC | Age: 79
End: 2024-05-01

## 2024-05-01 NOTE — TELEPHONE ENCOUNTER
Courtesy called placed to patient for post procedure follow up. Patient stated she is feeling fine, no pain right now. Informed patient that soreness is to be expected after the procedure. Educated patient that it takes 3-5 days for the steroid to be effective and to allow adequate time for medication to work. Encouraged patient to alternate ice and heat and to take medications as prescribed. Pt verbalized understanding to call with any questions or concerns.      Procedure: LUMBAR INTERLAMINAR EPIDURAL INJECTION with local   Date: 04/30/24  Follow up Visit Scheduled: 05/15/24

## 2024-05-08 ENCOUNTER — TELEPHONE (OUTPATIENT)
Dept: ORTHOPEDICS CLINIC | Facility: CLINIC | Age: 79
End: 2024-05-08

## 2024-05-08 NOTE — TELEPHONE ENCOUNTER
Pt is coming to see Dr. Sheehan for right knee pain. Please advise if imaging is needed.  Future Appointments   Date Time Provider Department Center   5/15/2024  1:00 PM Jose Rich MD [facility]   6/5/2024 11:30 AM Nasir Maya MD  [facility]   7/3/2024  2:20 PM Scott Sheehan MD [facility]   10/15/2024 12:45 PM Coni Croft MD [facility]

## 2024-05-09 NOTE — TELEPHONE ENCOUNTER
No further imaging needed.     - 05/22/23  Severe osteoarthritic changes in the lateral knee compartment with bone-on-bone appearance, sclerosis, and osteophyte, with advanced although less severe osteoarthritic changes medial and patellofemoral compartments

## 2024-05-13 ENCOUNTER — TELEPHONE (OUTPATIENT)
Dept: RHEUMATOLOGY | Facility: CLINIC | Age: 79
End: 2024-05-13

## 2024-05-13 NOTE — TELEPHONE ENCOUNTER
Spoke to pt, pt had questions regarding the Colchicine. Pt saw on her med list, and wanted to make sure that she is not suppose to be taken daily. Reviewed Dr. Croft's LOV pt to continue allopurinol and Cymbalta, hold colchicine at this time and used for gout flares. Pt voices understanding

## 2024-05-15 ENCOUNTER — OFFICE VISIT (OUTPATIENT)
Dept: PAIN CLINIC | Facility: CLINIC | Age: 79
End: 2024-05-15

## 2024-05-15 VITALS — OXYGEN SATURATION: 98 % | SYSTOLIC BLOOD PRESSURE: 128 MMHG | DIASTOLIC BLOOD PRESSURE: 70 MMHG | HEART RATE: 68 BPM

## 2024-05-15 DIAGNOSIS — M51.36 DDD (DEGENERATIVE DISC DISEASE), LUMBAR: ICD-10-CM

## 2024-05-15 DIAGNOSIS — M47.816 LUMBAR FACET ARTHROPATHY: ICD-10-CM

## 2024-05-15 DIAGNOSIS — M47.899 FACET SYNDROME: ICD-10-CM

## 2024-05-15 DIAGNOSIS — M47.816 LUMBAR SPONDYLOSIS: Primary | ICD-10-CM

## 2024-05-15 PROCEDURE — 99214 OFFICE O/P EST MOD 30 MIN: CPT | Performed by: ANESTHESIOLOGY

## 2024-05-15 NOTE — PROGRESS NOTES
Name: Gabi Kumar   : 1945   DOS: 5/15/2024     Pain Clinic Follow Up Visit:     Chief Complaint   Patient presents with    Procedure Follow Up     Post LESI 2024       Gabi Kumar is a 78 year old female with a history of advanced lumbar degenerative disc disease here for follow-up.  The patient is status post lumbar interlaminar epidural steroid injection which provided 3 days of relief.  From a symptom standpoint, patient complains of burning across the back.  This is made worse by standing and walking.  The patient denies any radicular symptoms.  Pt denies any chills, fever, or weakness. There is no bladder or bowel incontinence associated with the pain.    REVIEW OF SYSTEMS:  A ten point review of systems was performed with pertinent positives and negatives in the HPI.    Allergies   Allergen Reactions    Pcn [Bicillin C-R,] SWELLING     itching    Levofloxacin MYALGIA     Severe muscle and bone pain    Penicillins OTHER (SEE COMMENTS)       Current Outpatient Medications   Medication Sig Dispense Refill    metoprolol succinate ER 50 MG Oral Tablet 24 Hr       Potassium Chloride ER 10 MEQ Oral Tab CR       omeprazole 20 MG Oral Capsule Delayed Release       Meloxicam 15 MG Oral Tab Take 1 tablet (15 mg total) by mouth daily.      atorvastatin 40 MG Oral Tab       DULoxetine (CYMBALTA) 60 MG Oral Cap DR Particles Take 1 capsule (60 mg total) by mouth daily. 90 capsule 1    umeclidinium-vilanterol (ANORO ELLIPTA) 62.5-25 MCG/ACT Inhalation Aerosol Powder, Breath Activated Inhale 1 puff into the lungs daily. 1 each 3    pantoprazole 40 MG Oral Tab EC Take 1 tablet (40 mg total) by mouth every morning before breakfast.      HYDROcodone-acetaminophen 5-325 MG Oral Tab Take 1 tablet by mouth every 4 (four) hours as needed. 20 tablet 0    colchicine 0.6 MG Oral Tab Take 1 tablet (0.6 mg total) by mouth daily.      allopurinol 300 MG Oral Tab Take 1 tablet (300 mg total) by mouth daily. 90  tablet 3    ipratropium-albuterol 0.5-2.5 (3) MG/3ML Inhalation Solution Take 3 mL by nebulization every 6 (six) hours as needed. 180 mL 12    clonazePAM 1 MG Oral Tab Take 1 tablet (1 mg total) by mouth nightly as needed.      ONETOUCH ULTRA In Vitro Strip 2 (two) times daily.      FLUTICASONE PROPIONATE 50 MCG/ACT Nasal Suspension spray 2 sprays into each nostril daily 16 g 5    YUPELRI 175 MCG/3ML Inhalation Solution nebulizer solution ADMINISTER ONE (1) VIAL VIA NEBULIZER MACHINE DAILY 90 mL 11    Ascorbic Acid (VITAMIN C) 100 MG Oral Tab Take 10 tablets (1,000 mg total) by mouth daily.      cetirizine 10 MG Oral Tab Take 1 tablet (10 mg total) by mouth daily.      pioglitazone 15 MG Oral Tab Take 1 tablet (15 mg total) by mouth daily.      glimepiride 2 MG Oral Tab Take 1 tablet (2 mg total) by mouth 2 (two) times daily.      albuterol 108 (90 Base) MCG/ACT Inhalation Aero Soln Inhale 2 puffs into the lungs every 6 (six) hours as needed for Wheezing or Shortness of Breath. inhale 2 puff by inhalation route  every 4 - 6 hours as needed 1 each 11    METOPROLOL SUCCINATE ER 25 MG Oral Tablet 24 Hr TAKE ONE TABLET BY MOUTH ONE TIME DAILY 90 tablet 1    rosuvastatin 10 MG Oral Tab Take 1 tablet (10 mg total) by mouth nightly. Replaces simvastatin 90 tablet 3    Cholecalciferol (VITAMIN D-3 OR) Take 1 capsule by mouth once daily.      Multiple Vitamins-Minerals (CENTRUM) Oral Tab Take 1 tablet by mouth daily.           EXAM:   /70 (BP Location: Left arm, Patient Position: Sitting)   Pulse 68   LMP 06/07/1999   SpO2 98%   General:  Patient is a(n) 78 year old year old female in no acute distress.  Neurologic:: WNL-Orientation to time, place and person, normal mood & affect, concentration & attention span intact.   Inspection:  Ambulates with well-coordinated, fluid, non-antalgic gait.  Gait is normal.  Neck: Full range of motion  Respiratory: Nonlabored  Cranial nerve: Grossly intact  Back: Injection site  clear.  Gait intact.  Facet loading positive bilaterally.       IMAGES:     Lumbar MRI reviewed again with patient with extensive degenerative changes with severe bilateral facet arthropathy with hypertrophy and ligamentum flavum thickening    ASSESSMENT AND PLAN:     1. Lumbar spondylosis    2. Lumbar facet arthropathy    3. Facet syndrome    4. DDD (degenerative disc disease), lumbar        The patient is a pleasant 78-year-old female who presents today for follow-up.  The patient has a longstanding history of back pain.  This is made worse by standing and walking.  Pain is 100% axial and corresponds to imaging findings of advanced facet disease.  Discussed role for bilateral lumbar medial branch block to facilitate radiofrequency ablation.  Risk and benefits of procedure discussed with patient and she would like to move forward.    Current VAS Score: 8  Functional Deficits: Standing walking dishes ADLs  Duration of pain symptoms: Chronic  Specific Levels (Only 2 allowed): L4-5, L5-S1 initial  Initial treatment or subsequent? (Per area of the body): Initial  Radiculopathy & neurogenic claudication ruled out?:  Yes  Prior Conservative Treatment: Physical therapy, nonsteroidals, home exercise program    Orders:  Orders Placed This Encounter   Procedures    [facility] PAIN NAVIGATOR         Radiology orders and consultations:None  The patient indicates understanding of these issues and agrees to the plan.  No follow-ups on file.    Jose Rich MD, 5/15/2024, 1:41 PM

## 2024-05-15 NOTE — PROGRESS NOTES
Last procedure: LESI  Date: 4/30/2024  Percentage of relief obtained: 70 %  Duration of relief: lasted 3 days    Current Pain Score: 8/10

## 2024-05-16 ENCOUNTER — TELEPHONE (OUTPATIENT)
Dept: PAIN CLINIC | Facility: CLINIC | Age: 79
End: 2024-05-16

## 2024-05-16 NOTE — TELEPHONE ENCOUNTER
Prior Authorization for BILATERAL L4/5,L5/S1 MBB   initiated with [facility]  CPT codes: 83213,44367   Clinical notes submitted via clinical to fax [phone]

## 2024-05-28 NOTE — TELEPHONE ENCOUNTER
Prior authorization request completed for: bilateral L4/5,L5/S1 MBB  Authorization #[id number]  Authorization dates: 5/23/24-9/19/24  CPT codes approved: 02366,6494  Number of visits/dates of service approved: 1  Physician: CONSUELO  Location: [facility]     Patient can be scheduled. Routed to Navigator.

## 2024-05-29 ENCOUNTER — APPOINTMENT (OUTPATIENT)
Dept: GENERAL RADIOLOGY | Facility: HOSPITAL | Age: 79
End: 2024-05-29
Attending: EMERGENCY MEDICINE
Payer: MEDICARE

## 2024-05-29 ENCOUNTER — HOSPITAL ENCOUNTER (INPATIENT)
Facility: HOSPITAL | Age: 79
LOS: 6 days | Discharge: HOME | End: 2024-06-05
Attending: EMERGENCY MEDICINE | Admitting: INTERNAL MEDICINE
Payer: MEDICARE

## 2024-05-29 DIAGNOSIS — J44.1 COPD EXACERBATION (HCC): ICD-10-CM

## 2024-05-29 DIAGNOSIS — R09.02 HYPOXIA: ICD-10-CM

## 2024-05-29 DIAGNOSIS — R07.81 RIB PAIN: Primary | ICD-10-CM

## 2024-05-29 DIAGNOSIS — S20.212A CONTUSION OF RIB ON LEFT SIDE, INITIAL ENCOUNTER: ICD-10-CM

## 2024-05-29 DIAGNOSIS — S92.412A CLOSED DISPLACED FRACTURE OF PROXIMAL PHALANX OF LEFT GREAT TOE, INITIAL ENCOUNTER: ICD-10-CM

## 2024-05-29 LAB
ALBUMIN SERPL-MCNC: 3.6 G/DL (ref 3.4–5)
ALBUMIN/GLOB SERPL: 1.2 {RATIO} (ref 1–2)
ALP LIVER SERPL-CCNC: 135 U/L
ALT SERPL-CCNC: 17 U/L
ANION GAP SERPL CALC-SCNC: 8 MMOL/L (ref 0–18)
AST SERPL-CCNC: 22 U/L (ref 15–37)
BASOPHILS # BLD AUTO: 0.05 X10(3) UL (ref 0–0.2)
BASOPHILS NFR BLD AUTO: 0.7 %
BILIRUB SERPL-MCNC: 0.6 MG/DL (ref 0.1–2)
BUN BLD-MCNC: 23 MG/DL (ref 9–23)
CALCIUM BLD-MCNC: 9.4 MG/DL (ref 8.5–10.1)
CHLORIDE SERPL-SCNC: 108 MMOL/L (ref 98–112)
CO2 SERPL-SCNC: 24 MMOL/L (ref 21–32)
CREAT BLD-MCNC: 1 MG/DL
EGFRCR SERPLBLD CKD-EPI 2021: 57 ML/MIN/1.73M2 (ref 60–?)
EOSINOPHIL # BLD AUTO: 0.38 X10(3) UL (ref 0–0.7)
EOSINOPHIL NFR BLD AUTO: 5.7 %
ERYTHROCYTE [DISTWIDTH] IN BLOOD BY AUTOMATED COUNT: 15.3 %
EST. AVERAGE GLUCOSE BLD GHB EST-MCNC: 134 MG/DL (ref 68–126)
GLOBULIN PLAS-MCNC: 3.1 G/DL (ref 2.8–4.4)
GLUCOSE BLD-MCNC: 134 MG/DL (ref 70–99)
GLUCOSE BLD-MCNC: 336 MG/DL (ref 70–99)
HBA1C MFR BLD: 6.3 % (ref ?–5.7)
HCT VFR BLD AUTO: 44.4 %
HGB BLD-MCNC: 13.8 G/DL
IMM GRANULOCYTES # BLD AUTO: 0.03 X10(3) UL (ref 0–1)
IMM GRANULOCYTES NFR BLD: 0.4 %
LYMPHOCYTES # BLD AUTO: 1.27 X10(3) UL (ref 1–4)
LYMPHOCYTES NFR BLD AUTO: 19 %
MCH RBC QN AUTO: 29.1 PG (ref 26–34)
MCHC RBC AUTO-ENTMCNC: 31.1 G/DL (ref 31–37)
MCV RBC AUTO: 93.5 FL
MONOCYTES # BLD AUTO: 0.58 X10(3) UL (ref 0.1–1)
MONOCYTES NFR BLD AUTO: 8.7 %
NEUTROPHILS # BLD AUTO: 4.39 X10 (3) UL (ref 1.5–7.7)
NEUTROPHILS # BLD AUTO: 4.39 X10(3) UL (ref 1.5–7.7)
NEUTROPHILS NFR BLD AUTO: 65.5 %
NT-PROBNP SERPL-MCNC: 884 PG/ML (ref ?–450)
OSMOLALITY SERPL CALC.SUM OF ELEC: 296 MOSM/KG (ref 275–295)
PLATELET # BLD AUTO: 144 10(3)UL (ref 150–450)
POTASSIUM SERPL-SCNC: 3.3 MMOL/L (ref 3.5–5.1)
PROT SERPL-MCNC: 6.7 G/DL (ref 6.4–8.2)
RBC # BLD AUTO: 4.75 X10(6)UL
SODIUM SERPL-SCNC: 140 MMOL/L (ref 136–145)
WBC # BLD AUTO: 6.7 X10(3) UL (ref 4–11)

## 2024-05-29 PROCEDURE — 71101 X-RAY EXAM UNILAT RIBS/CHEST: CPT | Performed by: EMERGENCY MEDICINE

## 2024-05-29 PROCEDURE — 99223 1ST HOSP IP/OBS HIGH 75: CPT | Performed by: STUDENT IN AN ORGANIZED HEALTH CARE EDUCATION/TRAINING PROGRAM

## 2024-05-29 PROCEDURE — 73660 X-RAY EXAM OF TOE(S): CPT | Performed by: EMERGENCY MEDICINE

## 2024-05-29 RX ORDER — ACETAMINOPHEN 500 MG
500 TABLET ORAL EVERY 4 HOURS PRN
Status: DISCONTINUED | OUTPATIENT
Start: 2024-05-29 | End: 2024-06-05

## 2024-05-29 RX ORDER — CLONAZEPAM 1 MG/1
1 TABLET ORAL NIGHTLY PRN
Status: DISCONTINUED | OUTPATIENT
Start: 2024-05-29 | End: 2024-06-05

## 2024-05-29 RX ORDER — NICOTINE POLACRILEX 4 MG
15 LOZENGE BUCCAL
Status: DISCONTINUED | OUTPATIENT
Start: 2024-05-29 | End: 2024-06-05

## 2024-05-29 RX ORDER — GUAIFENESIN 600 MG/1
600 TABLET, EXTENDED RELEASE ORAL 2 TIMES DAILY PRN
Status: DISCONTINUED | OUTPATIENT
Start: 2024-05-29 | End: 2024-06-05

## 2024-05-29 RX ORDER — ATORVASTATIN CALCIUM 40 MG/1
40 TABLET, FILM COATED ORAL DAILY
Status: DISCONTINUED | OUTPATIENT
Start: 2024-05-29 | End: 2024-05-29

## 2024-05-29 RX ORDER — NICOTINE 21 MG/24HR
1 PATCH, TRANSDERMAL 24 HOURS TRANSDERMAL DAILY
Status: DISCONTINUED | OUTPATIENT
Start: 2024-05-29 | End: 2024-05-29

## 2024-05-29 RX ORDER — ROSUVASTATIN CALCIUM 10 MG/1
10 TABLET, COATED ORAL NIGHTLY
Status: DISCONTINUED | OUTPATIENT
Start: 2024-05-29 | End: 2024-06-05

## 2024-05-29 RX ORDER — METOCLOPRAMIDE HYDROCHLORIDE 5 MG/ML
10 INJECTION INTRAMUSCULAR; INTRAVENOUS EVERY 8 HOURS PRN
Status: DISCONTINUED | OUTPATIENT
Start: 2024-05-29 | End: 2024-05-30

## 2024-05-29 RX ORDER — ALLOPURINOL 300 MG/1
300 TABLET ORAL DAILY
Status: DISCONTINUED | OUTPATIENT
Start: 2024-05-29 | End: 2024-06-05

## 2024-05-29 RX ORDER — IPRATROPIUM BROMIDE AND ALBUTEROL SULFATE 2.5; .5 MG/3ML; MG/3ML
3 SOLUTION RESPIRATORY (INHALATION)
Status: DISCONTINUED | OUTPATIENT
Start: 2024-05-29 | End: 2024-06-01

## 2024-05-29 RX ORDER — METOPROLOL SUCCINATE 25 MG/1
25 TABLET, EXTENDED RELEASE ORAL NIGHTLY
Status: DISCONTINUED | OUTPATIENT
Start: 2024-05-29 | End: 2024-06-05

## 2024-05-29 RX ORDER — POTASSIUM CHLORIDE 20 MEQ/1
40 TABLET, EXTENDED RELEASE ORAL ONCE
Qty: 2 TABLET | Refills: 0 | Status: COMPLETED | OUTPATIENT
Start: 2024-05-29 | End: 2024-05-29

## 2024-05-29 RX ORDER — PANTOPRAZOLE SODIUM 40 MG/1
40 TABLET, DELAYED RELEASE ORAL
Status: DISCONTINUED | OUTPATIENT
Start: 2024-05-30 | End: 2024-06-05

## 2024-05-29 RX ORDER — METHYLPREDNISOLONE SODIUM SUCCINATE 125 MG/2ML
125 INJECTION, POWDER, LYOPHILIZED, FOR SOLUTION INTRAMUSCULAR; INTRAVENOUS ONCE
Status: COMPLETED | OUTPATIENT
Start: 2024-05-29 | End: 2024-05-29

## 2024-05-29 RX ORDER — ENEMA 19; 7 G/133ML; G/133ML
1 ENEMA RECTAL ONCE AS NEEDED
Status: DISCONTINUED | OUTPATIENT
Start: 2024-05-29 | End: 2024-06-05

## 2024-05-29 RX ORDER — METOPROLOL SUCCINATE 25 MG/1
25 TABLET, EXTENDED RELEASE ORAL DAILY
Status: DISCONTINUED | OUTPATIENT
Start: 2024-05-29 | End: 2024-05-29

## 2024-05-29 RX ORDER — DULOXETIN HYDROCHLORIDE 30 MG/1
60 CAPSULE, DELAYED RELEASE ORAL DAILY
Status: DISCONTINUED | OUTPATIENT
Start: 2024-05-29 | End: 2024-06-05

## 2024-05-29 RX ORDER — IPRATROPIUM BROMIDE AND ALBUTEROL SULFATE 2.5; .5 MG/3ML; MG/3ML
SOLUTION RESPIRATORY (INHALATION)
Status: COMPLETED
Start: 2024-05-29 | End: 2024-05-29

## 2024-05-29 RX ORDER — MELATONIN
3 NIGHTLY PRN
Status: DISCONTINUED | OUTPATIENT
Start: 2024-05-29 | End: 2024-06-05

## 2024-05-29 RX ORDER — ONDANSETRON 2 MG/ML
4 INJECTION INTRAMUSCULAR; INTRAVENOUS EVERY 6 HOURS PRN
Status: DISCONTINUED | OUTPATIENT
Start: 2024-05-29 | End: 2024-06-05

## 2024-05-29 RX ORDER — METHYLPREDNISOLONE SODIUM SUCCINATE 125 MG/2ML
60 INJECTION, POWDER, LYOPHILIZED, FOR SOLUTION INTRAMUSCULAR; INTRAVENOUS EVERY 12 HOURS
Status: DISCONTINUED | OUTPATIENT
Start: 2024-05-30 | End: 2024-05-31

## 2024-05-29 RX ORDER — IPRATROPIUM BROMIDE AND ALBUTEROL SULFATE 2.5; .5 MG/3ML; MG/3ML
3 SOLUTION RESPIRATORY (INHALATION) EVERY 4 HOURS PRN
Status: DISCONTINUED | OUTPATIENT
Start: 2024-05-29 | End: 2024-06-05

## 2024-05-29 RX ORDER — KETOROLAC TROMETHAMINE 30 MG/ML
30 INJECTION, SOLUTION INTRAMUSCULAR; INTRAVENOUS ONCE
Status: COMPLETED | OUTPATIENT
Start: 2024-05-29 | End: 2024-05-29

## 2024-05-29 RX ORDER — KETOROLAC TROMETHAMINE 15 MG/ML
15 INJECTION, SOLUTION INTRAMUSCULAR; INTRAVENOUS EVERY 6 HOURS PRN
Status: DISPENSED | OUTPATIENT
Start: 2024-05-29 | End: 2024-05-31

## 2024-05-29 RX ORDER — SENNOSIDES 8.6 MG
17.2 TABLET ORAL NIGHTLY PRN
Status: DISCONTINUED | OUTPATIENT
Start: 2024-05-29 | End: 2024-06-05

## 2024-05-29 RX ORDER — BISACODYL 10 MG
10 SUPPOSITORY, RECTAL RECTAL
Status: DISCONTINUED | OUTPATIENT
Start: 2024-05-29 | End: 2024-06-05

## 2024-05-29 RX ORDER — COLCHICINE 0.6 MG/1
0.6 TABLET ORAL DAILY
Status: DISCONTINUED | OUTPATIENT
Start: 2024-05-29 | End: 2024-05-29

## 2024-05-29 RX ORDER — ECHINACEA PURPUREA EXTRACT 125 MG
1 TABLET ORAL
Status: DISCONTINUED | OUTPATIENT
Start: 2024-05-29 | End: 2024-06-05

## 2024-05-29 RX ORDER — ENOXAPARIN SODIUM 100 MG/ML
40 INJECTION SUBCUTANEOUS DAILY
Status: DISCONTINUED | OUTPATIENT
Start: 2024-05-30 | End: 2024-06-05

## 2024-05-29 RX ORDER — BENZONATATE 200 MG/1
200 CAPSULE ORAL 3 TIMES DAILY PRN
Status: DISCONTINUED | OUTPATIENT
Start: 2024-05-29 | End: 2024-06-05

## 2024-05-29 RX ORDER — POLYETHYLENE GLYCOL 3350 17 G/17G
17 POWDER, FOR SOLUTION ORAL DAILY PRN
Status: DISCONTINUED | OUTPATIENT
Start: 2024-05-29 | End: 2024-06-05

## 2024-05-29 RX ORDER — LORATADINE 10 MG/1
10 TABLET ORAL DAILY
COMMUNITY

## 2024-05-29 RX ORDER — DEXTROSE MONOHYDRATE 25 G/50ML
50 INJECTION, SOLUTION INTRAVENOUS
Status: DISCONTINUED | OUTPATIENT
Start: 2024-05-29 | End: 2024-06-05

## 2024-05-29 RX ORDER — NICOTINE POLACRILEX 4 MG
30 LOZENGE BUCCAL
Status: DISCONTINUED | OUTPATIENT
Start: 2024-05-29 | End: 2024-06-05

## 2024-05-29 NOTE — ED INITIAL ASSESSMENT (HPI)
Fell down today morning . Patient said she tripped  on the walker and fell down  complaining of pain in left rib area increased pain while taking deep breath and moving . Did not hit her head ,no loss of consciousness. Not on any blood thinner bruises noted on right hand. Oxygen saturation is 88% room air . Patient is known case of copd   normal oxygen saturation is 92%. Denies shortness of breath .

## 2024-05-29 NOTE — ED QUICK NOTES
Orders for admission, patient is aware of plan and ready to go upstairs. Any questions, please call ED RN Olaf at extension [phone].     Patient Covid vaccination status: Fully vaccinated     COVID Test Ordered in ED: None    COVID Suspicion at Admission: N/A    Running Infusions:  None    Mental Status/LOC at time of transport: A&Ox4    Other pertinent information: Pt's left great toe is broken. Pt on 3LO2  CIWA score: N/A   NIH score:  N/A

## 2024-05-29 NOTE — H&P
[facility]     Gabi Kumar Patient Status:  Emergency    1945 MRN [id number]   Location [facility] EMERGENCY DEPARTMENT Attending Chel Soria MD   Hosp Day # 0 PCP Bismark Cabrera MD     Chief Complaint: Rib pain after fall    Subjective:    History of Present Illness:     Gabi Kumar is a 78 year old female with past medical history of hypertension, hyperlipidemia, diabetes type 2, gout, GERD, COPD, ILD who presents ED for rib pain after a fall yesterday.  Patient reports she was rushing to answer her phone.  She pushed her walker inside but her feet tripped on it when she was reaching for the phone and she fell on her left side.  She denies hitting her head or losing consciousness.  She is able to get back up on her own.  She has had worsening left rib cage pain ever since.  Pain is worse with breathing or movement.  She denies any shortness of breath.    History/Other:    Past Medical History:  Past Medical History:    Back problem    Cataract    Cholelithiasis    DIABETES    Diabetes (HCC)    Diverticulitis    suggestion of possible wall thickening involving sigmoid, possible mild diverticulitis    Diverticulosis    Essential hypertension    Gallbladder disease    thickened gallbladder with distended bile duct    High blood pressure    High cholesterol    HYPERLIPIDEMIA    Hyperlipidemia    IBS (irritable bowel syndrome)    Osteoarthritis    hips, back and knees    Thyroid disease    Visual impairment    reading glasses     Past Surgical History:   Past Surgical History:   Procedure Laterality Date    Carpal tunnel release      Colonoscopy  ,     Diverticulosis    Colonoscopy      decreased anal sphincter tone, diverticulosis, internal hemorrhoids, normal random colon biopsies    Colonoscopy N/A 2017    Procedure: COLONOSCOPY;  Surgeon: Garrett Moody MD;  Location:  ENDOSCOPY    [facility]    Other surgical  history  1995    Fatty tumor from neck    Other surgical history  1999    Arthroscopy right knee    Other surgical history  2001    Carpal tunnel both hand    Other surgical history  12/22/2020    Cystoscopy, Dr Chun      Family History:   Family History   Problem Relation Age of Onset    Cancer Other         Stomach cancer    Heart Disease Father     Hypertension Father     High Blood Pressure Father     Diabetes Father     Heart Disease Mother     Hypertension Mother     Lung Disorder Mother         Pulmonary fibrosis     Social History:    reports that she quit smoking about 9 years ago. Her smoking use included cigarettes. She started smoking about 59 years ago. She has a 25 pack-year smoking history. She has never used smokeless tobacco. She reports that she does not drink alcohol and does not use drugs.     Allergies:   Allergies   Allergen Reactions    Pcn [Bicillin C-R,] SWELLING     itching    Levofloxacin MYALGIA     Severe muscle and bone pain    Penicillins OTHER (SEE COMMENTS)       Medications:    No current facility-administered medications on file prior to encounter.     Current Outpatient Medications on File Prior to Encounter   Medication Sig Dispense Refill    metoprolol succinate ER 50 MG Oral Tablet 24 Hr       Potassium Chloride ER 10 MEQ Oral Tab CR       omeprazole 20 MG Oral Capsule Delayed Release       Meloxicam 15 MG Oral Tab Take 1 tablet (15 mg total) by mouth daily.      atorvastatin 40 MG Oral Tab       DULoxetine (CYMBALTA) 60 MG Oral Cap DR Particles Take 1 capsule (60 mg total) by mouth daily. 90 capsule 1    umeclidinium-vilanterol (ANORO ELLIPTA) 62.5-25 MCG/ACT Inhalation Aerosol Powder, Breath Activated Inhale 1 puff into the lungs daily. 1 each 3    pantoprazole 40 MG Oral Tab EC Take 1 tablet (40 mg total) by mouth every morning before breakfast.      HYDROcodone-acetaminophen 5-325 MG Oral Tab Take 1 tablet by mouth every 4 (four) hours as needed. 20 tablet 0    colchicine  0.6 MG Oral Tab Take 1 tablet (0.6 mg total) by mouth daily.      allopurinol 300 MG Oral Tab Take 1 tablet (300 mg total) by mouth daily. 90 tablet 3    ipratropium-albuterol 0.5-2.5 (3) MG/3ML Inhalation Solution Take 3 mL by nebulization every 6 (six) hours as needed. 180 mL 12    clonazePAM 1 MG Oral Tab Take 1 tablet (1 mg total) by mouth nightly as needed.      ONETOUCH ULTRA In Vitro Strip 2 (two) times daily.      FLUTICASONE PROPIONATE 50 MCG/ACT Nasal Suspension spray 2 sprays into each nostril daily 16 g 5    YUPELRI 175 MCG/3ML Inhalation Solution nebulizer solution ADMINISTER ONE (1) VIAL VIA NEBULIZER MACHINE DAILY 90 mL 11    Ascorbic Acid (VITAMIN C) 100 MG Oral Tab Take 10 tablets (1,000 mg total) by mouth daily.      cetirizine 10 MG Oral Tab Take 1 tablet (10 mg total) by mouth daily.      pioglitazone 15 MG Oral Tab Take 1 tablet (15 mg total) by mouth daily.      glimepiride 2 MG Oral Tab Take 1 tablet (2 mg total) by mouth 2 (two) times daily.      albuterol 108 (90 Base) MCG/ACT Inhalation Aero Soln Inhale 2 puffs into the lungs every 6 (six) hours as needed for Wheezing or Shortness of Breath. inhale 2 puff by inhalation route  every 4 - 6 hours as needed 1 each 11    METOPROLOL SUCCINATE ER 25 MG Oral Tablet 24 Hr TAKE ONE TABLET BY MOUTH ONE TIME DAILY 90 tablet 1    rosuvastatin 10 MG Oral Tab Take 1 tablet (10 mg total) by mouth nightly. Replaces simvastatin 90 tablet 3    Cholecalciferol (VITAMIN D-3 OR) Take 1 capsule by mouth once daily.      Multiple Vitamins-Minerals (CENTRUM) Oral Tab Take 1 tablet by mouth daily.         Review of Systems:   A comprehensive review of systems was completed.    Pertinent positives and negatives noted in the HPI.    Objective:   Physical Exam:    /51   Pulse 96   Temp 97.9 °F (36.6 °C) (Temporal)   Resp 17   Ht 5' 4\" (1.626 m)   Wt 180 lb (81.6 kg)   LMP 06/07/1999   SpO2 93%   BMI 30.90 kg/m²   General: No acute distress,  Alert  Respiratory: Tight, decreased air movement  Cardiovascular: S1, S2. Regular rate and rhythm  Abdomen: Soft, Non-tender, non-distended, positive bowel sounds  Neuro: No new focal deficits  Extremities: No edema    Results:    Labs:      Labs Last 24 Hours:    Recent Labs   Lab 05/29/24  1628   RBC 4.75   HGB 13.8   HCT 44.4   MCV 93.5   MCH 29.1   MCHC 31.1   RDW 15.3   NEPRELIM 4.39   WBC 6.7   .0*       Recent Labs   Lab 05/29/24  1628   *   BUN 23   CREATSERUM 1.00   EGFRCR 57*   CA 9.4   ALB 3.6      K 3.3*      CO2 24.0   ALKPHO 135   AST 22   ALT 17   BILT 0.6   TP 6.7       Lab Results   Component Value Date    INR 1.03 12/12/2023       No results for input(s): \"TROP\", \"TROPHS\", \"CK\" in the last 168 hours.    Recent Labs   Lab 05/29/24  1628   PBNP 884*       No results for input(s): \"PCT\" in the last 168 hours.    Imaging: Imaging data reviewed in Epic.    Assessment & Plan:      #Acute hypoxic respiratory failure  #COPD exacerbation  -IV steroids  -Nebs  -Incentive spirometry  -Wean O2 as tolerated    #Rib pain  -Chest x-ray reviewed and shows no rib fracture  -Symptomatic management    #Diabetes type 2  -Hyperglycemia protocol    #Hypertension  -Metoprolol    #Hyperlipidemia  -Statin     #Gout  -Allopurinol, colchicine    #GERD  -PPI    Plan of care discussed with patient    Ray Adame DO    Supplementary Documentation:     The 21st Century Cures Act makes medical notes like these available to patients in the interest of transparency. Please be advised this is a medical document. Medical documents are intended to carry relevant information, facts as evident, and the clinical opinion of the practitioner. The medical note is intended as peer to peer communication and may appear blunt or direct. It is written in medical language and may contain abbreviations or verbiage that are unfamiliar.

## 2024-05-29 NOTE — ED PROVIDER NOTES
Patient Seen in: [facility] Emergency Department      History     Chief Complaint   Patient presents with    Trauma     Stated Complaint: rib pain post fall    Subjective:   HPI    Patient presents with rib pain after a fall yesterday.  The patient states that she was rushing to answer her phone.  She pushed her walker aside but then her feet tripped over it as she was reaching for the phone.  She fell onto her left side and denies any head injury or loss of consciousness.  She was able to get back up on her own.  She states that since that time she has had pain particularly in the left rib cage that is worse with breathing or movement.  She does have chronic pain from some orthopedic issues and has [place] at home.  She took it this morning but it did not really help.  She denies feeling short of breath.  She also has some bruising and pain to her left great toe.    Objective:   Past Medical History:    Back problem    Cataract    Cholelithiasis    DIABETES    Diabetes (HCC)    Diverticulitis    suggestion of possible wall thickening involving sigmoid, possible mild diverticulitis    Diverticulosis    Essential hypertension    Gallbladder disease    thickened gallbladder with distended bile duct    High blood pressure    High cholesterol    HYPERLIPIDEMIA    Hyperlipidemia    IBS (irritable bowel syndrome)    Osteoarthritis    hips, back and knees    Thyroid disease    Visual impairment    reading glasses              Past Surgical History:   Procedure Laterality Date    Carpal tunnel release      Colonoscopy  2003,     Diverticulosis    Colonoscopy  2012    decreased anal sphincter tone, diverticulosis, internal hemorrhoids, normal random colon biopsies    Colonoscopy N/A 7/26/2017    Procedure: COLONOSCOPY;  Surgeon: Garrett Moody MD;  Location:  ENDOSCOPY    Other  1999    Carpel tnnel right hand    Other surgical history  1995    Fatty tumor from neck    Other surgical history  1999    Arthroscopy  right knee    Other surgical history      Carpal tunnel both hand    Other surgical history  2020    Cystoscopy, Dr Chun                Social History     Socioeconomic History    Marital status:    Tobacco Use    Smoking status: Former     Current packs/day: 0.00     Average packs/day: 0.5 packs/day for 50.0 years (25.0 ttl pk-yrs)     Types: Cigarettes     Start date: 10/22/1964     Quit date: 10/22/2014     Years since quittin.6    Smokeless tobacco: Never   Vaping Use    Vaping status: Never Used   Substance and Sexual Activity    Alcohol use: No     Alcohol/week: 0.0 standard drinks of alcohol    Drug use: No    Sexual activity: Not Currently   Other Topics Concern    Caffeine Concern Yes    Exercise Yes     Social Determinants of Health     Food Insecurity: No Food Insecurity (2023)    Food Insecurity     Food Insecurity: Never true   Transportation Needs: No Transportation Needs (2023)    Transportation Needs     Lack of Transportation: No   Housing Stability: Low Risk  (2023)    Housing Stability     Housing Instability: No              Review of Systems    Positive for stated complaint: rib pain post fall  Other systems are as noted in HPI.  Constitutional and vital signs reviewed.      All other systems reviewed and negative except as noted above.    Physical Exam     ED Triage Vitals [24 1241]   /84   Pulse 78   Resp 18   Temp 97.9 °F (36.6 °C)   Temp src Temporal   SpO2 (!) 88 %   O2 Device None (Room air)       Current Vitals:   Vital Signs  BP: 101/51  Pulse: 96  Resp: 17  Temp: 97.9 °F (36.6 °C)  Temp src: Temporal  MAP (mmHg): 67    Oxygen Therapy  SpO2: 93 %  O2 Device: Nasal cannula  O2 Flow Rate (L/min): 3 L/min            Physical Exam    General: Alert and oriented x3 in no acute distress.  HEENT: Normocephalic, atraumatic, pupils equal round and reactive to light.  Neck: No midline C-spine tenderness.  Cardiovascular: Regular rate and  rhythm.  Respiratory: Lungs laterally, very tender in the left anterolateral rib cage without crepitus or bruising.  Abdomen: Soft, nontender, no rebound or guarding, normal active bowel sounds, no CVA tenderness.  Extremities: Bruising, tenderness and edema at the base of the left great toe, no other bony tenderness in the extremities.  Intact pulses in all extremities.  Skin: Warm and dry.  Neurologic: Cranial nerves intact.  Strength 5/5 in all extremities.  Sensory exam grossly intact.    ED Course     Labs Reviewed   CBC W/ DIFFERENTIAL - Abnormal; Notable for the following components:       Result Value    .0 (*)     All other components within normal limits   CBC WITH DIFFERENTIAL WITH PLATELET    Narrative:     The following orders were created for panel order CBC With Differential With Platelet.  Procedure                               Abnormality         Status                     ---------                               -----------         ------                     CBC W/ DIFFERENTIAL[[id number]]          Abnormal            Final result                 Please view results for these tests on the individual orders.   COMP METABOLIC PANEL (14)   PRO BETA NATRIURETIC PEPTIDE   RAINBOW DRAW LAVENDER   RAINBOW DRAW LIGHT GREEN   RAINBOW DRAW BLUE     I personally reviewed the rib and toe films and no rib fracture but first proximal phalanx fracture noted.        XR RIBS WITH CHEST (3 VIEWS), LEFT  (CPT=71101)    Result Date: 5/29/2024  PROCEDURE:  XR RIBS WITH CHEST (3 VIEWS), LEFT  (CPT=71101)  TECHNIQUE:  PA Chest and three views of the ribs were obtained  COMPARISON:  [facility] , XR, XR CHEST AP/PA (1 VIEW) (CPT=71045), 12/12/2023, 1:02 PM.  [facility] , XR, XR CHEST AP PORTABLE  (CPT=71045), 8/16/2023, 3:06 PM.  INDICATIONS:  rib pain post fall  PATIENT STATED HISTORY: (As transcribed by Technologist)  Patient complaints of left rib pain after fall today.    FINDINGS:  There is pulmonary venous vascular  congestion, without overt edema.  Mild prominence of the cardiac silhouette is grossly stable.  No focal consolidation.  No pleural effusion.  Mild atelectasis at the lung bases.  No pneumothorax.  No evidence of displaced left rib fracture.            CONCLUSION:  Pulmonary vascular congestion, without overt edema.  No evidence of displaced left rib fracture.  If there is continued clinical concern, CT evaluation is recommended.   LOCATION:  [facility]     Dictated by (CST): Deep Reeves MD on 5/29/2024 at 2:06 PM     Finalized by (CST): Deep Reeves MD on 5/29/2024 at 2:08 PM       XR TOE(S) (MIN 2 VIEWS), LEFT 1ST (CPT=73660)    Result Date: 5/29/2024  PROCEDURE:  XR TOE(S) (MIN 2 VIEWS), LEFT 1ST (CPT=73660)  TECHNIQUE:  Three views were obtained.  COMPARISON:  None.  INDICATIONS:  toe pain  PATIENT STATED HISTORY: (As transcribed by Technologist)  Patient states she fell today. Patient complaints of left first digit pain.    FINDINGS:  Suspected nondisplaced fracture of the 1st proximal phalanx distally, with possible intra-articular extension..  There is mild to moderate osteoarthritis within the 1st MTP joint and 1st interphalangeal joint.  Subacute to chronic appearing fracture within the proximal 2nd and 3rd metatarsals.  No radiopaque foreign body.            CONCLUSION:  Possible nondisplaced fracture of the 1st proximal phalanx distally with intra-articular extension into the interphalangeal joint, correlate clinically.  Probable remote appearing fractures of the 2nd and 3rd metatarsals.   LOCATION:  [facility]   Dictated by (CST): Deep Reeves MD on 5/29/2024 at 2:03 PM     Finalized by (CST): Deep Reeves MD on 5/29/2024 at 2:04 PM         Medications   albuterol (Ventolin) (5 MG/ML) 0.5% nebulizer solution 10 mg (10 mg Nebulization Given 5/29/24 1442)   ketorolac (Toradol) 30 MG/ML injection 30 mg (30 mg Intramuscular Given 5/29/24 1437)   methylPREDNISolone sodium succinate (Solu-MEDROL) injection  125 mg (125 mg Intravenous Given 5/29/24 1640)     The patient was noted to be hypoxic and was placed on supplemental nasal cannula oxygen.  She was given a continuous albuterol nebulizer treatment and Toradol for her rib pain.  She did feel improvement in the rib pain.  She on repeat exam was still diminished and had end expiratory wheezing.  She still have an oxygen requirement.  She had an IV placed and was given Solu-Medrol.  She also had a postop shoe and manjula tape placed to her left great toe.       MDM      Patient presents with chest pain after trauma.  Differential diagnosis includes but is not limited to rib fracture, pneumothorax and COPD exacerbation.  The patient does not have evidence of rib fracture or pneumothorax on chest x-ray.  She has been hypoxic and still sounds very diminished with end expiratory wheezing.  Her symptoms are likely related to a COPD exacerbation and she also has a rib contusion.  She has been started on steroids and will need ongoing respiratory care.  I discussed her case with Dr. Adame from the hospitalist service who has agreed to admit her primarily.  Patient is comfortable with the plan.  Admission disposition: 5/29/2024  4:55 PM                                Medical Decision Making      Disposition and Plan     Clinical Impression:  1. COPD exacerbation (HCC)    2. Contusion of rib on left side, initial encounter    3. Hypoxia    4. Closed displaced fracture of proximal phalanx of left great toe, initial encounter         Disposition:  Admit  5/29/2024  4:55 pm    Follow-up:  No follow-up provider specified.        Medications Prescribed:  Current Discharge Medication List                            Hospital Problems       Present on Admission  Date Reviewed: 5/15/2024            ICD-10-CM Noted POA    * (Principal) COPD exacerbation (HCC) J44.1 8/16/2023 Unknown

## 2024-05-30 LAB
ALBUMIN SERPL-MCNC: 3 G/DL (ref 3.4–5)
ALBUMIN/GLOB SERPL: 0.9 {RATIO} (ref 1–2)
ALP LIVER SERPL-CCNC: 122 U/L
ALT SERPL-CCNC: 15 U/L
ANION GAP SERPL CALC-SCNC: 8 MMOL/L (ref 0–18)
AST SERPL-CCNC: 16 U/L (ref 15–37)
BASOPHILS # BLD AUTO: 0.01 X10(3) UL (ref 0–0.2)
BASOPHILS NFR BLD AUTO: 0.1 %
BILIRUB SERPL-MCNC: 0.5 MG/DL (ref 0.1–2)
BUN BLD-MCNC: 31 MG/DL (ref 9–23)
CALCIUM BLD-MCNC: 9.2 MG/DL (ref 8.5–10.1)
CHLORIDE SERPL-SCNC: 109 MMOL/L (ref 98–112)
CO2 SERPL-SCNC: 23 MMOL/L (ref 21–32)
CREAT BLD-MCNC: 1.15 MG/DL
EGFRCR SERPLBLD CKD-EPI 2021: 48 ML/MIN/1.73M2 (ref 60–?)
EOSINOPHIL # BLD AUTO: 0 X10(3) UL (ref 0–0.7)
EOSINOPHIL NFR BLD AUTO: 0 %
ERYTHROCYTE [DISTWIDTH] IN BLOOD BY AUTOMATED COUNT: 15.3 %
GLOBULIN PLAS-MCNC: 3.2 G/DL (ref 2.8–4.4)
GLUCOSE BLD-MCNC: 173 MG/DL (ref 70–99)
GLUCOSE BLD-MCNC: 178 MG/DL (ref 70–99)
GLUCOSE BLD-MCNC: 192 MG/DL (ref 70–99)
GLUCOSE BLD-MCNC: 224 MG/DL (ref 70–99)
GLUCOSE BLD-MCNC: 234 MG/DL (ref 70–99)
HCT VFR BLD AUTO: 40.2 %
HGB BLD-MCNC: 12.3 G/DL
IMM GRANULOCYTES # BLD AUTO: 0.06 X10(3) UL (ref 0–1)
IMM GRANULOCYTES NFR BLD: 0.7 %
LYMPHOCYTES # BLD AUTO: 0.56 X10(3) UL (ref 1–4)
LYMPHOCYTES NFR BLD AUTO: 6.5 %
MCH RBC QN AUTO: 28.4 PG (ref 26–34)
MCHC RBC AUTO-ENTMCNC: 30.6 G/DL (ref 31–37)
MCV RBC AUTO: 92.8 FL
MONOCYTES # BLD AUTO: 0.18 X10(3) UL (ref 0.1–1)
MONOCYTES NFR BLD AUTO: 2.1 %
NEUTROPHILS # BLD AUTO: 7.79 X10 (3) UL (ref 1.5–7.7)
NEUTROPHILS # BLD AUTO: 7.79 X10(3) UL (ref 1.5–7.7)
NEUTROPHILS NFR BLD AUTO: 90.6 %
OSMOLALITY SERPL CALC.SUM OF ELEC: 301 MOSM/KG (ref 275–295)
PLATELET # BLD AUTO: 149 10(3)UL (ref 150–450)
POTASSIUM SERPL-SCNC: 5.1 MMOL/L (ref 3.5–5.1)
POTASSIUM SERPL-SCNC: 5.1 MMOL/L (ref 3.5–5.1)
PROT SERPL-MCNC: 6.2 G/DL (ref 6.4–8.2)
RBC # BLD AUTO: 4.33 X10(6)UL
SODIUM SERPL-SCNC: 140 MMOL/L (ref 136–145)
WBC # BLD AUTO: 8.6 X10(3) UL (ref 4–11)

## 2024-05-30 PROCEDURE — 99232 SBSQ HOSP IP/OBS MODERATE 35: CPT | Performed by: INTERNAL MEDICINE

## 2024-05-30 RX ORDER — HYDROCODONE BITARTRATE AND ACETAMINOPHEN 5; 325 MG/1; MG/1
1 TABLET ORAL EVERY 6 HOURS PRN
Status: COMPLETED | OUTPATIENT
Start: 2024-05-30 | End: 2024-05-31

## 2024-05-30 RX ORDER — METOCLOPRAMIDE HYDROCHLORIDE 5 MG/ML
5 INJECTION INTRAMUSCULAR; INTRAVENOUS EVERY 8 HOURS PRN
Status: DISCONTINUED | OUTPATIENT
Start: 2024-05-30 | End: 2024-06-05

## 2024-05-30 NOTE — PLAN OF CARE
Pt is aox4, can be confused. On 3L, baseline is RA. No tele orders. SOB with exertion. Will attempt to wean O2. Up SB with cane to void, fall precautions in place. QID accucheck. C/o pain in right and left ribs, see MAR. Lidocaine patch on right ribs. IV saline locked. IV steroids. Carb controlled diet tolerating well. Pt has no c/o n/v/d. Resting in bed with call light in place. Will continue to monitor.       Problem: Diabetes/Glucose Control  Goal: Glucose maintained within prescribed range  Description: INTERVENTIONS:  - Monitor Blood Glucose as ordered  - Assess for signs and symptoms of hyperglycemia and hypoglycemia  - Administer ordered medications to maintain glucose within target range  - Assess barriers to adequate nutritional intake and initiate nutrition consult as needed  - Instruct patient on self management of diabetes  Outcome: Progressing     Problem: RESPIRATORY - ADULT  Goal: Achieves optimal ventilation and oxygenation  Description: INTERVENTIONS:  - Assess for changes in respiratory status  - Assess for changes in mentation and behavior  - Position to facilitate oxygenation and minimize respiratory effort  - Oxygen supplementation based on oxygen saturation or ABGs  - Provide Smoking Cessation handout, if applicable  - Encourage broncho-pulmonary hygiene including cough, deep breathe, Incentive Spirometry  - Assess the need for suctioning and perform as needed  - Assess and instruct to report SOB or any respiratory difficulty  - Respiratory Therapy support as indicated  - Manage/alleviate anxiety  - Monitor for signs/symptoms of CO2 retention  Outcome: Progressing     Problem: SAFETY ADULT - FALL  Goal: Free from fall injury  Description: INTERVENTIONS:  - Assess pt frequently for physical needs  - Identify cognitive and physical deficits and behaviors that affect risk of falls.  - [place] fall precautions as indicated by assessment.  - Educate pt/family on patient safety including physical  limitations  - Instruct pt to call for assistance with activity based on assessment  - Modify environment to reduce risk of injury  - Provide assistive devices as appropriate  - Consider OT/PT consult to assist with strengthening/mobility  - Encourage toileting schedule  Outcome: Progressing

## 2024-05-30 NOTE — PLAN OF CARE
Patient A&Ox4. Patient resting in bed, no apparent distress. Patient resting on received on 3L, increased to 4-5L ovcernight. Patient off tele monitoring, vital sign monitoring. PRN pain medication given, see MAR for more details. PRN clonazepam given per request, see MAR. Nebs. Steroids. Isolation measures in place. QID ACHS. Safety/fall precautions in place. Call light in reach.   PT eval pending        Problem: SAFETY ADULT - FALL  Goal: Free from fall injury  Description: INTERVENTIONS:  - Assess pt frequently for physical needs  - Identify cognitive and physical deficits and behaviors that affect risk of falls.  - [place] fall precautions as indicated by assessment.  - Educate pt/family on patient safety including physical limitations  - Instruct pt to call for assistance with activity based on assessment  - Modify environment to reduce risk of injury  - Provide assistive devices as appropriate  - Consider OT/PT consult to assist with strengthening/mobility  - Encourage toileting schedule  Outcome: Progressing     Problem: Diabetes/Glucose Control  Goal: Glucose maintained within prescribed range  Description: INTERVENTIONS:  - Monitor Blood Glucose as ordered  - Assess for signs and symptoms of hyperglycemia and hypoglycemia  - Administer ordered medications to maintain glucose within target range  - Assess barriers to adequate nutritional intake and initiate nutrition consult as needed  - Instruct patient on self management of diabetes  Outcome: Progressing     Problem: RESPIRATORY - ADULT  Goal: Achieves optimal ventilation and oxygenation  Description: INTERVENTIONS:  - Assess for changes in respiratory status  - Assess for changes in mentation and behavior  - Position to facilitate oxygenation and minimize respiratory effort  - Oxygen supplementation based on oxygen saturation or ABGs  - Provide Smoking Cessation handout, if applicable  - Encourage broncho-pulmonary hygiene including cough, deep breathe,  Incentive Spirometry  - Assess the need for suctioning and perform as needed  - Assess and instruct to report SOB or any respiratory difficulty  - Respiratory Therapy support as indicated  - Manage/alleviate anxiety  - Monitor for signs/symptoms of CO2 retention  Outcome: Progressing     Problem: Patient/Family Goals  Goal: Patient/Family Long Term Goal  Description: Patient's Long Term Goal: discharge    Interventions:  - Nebs  -Steroids  -MD rounding  - Labs & imaging  - See additional Care Plan goals for specific interventions  Outcome: Progressing  Goal: Patient/Family Short Term Goal  Description: Patient's Short Term Goal:   5/29 noc: rest, sleep, monitor O2    Interventions:   -  monitor  -PRN intervention  -O2  - See additional Care Plan goals for specific interventions  Outcome: Progressing

## 2024-05-30 NOTE — PROGRESS NOTES
[facility]   part of [facility]     Hospitalist Progress Note     Gabi Kumar Patient Status:  Observation    1945 MRN [id number]   Location [facility] 5NW-A Attending Minh Daly MD   Hosp Day # 0 PCP Bismark Cabrera MD     Chief Complaint: rib pain after fall    Subjective:     Patient without acute events overnight. Pain still present with deep breaths. No F/C. Breathing better.     Objective:    Review of Systems:   A comprehensive review of systems was completed; pertinent positive and negatives stated in subjective.    Vital signs:  Temp:  [97.9 °F (36.6 °C)-98.3 °F (36.8 °C)] 98.1 °F (36.7 °C)  Pulse:  [] 73  Resp:  [13-22] 16  BP: ()/(45-87) 95/63  SpO2:  [87 %-100 %] 98 %    Physical Exam:    General: No acute distress  Respiratory: No wheezes, no rhonchi  Cardiovascular: S1, S2, regular rate and rhythm  Abdomen: Soft, Non-tender, non-distended, positive bowel sounds  Neuro: No new focal deficits.   Extremities: No edema      Diagnostic Data:    Labs:  Recent Labs   Lab 24  1628 24  0747   WBC 6.7 8.6   HGB 13.8 12.3   MCV 93.5 92.8   .0* 149.0*       Recent Labs   Lab 24  1628 24  0747   * 178*   BUN 23 31*   CREATSERUM 1.00 1.15*   CA 9.4 9.2   ALB 3.6 3.0*    140   K 3.3* 5.1  5.1    109   CO2 24.0 23.0   ALKPHO 135 122   AST 22 16   ALT 17 15   BILT 0.6 0.5   TP 6.7 6.2*       Estimated Creatinine Clearance: 33.7 mL/min (A) (based on SCr of 1.15 mg/dL (H)).    No results for input(s): \"TROP\", \"TROPHS\", \"CK\" in the last 168 hours.    No results for input(s): \"PTP\", \"INR\" in the last 168 hours.               Microbiology    No results found for this visit on 24.      Imaging: Reviewed in Epic.    Medications:    insulin aspart  1-68 Units Subcutaneous TID CC    insulin aspart  1-30 Units Subcutaneous TID AC and HS    enoxaparin  40 mg Subcutaneous Daily    methylPREDNISolone  60 mg Intravenous Q12H     ipratropium-albuterol  3 mL Nebulization Q4H WA (5 times daily)    allopurinol  300 mg Oral Daily    DULoxetine  60 mg Oral Daily    pantoprazole  40 mg Oral QAM AC    umeclidinium-vilanterol  1 puff Inhalation Daily    rosuvastatin  10 mg Oral Nightly    metoprolol succinate ER  25 mg Oral Nightly       Assessment & Plan:      #Acute hypoxic respiratory failure  #COPD exacerbation  -IV steroids  -Nebs  -Incentive spirometry  -Wean O2 as tolerated, currently on 2L     #Rib pain  -Chest x-ray reviewed and shows no rib fracture  -Symptomatic management  -encourage IS     #Diabetes type 2  -Hyperglycemia protocol     #Hypertension  -Metoprolol     #Hyperlipidemia  -Statin     #Gout  -Allopurinol, colchicine    #GERD  -PPI    #Closed displaced fracture of proximal phalanx of left great toe, initial encounter   -PT/OT  -pain control         Minh Daly MD    Supplementary Documentation:     Quality:  DVT Mechanical Prophylaxis:   SCDs,    DVT Pharmacologic Prophylaxis   Medication    enoxaparin (Lovenox) 40 MG/0.4ML SUBQ injection 40 mg                Code Status: DNAR/Selective Treatment  Thapa: No urinary catheter in place  Thapa Duration (in days):   Central line:    SARAVANAN:     Discharge is dependent on: progress  At this point Ms. Kumar is expected to be discharge to: home    The 21st Century Cures Act makes medical notes like these available to patients in the interest of transparency. Please be advised this is a medical document. Medical documents are intended to carry relevant information, facts as evident, and the clinical opinion of the practitioner. The medical note is intended as peer to peer communication and may appear blunt or direct. It is written in medical language and may contain abbreviations or verbiage that are unfamiliar.

## 2024-05-30 NOTE — PROGRESS NOTES
.    NURSING ADMISSION NOTE      Patient admitted via Cart  Oriented to room.  Safety precautions initiated.  Bed in low position.  Call light in reach.    Patient arrived to room in stable condition. VSS.    monitor in place.  Admission navigator completed with patient at bedside.   MD paged for PTA med list reconciliation, see Orders.   See Flowsheets for admission physical assessment. See MAR for medications given overnight.

## 2024-05-30 NOTE — ED QUICK NOTES
Rounding Completed. Report received from RJ Garcia    Plan of Care reviewed. Waiting for transport.  Elimination needs assessed.  Provided updates to pt.    Bed is locked and in lowest position. Call light within reach.

## 2024-05-30 NOTE — PHYSICAL THERAPY NOTE
PHYSICAL THERAPY EVALUATION - INPATIENT     Room Number: 520/520-A  Evaluation Date: 5/30/2024  Type of Evaluation: Initial  Physician Order: PT Eval and Treat    Presenting Problem: SOB, rib pain  Co-Morbidities : COPD, DM2, DDD, essential HTN,  Reason for Therapy: Mobility Dysfunction and Discharge Planning    PHYSICAL THERAPY ASSESSMENT   Patient is currently functioning near baseline with bed mobility, transfers, and gait.  Prior to admission, patient's baseline is modified independent.  Patient is requiring supervision as a result of the following impairments: decreased functional strength, decreased endurance/aerobic capacity, pain, and increased O2 needs from baseline.  Physical Therapy will continue to follow for duration of hospitalization.    Patient will benefit from continued skilled PT Services at discharge to promote functional independence in home.  Anticipate patient will return home with home health PT.    PLAN  PT Treatment Plan: Bed mobility;Body mechanics;Endurance;Energy conservation;Patient education;Gait training;Stair training;Transfer training  Rehab Potential : Fair  Frequency (Obs):  (2-3x/week)  Number of Visits to Meet Established Goals: 3      CURRENT GOALS    Goal #1 Patient is able to ambulate 250 feet with assist device: LRAD at assistance level: supervision   Goal #2 Patient is able to navigate 6 stairs using the railings and assistance level: supervision in order to return home safely.     Goal #3    Goal #4    Goal #5    Goal #6    Goal Comments: Goals established on 5/30/2024      PHYSICAL THERAPY MEDICAL/SOCIAL HISTORY  History related to current admission: Patient is a 78 year old female admitted on 5/29/2024 from home for s/p fall resulting in rib pain.      HOME SITUATION  Type of Home: House   Home Layout: Multi-level        Stairs to Bedroom: 6  Railing: Yes    Lives With: Alone  Drives: Yes  Patient Owned Equipment: Rolling walker;Cane       Prior Level of  [place]: Pt reports being mod I for all mobility using a cane and walker within the house and a walker for longer community distances. Pt reports using rails on both sides to ascend and descend stairs in her home and has an assistive device located on all 3 floors. Pt reports using a elevated toilet seat and utilizing grab bars in her shower for safety. Pt is ind with all ADLs and reports driving. Baseline pt is not supplemented with any O2 at home.     SUBJECTIVE  \"I want to get up and moving.\"      OBJECTIVE  Precautions: Bed/chair alarm  Fall Risk: Standard fall risk    WEIGHT BEARING RESTRICTION  Weight Bearing Restriction: L lower extremity           L Lower Extremity: Weight Bearing as Tolerated (with post op shoe)    PAIN ASSESSMENT  Ratin  Location: rib pain EVGENY  Management Techniques: Activity promotion;Body mechanics;Breathing techniques;Repositioning;Relaxation    COGNITION  Following Commands:  follows all commands and directions without difficulty    RANGE OF MOTION AND STRENGTH ASSESSMENT  Upper extremity ROM and strength are within functional limits     Lower extremity ROM is within functional limits     Lower extremity strength is within functional limits       BALANCE  Static Sitting: Good  Dynamic Sitting: Good  Static Standing: Fair -  Dynamic Standing: Fair -    ADDITIONAL TESTS                                    ACTIVITY TOLERANCE                         O2 WALK       NEUROLOGICAL FINDINGS                        AM-PAC '6-Clicks' INPATIENT SHORT FORM - BASIC MOBILITY  How much difficulty does the patient currently have...  Patient Difficulty: Turning over in bed (including adjusting bedclothes, sheets and blankets)?: A Little   Patient Difficulty: Sitting down on and standing up from a chair with arms (e.g., wheelchair, bedside commode, etc.): A Little   Patient Difficulty: Moving from lying on back to sitting on the side of the bed?: A Little   How much help from another person does  the patient currently need...   Help from Another: Moving to and from a bed to a chair (including a wheelchair)?: A Little   Help from Another: Need to walk in hospital room?: A Little   Help from Another: Climbing 3-5 steps with a railing?: A Little       AM-PAC Score:  Raw Score: 18   Approx Degree of Impairment: 46.58%   Standardized Score (AM-PAC Scale): 43.63   CMS Modifier (G-Code): CK    FUNCTIONAL ABILITY STATUS  Gait Assessment   Functional Mobility/Gait Assessment  Gait Assistance: Supervision  Distance (ft): 150  Assistive Device: Rolling walker  Pattern: Shuffle    Skilled Therapy Provided     Bed Mobility:  Rolling: Not observed  Supine to sit: Not observed   Sit to supine: Not observed     Transfer Mobility:  Sit to stand: Supervision   Stand to sit: Supervision  Gait = See gait assessment above.    Therapist's Comments: Pt received up in the chair post receiving pain medication from RN due to report of rib pain. Pt was switched to portable O2 prior to walking. Pt was wearing post op shoe on L LE and placed regular shoe on the RLE. While walking, pt denied any SOB and dizziness. When talking, pt would veer side to side with walker but would correct when looking ahead at where she was going. Pt exhibited a slowed jose. Pt was returned to the chair and put back on the O2 in the room. Pt was educated on pursed lip breathing as her O2 sats were monitored. Upon returning to room her sats were between 79 and 83. Pt was handed off to speak with the APRN.     Exercise/Education Provided:  Bed mobility  Body mechanics  Energy conservation  Functional activity tolerated  Gait training  Transfer training    Patient End of Session: Up in chair;Needs met;Call light within reach;All patient questions and concerns addressed      Patient Evaluation Complexity Level:  History Moderate - 1 or 2 personal factors and/or co-morbidities   Examination of body systems Moderate - addressing a total of 3 or more elements    Clinical Presentation Moderate - Evolving   Clinical Decision Making Moderate - Evolving       PT Session Time: 20 minutes    Therapeutic Activity: 10 minutes

## 2024-05-30 NOTE — OCCUPATIONAL THERAPY NOTE
OCCUPATIONAL THERAPY EVALUATION - INPATIENT    Room Number: 520/520-A  Evaluation Date: 5/30/2024     Type of Evaluation: Initial  Presenting Problem: COPD exacerbation    Physician Order: IP Consult to Occupational Therapy  Reason for Therapy:  ADL/IADL Dysfunction and Discharge Planning      OCCUPATIONAL THERAPY ASSESSMENT   Patient is a 78 year old female admitted on 5/29/2024 with Presenting Problem: COPD exacerbation. Co-Morbidities : COPD, DM2, DDD, essential HTN,  Patient is currently functioning near baseline with ADLs and functional mobility.  Prior to admission, patient's baseline is MOD I/IND.  Patient met all OT goals at or close to baseline level.  Patient reports no further questions/concerns at this time.     Patient is discharged from acute OT services at this time      WEIGHT BEARING RESTRICTION  Weight Bearing Restriction: L lower extremity           L Lower Extremity: Weight Bearing as Tolerated    Recommendations for nursing staff:   Transfers: one person with RW  Toileting location: Toilet    EVALUATION SESSION:  Patient at start of session: seated in chair  FUNCTIONAL TRANSFER ASSESSMENT  Sit to Stand: Chair  Chair: Contact Guard Assist    BED MOBILITY     BALANCE ASSESSMENT     FUNCTIONAL ADL ASSESSMENT  LB Dressing Seated: Independent      ACTIVITY TOLERANCE: WFL                         O2 SATURATIONS       COGNITION  Overall Cognitive Status:  WFL - within functional limits  COGNITION ASSESSMENTS       Upper Extremity:   ROM: within functional limits   Strength: is within functional limits   Coordination:  Gross motor: WFL  Fine motor: WFL  Sensation: Light touch:  intact    EDUCATION PROVIDED  Patient: Role of Occupational Therapy; Plan of Care  Patient's Response to Education: Verbalized Understanding; Returned Demonstration    Equipment used: RW  Demonstrates functional use      Patient End of Session: Up in chair;RN aware of session/findings;Needs met;Call light within reach;All  patient questions and concerns addressed    OCCUPATIONAL PROFILE    HOME SITUATION  Type of Home: House  Home Layout: Multi-level  Lives With: Alone               Occupation/Status: reading     Drives: Yes       Prior Level of Function: Pt is IND with BADLs, IADLs, driving.    SUBJECTIVE  Pt is pleasant and cooperative throughout session.       PAIN ASSESSMENT  Rating: Unable to rate  Location: Ribs when deep breathing  Management Techniques: Body mechanics;Breathing techniques    OBJECTIVE  Precautions: Bed/chair alarm  Fall Risk: Standard fall risk    WEIGHT BEARING RESTRICTION  Weight Bearing Restriction: L lower extremity           L Lower Extremity: Weight Bearing as Tolerated    AM-PAC ‘6-Clicks’ Inpatient Daily Activity Short Form  -   Putting on and taking off regular lower body clothing?: None  -   Bathing (including washing, rinsing, drying)?: None  -   Toileting, which includes using toilet, bedpan or urinal? : None  -   Putting on and taking off regular upper body clothing?: None  -   Taking care of personal grooming such as brushing teeth?: None  -   Eating meals?: None    AM-PAC Score:  Score: 24  Approx Degree of Impairment: 0%  Standardized Score (AM-PAC Scale): 57.54      ADDITIONAL TESTS     NEUROLOGICAL FINDINGS        PLAN   Patient has been evaluated and presents with no skilled Occupational Therapy needs at this time.  Patient discharged from Occupational Therapy services.  Please re-order if a new functional limitation presents during this admission.      Patient Evaluation Complexity Level:   Occupational Profile/Medical History LOW - Brief history including review of medical or therapy records    Specific performance deficits impacting engagement in ADL/IADL LOW  1 - 3 performance deficits    Client Assessment/Performance Deficits MODERATE - Comorbidities and min to mod modifications of tasks    Clinical Decision Making LOW - Analysis of occupational profile, problem-focused assessments,  limited treatment options    Overall Complexity LOW     OT Session Time: 15 minutes  Self-Care Home Management: 4 minutes  Therapeutic Activity: 10 minutes

## 2024-05-31 ENCOUNTER — APPOINTMENT (OUTPATIENT)
Dept: GENERAL RADIOLOGY | Facility: HOSPITAL | Age: 79
End: 2024-05-31
Attending: NURSE PRACTITIONER
Payer: MEDICARE

## 2024-05-31 LAB
ANION GAP SERPL CALC-SCNC: 5 MMOL/L (ref 0–18)
BUN BLD-MCNC: 38 MG/DL (ref 9–23)
CALCIUM BLD-MCNC: 9.1 MG/DL (ref 8.5–10.1)
CHLORIDE SERPL-SCNC: 106 MMOL/L (ref 98–112)
CO2 SERPL-SCNC: 26 MMOL/L (ref 21–32)
CREAT BLD-MCNC: 1.13 MG/DL
EGFRCR SERPLBLD CKD-EPI 2021: 49 ML/MIN/1.73M2 (ref 60–?)
GLUCOSE BLD-MCNC: 119 MG/DL (ref 70–99)
GLUCOSE BLD-MCNC: 198 MG/DL (ref 70–99)
GLUCOSE BLD-MCNC: 207 MG/DL (ref 70–99)
GLUCOSE BLD-MCNC: 228 MG/DL (ref 70–99)
GLUCOSE BLD-MCNC: 253 MG/DL (ref 70–99)
OSMOLALITY SERPL CALC.SUM OF ELEC: 300 MOSM/KG (ref 275–295)
POTASSIUM SERPL-SCNC: 5.1 MMOL/L (ref 3.5–5.1)
SODIUM SERPL-SCNC: 137 MMOL/L (ref 136–145)

## 2024-05-31 PROCEDURE — 71101 X-RAY EXAM UNILAT RIBS/CHEST: CPT | Performed by: NURSE PRACTITIONER

## 2024-05-31 PROCEDURE — 99232 SBSQ HOSP IP/OBS MODERATE 35: CPT | Performed by: INTERNAL MEDICINE

## 2024-05-31 RX ORDER — PREDNISONE 20 MG/1
40 TABLET ORAL 2 TIMES DAILY WITH MEALS
Status: DISCONTINUED | OUTPATIENT
Start: 2024-05-31 | End: 2024-06-01

## 2024-05-31 RX ORDER — TRAMADOL HYDROCHLORIDE 50 MG/1
50 TABLET ORAL EVERY 6 HOURS PRN
Status: DISCONTINUED | OUTPATIENT
Start: 2024-05-31 | End: 2024-06-05

## 2024-05-31 NOTE — PROGRESS NOTES
[facility]   part of [facility]     Hospitalist Progress Note     Gabi Kumar Patient Status:  Inpatient    1945 MRN [id number]   Location [facility] 5NW-A Attending Minh Daly MD   Hosp Day # 1 PCP No primary care provider on file.     Chief Complaint: Rib pain after fall/SOB    Subjective:     Patient is lying in bed and states she has an 8/10 bilateral rib pain. Reports hot packs are helping but the lidocaine patch did not relieve any pain.  She denies any foot pain and has the post op shoe on. Denies shortness of breath, chest pain, nausea, and vomiting. Admits to having back pain and states she is uncomfortable in the bed and chair. She received a Norco for the pain early this morning. Declined an incentive spirometer because she has multiple at home. 90% on 3L via NC. Eager to get home.       Hurts to cough     Objective:    Review of Systems:   A comprehensive review of systems was completed; pertinent positive and negatives stated in subjective.    Vital signs:  Temp:  [97.8 °F (36.6 °C)-98.1 °F (36.7 °C)] 97.8 °F (36.6 °C)  Pulse:  [60-70] 65  Resp:  [16] 16  BP: (122-126)/(54-68) 122/54  SpO2:  [92 %-98 %] 93 %    Physical Exam:    General: No acute distress, alert and oriented  Respiratory: Crackles, left base  tenderness to palpation near right anterolateral rib cage  3 liters NC   Cardiovascular: S1, S2, regular rate and rhythm  Abdomen: Soft, Non-tender, non-distended  Neuro: No new focal deficits  Extremities: No edema, left great toe has mild bruising, bruising to left hip     Diagnostic Data:    Labs:  Recent Labs   Lab 248 24  0747   WBC 6.7 8.6   HGB 13.8 12.3   MCV 93.5 92.8   .0* 149.0*       Recent Labs   Lab 24  1628 24  0747 24  0546   * 178* 228*   BUN 23 31* 38*   CREATSERUM 1.00 1.15* 1.13*   CA 9.4 9.2 9.1   ALB 3.6 3.0*  --     140 137   K 3.3* 5.1  5.1 5.1    109 106   CO2 24.0 23.0 26.0    ALKPHO 135 122  --    AST 22 16  --    ALT 17 15  --    BILT 0.6 0.5  --    TP 6.7 6.2*  --        Estimated Creatinine Clearance: 34.3 mL/min (A) (based on SCr of 1.13 mg/dL (H)).    No results for input(s): \"TROP\", \"TROPHS\", \"CK\" in the last 168 hours.    No results for input(s): \"PTP\", \"INR\" in the last 168 hours.               Microbiology    No results found for this visit on 05/29/24.      Imaging: Reviewed in Epic.    Medications:    lidocaine-menthol  1 patch Transdermal Daily    insulin aspart  1-68 Units Subcutaneous TID CC    insulin aspart  1-30 Units Subcutaneous TID AC and HS    enoxaparin  40 mg Subcutaneous Daily    methylPREDNISolone  60 mg Intravenous Q12H    ipratropium-albuterol  3 mL Nebulization Q4H WA (5 times daily)    allopurinol  300 mg Oral Daily    DULoxetine  60 mg Oral Daily    pantoprazole  40 mg Oral QAM AC    umeclidinium-vilanterol  1 puff Inhalation Daily    rosuvastatin  10 mg Oral Nightly    metoprolol succinate ER  25 mg Oral Nightly       Assessment & Plan:      #Acute hypoxic respiratory failure  #COPD exacerbation  -IV steroids change to po 40 mg bid   -Nebs  -Declined Incentive spirometry  -Wean O2 as tolerated, currently on 3L needs walk test  weaning, refuses O2 for home   Check cxr / r rib films  Add ultram      #Rib pain  -Chest x-ray reviewed and shows no rib fracture  -Symptomatic management  -encourage IS  -lidocaine patch not helping, using warm pack   Check cxr r rib images   Add ultram      #Diabetes type 2  -Hyperglycemia protocol/ ISS    -blood glucose 228   steroid effect      #Hypertension  -Metoprolol     #Hyperlipidemia  -Statin     #Gout  -Allopurinol, colchicine    #GERD  -PPI     #Closed displaced fracture of proximal phalanx of left great toe, initial encounter   -PT/OT  -pain control  denies pain     GARY Barton NP     Supplementary Documentation:     Quality:  DVT Mechanical Prophylaxis:   SCDs,    DVT Pharmacologic  Prophylaxis   Medication    enoxaparin (Lovenox) 40 MG/0.4ML SUBQ injection 40 mg                Code Status: DNAR/Selective Treatment  Thapa: No urinary catheter in place  Thapa Duration (in days):   Central line:    SARAVANAN:     Discharge is dependent on: clinical course  At this point Ms. Kumar is expected to be discharge to: home     The 21st Century Cures Act makes medical notes like these available to patients in the interest of transparency. Please be advised this is a medical document. Medical documents are intended to carry relevant information, facts as evident, and the clinical opinion of the practitioner. The medical note is intended as peer to peer communication and may appear blunt or direct. It is written in medical language and may contain abbreviations or verbiage that are unfamiliar.         Addendum: Pt seen on 5/31, late note     Agree with above note except as documented below.  Pt still with pain. Remains on O2.      Gen: NAD  CVS: s1s2  Resp: CTA  Abd: soft     #Rib Pain  #COPD  #DM  #Ess HTN     -continue inhalers and steroids  -check plain films  -wean O2 as tolerated           Pt seen and examined independently. Chart reviewed. Labs and imaging over the last 24 hours have been personally reviewed.  I personally made/approved 100% of the management plan for this patient and take full responsibility for the patient management.   Note has been reviewed by me and modified as needed.  Exam and Impression/ Recs as noted above.  D/w staff.     Minh Daly MD

## 2024-05-31 NOTE — PLAN OF CARE
Patient is alert and oriented x 4, afebrile. C/o pain in R lower ribs, Norco, hot packs as needed. Surgical shoe to L foot when out of bed.   Per patient, she has Raynaud syndrome, extremities, nose, cold most of the time, poor SpO2 readings. SPO2 91% on 3L NC. Unable to complete O2 walk, d/t unstable pleth, but patient walked w/o shortness of breath or tachycardia. Notified NP. CXR and Rib xray ordered,  Patient is on carb controlled diet, tolerating well, insulin per scale and carb coverage for meals. Patient is continent, voids. Ambulates with a cane.  Problem: SAFETY ADULT - FALL  Goal: Free from fall injury  Description: INTERVENTIONS:  - Assess pt frequently for physical needs  - Identify cognitive and physical deficits and behaviors that affect risk of falls.  - [place] fall precautions as indicated by assessment.  - Educate pt/family on patient safety including physical limitations  - Instruct pt to call for assistance with activity based on assessment  - Modify environment to reduce risk of injury  - Provide assistive devices as appropriate  - Consider OT/PT consult to assist with strengthening/mobility  - Encourage toileting schedule  Outcome: Progressing     Problem: Diabetes/Glucose Control  Goal: Glucose maintained within prescribed range  Description: INTERVENTIONS:  - Monitor Blood Glucose as ordered  - Assess for signs and symptoms of hyperglycemia and hypoglycemia  - Administer ordered medications to maintain glucose within target range  - Assess barriers to adequate nutritional intake and initiate nutrition consult as needed  - Instruct patient on self management of diabetes  Outcome: Progressing     Problem: RESPIRATORY - ADULT  Goal: Achieves optimal ventilation and oxygenation  Description: INTERVENTIONS:  - Assess for changes in respiratory status  - Assess for changes in mentation and behavior  - Position to facilitate oxygenation and minimize respiratory effort  - Oxygen supplementation  based on oxygen saturation or ABGs  - Provide Smoking Cessation handout, if applicable  - Encourage broncho-pulmonary hygiene including cough, deep breathe, Incentive Spirometry  - Assess the need for suctioning and perform as needed  - Assess and instruct to report SOB or any respiratory difficulty  - Respiratory Therapy support as indicated  - Manage/alleviate anxiety  - Monitor for signs/symptoms of CO2 retention  Outcome: Progressing     Problem: Patient/Family Goals  Goal: Patient/Family Long Term Goal  Description: Patient's Long Term Goal: discharge    Interventions:  - Nebs  -Steroids  -MD rounding  - Labs & imaging  - See additional Care Plan goals for specific interventions  Outcome: Progressing  Goal: Patient/Family Short Term Goal  Description: Patient's Short Term Goal:   5/29 noc: rest, sleep, monitor O2  5/30: monitor O2, wean oxygen  5/31: wean O2; keep warm    Interventions:   -  monitor  -PRN intervention  -O2  - See additional Care Plan goals for specific interventions  Outcome: Progressing

## 2024-05-31 NOTE — PLAN OF CARE
Aox4. VSS, afebrile. RA BL. 3L at rest 5- 6L with ambulation. LOTT. IV steroids. NO tele. Reporting pain. PRN given. Relief with NORCO. SB assist with cane. ISO in place. Denies NVD. Updated pt on POC for NOC. Will follow.     Problem: SAFETY ADULT - FALL  Goal: Free from fall injury  Description: INTERVENTIONS:  - Assess pt frequently for physical needs  - Identify cognitive and physical deficits and behaviors that affect risk of falls.  - [place] fall precautions as indicated by assessment.  - Educate pt/family on patient safety including physical limitations  - Instruct pt to call for assistance with activity based on assessment  - Modify environment to reduce risk of injury  - Provide assistive devices as appropriate  - Consider OT/PT consult to assist with strengthening/mobility  - Encourage toileting schedule  Outcome: Progressing     Problem: Diabetes/Glucose Control  Goal: Glucose maintained within prescribed range  Description: INTERVENTIONS:  - Monitor Blood Glucose as ordered  - Assess for signs and symptoms of hyperglycemia and hypoglycemia  - Administer ordered medications to maintain glucose within target range  - Assess barriers to adequate nutritional intake and initiate nutrition consult as needed  - Instruct patient on self management of diabetes  Outcome: Progressing     Problem: RESPIRATORY - ADULT  Goal: Achieves optimal ventilation and oxygenation  Description: INTERVENTIONS:  - Assess for changes in respiratory status  - Assess for changes in mentation and behavior  - Position to facilitate oxygenation and minimize respiratory effort  - Oxygen supplementation based on oxygen saturation or ABGs  - Provide Smoking Cessation handout, if applicable  - Encourage broncho-pulmonary hygiene including cough, deep breathe, Incentive Spirometry  - Assess the need for suctioning and perform as needed  - Assess and instruct to report SOB or any respiratory difficulty  - Respiratory Therapy support as  indicated  - Manage/alleviate anxiety  - Monitor for signs/symptoms of CO2 retention  Outcome: Progressing     Problem: Patient/Family Goals  Goal: Patient/Family Long Term Goal  Description: Patient's Long Term Goal: discharge    Interventions:  - Nebs  -Steroids  -MD rounding  - Labs & imaging  - See additional Care Plan goals for specific interventions  Outcome: Progressing  Goal: Patient/Family Short Term Goal  Description: Patient's Short Term Goal:   5/29 noc: rest, sleep, monitor O2  5/30: monitor O2, wean oxygen    Interventions:   -  monitor  -PRN intervention  -O2  - See additional Care Plan goals for specific interventions  Outcome: Progressing

## 2024-05-31 NOTE — CM/SW NOTE
05/31/24 1100   CM/SW Referral Data   Referral Source Social Work (self-referral)   Reason for Referral Discharge planning   Informant Patient;EMR   Medical Hx   Does patient have an established PCP? Yes   Patient Info   Patient's Current Mental Status at Time of Assessment Alert;Oriented   Patient's Home Environment House   Number of Levels in Home 2   Number of Stair in Home 6   Patient lives with Alone   Discharge Needs   Anticipated D/C needs No anticipated discharge needs   Choice of Post-Acute Provider   Patient declines recommended services Yes     HOME SITUATION  Type of Home: House   Home Layout: Multi-level  Stairs to Bedroom: 6  Railing: Yes     Lives With: Alone  Drives: Yes  Patient Owned Equipment: Rolling walker;Cane     Prior Level of [place]: Pt reports being mod I for all mobility using a cane and walker within the house and a walker for longer community distances. Pt reports using rails on both sides to ascend and descend stairs in her home and has an assistive device located on all 3 floors. Pt reports using a elevated toilet seat and utilizing grab bars in her shower for safety. Pt is ind with all ADLs and reports driving. Baseline pt is not supplemented with any O2 at home.  (Per PT evaluation)      Patient is an 77 y/o female who admitted with COPD exacerbation, Closed displaced fracture of proximal phalanx of left great toe, Contusion of rib on left side.  Anticipated therapy need: Home with Home Healthcare    Met with patient, who was alert and oriented, to discuss discharge planning. She lives alone in a bi-level home in [place]. She has 6 stairs. Her son installed railings on all of her stairs. She has a commode to add height to her toilet and grab bars in her walk in shower. She does not have home oxygen and states \"I will not be using oxygen\". Her PCP is Dr. Bismark Cabrera. Discussed HH, she politely declined. She has not found HH helpful in the past, encouraged trying HH again  and she still declined.    SW/CM to remain available for support and/or discharge planning.    Julisa Adam, [facility]  Discharge Planner  [phone]

## 2024-06-01 LAB
GLUCOSE BLD-MCNC: 140 MG/DL (ref 70–99)
GLUCOSE BLD-MCNC: 191 MG/DL (ref 70–99)
GLUCOSE BLD-MCNC: 210 MG/DL (ref 70–99)
GLUCOSE BLD-MCNC: 232 MG/DL (ref 70–99)
INR BLD: 0.99 (ref 0.8–1.2)
PROTHROMBIN TIME: 13.1 SECONDS (ref 11.6–14.8)

## 2024-06-01 PROCEDURE — 99232 SBSQ HOSP IP/OBS MODERATE 35: CPT | Performed by: INTERNAL MEDICINE

## 2024-06-01 RX ORDER — HYDROCODONE BITARTRATE AND ACETAMINOPHEN 5; 325 MG/1; MG/1
1 TABLET ORAL EVERY 6 HOURS PRN
Status: DISCONTINUED | OUTPATIENT
Start: 2024-06-01 | End: 2024-06-05

## 2024-06-01 RX ORDER — PREDNISONE 20 MG/1
40 TABLET ORAL
Status: DISCONTINUED | OUTPATIENT
Start: 2024-06-02 | End: 2024-06-05

## 2024-06-01 RX ORDER — IPRATROPIUM BROMIDE AND ALBUTEROL SULFATE 2.5; .5 MG/3ML; MG/3ML
3 SOLUTION RESPIRATORY (INHALATION)
Status: DISCONTINUED | OUTPATIENT
Start: 2024-06-01 | End: 2024-06-01

## 2024-06-01 RX ORDER — IPRATROPIUM BROMIDE AND ALBUTEROL SULFATE 2.5; .5 MG/3ML; MG/3ML
3 SOLUTION RESPIRATORY (INHALATION)
Status: DISCONTINUED | OUTPATIENT
Start: 2024-06-01 | End: 2024-06-02

## 2024-06-01 NOTE — PROGRESS NOTES
[facility]   part of [facility]     Hospitalist Progress Note     Gabi Kumar Patient Status:  Inpatient    1945 MRN [id number]   Location [facility] 5NW-A Attending Minh Daly MD   Hosp Day # 2 PCP No primary care provider on file.     Chief Complaint: Rib pain after fall/SOB    Subjective:     Pt without acute events overnight. Had some oozing from lovenox injection site. Pain still present. No F/C. Xrays with rib fractures      Objective:    Review of Systems:   A comprehensive review of systems was completed; pertinent positive and negatives stated in subjective.    Vital signs:  Temp:  [97.7 °F (36.5 °C)] 97.7 °F (36.5 °C)  Pulse:  [68-96] 68  Resp:  [19-20] 19  BP: (133-147)/(54-69) 147/69  SpO2:  [93 %-94 %] 93 %    Physical Exam:    General: No acute distress, alert and oriented  Respiratory: Crackles, left base  tenderness to palpation near right anterolateral rib cage  3 liters NC   Cardiovascular: S1, S2, regular rate and rhythm  Abdomen: Soft, Non-tender, non-distended  Neuro: No new focal deficits  Extremities: No edema, left great toe has mild bruising, bruising to left hip     Diagnostic Data:    Labs:  Recent Labs   Lab 24  1628 24  0747 24  0949   WBC 6.7 8.6  --    HGB 13.8 12.3  --    MCV 93.5 92.8  --    .0* 149.0*  --    INR  --   --  0.99       Recent Labs   Lab 24  1628 24  0747 24  0546   * 178* 228*   BUN 23 31* 38*   CREATSERUM 1.00 1.15* 1.13*   CA 9.4 9.2 9.1   ALB 3.6 3.0*  --     140 137   K 3.3* 5.1  5.1 5.1    109 106   CO2 24.0 23.0 26.0   ALKPHO 135 122  --    AST 22 16  --    ALT 17 15  --    BILT 0.6 0.5  --    TP 6.7 6.2*  --        Estimated Creatinine Clearance: 34.3 mL/min (A) (based on SCr of 1.13 mg/dL (H)).    No results for input(s): \"TROP\", \"TROPHS\", \"CK\" in the last 168 hours.    Recent Labs   Lab 24  0949   PTP 13.1   INR 0.99                  Microbiology    No  results found for this visit on 05/29/24.      Imaging: Reviewed in Epic.    Medications:    ipratropium-albuterol  3 mL Nebulization QID    predniSONE  40 mg Oral BID with meals    lidocaine-menthol  1 patch Transdermal Daily    insulin aspart  1-68 Units Subcutaneous TID CC    insulin aspart  1-30 Units Subcutaneous TID AC and HS    enoxaparin  40 mg Subcutaneous Daily    allopurinol  300 mg Oral Daily    DULoxetine  60 mg Oral Daily    pantoprazole  40 mg Oral QAM AC    umeclidinium-vilanterol  1 puff Inhalation Daily    rosuvastatin  10 mg Oral Nightly    metoprolol succinate ER  25 mg Oral Nightly       Assessment & Plan:      #Acute hypoxic respiratory failure  #COPD exacerbation  -on oral steroids   -Nebs  -Declined Incentive spirometry  -Wean O2 as tolerated, currently on 3L needs walk test  weaning, refuses O2 for home      #Rib pain  #Rib Fractures  -repeat CXR with 5,6 and possible 7th rib fractures  -Symptomatic management  -encourage IS  -lidocaine patch not helping, using warm pack   -Add ultram      #Diabetes type 2  -Hyperglycemia protocol/ ISS    -blood glucose 228   steroid effect      #Hypertension  -Metoprolol     #Hyperlipidemia  -Statin     #Gout  -Allopurinol, colchicine    #GERD  -PPI     #Closed displaced fracture of proximal phalanx of left great toe, initial encounter   -PT/OT  -pain control  denies pain     Minh Daly MD      Supplementary Documentation:     Quality:  DVT Mechanical Prophylaxis:   SCDs,    DVT Pharmacologic Prophylaxis   Medication    enoxaparin (Lovenox) 40 MG/0.4ML SUBQ injection 40 mg                Code Status: DNAR/Selective Treatment  Thapa: No urinary catheter in place  Thapa Duration (in days):   Central line:    SARAVANAN:     Discharge is dependent on: clinical course  At this point Ms. Kumar is expected to be discharge to: home     The 21st Century Cures Act makes medical notes like these available to patients in the interest of transparency. Please be advised  this is a medical document. Medical documents are intended to carry relevant information, facts as evident, and the clinical opinion of the practitioner. The medical note is intended as peer to peer communication and may appear blunt or direct. It is written in medical language and may contain abbreviations or verbiage that are unfamiliar.

## 2024-06-01 NOTE — PROGRESS NOTES
06/01/24 1754   Mobility   O2 walk? Yes   At rest oxygen flow (liters per minute) 6   SPO2% Ambulation on Room Air 72   SPO2% Ambulation on Oxygen 93

## 2024-06-01 NOTE — PLAN OF CARE
Patient A&O x4.  [place]. Currently at 3 liters. No tele. Lovenox. Non cardiac electrolyte protocol. Continent x2. QID Accu check. PRN pain meds. Ambulates with SBA and a cane.   PO steroid. All questions answered. Patient updated on POC    Problem: SAFETY ADULT - FALL  Goal: Free from fall injury  Description: INTERVENTIONS:  - Assess pt frequently for physical needs  - Identify cognitive and physical deficits and behaviors that affect risk of falls.  - [place] fall precautions as indicated by assessment.  - Educate pt/family on patient safety including physical limitations  - Instruct pt to call for assistance with activity based on assessment  - Modify environment to reduce risk of injury  - Provide assistive devices as appropriate  - Consider OT/PT consult to assist with strengthening/mobility  - Encourage toileting schedule  Outcome: Progressing     Problem: Diabetes/Glucose Control  Goal: Glucose maintained within prescribed range  Description: INTERVENTIONS:  - Monitor Blood Glucose as ordered  - Assess for signs and symptoms of hyperglycemia and hypoglycemia  - Administer ordered medications to maintain glucose within target range  - Assess barriers to adequate nutritional intake and initiate nutrition consult as needed  - Instruct patient on self management of diabetes  Outcome: Progressing     Problem: RESPIRATORY - ADULT  Goal: Achieves optimal ventilation and oxygenation  Description: INTERVENTIONS:  - Assess for changes in respiratory status  - Assess for changes in mentation and behavior  - Position to facilitate oxygenation and minimize respiratory effort  - Oxygen supplementation based on oxygen saturation or ABGs  - Provide Smoking Cessation handout, if applicable  - Encourage broncho-pulmonary hygiene including cough, deep breathe, Incentive Spirometry  - Assess the need for suctioning and perform as needed  - Assess and instruct to report SOB or any respiratory difficulty  - Respiratory Therapy  support as indicated  - Manage/alleviate anxiety  - Monitor for signs/symptoms of CO2 retention  Outcome: Progressing     Problem: Patient/Family Goals  Goal: Patient/Family Long Term Goal  Description: Patient's Long Term Goal: discharge    Interventions:  - Nebs  -Steroids  -MD rounding  - Labs & imaging  - See additional Care Plan goals for specific interventions  Outcome: Progressing  Goal: Patient/Family Short Term Goal  Description: Patient's Short Term Goal:   5/29 noc: rest, sleep, monitor O2  5/30: monitor O2, wean oxygen  5/31: wean O2; keep warm  5/    Interventions:   -  monitor  -PRN intervention  -O2  - See additional Care Plan goals for specific interventions  Outcome: Progressing

## 2024-06-02 LAB
GLUCOSE BLD-MCNC: 112 MG/DL (ref 70–99)
GLUCOSE BLD-MCNC: 151 MG/DL (ref 70–99)
GLUCOSE BLD-MCNC: 186 MG/DL (ref 70–99)
GLUCOSE BLD-MCNC: 216 MG/DL (ref 70–99)

## 2024-06-02 PROCEDURE — 99232 SBSQ HOSP IP/OBS MODERATE 35: CPT | Performed by: INTERNAL MEDICINE

## 2024-06-02 RX ORDER — IPRATROPIUM BROMIDE AND ALBUTEROL SULFATE 2.5; .5 MG/3ML; MG/3ML
3 SOLUTION RESPIRATORY (INHALATION)
Status: DISCONTINUED | OUTPATIENT
Start: 2024-06-02 | End: 2024-06-05

## 2024-06-02 NOTE — PLAN OF CARE
Patient is alert and oriented x4. Req 3L at rest and 6L with ambulation. VSS. On lovenox. PO steroids. Up x1 with walker and Surgical shoe. QID accu checks. POC discussed with pt, pt verbalizes understanding.   Problem: SAFETY ADULT - FALL  Goal: Free from fall injury  Description: INTERVENTIONS:  - Assess pt frequently for physical needs  - Identify cognitive and physical deficits and behaviors that affect risk of falls.  - [place] fall precautions as indicated by assessment.  - Educate pt/family on patient safety including physical limitations  - Instruct pt to call for assistance with activity based on assessment  - Modify environment to reduce risk of injury  - Provide assistive devices as appropriate  - Consider OT/PT consult to assist with strengthening/mobility  - Encourage toileting schedule  Outcome: Progressing     Problem: Diabetes/Glucose Control  Goal: Glucose maintained within prescribed range  Description: INTERVENTIONS:  - Monitor Blood Glucose as ordered  - Assess for signs and symptoms of hyperglycemia and hypoglycemia  - Administer ordered medications to maintain glucose within target range  - Assess barriers to adequate nutritional intake and initiate nutrition consult as needed  - Instruct patient on self management of diabetes  Outcome: Progressing     Problem: RESPIRATORY - ADULT  Goal: Achieves optimal ventilation and oxygenation  Description: INTERVENTIONS:  - Assess for changes in respiratory status  - Assess for changes in mentation and behavior  - Position to facilitate oxygenation and minimize respiratory effort  - Oxygen supplementation based on oxygen saturation or ABGs  - Provide Smoking Cessation handout, if applicable  - Encourage broncho-pulmonary hygiene including cough, deep breathe, Incentive Spirometry  - Assess the need for suctioning and perform as needed  - Assess and instruct to report SOB or any respiratory difficulty  - Respiratory Therapy support as indicated  -  Manage/alleviate anxiety  - Monitor for signs/symptoms of CO2 retention  Outcome: Progressing     Problem: Patient/Family Goals  Goal: Patient/Family Long Term Goal  Description: Patient's Long Term Goal: discharge    Interventions:  - Nebs  -Steroids  -MD rounding  - Labs & imaging  - See additional Care Plan goals for specific interventions  Outcome: Progressing  Goal: Patient/Family Short Term Goal  Description: Patient's Short Term Goal:   5/29 noc: rest, sleep, monitor O2  5/30: monitor O2, wean oxygen  5/31: wean O2; keep warm  6/1 noc: wean O2 as zane    Interventions:   -  monitor  -PRN intervention  -O2  - See additional Care Plan goals for specific interventions  Outcome: Progressing

## 2024-06-02 NOTE — PLAN OF CARE
Alert and Oriented x4, reports\" my ribs still  hurt a lot especially when I cough. On 3LNC at rest sats 93%, sats spot checked, very difficult to get pulse ox due top [place],fingers and skin cold. needing fingers warmed with warm washcloths to get a good reading.Will continue to monitor.  Problem: SAFETY ADULT - FALL  Goal: Free from fall injury  Description: INTERVENTIONS:  - Assess pt frequently for physical needs  - Identify cognitive and physical deficits and behaviors that affect risk of falls.  - [place] fall precautions as indicated by assessment.  - Educate pt/family on patient safety including physical limitations  - Instruct pt to call for assistance with activity based on assessment  - Modify environment to reduce risk of injury  - Provide assistive devices as appropriate  - Consider OT/PT consult to assist with strengthening/mobility  - Encourage toileting schedule  Outcome: Progressing     Problem: Diabetes/Glucose Control  Goal: Glucose maintained within prescribed range  Description: INTERVENTIONS:  - Monitor Blood Glucose as ordered  - Assess for signs and symptoms of hyperglycemia and hypoglycemia  - Administer ordered medications to maintain glucose within target range  - Assess barriers to adequate nutritional intake and initiate nutrition consult as needed  - Instruct patient on self management of diabetes  Outcome: Progressing     Problem: RESPIRATORY - ADULT  Goal: Achieves optimal ventilation and oxygenation  Description: INTERVENTIONS:  - Assess for changes in respiratory status  - Assess for changes in mentation and behavior  - Position to facilitate oxygenation and minimize respiratory effort  - Oxygen supplementation based on oxygen saturation or ABGs  - Provide Smoking Cessation handout, if applicable  - Encourage broncho-pulmonary hygiene including cough, deep breathe, Incentive Spirometry  - Assess the need for suctioning and perform as needed  - Assess and instruct to report SOB or  any respiratory difficulty  - Respiratory Therapy support as indicated  - Manage/alleviate anxiety  - Monitor for signs/symptoms of CO2 retention  Outcome: Progressing     Problem: Patient/Family Goals  Goal: Patient/Family Long Term Goal  Description: Patient's Long Term Goal: discharge    Interventions:  - Nebs  -Steroids  -MD rounding  - Labs & imaging  - See additional Care Plan goals for specific interventions  Outcome: Progressing  Goal: Patient/Family Short Term Goal  Description: Patient's Short Term Goal:   5/29 noc: rest, sleep, monitor O2  5/30: monitor O2, wean oxygen  5/31: wean O2; keep warm  6/1 noc: wean O2 as zane    Interventions:   -  monitor  -PRN intervention  -O2  - See additional Care Plan goals for specific interventions  Outcome: Progressing

## 2024-06-02 NOTE — PROGRESS NOTES
[facility]   part of [facility]     Hospitalist Progress Note     Gabi Kumar Patient Status:  Inpatient    1945 MRN [id number]   Location [facility] 5NW-A Attending Minh Daly MD   Hosp Day # 3 PCP No primary care provider on file.     Chief Complaint: Rib pain after fall/SOB    Subjective:     Pt without acute events overnight. Requiring 3L at rest and up to 6L with exertion. Pain somewhat better.       Objective:    Review of Systems:   A comprehensive review of systems was completed; pertinent positive and negatives stated in subjective.    Vital signs:  Temp:  [97.6 °F (36.4 °C)-98.2 °F (36.8 °C)] 97.6 °F (36.4 °C)  Pulse:  [62-81] 66  Resp:  [17-22] 18  BP: (140-149)/(57-60) 140/57  SpO2:  [84 %-94 %] 94 %    Physical Exam:    General: No acute distress, alert and oriented  Respiratory: Crackles, left base  tenderness to palpation near right anterolateral rib cage  3 liters NC   Cardiovascular: S1, S2, regular rate and rhythm  Abdomen: Soft, Non-tender, non-distended  Neuro: No new focal deficits  Extremities: No edema, left great toe has mild bruising, bruising to left hip     Diagnostic Data:    Labs:  Recent Labs   Lab 248 24  0747 24  0949   WBC 6.7 8.6  --    HGB 13.8 12.3  --    MCV 93.5 92.8  --    .0* 149.0*  --    INR  --   --  0.99       Recent Labs   Lab 248 24  0747 24  0546   * 178* 228*   BUN 23 31* 38*   CREATSERUM 1.00 1.15* 1.13*   CA 9.4 9.2 9.1   ALB 3.6 3.0*  --     140 137   K 3.3* 5.1  5.1 5.1    109 106   CO2 24.0 23.0 26.0   ALKPHO 135 122  --    AST 22 16  --    ALT 17 15  --    BILT 0.6 0.5  --    TP 6.7 6.2*  --        Estimated Creatinine Clearance: 34.3 mL/min (A) (based on SCr of 1.13 mg/dL (H)).    No results for input(s): \"TROP\", \"TROPHS\", \"CK\" in the last 168 hours.    Recent Labs   Lab 24  0949   PTP 13.1   INR 0.99                  Microbiology    No results found  for this visit on 05/29/24.      Imaging: Reviewed in Epic.    Medications:    ipratropium-albuterol  3 mL Nebulization 2 times daily    predniSONE  40 mg Oral Daily with breakfast    lidocaine-menthol  1 patch Transdermal Daily    insulin aspart  1-68 Units Subcutaneous TID CC    insulin aspart  1-30 Units Subcutaneous TID AC and HS    enoxaparin  40 mg Subcutaneous Daily    allopurinol  300 mg Oral Daily    DULoxetine  60 mg Oral Daily    pantoprazole  40 mg Oral QAM AC    umeclidinium-vilanterol  1 puff Inhalation Daily    rosuvastatin  10 mg Oral Nightly    metoprolol succinate ER  25 mg Oral Nightly       Assessment & Plan:      #Acute hypoxic respiratory failure  #COPD exacerbation  -on oral steroids   -Nebs  -Declined Incentive spirometry  -Wean O2 as tolerated, currently on 3L needs walk test  weaning, refuses O2 for home      #Rib pain  #Rib Fractures  -repeat CXR with 5,6 and possible 7th rib fractures  -Symptomatic management  -encourage IS  -lidocaine patch not helping, using warm pack   -Add ultram      #Diabetes type 2  -Hyperglycemia protocol/ ISS    -blood glucose 228   steroid effect      #Hypertension  -Metoprolol     #Hyperlipidemia  -Statin     #Gout  -Allopurinol, colchicine    #GERD  -PPI     #Closed displaced fracture of proximal phalanx of left great toe, initial encounter   -PT/OT  -pain control  denies pain     Minh Daly MD      Supplementary Documentation:     Quality:  DVT Mechanical Prophylaxis:   SCDs,    DVT Pharmacologic Prophylaxis   Medication    enoxaparin (Lovenox) 40 MG/0.4ML SUBQ injection 40 mg                Code Status: DNAR/Selective Treatment  Thapa: No urinary catheter in place  Thapa Duration (in days):   Central line:    SARAVANAN:     Discharge is dependent on: clinical course  At this point Ms. Kumar is expected to be discharge to: home     The 21st Century Cures Act makes medical notes like these available to patients in the interest of transparency. Please be  advised this is a medical document. Medical documents are intended to carry relevant information, facts as evident, and the clinical opinion of the practitioner. The medical note is intended as peer to peer communication and may appear blunt or direct. It is written in medical language and may contain abbreviations or verbiage that are unfamiliar.

## 2024-06-03 LAB
GLUCOSE BLD-MCNC: 137 MG/DL (ref 70–99)
GLUCOSE BLD-MCNC: 159 MG/DL (ref 70–99)
GLUCOSE BLD-MCNC: 224 MG/DL (ref 70–99)
GLUCOSE BLD-MCNC: 226 MG/DL (ref 70–99)

## 2024-06-03 PROCEDURE — 99232 SBSQ HOSP IP/OBS MODERATE 35: CPT | Performed by: INTERNAL MEDICINE

## 2024-06-03 NOTE — PHYSICAL THERAPY NOTE
PHYSICAL THERAPY TREATMENT NOTE - INPATIENT    Room Number: 520/520-A     Session: 2     Number of Visits to Meet Established Goals: 3    Presenting Problem: SOB, rib pain  Co-Morbidities : COPD, DM2, DDD, essential HTN,    ASSESSMENT   Patient demonstrates fair progress this session, goals  remain in progress.    Patient continues to function near baseline with bed mobility, transfers, and gait.  Contributing factors to remaining limitations include decreased functional strength, decreased endurance/aerobic capacity, pain, and increased O2 needs from baseline.  Next session anticipate patient to progress bed mobility, transfers, gait, and stair negotiation.  Physical Therapy will continue to follow patient for duration of hospitalization.    Patient continues to benefit from continued skilled PT services: at discharge to promote functional independence in home.  Anticipate patient will return home with home health PT.    PLAN  PT Treatment Plan: Bed mobility;Body mechanics;Endurance;Energy conservation;Patient education;Gait training;Stair training;Transfer training  Rehab Potential : Fair  Frequency (Obs):  (2-3x/week)    CURRENT GOALS     Goal #1 Patient is able to ambulate 250 feet with assist device: LRAD at assistance level: supervision   Goal #2 Patient is able to navigate 6 stairs using the railings and assistance level: supervision in order to return home safely.     6/3/2024 all goals ongoing    SUBJECTIVE  \"I have been begging to go on a walk.\"    OBJECTIVE  Precautions: Bed/chair alarm    WEIGHT BEARING RESTRICTION  Weight Bearing Restriction: L lower extremity           L Lower Extremity: Weight Bearing as Tolerated (with post op shoe)    PAIN ASSESSMENT   Ratin  Location: rib pain  Management Techniques: Activity promotion;Body mechanics;Repositioning;Breathing techniques    BALANCE                                                                                                                        Static Sitting: Good  Dynamic Sitting: Good           Static Standing: Fair -  Dynamic Standing: Fair -    ACTIVITY TOLERANCE                         O2 WALK         AM-PAC '6-Clicks' INPATIENT SHORT FORM - BASIC MOBILITY  How much difficulty does the patient currently have...  Patient Difficulty: Turning over in bed (including adjusting bedclothes, sheets and blankets)?: A Little   Patient Difficulty: Sitting down on and standing up from a chair with arms (e.g., wheelchair, bedside commode, etc.): A Little   Patient Difficulty: Moving from lying on back to sitting on the side of the bed?: A Little   How much help from another person does the patient currently need...   Help from Another: Moving to and from a bed to a chair (including a wheelchair)?: A Little   Help from Another: Need to walk in hospital room?: A Little   Help from Another: Climbing 3-5 steps with a railing?: A Little       AM-PAC Score:  Raw Score: 18   Approx Degree of Impairment: 46.58%   Standardized Score (AM-PAC Scale): 43.63   CMS Modifier (G-Code): CK    FUNCTIONAL ABILITY STATUS  Gait Assessment   Functional Mobility/Gait Assessment  Gait Assistance: Supervision  Distance (ft): 150  Assistive Device: Rolling walker  Pattern: Shuffle    Skilled Therapy Provided    Bed Mobility:  Rolling: NT   Supine<>Sit: Min A// pt required cuing for sequencing and handheld to pull herself to EOB. Sit<>Supine: NT     Transfer Mobility:  Sit<>Stand: CGA   Stand<>Sit: supervision   Gait:Pt walked with slowed jose.    Therapist's Comments: Pt was received supine in bed. Pt reports rib pain being 2/10 at rest but increased to 5/10 when moving. Pt required min A x1 to sit at EOB requiring handheld support to pull trunk up. Pt walked with slowed jose using the RW but denied being SOB or dizzy. Pt wore post op shoe when walking and preforming exercises. Pt was educated on deep breathing through exertion to help with pain.       THERAPEUTIC  EXERCISES  Lower Extremity Hip AB/AD  Hip extensions   Upper Extremity      Position Standing while holding onto railing in hallway     Repetitions   10x each leg  10x each leg   Sets   1  1     Patient End of Session: Up in chair;Needs met;Call light within reach;All patient questions and concerns addressed    PT Session Time: 23 minutes    Therapeutic Activity: 13 minutes  Therapeutic Exercise: 10

## 2024-06-03 NOTE — PROGRESS NOTES
[facility]   part of [facility]     Hospitalist Progress Note     Gabi Kumar Patient Status:  Inpatient    1945 MRN [id number]   Location [facility] 5NW-A Attending Minh Daly MD   Hosp Day # 4 PCP No primary care provider on file.     Chief Complaint: Rib pain after fall/SOB    Subjective:     Pt still on O2  feels bored   still hurts to cough , wants to go home       Objective:    Review of Systems:   A comprehensive review of systems was completed; pertinent positive and negatives stated in subjective.    Vital signs:  Temp:  [97.6 °F (36.4 °C)-98.6 °F (37 °C)] 97.6 °F (36.4 °C)  Pulse:  [63-78] 63  Resp:  [20] 20  BP: (124-153)/(65-71) 153/71  SpO2:  [89 %-99 %] 95 %    Physical Exam:    General: No acute distress, alert and oriented  Respiratory: diminished bilat  3 liters NC  1000 IS    Cardiovascular: S1, S2, regular rate and rhythm  Abdomen: Soft, Non-tender, non-distended  Neuro: No new focal deficits  Extremities: No edema, left great toe has mild bruising, bruising to left hip     Diagnostic Data:    Labs:  Recent Labs   Lab 24  1628 24  0747 24  0949   WBC 6.7 8.6  --    HGB 13.8 12.3  --    MCV 93.5 92.8  --    .0* 149.0*  --    INR  --   --  0.99       Recent Labs   Lab 24  0747 24  0546   * 178* 228*   BUN 23 31* 38*   CREATSERUM 1.00 1.15* 1.13*   CA 9.4 9.2 9.1   ALB 3.6 3.0*  --     140 137   K 3.3* 5.1  5.1 5.1    109 106   CO2 24.0 23.0 26.0   ALKPHO 135 122  --    AST 22 16  --    ALT 17 15  --    BILT 0.6 0.5  --    TP 6.7 6.2*  --        Estimated Creatinine Clearance: 34.3 mL/min (A) (based on SCr of 1.13 mg/dL (H)).    No results for input(s): \"TROP\", \"TROPHS\", \"CK\" in the last 168 hours.    Recent Labs   Lab 24  0949   PTP 13.1   INR 0.99                  Microbiology    No results found for this visit on 24.      Imaging: Reviewed in Epic.    Medications:     ipratropium-albuterol  3 mL Nebulization 2 times daily    predniSONE  40 mg Oral Daily with breakfast    lidocaine-menthol  1 patch Transdermal Daily    insulin aspart  1-68 Units Subcutaneous TID CC    insulin aspart  1-30 Units Subcutaneous TID AC and HS    enoxaparin  40 mg Subcutaneous Daily    allopurinol  300 mg Oral Daily    DULoxetine  60 mg Oral Daily    pantoprazole  40 mg Oral QAM AC    umeclidinium-vilanterol  1 puff Inhalation Daily    rosuvastatin  10 mg Oral Nightly    metoprolol succinate ER  25 mg Oral Nightly       Assessment & Plan:      #Acute hypoxic respiratory failure  #COPD exacerbation  -on oral steroids  weaning   -Nebs  -Wean O2 as tolerated, currently on 3L needs walk test  weaning, worried about needing  O2 for home fall risk       #Rib pain  #Rib Fractures  R   -repeat CXR with 5,6 and possible 7th rib fractures  -Symptomatic management  -encourage IS  -lidocaine patch not helping, using warm pack   - ultram      #Diabetes type 2  -Hyperglycemia protocol/ ISS    -blood glucose 228   steroid effect      #Hypertension  -Metoprolol     #Hyperlipidemia  -Statin     #Gout  -Allopurinol, colchicine    #GERD  -PPI     #Closed displaced fracture of proximal phalanx of left great toe, initial encounter   -PT/OT  -pain control  denies pain     Amber Villalba NP       Supplementary Documentation:     Quality:  DVT Mechanical Prophylaxis:   SCDs,    DVT Pharmacologic Prophylaxis   Medication    enoxaparin (Lovenox) 40 MG/0.4ML SUBQ injection 40 mg                Code Status: DNAR/Selective Treatment  Thapa: No urinary catheter in place  Thapa Duration (in days):   Central line:    SARAVANAN:     Discharge is dependent on: clinical course  At this point Ms. Kumar is expected to be discharge to: home     The 21st Century Cures Act makes medical notes like these available to patients in the interest of transparency. Please be advised this is a medical document. Medical documents are intended to carry  relevant information, facts as evident, and the clinical opinion of the practitioner. The medical note is intended as peer to peer communication and may appear blunt or direct. It is written in medical language and may contain abbreviations or verbiage that are unfamiliar.       Addendum: Pt seen on 5/31, late note     Agree with above note except as documented below.  Pt still with pain. Remains on O2. Open to going home on O2.      Gen: NAD  CVS: s1s2  Resp: CTA  Abd: soft     #Rib Pain  #COPD  #DM  #Ess HTN     -continue inhalers and steroids  -wean O2 as tolerated           Pt seen and examined independently. Chart reviewed. Labs and imaging over the last 24 hours have been personally reviewed.  I personally made/approved 100% of the management plan for this patient and take full responsibility for the patient management.   Note has been reviewed by me and modified as needed.  Exam and Impression/ Recs as noted above.  D/w staff.     Minh Daly MD

## 2024-06-03 NOTE — PLAN OF CARE
Oxygen Walk results:      SPO2% on Room Air at Rest: 84 (06/03/24 1116)  SPO2% on Oxygen at Rest: 92 (06/03/24 1116)  At rest oxygen flow (liters per minute): 3 (06/03/24 1116)    SPO2% Ambulation on Oxygen: 89 (06/03/24 1116)  Ambulation oxygen flow (liters per minute): 4 (06/03/24 1116).

## 2024-06-03 NOTE — PLAN OF CARE
Patient AAOx4. Glasses, hard of hearing. 3L/rest, 4L/ambulation with O2 walk today. Scheduled nebs. No tele. Lovenox. Miralax given for constipation. PRN norco/tramadol for pain. Assist x1 with walker, post op shoe with ambulation. Cane from home in room. QID accucheck, carb controlled diet. Patient rounded on routinely. Patient updated on plan of care.      Problem: SAFETY ADULT - FALL  Goal: Free from fall injury  Description: INTERVENTIONS:  - Assess pt frequently for physical needs  - Identify cognitive and physical deficits and behaviors that affect risk of falls.  - [place] fall precautions as indicated by assessment.  - Educate pt/family on patient safety including physical limitations  - Instruct pt to call for assistance with activity based on assessment  - Modify environment to reduce risk of injury  - Provide assistive devices as appropriate  - Consider OT/PT consult to assist with strengthening/mobility  - Encourage toileting schedule  Outcome: Progressing     Problem: Diabetes/Glucose Control  Goal: Glucose maintained within prescribed range  Description: INTERVENTIONS:  - Monitor Blood Glucose as ordered  - Assess for signs and symptoms of hyperglycemia and hypoglycemia  - Administer ordered medications to maintain glucose within target range  - Assess barriers to adequate nutritional intake and initiate nutrition consult as needed  - Instruct patient on self management of diabetes  Outcome: Progressing     Problem: RESPIRATORY - ADULT  Goal: Achieves optimal ventilation and oxygenation  Description: INTERVENTIONS:  - Assess for changes in respiratory status  - Assess for changes in mentation and behavior  - Position to facilitate oxygenation and minimize respiratory effort  - Oxygen supplementation based on oxygen saturation or ABGs  - Provide Smoking Cessation handout, if applicable  - Encourage broncho-pulmonary hygiene including cough, deep breathe, Incentive Spirometry  - Assess the need for  suctioning and perform as needed  - Assess and instruct to report SOB or any respiratory difficulty  - Respiratory Therapy support as indicated  - Manage/alleviate anxiety  - Monitor for signs/symptoms of CO2 retention  Outcome: Progressing     Problem: Patient/Family Goals  Goal: Patient/Family Long Term Goal  Description: Patient's Long Term Goal: discharge    Interventions:  - Nebs  -Steroids  -MD rounding  - Labs & imaging  - See additional Care Plan goals for specific interventions  Outcome: Progressing  Goal: Patient/Family Short Term Goal  Description: Patient's Short Term Goal:   5/29 noc: rest, sleep, monitor O2  5/30: monitor O2, wean oxygen  5/31: wean O2; keep warm  6/1 noc: wean O2 as zane  6/2 NOC: monitor O2 needs    Interventions:   -  monitor  -PRN intervention  -O2  - See additional Care Plan goals for specific interventions  Outcome: Progressing

## 2024-06-03 NOTE — PLAN OF CARE
Received pt A&Ox4.  on 3L/rest, 6L/walk. Vitals stable. Lovenox for VTE prophylaxis. Tolerating diet, accuchecks QID. Voids via BRP. Up SBA with walker - post op shoe to L foot on when up. Pain management per MAR. Plan of care continues, no further needs at this time.     Problem: SAFETY ADULT - FALL  Goal: Free from fall injury  Description: INTERVENTIONS:  - Assess pt frequently for physical needs  - Identify cognitive and physical deficits and behaviors that affect risk of falls.  - [place] fall precautions as indicated by assessment.  - Educate pt/family on patient safety including physical limitations  - Instruct pt to call for assistance with activity based on assessment  - Modify environment to reduce risk of injury  - Provide assistive devices as appropriate  - Consider OT/PT consult to assist with strengthening/mobility  - Encourage toileting schedule  Outcome: Progressing     Problem: Diabetes/Glucose Control  Goal: Glucose maintained within prescribed range  Description: INTERVENTIONS:  - Monitor Blood Glucose as ordered  - Assess for signs and symptoms of hyperglycemia and hypoglycemia  - Administer ordered medications to maintain glucose within target range  - Assess barriers to adequate nutritional intake and initiate nutrition consult as needed  - Instruct patient on self management of diabetes  Outcome: Progressing     Problem: RESPIRATORY - ADULT  Goal: Achieves optimal ventilation and oxygenation  Description: INTERVENTIONS:  - Assess for changes in respiratory status  - Assess for changes in mentation and behavior  - Position to facilitate oxygenation and minimize respiratory effort  - Oxygen supplementation based on oxygen saturation or ABGs  - Provide Smoking Cessation handout, if applicable  - Encourage broncho-pulmonary hygiene including cough, deep breathe, Incentive Spirometry  - Assess the need for suctioning and perform as needed  - Assess and instruct to report SOB or any respiratory  difficulty  - Respiratory Therapy support as indicated  - Manage/alleviate anxiety  - Monitor for signs/symptoms of CO2 retention  Outcome: Progressing     Problem: Patient/Family Goals  Goal: Patient/Family Long Term Goal  Description: Patient's Long Term Goal: discharge    Interventions:  - Nebs  -Steroids  -MD rounding  - Labs & imaging  - See additional Care Plan goals for specific interventions  Outcome: Progressing  Goal: Patient/Family Short Term Goal  Description: Patient's Short Term Goal:   5/29 noc: rest, sleep, monitor O2  5/30: monitor O2, wean oxygen  5/31: wean O2; keep warm  6/1 noc: wean O2 as zane  6/2 NOC: monitor O2 needs    Interventions:   -  monitor  -PRN intervention  -O2  - See additional Care Plan goals for specific interventions  Outcome: Progressing

## 2024-06-04 LAB
ANION GAP SERPL CALC-SCNC: 6 MMOL/L (ref 0–18)
BASOPHILS # BLD AUTO: 0.02 X10(3) UL (ref 0–0.2)
BASOPHILS NFR BLD AUTO: 0.2 %
BUN BLD-MCNC: 35 MG/DL (ref 9–23)
CALCIUM BLD-MCNC: 9.1 MG/DL (ref 8.5–10.1)
CHLORIDE SERPL-SCNC: 104 MMOL/L (ref 98–112)
CO2 SERPL-SCNC: 29 MMOL/L (ref 21–32)
CREAT BLD-MCNC: 0.82 MG/DL
EGFRCR SERPLBLD CKD-EPI 2021: 72 ML/MIN/1.73M2 (ref 60–?)
EOSINOPHIL # BLD AUTO: 0.11 X10(3) UL (ref 0–0.7)
EOSINOPHIL NFR BLD AUTO: 1.3 %
ERYTHROCYTE [DISTWIDTH] IN BLOOD BY AUTOMATED COUNT: 14.5 %
GLUCOSE BLD-MCNC: 142 MG/DL (ref 70–99)
GLUCOSE BLD-MCNC: 175 MG/DL (ref 70–99)
GLUCOSE BLD-MCNC: 176 MG/DL (ref 70–99)
GLUCOSE BLD-MCNC: 194 MG/DL (ref 70–99)
GLUCOSE BLD-MCNC: 205 MG/DL (ref 70–99)
HCT VFR BLD AUTO: 42.6 %
HGB BLD-MCNC: 14 G/DL
IMM GRANULOCYTES # BLD AUTO: 0.07 X10(3) UL (ref 0–1)
IMM GRANULOCYTES NFR BLD: 0.8 %
LYMPHOCYTES # BLD AUTO: 1.16 X10(3) UL (ref 1–4)
LYMPHOCYTES NFR BLD AUTO: 13.2 %
MCH RBC QN AUTO: 29.4 PG (ref 26–34)
MCHC RBC AUTO-ENTMCNC: 32.9 G/DL (ref 31–37)
MCV RBC AUTO: 89.5 FL
MONOCYTES # BLD AUTO: 1.06 X10(3) UL (ref 0.1–1)
MONOCYTES NFR BLD AUTO: 12.1 %
NEUTROPHILS # BLD AUTO: 6.35 X10 (3) UL (ref 1.5–7.7)
NEUTROPHILS # BLD AUTO: 6.35 X10(3) UL (ref 1.5–7.7)
NEUTROPHILS NFR BLD AUTO: 72.4 %
OSMOLALITY SERPL CALC.SUM OF ELEC: 300 MOSM/KG (ref 275–295)
PLATELET # BLD AUTO: 155 10(3)UL (ref 150–450)
POTASSIUM SERPL-SCNC: 4.1 MMOL/L (ref 3.5–5.1)
RBC # BLD AUTO: 4.76 X10(6)UL
SODIUM SERPL-SCNC: 139 MMOL/L (ref 136–145)
WBC # BLD AUTO: 8.8 X10(3) UL (ref 4–11)

## 2024-06-04 PROCEDURE — 99232 SBSQ HOSP IP/OBS MODERATE 35: CPT | Performed by: INTERNAL MEDICINE

## 2024-06-04 NOTE — PROGRESS NOTES
A+O x 4. Gave PRN med for pain. Consult to pulm for continued O2 need. QID accucheck and insulin as ordered. Ambulates CGA with a RW. Call light within reach. All needs met.

## 2024-06-04 NOTE — PLAN OF CARE
AOX4, vss, afebrile. 3L rest 4L with ambulation. RA BL. No tele. Lovenox. QID accucheck. Pain management, see MAR. PO prednisone, nebs. Up sb with walker, post op shoe to left ft when ambulating. Updated pt on poc for NOC. No acute events tonight. Will follow.    Problem: SAFETY ADULT - FALL  Goal: Free from fall injury  Description: INTERVENTIONS:  - Assess pt frequently for physical needs  - Identify cognitive and physical deficits and behaviors that affect risk of falls.  - [place] fall precautions as indicated by assessment.  - Educate pt/family on patient safety including physical limitations  - Instruct pt to call for assistance with activity based on assessment  - Modify environment to reduce risk of injury  - Provide assistive devices as appropriate  - Consider OT/PT consult to assist with strengthening/mobility  - Encourage toileting schedule  Outcome: Progressing     Problem: Diabetes/Glucose Control  Goal: Glucose maintained within prescribed range  Description: INTERVENTIONS:  - Monitor Blood Glucose as ordered  - Assess for signs and symptoms of hyperglycemia and hypoglycemia  - Administer ordered medications to maintain glucose within target range  - Assess barriers to adequate nutritional intake and initiate nutrition consult as needed  - Instruct patient on self management of diabetes  Outcome: Progressing     Problem: RESPIRATORY - ADULT  Goal: Achieves optimal ventilation and oxygenation  Description: INTERVENTIONS:  - Assess for changes in respiratory status  - Assess for changes in mentation and behavior  - Position to facilitate oxygenation and minimize respiratory effort  - Oxygen supplementation based on oxygen saturation or ABGs  - Provide Smoking Cessation handout, if applicable  - Encourage broncho-pulmonary hygiene including cough, deep breathe, Incentive Spirometry  - Assess the need for suctioning and perform as needed  - Assess and instruct to report SOB or any respiratory  difficulty  - Respiratory Therapy support as indicated  - Manage/alleviate anxiety  - Monitor for signs/symptoms of CO2 retention  Outcome: Progressing     Problem: Patient/Family Goals  Goal: Patient/Family Long Term Goal  Description: Patient's Long Term Goal: discharge    Interventions:  - Nebs  -Steroids  -MD rounding  - Labs & imaging  - See additional Care Plan goals for specific interventions  Outcome: Progressing  Goal: Patient/Family Short Term Goal  Description: Patient's Short Term Goal:   5/29 noc: rest, sleep, monitor O2  5/30: monitor O2, wean oxygen  5/31: wean O2; keep warm  6/1 noc: wean O2 as zane  6/2 NOC: monitor O2 needs    Interventions:   -  monitor  -PRN intervention  -O2  - See additional Care Plan goals for specific interventions  Outcome: Progressing

## 2024-06-04 NOTE — PROGRESS NOTES
[facility]   part of [facility]     Hospitalist Progress Note     Gabi Kumar Patient Status:  Inpatient    1945 MRN [id number]   Location [facility] 5NW-A Attending Minh Daly MD   Hosp Day # 5 PCP No primary care provider on file.     Chief Complaint: Rib pain after fall/SOB    Subjective:   Feels cough better  pain lessening  Needes  4  l to walk today   Had Bm today       Objective:    Review of Systems:   A comprehensive review of systems was completed; pertinent positive and negatives stated in subjective.    Vital signs:  Temp:  [97.7 °F (36.5 °C)] 97.7 °F (36.5 °C)  Pulse:  [70-90] 90  Resp:  [18-20] 18  BP: (133-151)/(69-82) 151/82  SpO2:  [90 %-95 %] 90 %    Physical Exam:    General: No acute distress, alert and oriented  Respiratory: diminished bilat - but better   3 liters NC  1000 IS    Cardiovascular: S1, S2, regular rate and rhythm  Abdomen: Soft, Non-tender, non-distended  Neuro: No new focal deficits  Extremities: No edema, left great toe has mild bruising, shoe on      Diagnostic Data:    Labs:  Recent Labs   Lab 24  1628 24  0747 24  0949 24  0515   WBC 6.7 8.6  --  8.8   HGB 13.8 12.3  --  14.0   MCV 93.5 92.8  --  89.5   .0* 149.0*  --  155.0   INR  --   --  0.99  --        Recent Labs   Lab 24  1628 24  0747 24  0546 24  0515   * 178* 228* 176*   BUN 23 31* 38* 35*   CREATSERUM 1.00 1.15* 1.13* 0.82   CA 9.4 9.2 9.1 9.1   ALB 3.6 3.0*  --   --     140 137 139   K 3.3* 5.1  5.1 5.1 4.1    109 106 104   CO2 24.0 23.0 26.0 29.0   ALKPHO 135 122  --   --    AST 22 16  --   --    ALT 17 15  --   --    BILT 0.6 0.5  --   --    TP 6.7 6.2*  --   --        Estimated Creatinine Clearance: 47.3 mL/min (based on SCr of 0.82 mg/dL).    No results for input(s): \"TROP\", \"TROPHS\", \"CK\" in the last 168 hours.    Recent Labs   Lab 24  0949   PTP 13.1   INR 0.99            Microbiology    No  results found for this visit on 05/29/24.      Imaging: Reviewed in Epic.    Medications:    ipratropium-albuterol  3 mL Nebulization 2 times daily    predniSONE  40 mg Oral Daily with breakfast    lidocaine-menthol  1 patch Transdermal Daily    insulin aspart  1-68 Units Subcutaneous TID CC    insulin aspart  1-30 Units Subcutaneous TID AC and HS    enoxaparin  40 mg Subcutaneous Daily    allopurinol  300 mg Oral Daily    DULoxetine  60 mg Oral Daily    pantoprazole  40 mg Oral QAM AC    umeclidinium-vilanterol  1 puff Inhalation Daily    rosuvastatin  10 mg Oral Nightly    metoprolol succinate ER  25 mg Oral Nightly       Assessment & Plan:      #Acute hypoxic respiratory failure  #COPD exacerbation  -on oral steroids  weaning   -Nebs  -Wean O2 as tolerated, currently on 3L Walk test  needs 4 liters NC   Doesn't want to go home w/    O2 for home fall risk    Consult pulm- follows w/ Dr Maya      #Rib pain  lessening   #Rib Fractures  R   -repeat CXR with 5,6 and possible 7th rib fractures  -Symptomatic management  -encourage IS  -lidocaine patch not helping, using warm pack   - ultram      #Diabetes type 2  -Hyperglycemia protocol/ ISS       #Hypertension  -Metoprolol     #Hyperlipidemia  -Statin     #Gout  -Allopurinol, colchicine    #GERD  -PPI     #Closed displaced fracture of proximal phalanx of left great toe, initial encounter   -PT/OT  -pain control  denies pain     Amber Villalba NP       Supplementary Documentation:     Quality:  DVT Mechanical Prophylaxis:   SCDs,    DVT Pharmacologic Prophylaxis   Medication    enoxaparin (Lovenox) 40 MG/0.4ML SUBQ injection 40 mg                Code Status: DNAR/Selective Treatment  Thapa: No urinary catheter in place  Thapa Duration (in days):   Central line:    SARAVANAN:     Discharge is dependent on: clinical course  At this point Ms. Kumar is expected to be discharge to: home     The 21st Century Cures Act makes medical notes like these available to patients in the  interest of transparency. Please be advised this is a medical document. Medical documents are intended to carry relevant information, facts as evident, and the clinical opinion of the practitioner. The medical note is intended as peer to peer communication and may appear blunt or direct. It is written in medical language and may contain abbreviations or verbiage that are unfamiliar.       Addendum: Pt seen on 5/31, late note     Agree with above note except as documented below.  Pt still with pain. Remains on O2. Open to going home on O2.      Gen: NAD  CVS: s1s2  Resp: CTA  Abd: soft     #Rib Pain  #COPD  #DM  #Ess HTN     -continue inhalers and steroids  -wean O2 as tolerated  -appreciate pulm recs           Pt seen and examined independently. Chart reviewed. Labs and imaging over the last 24 hours have been personally reviewed.  I personally made/approved 100% of the management plan for this patient and take full responsibility for the patient management.   Note has been reviewed by me and modified as needed.  Exam and Impression/ Recs as noted above.  D/w staff.     Minh Daly MD

## 2024-06-05 VITALS
HEIGHT: 64 IN | WEIGHT: 180 LBS | RESPIRATION RATE: 20 BRPM | OXYGEN SATURATION: 90 % | SYSTOLIC BLOOD PRESSURE: 121 MMHG | TEMPERATURE: 98 F | BODY MASS INDEX: 30.73 KG/M2 | HEART RATE: 75 BPM | DIASTOLIC BLOOD PRESSURE: 64 MMHG

## 2024-06-05 LAB
ARTERIAL PATENCY WRIST A: POSITIVE
BASE EXCESS BLDA CALC-SCNC: 5.8 MMOL/L (ref ?–2)
BODY TEMPERATURE: 98.6 F
COHGB MFR BLD: 2.1 % SAT (ref 0–3)
FIO2: 21 %
GLUCOSE BLD-MCNC: 113 MG/DL (ref 70–99)
GLUCOSE BLD-MCNC: 159 MG/DL (ref 70–99)
GLUCOSE BLD-MCNC: 215 MG/DL (ref 70–99)
HCO3 BLDA-SCNC: 28.8 MEQ/L (ref 21–27)
HGB BLD-MCNC: 14.2 G/DL
METHGB MFR BLD: 0.5 % SAT (ref 0.4–1.5)
OXYHGB MFR BLDA: 73.6 % (ref 92–100)
P/F RATIO: 195 MMHG
PCO2 BLDA: 40 MM HG (ref 35–45)
PH BLDA: 7.48 [PH] (ref 7.35–7.45)
PO2 BLDA: 41 MM HG (ref 80–100)

## 2024-06-05 PROCEDURE — 99223 1ST HOSP IP/OBS HIGH 75: CPT | Performed by: INTERNAL MEDICINE

## 2024-06-05 RX ORDER — TRAMADOL HYDROCHLORIDE 50 MG/1
50 TABLET ORAL EVERY 6 HOURS PRN
Qty: 20 TABLET | Refills: 0 | Status: SHIPPED | OUTPATIENT
Start: 2024-06-05

## 2024-06-05 RX ORDER — PREDNISONE 20 MG/1
TABLET ORAL
Qty: 10 TABLET | Refills: 0 | Status: SHIPPED | OUTPATIENT
Start: 2024-06-06

## 2024-06-05 RX ORDER — HYDROCODONE BITARTRATE AND ACETAMINOPHEN 5; 325 MG/1; MG/1
1 TABLET ORAL EVERY 6 HOURS PRN
Qty: 20 TABLET | Refills: 0 | Status: SHIPPED | OUTPATIENT
Start: 2024-06-05

## 2024-06-05 NOTE — PLAN OF CARE
Pt A&OX4. Received on 3.5L sating WNL. Will wean down as tolerated. Needs 4L with ambulation. Scheduled nebs. Q8 vitals. C/o rib pain - See MAR. QID accuchecks. Voids. BRP. Up standby. PIV SL. Boot to L foot.  Pt updated on plan of care and verbalized understanding. Call light within reach. All needs met at this time. Will follow.     Problem: SAFETY ADULT - FALL  Goal: Free from fall injury  Description: INTERVENTIONS:  - Assess pt frequently for physical needs  - Identify cognitive and physical deficits and behaviors that affect risk of falls.  - [place] fall precautions as indicated by assessment.  - Educate pt/family on patient safety including physical limitations  - Instruct pt to call for assistance with activity based on assessment  - Modify environment to reduce risk of injury  - Provide assistive devices as appropriate  - Consider OT/PT consult to assist with strengthening/mobility  - Encourage toileting schedule  Outcome: Progressing     Problem: Diabetes/Glucose Control  Goal: Glucose maintained within prescribed range  Description: INTERVENTIONS:  - Monitor Blood Glucose as ordered  - Assess for signs and symptoms of hyperglycemia and hypoglycemia  - Administer ordered medications to maintain glucose within target range  - Assess barriers to adequate nutritional intake and initiate nutrition consult as needed  - Instruct patient on self management of diabetes  Outcome: Progressing     Problem: RESPIRATORY - ADULT  Goal: Achieves optimal ventilation and oxygenation  Description: INTERVENTIONS:  - Assess for changes in respiratory status  - Assess for changes in mentation and behavior  - Position to facilitate oxygenation and minimize respiratory effort  - Oxygen supplementation based on oxygen saturation or ABGs  - Provide Smoking Cessation handout, if applicable  - Encourage broncho-pulmonary hygiene including cough, deep breathe, Incentive Spirometry  - Assess the need for suctioning and perform as  needed  - Assess and instruct to report SOB or any respiratory difficulty  - Respiratory Therapy support as indicated  - Manage/alleviate anxiety  - Monitor for signs/symptoms of CO2 retention  Outcome: Progressing     Problem: Patient/Family Goals  Goal: Patient/Family Long Term Goal  Description: Patient's Long Term Goal: discharge    Interventions:  - Nebs  -Steroids  -MD rounding  - Labs & imaging  - See additional Care Plan goals for specific interventions  Outcome: Progressing  Goal: Patient/Family Short Term Goal  Description: Patient's Short Term Goal:   5/29 noc: rest, sleep, monitor O2  5/30: monitor O2, wean oxygen  5/31: wean O2; keep warm  6/1 noc: wean O2 as zane  6/2 NOC: monitor O2 needs  6/4: O2 walk, wean O2 as tolerated  6/4 NOC: sleep, wean O2 as tolerated    Interventions:   -  monitor  -PRN intervention  -O2  - See additional Care Plan goals for specific interventions  Outcome: Progressing

## 2024-06-05 NOTE — CONSULTS
[facility] Pulmonary Consult Note       Gabi Kumar Patient Status:  Inpatient    1945 MRN [id number]   Location [facility] 5NW-A Attending Karen Jose MD   Hosp Day # 6 PCP No primary care provider on file.     Reason for Consultation:  Hypoxic respiratory failure, broken ribs    History of Present Illness:  Gabi Kumar is a a(n) 78 year old female with history of COPD, interstitial lung disease/pulmonary fibrosis, chronic respiratory failure known to Dr. Maya who presents for worsening shortness of breath and after a fall.  Workup thus far has shown broken right fifth, sixth, and possibly seventh rib fracture.  Patient reports no significant dyspnea, mostly pain over affected right side.  While being worked up, patient was noted to be hypoxic, requiring 4 L.    History:  Past Medical History:    Back problem    Cataract    Cholelithiasis    DIABETES    Diabetes (HCC)    Diverticulitis    suggestion of possible wall thickening involving sigmoid, possible mild diverticulitis    Diverticulosis    Essential hypertension    Gallbladder disease    thickened gallbladder with distended bile duct    High blood pressure    High cholesterol    HYPERLIPIDEMIA    Hyperlipidemia    IBS (irritable bowel syndrome)    Osteoarthritis    hips, back and knees    Thyroid disease    Visual impairment    reading glasses     Past Surgical History:   Procedure Laterality Date    Carpal tunnel release      Colonoscopy  ,     Diverticulosis    Colonoscopy      decreased anal sphincter tone, diverticulosis, internal hemorrhoids, normal random colon biopsies    Colonoscopy N/A 2017    Procedure: COLONOSCOPY;  Surgeon: Garrett Moody MD;  Location:  ENDOSCOPY    Other      Carpel tnnel right hand    Other surgical history      Fatty tumor from neck    Other surgical history      Arthroscopy right knee    Other surgical history      Carpal tunnel both hand    Other surgical  history  12/22/2020    Cystoscopy, Dr Chun     Family History   Problem Relation Age of Onset    Cancer Other         Stomach cancer    Heart Disease Father     Hypertension Father     High Blood Pressure Father     Diabetes Father     Heart Disease Mother     Hypertension Mother     Lung Disorder Mother         Pulmonary fibrosis      reports that she quit smoking about 9 years ago. Her smoking use included cigarettes. She started smoking about 59 years ago. She has a 25 pack-year smoking history. She has never used smokeless tobacco. She reports that she does not drink alcohol and does not use drugs.    Allergies:  Allergies   Allergen Reactions    Pcn [Bicillin C-R,] SWELLING     itching    Levofloxacin MYALGIA     Severe muscle and bone pain    Penicillins OTHER (SEE COMMENTS)       Medications:    Current Facility-Administered Medications:     ipratropium-albuterol (Duoneb) 0.5-2.5 (3) MG/3ML inhalation solution 3 mL, 3 mL, Nebulization, 2 times daily    predniSONE (Deltasone) tab 40 mg, 40 mg, Oral, Daily with breakfast    HYDROcodone-acetaminophen (Norco) 5-325 MG per tab 1 tablet, 1 tablet, Oral, Q6H PRN    traMADol (Ultram) tab 50 mg, 50 mg, Oral, Q6H PRN    lidocaine-menthol 4-1 % patch 1 patch, 1 patch, Transdermal, Daily    metoclopramide (Reglan) 5 mg/mL injection 5 mg, 5 mg, Intravenous, Q8H PRN    insulin aspart (NovoLOG) 100 Units/mL FlexPen 1-68 Units, 1-68 Units, Subcutaneous, TID CC    glucose (Dex4) 15 GM/59ML oral liquid 15 g, 15 g, Oral, Q15 Min PRN **OR** glucose (Glutose) 40% oral gel 15 g, 15 g, Oral, Q15 Min PRN **OR** glucose-vitamin C (Dex-4) chewable tab 4 tablet, 4 tablet, Oral, Q15 Min PRN **OR** dextrose 50% injection 50 mL, 50 mL, Intravenous, Q15 Min PRN **OR** glucose (Dex4) 15 GM/59ML oral liquid 30 g, 30 g, Oral, Q15 Min PRN **OR** glucose (Glutose) 40% oral gel 30 g, 30 g, Oral, Q15 Min PRN **OR** glucose-vitamin C (Dex-4) chewable tab 8 tablet, 8 tablet, Oral, Q15 Min PRN     insulin aspart (NovoLOG) 100 Units/mL FlexPen 1-30 Units, 1-30 Units, Subcutaneous, TID AC and HS    acetaminophen (Tylenol Extra Strength) tab 500 mg, 500 mg, Oral, Q4H PRN    melatonin tab 3 mg, 3 mg, Oral, Nightly PRN    guaiFENesin ER (Mucinex) 12 hr tab 600 mg, 600 mg, Oral, BID PRN    benzonatate (Tessalon) cap 200 mg, 200 mg, Oral, TID PRN    glycerin-hypromellose- (Artificial Tears) 0.2-0.2-1 % ophthalmic solution 1 drop, 1 drop, Both Eyes, QID PRN    sodium chloride (Saline Mist) 0.65 % nasal solution 1 spray, 1 spray, Each Nare, Q3H PRN    enoxaparin (Lovenox) 40 MG/0.4ML SUBQ injection 40 mg, 40 mg, Subcutaneous, Daily    ondansetron (Zofran) 4 MG/2ML injection 4 mg, 4 mg, Intravenous, Q6H PRN    polyethylene glycol (PEG 3350) (Miralax) 17 g oral packet 17 g, 17 g, Oral, Daily PRN    sennosides (Senokot) tab 17.2 mg, 17.2 mg, Oral, Nightly PRN    bisacodyl (Dulcolax) 10 MG rectal suppository 10 mg, 10 mg, Rectal, Daily PRN    fleet enema (Fleet) 7-19 GM/118ML rectal enema 133 mL, 1 enema, Rectal, Once PRN    ipratropium-albuterol (Duoneb) 0.5-2.5 (3) MG/3ML inhalation solution 3 mL, 3 mL, Nebulization, Q4H PRN    allopurinol (Zyloprim) tab 300 mg, 300 mg, Oral, Daily    clonazePAM (KlonoPIN) tab 1 mg, 1 mg, Oral, Nightly PRN    DULoxetine (Cymbalta) DR cap 60 mg, 60 mg, Oral, Daily    pantoprazole (Protonix) DR tab 40 mg, 40 mg, Oral, QAM AC    umeclidinium-vilanterol (Anoro Ellipta) 62.5-25 MCG/ACT inhaler 1 puff, 1 puff, Inhalation, Daily    rosuvastatin (Crestor) tab 10 mg, 10 mg, Oral, Nightly    metoprolol succinate ER (Toprol XL) 24 hr tab 25 mg, 25 mg, Oral, Nightly    Review of Systems:   All other review of systems are negative.    Vital signs in last 24 hours:  Patient Vitals for the past 24 hrs:   BP Temp Temp src Pulse Resp SpO2   06/05/24 0921 -- -- -- 81 -- 96 %   06/05/24 0815 -- -- -- -- -- 93 %   06/05/24 0757 156/76 98 °F (36.7 °C) Oral 75 18 (!) 84 %   06/05/24 0529 -- -- -- --  -- 95 %   06/05/24 0426 143/69 98 °F (36.7 °C) Oral 74 20 90 %   06/05/24 0041 -- -- -- 73 -- 94 %   06/04/24 2339 134/67 98 °F (36.7 °C) Oral 85 20 94 %   06/04/24 2154 -- -- -- -- -- 96 %   06/04/24 2016 -- -- -- -- -- 95 %   06/04/24 1949 122/64 -- -- 85 -- 91 %   06/04/24 1922 101/58 98 °F (36.7 °C) Oral 86 20 94 %       Intake/Output:  No intake or output data in the 24 hours ending 06/05/24 1111    Physical Exam:   General: alert, cooperative, oriented.  No respiratory distress.   Head: Normocephalic, without obvious abnormality, atraumatic.   Eyes: Conjunctivae/corneas clear.  No scleral icterus.  No conjunctival     hemorrhage.   Nose: Nares normal.   Throat: Lips, mucosa, and tongue normal.  No thrush noted.   Neck: Soft, supple neck; trachea midline, no adenopathy, no thyromegaly.   Lungs: clear to auscultation bilaterally   Chest wall: No tenderness or deformity.   Heart: Regular rate and rhythm, normal S1S2, no murmur.   Abdomen: soft, non-tender, non-distended, no masses, no guarding, no     rebound, positive BS.   Extremity: no edema, no cyanosis   Skin: No rashes or lesions.   Neurological: Alert, interactive, no focal deficits    Lab Data Review:  Recent Labs   Lab 05/29/24 1628 05/30/24  0747 06/04/24  0515   WBC 6.7 8.6 8.8   HGB 13.8 12.3 14.0   HCT 44.4 40.2 42.6   .0* 149.0* 155.0       Recent Labs   Lab 05/29/24 1628 05/30/24  0747 05/31/24  0546 06/04/24  0515    140 137 139   K 3.3* 5.1  5.1 5.1 4.1    109 106 104   CO2 24.0 23.0 26.0 29.0   BUN 23 31* 38* 35*   ALT 17 15  --   --    AST 22 16  --   --        No results for input(s): \"MG\" in the last 168 hours.    No results found for: \"PHOS\", \"PHOSPHORUS\"     Recent Labs   Lab 06/01/24  0949   INR 0.99       Recent Labs   Lab 06/05/24  1041   ABGPHT 7.48*   [id number] 40   YFQIB6K 41*   ABGHCO3 28.8*   ABGBE 5.8*   TEMP 98.6   HERMELINDO Positive   SITE Left Radial   DEV Room Air   THGB 14.2       Invalid input(s): \"CKTOTAL\",  \"TROPONINI\", \"TROPONINT\", \"CKMBINDEX\"      Cultures:   No results found for this visit on 05/29/24.    Radiology:  XR RIBS WITH CHEST (3 VIEWS), RIGHT  (CPT=71101)  Narrative: PROCEDURE:  XR RIBS WITH CHEST (3 VIEWS), RIGHT  (CPT=71101)     TECHNIQUE:  PA Chest and three views of the ribs were obtained     COMPARISON:  [facility] , XR, XR RIBS WITH CHEST (3 VIEWS), LEFT  (CPT=71101), 5/29/2024, 1:44 PM.     INDICATIONS:  pain fall     PATIENT STATED HISTORY: (As transcribed by Technologist)           FINDINGS:    LUNGS:  Chronic interstitial changes are noted in the lungs.  No focal consolidation or pneumothorax.  CARDIAC:  Normal size cardiac silhouette.  MEDIASTINUM:  Normal.  PLEURA:  Trace bilateral effusions cannot be excluded.  BONES:  Acute right 5th and 6th rib fractures are noted.  Questionable right 7th rib fracture is noted  SOFT TISSUES:  Negative.                   Impression: CONCLUSION:  Acute right 5th and 6th rib fractures with questionable right 7th rib fracture.        LOCATION:  [facility]              Dictated by (CST): Deep Morales MD on 5/31/2024 at 7:29 PM       Finalized by (CST): Deep Morales MD on 5/31/2024 at 7:30 PM         Assessment:  Chronic respiratory failure secondary to COPD as well as pulmonary fibrosis  Acute fifth/sixth/possibly seventh rib fracture  Interstitial lung disease with restrictive ventilatory defect, with decreased DLCO out of portion to obstruction  Pulmonary hypertension, likely group 2 versus 3  Tobacco abuse      Plan:  Patient is on her baseline oxygen requirements, she has had oxygen in the past and has discontinued it on her own AGAINST MEDICAL ADVICE.  We held an extensive conversation today at the bedside regarding needs for wearing oxygen when is required.  I furthermore explained that she may not necessarily feel short of breath but she could still be hypoxic and not wearing oxygen when it is necessary can lead to significant morbidity and even death  Patient  furthermore explained that she is very clumsy and she will tripped over her oxygen tubing so she adamantly does not want to wear it even if it means that she has a sooner death  I have checked an ABG to confirm that she is indeed hypoxic and this is the case with a pO2 of 40 on room air  I discussed the need for oxygen repeatedly with her and she adamantly refuses to wear it as an outpatient  Focus on pain control for broken ribs, incentive spirometry and pain control to prevent splinting  From my perspective, patient is okay for discharge as her oxygen requirements are at her established baseline from previous hospitalizations  Patient has follow-up with Dr. Maya at the end of this month  Continue Anoro with DuoNebs as needed  Can move forward with quick prednisone taper  Will follow while in hospital    Thank you for the consultation.  Will follow with you.    Antoine Garza MD  [facility]  Office: [phone]

## 2024-06-05 NOTE — PROGRESS NOTES
[facility]   part of [facility]     Hospitalist Progress Note     Gabi Kumar Patient Status:  Inpatient    1945 MRN [id number]   Location [facility] 5NW-A Attending Karen Jose MD    Hosp Day # 6 PCP No primary care provider on file.     Chief Complaint: Rib pain after fall/SOB    Subjective:     Pt c/o rib pain  - says meds help for a time, her butt hurts  also   \" Watched my mother die from pulm HTN \"   adamantly refusing home o2 - have spoken w/ her son margaret and RANJEET Rivera on phone  - shared the ABG results and she is at a high risk of possible death 2/2 low O2 levels . Margaret will try to discuss w/ his mom- to get her to agree to it. She has refused it in the past        Objective:    Review of Systems:   A comprehensive review of systems was completed; pertinent positive and negatives stated in subjective.    Vital signs:  Temp:  [98 °F (36.7 °C)] 98 °F (36.7 °C)  Pulse:  [73-86] 81  Resp:  [18-20] 18  BP: (101-156)/(58-76) 156/76  SpO2:  [84 %-96 %] 96 %    Physical Exam:    General: No acute distress, alert and oriented  Respiratory: diminished bilat - but better   3 liters NC      Cardiovascular: S1, S2, regular rate and rhythm  Abdomen: Soft, Non-tender, non-distended  Neuro: No new focal deficits  Extremities: No edema, left great toe has mild bruising, shoe on      Diagnostic Data:    Labs:  Recent Labs   Lab 24  0747 24  0949 24  0515   WBC 6.7 8.6  --  8.8   HGB 13.8 12.3  --  14.0   MCV 93.5 92.8  --  89.5   .0* 149.0*  --  155.0   INR  --   --  0.99  --        Recent Labs   Lab 24  16224  0747 24  0546 24  0515   * 178* 228* 176*   BUN 23 31* 38* 35*   CREATSERUM 1.00 1.15* 1.13* 0.82   CA 9.4 9.2 9.1 9.1   ALB 3.6 3.0*  --   --     140 137 139   K 3.3* 5.1  5.1 5.1 4.1    109 106 104   CO2 24.0 23.0 26.0 29.0   ALKPHO 135 122  --   --    AST 22 16  --   --    ALT 17 15  --   --    BILT 0.6  0.5  --   --    TP 6.7 6.2*  --   --        Estimated Creatinine Clearance: 47.3 mL/min (based on SCr of 0.82 mg/dL).    No results for input(s): \"TROP\", \"TROPHS\", \"CK\" in the last 168 hours.    Recent Labs   Lab 06/01/24  0949   PTP 13.1   INR 0.99            Microbiology    No results found for this visit on 05/29/24.      Imaging: Reviewed in Epic.    Medications:    ipratropium-albuterol  3 mL Nebulization 2 times daily    predniSONE  40 mg Oral Daily with breakfast    lidocaine-menthol  1 patch Transdermal Daily    insulin aspart  1-68 Units Subcutaneous TID CC    insulin aspart  1-30 Units Subcutaneous TID AC and HS    enoxaparin  40 mg Subcutaneous Daily    allopurinol  300 mg Oral Daily    DULoxetine  60 mg Oral Daily    pantoprazole  40 mg Oral QAM AC    umeclidinium-vilanterol  1 puff Inhalation Daily    rosuvastatin  10 mg Oral Nightly    metoprolol succinate ER  25 mg Oral Nightly       Assessment & Plan:      #Acute hypoxic respiratory failure  #COPD exacerbation  severe   should be on home O2 refusing    -on oral steroids  weaning   -Nebs has nebs at home  not wheezing   - pulm- follows w/ Dr Zulma Pagna  saw pt this am- we did ABG- discussed results- she is refusing home O2 - cleared for dc and aware of risk      #Rib pain  lessening   #Rib Fractures  R   -repeat CXR with 5,6 and possible 7th rib fractures  -Symptomatic management  -encourage IS  -norco prn  ordered for dc   - ultram  prn ordered prn dc      #Diabetes type 2  -Hyperglycemia protocol/ ISS       #Hypertension  -Metoprolol     #Hyperlipidemia  -Statin     #Gout  -Allopurinol, colchicine    #GERD  -PPI     #Closed displaced fracture of proximal phalanx of left great toe, initial encounter   -PT/OT  -pain control  denies pain     POC   ABG  on RA  po2 41 only   needs O2 at home    Refusing - discussed w/ son/ DIL. He will try to get her to agree. Will send pxs in   Dc w/ weaning of steroids   F/u w/ Dr Maya and PCP       1500   spoke w/ pt again- now willing to get home O2   I had a face to face visit with this patient and I evaluated the patient's O2 saturations. The patient is mobile in their home and is in need of a portable oxygen tank. The home oxygen will improve the patient's condition.     Amber Villalba NP       Supplementary Documentation:     Quality:  DVT Mechanical Prophylaxis:   SCDs,    DVT Pharmacologic Prophylaxis   Medication    enoxaparin (Lovenox) 40 MG/0.4ML SUBQ injection 40 mg                Code Status: DNAR/Selective Treatment  Thapa: No urinary catheter in place  Thapa Duration (in days):   Central line:    SARAVANAN:     Discharge is dependent on: clinical course  At this point Ms. Kumar is expected to be discharge to: home     The 21st Century Cures Act makes medical notes like these available to patients in the interest of transparency. Please be advised this is a medical document. Medical documents are intended to carry relevant information, facts as evident, and the clinical opinion of the practitioner. The medical note is intended as peer to peer communication and may appear blunt or direct. It is written in medical language and may contain abbreviations or verbiage that are unfamiliar.

## 2024-06-05 NOTE — PROGRESS NOTES
NURSING DISCHARGE NOTE    Discharged Home via Wheelchair.  Accompanied by Family member  Belongings Taken by patient/family.    Went over AVS with patient prior to discharge. Educated on importance of O2 use at home, patient sent home with portable O2. Removed IV prior to discharge. All belongings sent home with patient.

## 2024-06-05 NOTE — PLAN OF CARE
Oxygen Walk results:      SPO2% on Room Air at Rest: 87 (06/05/24 1532)  SPO2% on Oxygen at Rest: 90 (06/05/24 1532)  At rest oxygen flow (liters per minute): 2 (06/05/24 1532)  SPO2% Ambulation on Room Air: 72 (06/05/24 1532)  SPO2% Ambulation on Oxygen: 91 (06/05/24 1532)  Ambulation oxygen flow (liters per minute): 4 (06/05/24 1532).

## 2024-06-05 NOTE — PROGRESS NOTES
06/04/24 2110   Mobility   O2 walk? Yes   SPO2% on Oxygen at Rest 98   At rest oxygen flow (liters per minute) 3.5   SPO2% Ambulation on Room Air 87   SPO2% Ambulation on Oxygen 93   Ambulation oxygen flow (liters per minute) 4

## 2024-06-05 NOTE — CM/SW NOTE
Informed by APRN that patient is agreeable with home oxygen, sent referral in aidin.    MD to document AFTER O2 sats  I had a face to face visit with this patient and I evaluated the patient's O2 saturations.  The patient is mobile in their home and is in need of a portable oxygen tank.  The home oxygen will improve the patient's condition.    SW/CM to remain available for support and/or discharge planning.    Addendum 4:10pm: Spoke with Debbie from [facility] who confirmed home oxygen approved. She will follow up with pt/family regarding delivery. Spoke with RT to dispense portable tank.       DAVID Wright  Discharge Planner  [phone]

## 2024-06-05 NOTE — DIETARY NOTE
[facility]   part of [facility]   CLINICAL NUTRITION    Gabi Kumar admitted on 5/29 presents with COPD exacerbation.    PMH: Cholelithiasis, Diabetes, Diverticulitis, Diverticulosis, Essential hypertension, Gallbladder disease, High blood pressure, Hyperlipidemia, IBS, Osteoarthritis, Thyroid disease    Admitting diagnosis:  Hypoxia [R09.02]  COPD exacerbation (HCC) [J44.1]  Closed displaced fracture of proximal phalanx of left great toe, initial encounter [S92.412A]  Contusion of rib on left side, initial encounter [S20.212A]    Ht: 162.6 cm (5' 4\")  Wt: 81.6 kg (180 lb).   Body mass index is 30.9 kg/m².    Wt Readings from Last 6 Encounters:   05/29/24 81.6 kg (180 lb)   04/29/24 84.8 kg (187 lb)   04/15/24 84.8 kg (187 lb)   02/14/24 85.2 kg (187 lb 12.8 oz)   12/12/23 89.4 kg (197 lb)   10/23/23 90.3 kg (199 lb)        Labs/Meds reviewed    Diet:       Procedures    Carbohydrate controlled diet 1800 kcal/60 grams; Is Patient on Accuchecks? Yes       Percent Meals Eaten (last 3 days)       None            Pt chart reviewed d/t LOS. Visited pt at bedside. Pt reports good appetite/PO intake PTA and currently. Denies nausea, vomiting and diarrhea but reports constipation with last BM today (reports miralax helped). No chewing or swallowing difficulties and NKFA. Reports her UBW ~ 6 months ago was 200 lbs but she has been trying to lose weight intentionally. Continued to encourage PO intake; all questions answered at this time. No nutrition interventions warranted at this time.     Patient is at low nutrition risk at this time.    Please consult if patient status changes or nutrition issues arise.    Marti Bentley RD, LDN, [facility]  Clinical Dietitian  Spectra: [phone]

## 2024-06-06 ENCOUNTER — TELEPHONE (OUTPATIENT)
Facility: CLINIC | Age: 79
End: 2024-06-06

## 2024-06-06 ENCOUNTER — PATIENT OUTREACH (OUTPATIENT)
Dept: CASE MANAGEMENT | Age: 79
End: 2024-06-06

## 2024-06-06 NOTE — TELEPHONE ENCOUNTER
Pt c/o dryness in her nose from oxygen.  Advised pt she can purchase a water based gel to apply to her nasal passages.  Also, pt does not have a humidifier and an order for humidifier sent to [facility] per Lashay DE LA ROSA.  Pt is on 2 LPM with rest  and 4 LPM with exertion.  Pt will call if she has any additional questions.

## 2024-06-06 NOTE — PROGRESS NOTES
[facility].  [facility] contact information provided as well as [facility] office number, [phone].

## 2024-06-06 NOTE — DISCHARGE SUMMARY
[facility]PITAL[facility]  DISCHARGE SUMMARY     Gabi Kuamr Patient Status:  Inpatient    1945 MRN [id number]   Location [facility] 5NW-A Attending No att. providers found   Hosp Day # 6 PCP No primary care provider on file.     Date of Admission: 2024  Date of Discharge:  2024     Discharge Disposition: Home or Self Care    Discharge Diagnosis:  Acute on chronic respiratory failure secondary to COPD  COPD exacerbation  Severe COPD  Mechanical fall resulting in right rib fractures pain  Diabetes mellitus type 2  Essential hypertension  Dyslipidemia  Gout  GERD  Close displaced fracture of proximal phalanx of the left great toe    History of Present Illness:     Gabi Kumar is a 78 year old female with past medical history of hypertension, hyperlipidemia, diabetes type 2, gout, GERD, COPD, ILD who presents ED for rib pain after a fall yesterday.  Patient reports she was rushing to answer her phone.  She pushed her walker inside but her feet tripped on it when she was reaching for the phone and she fell on her left side.  She denies hitting her head or losing consciousness.  She is able to get back up on her own.  She has had worsening left rib cage pain ever since.  Pain is worse with breathing or movement.  She denies any shortness of breath.     Brief Synopsis:   78-year-old presented several days after falling states she tripped over her walker when rushing to get phone.  Patient presented with left-sided rib pain that eventually migrated to the right side.  Initial x-rays on the left were negative.  Did x-rays after admission to the right ribs found to have 2 acute fractures #5 and 6.  Also had a fracture in her toe.  Toe was initially taped she is now wearing surgical shoe.  Toe is causing minimal problems.  Patient was started on steroids eventually converted to p.o. neb treatments and was requiring oxygen.  Patient's pain was significant from her ribs.  Has been unable to wean O2.   She was adamant she was not going home with oxygen.  She has been kept in the hospital for a week.  Still showing significant desaturation with ambulation requiring down to 4 L now had been requiring 8.  Room air gas was done on day of discharge it was done twice and was felt to be arterial with a pO2 of 41.  This was shared with the patient that he would need home O2.  We had also gotten pulmonary to see her Dr. Maya's partner.  She was adamant she was not going home with oxygen and wanted to leave without oxygen.  After discussion with her son and daughter-in-law they were able to talk to her and convince her to have us order oxygen.  This has been ordered and being delivered today.  She will go home with oxygen hopefully she will use it she needs follow-up with PCP, Dr. Maya.  Have prescribed Norco and Ultram for her she has been not needing that much pain medicine despite her complaining of pain.  She uses walker.  Labs have been stable discharged home today.    Lace+ Score: 79  59-90 High Risk  29-58 Medium Risk  0-28   Low Risk  Patient was referred to the [facility].    TCM Follow-Up Recommendation:  LACE > 58: High Risk of readmission after discharge from the hospital.      Procedures during hospitalization:   Left rib fracture shows pulmonary congestion no edema no evidence of displaced left rib fracture  X-ray left foot toes    CONCLUSION:  Possible nondisplaced fracture of the 1st proximal phalanx distally with intra-articular extension into the interphalangeal joint, correlate clinically.  Probable remote appearing fractures of the 2nd and 3rd metatarsals.   Right sided rib x-ray shows acute right fifth and sixth rib fractures    Incidental or significant findings and recommendations (brief descriptions):  Home with O2 needs 4 L walking 3 L at rest  Follow-up with Dr. Maya  Follow-up with PCP  Quick prednisone taper  Norco and Ultram for pain as needed  Right walking  shoe  DNAR select    Lab/Test results pending at Discharge:   None    Consultants:  Pulmonology, social work    Discharge Medication List:     Discharge Medications        START taking these medications        Instructions Prescription details   predniSONE 20 MG Tabs  Commonly known as: Deltasone  Start taking on: June 6, 2024      Take 1 1/2 pills x3 days  then 1 pill x3 days   then dc   Quantity: 10 tablet  Refills: 0     traMADol 50 MG Tabs  Commonly known as: Ultram      Take 1 tablet (50 mg total) by mouth every 6 (six) hours as needed.   Quantity: 20 tablet  Refills: 0            CHANGE how you take these medications        Instructions Prescription details   HYDROcodone-acetaminophen 5-325 MG Tabs  Commonly known as: Norco  What changed: when to take this      Take 1 tablet by mouth every 6 (six) hours as needed.   Quantity: 20 tablet  Refills: 0     metoprolol succinate ER 25 MG Tb24  Commonly known as: Toprol XL  What changed: Another medication with the same name was removed. Continue taking this medication, and follow the directions you see here.      TAKE ONE TABLET BY MOUTH ONE TIME DAILY   Quantity: 90 tablet  Refills: 1            CONTINUE taking these medications        Instructions Prescription details   albuterol 108 (90 Base) MCG/ACT Aers  Commonly known as: Ventolin HFA      Inhale 2 puffs into the lungs every 6 (six) hours as needed for Wheezing or Shortness of Breath. inhale 2 puff by inhalation route  every 4 - 6 hours as needed   Quantity: 1 each  Refills: 11     allopurinol 300 MG Tabs  Commonly known as: Zyloprim      Take 1 tablet (300 mg total) by mouth daily.   Quantity: 90 tablet  Refills: 3     Anoro Ellipta 62.5-25 MCG/ACT Aepb  Generic drug: umeclidinium-vilanterol      Inhale 1 puff into the lungs daily.   Quantity: 1 each  Refills: 3     Centrum Tabs      Take 1 tablet by mouth daily.   Refills: 0     clonazePAM 1 MG Tabs  Commonly known as: KlonoPIN      Take 1 tablet (1 mg total)  by mouth nightly as needed.   Refills: 0     DULoxetine 60 MG Cpep  Commonly known as: Cymbalta      Take 1 capsule (60 mg total) by mouth daily.   Quantity: 90 capsule  Refills: 1     fluticasone propionate 50 MCG/ACT Susp  Commonly known as: Flonase      spray 2 sprays into each nostril daily   Quantity: 16 g  Refills: 5     glimepiride 2 MG Tabs  Commonly known as: Amaryl      Take 1 tablet (2 mg total) by mouth 2 (two) times daily.   Refills: 0     ipratropium-albuterol 0.5-2.5 (3) MG/3ML Soln  Commonly known as: Duoneb      Take 3 mL by nebulization every 6 (six) hours as needed.   Quantity: 180 mL  Refills: 12     loratadine 10 MG Tabs  Commonly known as: Claritin      Take 1 tablet (10 mg total) by mouth daily.   Refills: 0     OneTouch Ultra Strp  Generic drug: Glucose Blood      2 (two) times daily.   Refills: 0     pantoprazole 40 MG Tbec  Commonly known as: Protonix      Take 1 tablet (40 mg total) by mouth every morning before breakfast.   Refills: 0     pioglitazone 15 MG Tabs  Commonly known as: Actos      Take 1 tablet (15 mg total) by mouth daily.   Refills: 0     rosuvastatin 10 MG Tabs  Commonly known as: Crestor      Take 1 tablet (10 mg total) by mouth nightly. Replaces simvastatin   Quantity: 90 tablet  Refills: 3     vitamin C 100 MG Tabs      Take 10 tablets (1,000 mg total) by mouth daily.   Refills: 0     VITAMIN D-3 OR      Take 1 capsule by mouth once daily.   Refills: 0     Yupelri 175 MCG/3ML Soln nebulizer solution  Generic drug: revefenacin      ADMINISTER ONE (1) VIAL VIA NEBULIZER MACHINE DAILY   Quantity: 90 mL  Refills: 11               Where to Get Your Medications        These medications were sent to [id number] - [place], [place] - [address] [phone], [phone]  [address] [place] [address]      Phone: [phone]   HYDROcodone-acetaminophen 5-325 MG Tabs  predniSONE 20 MG Tabs  traMADol 50 MG Tabs         [place] reviewed: Reviewed    Follow-up  appointment:   Nasir Maya  [address] [place] [id number]  [phone]    Follow up in 1 week(s)  Follow up    Appointments for Next 30 Days 6/5/2024 - 7/5/2024        Date Arrival Time Visit Type Length Department Provider     7/3/2024  2:20 PM  FOLLOW UP VISIT [2828] 20 min [facility], [address] [place] [address], [place]    Patient Instructions:         Location Instructions:     Masks are optional for all patients and visitors, unless otherwise indicated.                      Vital signs:  Temp:  [98 °F (36.7 °C)-98.4 °F (36.9 °C)] 98.4 °F (36.9 °C)  Pulse:  [73-85] 75  Resp:  [18-20] 20  BP: (121-156)/(64-76) 121/64  SpO2:  [84 %-96 %] 90 %    Physical Exam:    -----------------------------------------------------------------------------------------------  PATIENT DISCHARGE INSTRUCTIONS: See electronic chart    Amber Villalba NP    Total time spent on discharge planning:  x minutes     The 21st Century Cures Act makes medical notes like these available to patients in the interest of transparency. Please be advised this is a medical document. Medical documents are intended to carry relevant information, facts as evident, and the clinical opinion of the practitioner. The medical note is intended as peer to peer communication and may appear blunt or direct. It is written in medical language and may contain abbreviations or verbiage that are unfamiliar.

## 2024-06-06 NOTE — TELEPHONE ENCOUNTER
Dr. Maya made aware of pt c/o nasal dryness and on oxygen. MD agreeable with sending humidifier order via parachute. Order sent.

## 2024-06-06 NOTE — PROGRESS NOTES
MEDARDO s/w patient for HFU. She states that her right ribs are still causing her a bit of pain but the pain medication does help. She states that her breathing is fine. She confirms she has her new medications. MEDARDO discussed TCC but she states that she will be following up with her PCP Dr. Bismark Landaverde. She states that she will also call herself to schedule with Dr. Maya. She denied having any questions or concerns at this time.

## 2024-06-21 ENCOUNTER — HOSPITAL ENCOUNTER (EMERGENCY)
Facility: HOSPITAL | Age: 79
Discharge: HOME | End: 2024-06-21
Attending: EMERGENCY MEDICINE

## 2024-06-21 VITALS
DIASTOLIC BLOOD PRESSURE: 78 MMHG | HEART RATE: 77 BPM | RESPIRATION RATE: 18 BRPM | OXYGEN SATURATION: 100 % | SYSTOLIC BLOOD PRESSURE: 140 MMHG

## 2024-06-21 DIAGNOSIS — R04.0 EPISTAXIS: Primary | ICD-10-CM

## 2024-06-21 LAB
ANION GAP SERPL CALC-SCNC: 6 MMOL/L (ref 0–18)
BASOPHILS # BLD AUTO: 0.08 X10(3) UL (ref 0–0.2)
BASOPHILS NFR BLD AUTO: 1 %
BUN BLD-MCNC: 22 MG/DL (ref 9–23)
CALCIUM BLD-MCNC: 8.7 MG/DL (ref 8.5–10.1)
CHLORIDE SERPL-SCNC: 110 MMOL/L (ref 98–112)
CO2 SERPL-SCNC: 24 MMOL/L (ref 21–32)
CREAT BLD-MCNC: 1.01 MG/DL
EGFRCR SERPLBLD CKD-EPI 2021: 56 ML/MIN/1.73M2 (ref 60–?)
EOSINOPHIL # BLD AUTO: 1 X10(3) UL (ref 0–0.7)
EOSINOPHIL NFR BLD AUTO: 12.5 %
ERYTHROCYTE [DISTWIDTH] IN BLOOD BY AUTOMATED COUNT: 15.9 %
GLUCOSE BLD-MCNC: 149 MG/DL (ref 70–99)
HCT VFR BLD AUTO: 41.9 %
HGB BLD-MCNC: 13 G/DL
IMM GRANULOCYTES # BLD AUTO: 0.06 X10(3) UL (ref 0–1)
IMM GRANULOCYTES NFR BLD: 0.8 %
LYMPHOCYTES # BLD AUTO: 1.07 X10(3) UL (ref 1–4)
LYMPHOCYTES NFR BLD AUTO: 13.4 %
MCH RBC QN AUTO: 28.4 PG (ref 26–34)
MCHC RBC AUTO-ENTMCNC: 31 G/DL (ref 31–37)
MCV RBC AUTO: 91.7 FL
MONOCYTES # BLD AUTO: 0.75 X10(3) UL (ref 0.1–1)
MONOCYTES NFR BLD AUTO: 9.4 %
NEUTROPHILS # BLD AUTO: 5.02 X10 (3) UL (ref 1.5–7.7)
NEUTROPHILS # BLD AUTO: 5.02 X10(3) UL (ref 1.5–7.7)
NEUTROPHILS NFR BLD AUTO: 62.9 %
OSMOLALITY SERPL CALC.SUM OF ELEC: 296 MOSM/KG (ref 275–295)
PLATELET # BLD AUTO: 150 10(3)UL (ref 150–450)
POTASSIUM SERPL-SCNC: 4.1 MMOL/L (ref 3.5–5.1)
RBC # BLD AUTO: 4.57 X10(6)UL
SODIUM SERPL-SCNC: 140 MMOL/L (ref 136–145)
WBC # BLD AUTO: 8 X10(3) UL (ref 4–11)

## 2024-06-21 PROCEDURE — 85025 COMPLETE CBC W/AUTO DIFF WBC: CPT | Performed by: EMERGENCY MEDICINE

## 2024-06-21 PROCEDURE — 36415 COLL VENOUS BLD VENIPUNCTURE: CPT

## 2024-06-21 PROCEDURE — 99284 EMERGENCY DEPT VISIT MOD MDM: CPT

## 2024-06-21 PROCEDURE — 30903 CONTROL OF NOSEBLEED: CPT

## 2024-06-21 PROCEDURE — 80048 BASIC METABOLIC PNL TOTAL CA: CPT | Performed by: EMERGENCY MEDICINE

## 2024-06-21 RX ORDER — AZITHROMYCIN 250 MG/1
TABLET, FILM COATED ORAL
Qty: 6 TABLET | Refills: 0 | Status: SHIPPED | OUTPATIENT
Start: 2024-06-21 | End: 2024-06-26

## 2024-06-21 RX ORDER — OXYMETAZOLINE HYDROCHLORIDE 0.05 G/100ML
1 SPRAY NASAL ONCE
Status: DISCONTINUED | OUTPATIENT
Start: 2024-06-21 | End: 2024-06-21

## 2024-06-21 RX ORDER — OXYMETAZOLINE HYDROCHLORIDE 0.05 G/100ML
1 SPRAY NASAL EVERY 12 HOURS PRN
Status: DISCONTINUED | OUTPATIENT
Start: 2024-06-21 | End: 2024-06-21

## 2024-06-21 NOTE — ED INITIAL ASSESSMENT (HPI)
Pt woke up at 2:30 am with a \"pouring\" nose bleed. She was able to stop the bleeding and woke up at 7:15 am and it was bleeding again. No thinners, no trauma. No hx of nose bleeds.

## 2024-06-21 NOTE — ED PROVIDER NOTES
Patient Seen in: [facility] Emergency Department      History     Chief Complaint   Patient presents with    Nose Bleed     Stated Complaint: Nose bleed    Subjective:   HPI  Patient was just discharged from the hospital earlier this month with acute on chronic respiratory failure secondary to COPD, diabetes, hypertension, dyslipidemia, and a fall with rib fractures.  She was on a prednisone taper and discharged home on tramadol and hydrocodone.  Patient does wear oxygen by nasal cannula at rest during the day  Patient awoke at about 2:30 AM with a nosebleed.  Eventually, it stopped but it started up again at 7:15 AM.  It is all from the right side.  No trauma to the nose.  No recent severe stuffy nose, congestion, or purulent nasal secretions.  No fever.  No cough  Patient tells me that her breathing has been good lately and the pain from the broken ribs is gradually getting better.    Objective:   No pertinent past medical history.            No pertinent past surgical history.              No pertinent social history.            Review of Systems    Positive for stated complaint: Nose bleed  Other systems are as noted in HPI.  Constitutional and vital signs reviewed.      All other systems reviewed and negative except as noted above.    Physical Exam     ED Triage Vitals [06/21/24 0826]   /72   Pulse 66   Resp 18   Temp    Temp src    SpO2 93 %   O2 Device None (Room air)       Current Vitals:   Vital Signs  BP: 121/72  Pulse: 66  Resp: 18    Oxygen Therapy  SpO2: 93 %  O2 Device: None (Room air)            Physical Exam  General: The patient is awake, alert, conversant.   Eyes: sclera white, conjunctiva pink and moist.  Lids and lashes are normal.  Nose: There is dried blood in both nostrils.  No septal ulceration is noted.  Neurologic:  Mental status as above.  Patient moves all extremities with good strength and coordination.           ED Course     Labs Reviewed   BASIC METABOLIC PANEL (8) -  Abnormal; Notable for the following components:       Result Value    Glucose 149 (*)     Calculated Osmolality 296 (*)     eGFR-Cr 56 (*)     All other components within normal limits   CBC W/ DIFFERENTIAL - Abnormal; Notable for the following components:    Eosinophil Absolute 1.00 (*)     All other components within normal limits   CBC WITH DIFFERENTIAL WITH PLATELET    Narrative:     The following orders were created for panel order CBC With Differential With Platelet.  Procedure                               Abnormality         Status                     ---------                               -----------         ------                     CBC W/ DIFFERENTIAL[[id number]]          Abnormal            Final result                 Please view results for these tests on the individual orders.                      MDM      Patient with nosebleed possibly predisposed by her nasal cannula.  No septal erosion is noted.  No area amenable to cauterization  Patient consented to packing  Patient was treated with Afrin nasal spray  A modified anterior posterior Merocel pack was placed.  Patient was observed for prolonged period of time    CBC shows normal white count with hemoglobin 13  Metabolic panel shows normal electrolytes and creatinine    Follow-up on patient's usual nasal cannula oxygen 94%    On repeat examination, pack seems to be holding well.  Patient tolerated procedure well.  I reviewed nosebleed instructions.  I recommend patient use her oxygen during the day and at night while the packing is in.  I recommend she contact the ENT specialist today to arrange packing removal after 2 or 3 days      No stooping, straining, or bending  Try not to blow, rub, or pick at nose  Sneeze with mouth open      Wear your oxygen daytime and nighttime     call ENT specialist today to arrange packing removal after 2 or 3 days                                    Medical Decision Making      Disposition and Plan     Clinical  Impression:  1. Epistaxis         Disposition:  Discharge  6/21/2024 10:34 am    Follow-up:  Gabi Moser MD  [address] [facility] [place] [address]  [phone]    Call today  To make an appointment for packing removal next week          Medications Prescribed:  Current Discharge Medication List        START taking these medications    Details   azithromycin (ZITHROMAX Z-MARIA DEL ROSARIO) 250 MG Oral Tab 500 mg once followed by 250 mg daily x 4 days  Qty: 6 tablet, Refills: 0

## 2024-06-21 NOTE — ED QUICK NOTES
Patient stable for discharge per MD. Awaiting son to pick her up and bring her clothes to change into.

## 2024-06-21 NOTE — DISCHARGE INSTRUCTIONS
No stooping, straining, or bending  Try not to blow, rub, or pick at nose  Sneeze with mouth open     Wear your oxygen daytime and nighttime    call ENT specialist today to arrange packing removal after 2 or 3 days

## 2024-06-22 ENCOUNTER — HOSPITAL ENCOUNTER (EMERGENCY)
Facility: HOSPITAL | Age: 79
Discharge: HOME | End: 2024-06-22
Attending: EMERGENCY MEDICINE

## 2024-06-22 VITALS
SYSTOLIC BLOOD PRESSURE: 123 MMHG | OXYGEN SATURATION: 95 % | RESPIRATION RATE: 16 BRPM | DIASTOLIC BLOOD PRESSURE: 82 MMHG | HEIGHT: 64 IN | HEART RATE: 90 BPM | BODY MASS INDEX: 30.48 KG/M2 | TEMPERATURE: 97 F | WEIGHT: 178.56 LBS

## 2024-06-22 DIAGNOSIS — R04.0 EPISTAXIS: Primary | ICD-10-CM

## 2024-06-22 PROCEDURE — 30903 CONTROL OF NOSEBLEED: CPT

## 2024-06-22 PROCEDURE — 99283 EMERGENCY DEPT VISIT LOW MDM: CPT

## 2024-06-22 PROCEDURE — 99284 EMERGENCY DEPT VISIT MOD MDM: CPT

## 2024-06-22 RX ORDER — TRANEXAMIC ACID 100 MG/ML
5 INJECTION, SOLUTION INTRAVENOUS ONCE
Status: COMPLETED | OUTPATIENT
Start: 2024-06-22 | End: 2024-06-22

## 2024-06-22 RX ORDER — OXYMETAZOLINE HYDROCHLORIDE 0.05 G/100ML
1 SPRAY NASAL ONCE
Status: COMPLETED | OUTPATIENT
Start: 2024-06-22 | End: 2024-06-22

## 2024-06-22 NOTE — DISCHARGE INSTRUCTIONS
Leave the packing in place until you see the ENT doctor.    Return for any further bleeding or other issues    Continue your oxygen as recommended

## 2024-06-22 NOTE — ED PROVIDER NOTES
Patient Seen in: [facility] Emergency Department      History     Chief Complaint   Patient presents with    Nose Bleed     nosebleed     Stated Complaint: nosebleed    Subjective:   HPI    78-year-old with a history of diabetes, hypertension, high cholesterol, osteoarthritis presents via ambulance for evaluation of epistaxis.  Some of the history is provided by EMS.  Patient was seen yesterday for a nosebleed from the R naris. Had a packing placed. At 0230 she developed bleeding from the L naris. Packing still in place. No nausea or vomiting. No fever. No blood thinners. Cannot use her home oxygen due to having the packing in place and the bleeding, but doesn't notice increased shortness of breath.  She was discharged with antibiotics.  States she has ENT follow-up scheduled on 6/27.  Hemoglobin yesterday was 13.0  Records reviewed including discharge summary from 6/5 patient was mated on 5/29 after a fall.  She had to rib fractures and a toe fracture.  She was treated for COPD exacerbation and discharged with home oxygen.  Objective:   Past Medical History:    Back problem    Cataract    Cholelithiasis    DIABETES    Diabetes (HCC)    Diverticulitis    suggestion of possible wall thickening involving sigmoid, possible mild diverticulitis    Diverticulosis    Essential hypertension    Gallbladder disease    thickened gallbladder with distended bile duct    High blood pressure    High cholesterol    HYPERLIPIDEMIA    Hyperlipidemia    IBS (irritable bowel syndrome)    Osteoarthritis    hips, back and knees    Thyroid disease    Visual impairment    reading glasses              Past Surgical History:   Procedure Laterality Date    Carpal tunnel release      Colonoscopy  2003,     Diverticulosis    Colonoscopy  2012    decreased anal sphincter tone, diverticulosis, internal hemorrhoids, normal random colon biopsies    Colonoscopy N/A 7/26/2017    Procedure: COLONOSCOPY;  Surgeon: Garrett Moody MD;  Location:  [facility]    Other      Carpel tnnel right hand    Other surgical history      Fatty tumor from neck    Other surgical history      Arthroscopy right knee    Other surgical history      Carpal tunnel both hand    Other surgical history  2020    Cystoscopy, Dr Chun                Social History     Socioeconomic History    Marital status:    Tobacco Use    Smoking status: Former     Current packs/day: 0.00     Average packs/day: 0.5 packs/day for 50.0 years (25.0 ttl pk-yrs)     Types: Cigarettes     Start date: 10/22/1964     Quit date: 10/22/2014     Years since quittin.6    Smokeless tobacco: Never   Vaping Use    Vaping status: Never Used   Substance and Sexual Activity    Alcohol use: No     Alcohol/week: 0.0 standard drinks of alcohol    Drug use: No    Sexual activity: Not Currently   Other Topics Concern    Caffeine Concern Yes    Exercise Yes     Social Determinants of Health     Food Insecurity: No Food Insecurity (2024)    Food Insecurity     Food Insecurity: Never true   Transportation Needs: No Transportation Needs (2024)    Transportation Needs     Lack of Transportation: No   Housing Stability: Low Risk  (2024)    Housing Stability     Housing Instability: No              Review of Systems    Positive for stated complaint: nosebleed  Other systems are as noted in HPI.  Constitutional and vital signs reviewed.      All other systems reviewed and negative except as noted above.    Physical Exam     ED Triage Vitals   BP 24 0545 135/78   Pulse 24 0545 96   Resp 24 0545 22   Temp 24 0545 97.1 °F (36.2 °C)   Temp src 24 0545 Temporal   SpO2 24 0645 95 %   O2 Device 24 0545 None (Room air)       Current Vitals:   Vital Signs  BP: 123/82  Pulse: 90  Resp: 16  Temp: 97.1 °F (36.2 °C)  Temp src: Temporal  MAP (mmHg): 91    Oxygen Therapy  SpO2: 95 %  O2 Device: None (Room air)            Physical Exam    General: Patient  is awake, alert in no acute distress.   HEENT:   Sclera are not icteric.  Conjunctivae within normal limits.  Mucous members are moist.  Small oozing of blood from the left naris, no abnormality to the nasal septum on the left.  Cardiovascular: Regular rate  Respiratory: Occasional cough  Skin: warm and dry, no diaphoresis  Neuro: No facial droop.  No dysarthria or aphasia..  Patient is alert and answers questions appropriately    ED Course   Labs Reviewed - No data to display  Packing was removed from the right naris with active bleeding noted.  Afrin was instilled into the bilateral nares. LET -soaked cotton balls were placed in the R naris for 20 minutes. They were removed.  Subsequently, a TXA soaked 5.5 cm rapid Rhino was placed in the right naris, balloon was inflated.    Initially patient achieved adequate hemostasis however subsequently had Recurrent bleeding, prior to discharge.  Right naris again anesthetized with LET soaked cotton ball.  Next, a TXA soaked cottonball was placed in the naris for approximately 20 minutes.  Finally a 7.5 cm rapid Rhino was placed in the right naris with adequate hemostasis               MDM      History is obtained from: EMS providers    Previous records reviewed as noted in HPI    Differential includes, but is not limited to, hemorrhagic shock, epistaxis, sinusitis    Considered repeat CBC however given normal vitals and normal hemoglobin yesterday, this was considered not indicated.    Shared decision making with the patient.  Patient observed after packing and bleeding resolved.  Therefore she is stable for discharge home.    OTC meds/Rx meds: Continue azithromycin                                                        Medical Decision Making      Disposition and Plan     Clinical Impression:  1. Epistaxis         Disposition:  Discharge  6/22/2024  6:58 am    Follow-up:  Gabi Moser MD  [address] [place] [address]  [id number]  [phone]    Follow up            Medications Prescribed:  Current Discharge Medication List

## 2024-06-22 NOTE — ED QUICK NOTES
Patient ambulated with use of walker to bathroom. Walked with steady gait and no complaints. Bleeding did not resume upon standing. RN and MD notified.

## 2024-06-25 ENCOUNTER — OFFICE VISIT (OUTPATIENT)
Facility: CLINIC | Age: 79
End: 2024-06-25

## 2024-06-25 VITALS
BODY MASS INDEX: 31 KG/M2 | SYSTOLIC BLOOD PRESSURE: 110 MMHG | OXYGEN SATURATION: 81 % | DIASTOLIC BLOOD PRESSURE: 60 MMHG | HEART RATE: 104 BPM | HEIGHT: 64 IN | RESPIRATION RATE: 16 BRPM

## 2024-06-25 DIAGNOSIS — J84.9 ILD (INTERSTITIAL LUNG DISEASE) (HCC): ICD-10-CM

## 2024-06-25 DIAGNOSIS — J43.2 CENTRILOBULAR EMPHYSEMA (HCC): ICD-10-CM

## 2024-06-25 DIAGNOSIS — J96.21 ACUTE ON CHRONIC RESPIRATORY FAILURE WITH HYPOXIA (HCC): Primary | ICD-10-CM

## 2024-06-25 DIAGNOSIS — Z72.0 TOBACCO ABUSE: ICD-10-CM

## 2024-06-25 DIAGNOSIS — R91.8 MULTIPLE PULMONARY NODULES: ICD-10-CM

## 2024-06-25 PROCEDURE — 99214 OFFICE O/P EST MOD 30 MIN: CPT | Performed by: INTERNAL MEDICINE

## 2024-06-25 NOTE — PROGRESS NOTES
[facility] General Pulmonary Progress Note    History of Present Illness:  Gabi Kumar is a 78 year old female former smoker (quit 11/2019, 50 pack years) with significant PMH moderate COPD, ILD, CAD, PAD, DM, HTN who presents today for follow up of COPD. Since last visit she reports she had been well until she fell and fractured her right ribs. She was hospitalized and during her hospitalization found to have resting oximetry saturation in 80s on RA, she was restarted on home o2 and now presents for follow up.  Previously she had self discontinued the home oxygen and has only variably used it since her discharge. She developed epistaxis since her discharge and has returned to the ER and had nasal packing placed - planning to see ENT this week.  She presents today with no oxygen and denies change in chronic dyspnea.  Has had continued issues with the nose bleeding. Has facial pain related to packing. No fevers    October 2023 previously    Since last visit she self discontinued her oxygen against medical advice.  She denies new concerns today. Having chronic back pain, joint pains.  Stable LOTT.  Occasional cough with white/yellow phlegm. No f/c/s. No chest pain.  Using anoro off/on through the week, alternates with yupelri. Has albuterol MDI and neb PRN    August 2023 previously  who presents for hospital f/u for COPD exacerbation. Pt reports using Anoro daily without incident until a tree fell outside her house with her windows open and may have sent dust, dirt into her house.  She experienced increased SOB and started using Yupelri and rescue inhaler without relief and went to the hospital. She was hospitalized 8/16-8/20/23,  received steroids and found to need oxygen at discharge. Oxygen titration at discharge showed she needed 4L with activity (78% on RA with activity; 94% on RA at rest). Pt has not been using it at all. Today reports she is feeling better than she has in months and would like the oxygen picked up  from her home. Reprots SOB at times but she paces herself.    Previously 10/2022 Dr Maya   77 year old female former smoker (quit 11/2019, 50 pack years) with significant PMH moderate COPD, CAD, PAD, DM, HTN who presents today for follow up of COPD. She denies new concerns today, has chronic dyspnea and limited exertion due to joint pain. Had improvement in symptoms with spinal cord simulator but since has been removed.  Using anoro daily - enrolled in program through [employer]. Also using yupelri daily. Rarely uses albuterol. Occasional cough related to sinus congestion/drainage. Noted mild epistaxis last week, seems better now. No hemoptysis. No pain. No pleurisy. No fever.        Past Medical History:   Past Medical History:    Back problem    Cataract    Cholelithiasis    DIABETES    Diabetes (HCC)    Diverticulitis    suggestion of possible wall thickening involving sigmoid, possible mild diverticulitis    Diverticulosis    Essential hypertension    Gallbladder disease    thickened gallbladder with distended bile duct    High blood pressure    High cholesterol    HYPERLIPIDEMIA    Hyperlipidemia    IBS (irritable bowel syndrome)    Osteoarthritis    hips, back and knees    Thyroid disease    Visual impairment    reading glasses        Past Surgical History:   Past Surgical History:   Procedure Laterality Date    Carpal tunnel release      Colonoscopy  2003,     Diverticulosis    Colonoscopy  2012    decreased anal sphincter tone, diverticulosis, internal hemorrhoids, normal random colon biopsies    Colonoscopy N/A 7/26/2017    Procedure: COLONOSCOPY;  Surgeon: Garrett Moody MD;  Location:  ENDOSCOPY    Other  1999    Carpel tnnel right hand    Other surgical history  1995    Fatty tumor from neck    Other surgical history  1999    Arthroscopy right knee    Other surgical history  2001    Carpal tunnel both hand    Other surgical history  12/22/2020    Cystoscopy, Dr Chun       Family Medical History:   Family  History   Problem Relation Age of Onset    Cancer Other         Stomach cancer    Heart Disease Father     Hypertension Father     High Blood Pressure Father     Diabetes Father     Heart Disease Mother     Hypertension Mother     Lung Disorder Mother         Pulmonary fibrosis        Social History:   Social History     Socioeconomic History    Marital status:      Spouse name: Not on file    Number of children: Not on file    Years of education: Not on file    Highest education level: Not on file   Occupational History    Not on file   Tobacco Use    Smoking status: Former     Current packs/day: 0.00     Average packs/day: 0.5 packs/day for 50.0 years (25.0 ttl pk-yrs)     Types: Cigarettes     Start date: 10/22/1964     Quit date: 10/22/2014     Years since quittin.6    Smokeless tobacco: Never   Vaping Use    Vaping status: Never Used   Substance and Sexual Activity    Alcohol use: No     Alcohol/week: 0.0 standard drinks of alcohol    Drug use: No    Sexual activity: Not Currently   Other Topics Concern     Service Not Asked    Blood Transfusions Not Asked    Caffeine Concern Yes    Occupational Exposure Not Asked    Hobby Hazards Not Asked    Sleep Concern Not Asked    Stress Concern Not Asked    Weight Concern Not Asked    Special Diet Not Asked    Back Care Not Asked    Exercise Yes    Bike Helmet Not Asked    Seat Belt Not Asked    Self-Exams Not Asked   Social History Narrative    Not on file     Social Determinants of Health     Financial Resource Strain: Not on file   Food Insecurity: No Food Insecurity (2024)    Food Insecurity     Food Insecurity: Never true   Transportation Needs: No Transportation Needs (2024)    Transportation Needs     Lack of Transportation: No     Car Seat: Not on file   Physical Activity: Not on file   Stress: Not on file   Social Connections: Not on file   Housing Stability: Low Risk  (2024)    Housing Stability     Housing Instability: No      Housing Instability Emergency: Not on file     Crib or Bassinette: Not on file        Medications:   Current Outpatient Medications   Medication Sig Dispense Refill    azithromycin (ZITHROMAX Z-MARIA DEL ROSARIO) 250 MG Oral Tab 500 mg once followed by 250 mg daily x 4 days 6 tablet 0    HYDROcodone-acetaminophen 5-325 MG Oral Tab Take 1 tablet by mouth every 6 (six) hours as needed. 20 tablet 0    loratadine 10 MG Oral Tab Take 1 tablet (10 mg total) by mouth daily.      DULoxetine (CYMBALTA) 60 MG Oral Cap DR Particles Take 1 capsule (60 mg total) by mouth daily. 90 capsule 1    umeclidinium-vilanterol (ANORO ELLIPTA) 62.5-25 MCG/ACT Inhalation Aerosol Powder, Breath Activated Inhale 1 puff into the lungs daily. 1 each 3    pantoprazole 40 MG Oral Tab EC Take 1 tablet (40 mg total) by mouth every morning before breakfast.      allopurinol 300 MG Oral Tab Take 1 tablet (300 mg total) by mouth daily. 90 tablet 3    ipratropium-albuterol 0.5-2.5 (3) MG/3ML Inhalation Solution Take 3 mL by nebulization every 6 (six) hours as needed. 180 mL 12    clonazePAM 1 MG Oral Tab Take 1 tablet (1 mg total) by mouth nightly as needed.      ONETOUCH ULTRA In Vitro Strip 2 (two) times daily.      YUPELRI 175 MCG/3ML Inhalation Solution nebulizer solution ADMINISTER ONE (1) VIAL VIA NEBULIZER MACHINE DAILY 90 mL 11    Ascorbic Acid (VITAMIN C) 100 MG Oral Tab Take 10 tablets (1,000 mg total) by mouth daily.      pioglitazone 15 MG Oral Tab Take 1 tablet (15 mg total) by mouth daily.      glimepiride 2 MG Oral Tab Take 1 tablet (2 mg total) by mouth 2 (two) times daily.      albuterol 108 (90 Base) MCG/ACT Inhalation Aero Soln Inhale 2 puffs into the lungs every 6 (six) hours as needed for Wheezing or Shortness of Breath. inhale 2 puff by inhalation route  every 4 - 6 hours as needed 1 each 11    METOPROLOL SUCCINATE ER 25 MG Oral Tablet 24 Hr TAKE ONE TABLET BY MOUTH ONE TIME DAILY 90 tablet 1    rosuvastatin 10 MG Oral Tab Take 1 tablet (10 mg  total) by mouth nightly. Replaces simvastatin 90 tablet 3    Cholecalciferol (VITAMIN D-3 OR) Take 1 capsule by mouth once daily.      Multiple Vitamins-Minerals (CENTRUM) Oral Tab Take 1 tablet by mouth daily.      traMADol 50 MG Oral Tab Take 1 tablet (50 mg total) by mouth every 6 (six) hours as needed. (Patient not taking: Reported on 6/21/2024) 20 tablet 0    predniSONE 20 MG Oral Tab Take 1 1/2 pills x3 days  then 1 pill x3 days   then dc (Patient not taking: Reported on 6/21/2024) 10 tablet 0    FLUTICASONE PROPIONATE 50 MCG/ACT Nasal Suspension spray 2 sprays into each nostril daily (Patient not taking: Reported on 6/25/2024) 16 g 5       Review of Systems: Review of Systems   Constitutional: Negative.    HENT:          Nasal packing with localized pain   Respiratory:  Positive for shortness of breath. Negative for cough.    Cardiovascular: Negative.    Musculoskeletal:  Positive for arthralgias and back pain.   All other systems reviewed and are negative.       Physical Exam:  /60 (BP Location: Right arm, Patient Position: Sitting, Cuff Size: adult)   Pulse 104   Resp 16   Ht 5' 4\" (1.626 m)   LMP 06/07/1999   SpO2 (!) 81%   BMI 30.65 kg/m²      Constitutional: alert, cooperative. No acute distress.  HEENT: Head NC/AT.    Cardio: Regular rate and rhythm. Normal S1 and S2. No murmurs.   Respiratory: Thorax symmetrical with no labored breathing. Crackles at bibases. No wheeze  GI: NABS. Abd soft, non-tender.  Extremities: No clubbing or cyanosis. No BLE edema.    Neurologic: A&Ox3. No gross motor deficits.  Skin: Warm, dry  Psych: Calm, cooperative. Pleasant affect.    Results:  Personally reviewed    WBC: 8, done on 6/21/2024.  HGB: 13, done on 6/21/2024.  PLT: 150, done on 6/21/2024.     Glucose: 149, done on 6/21/2024.  Cr: 1.01, done on 6/21/2024.  GFR(AA): 84, done on 12/1/2019.  GFR (non-AA): 73, done on 12/1/2019.  CA: 8.7, done on 6/21/2024.  Na: 140, done on 6/21/2024.  K: 4.1, done on  6/21/2024.  Cl: 110, done on 6/21/2024.  CO2: 24, done on 6/21/2024.  Last ALB was 3% done on 5/30/2024.     XR RIBS WITH CHEST (3 VIEWS), RIGHT  (CPT=71101)    Result Date: 5/31/2024  CONCLUSION:  Acute right 5th and 6th rib fractures with questionable right 7th rib fracture.   LOCATION:  [facility]     Dictated by ([facility]): Deep Morales MD on 5/31/2024 at 7:29 PM     Finalized by ([facility]): Deep Morales MD on 5/31/2024 at 7:30 PM       XR RIBS WITH CHEST (3 VIEWS), LEFT  (CPT=71101)    Result Date: 5/29/2024  CONCLUSION:  Pulmonary vascular congestion, without overt edema.  No evidence of displaced left rib fracture.  If there is continued clinical concern, CT evaluation is recommended.   LOCATION:  [facility]     Dictated by ([facility]): Deep Reeves MD on 5/29/2024 at 2:06 PM     Finalized by ([facility]ST): Deep Reeves MD on 5/29/2024 at 2:08 PM       XR TOE(S) (MIN 2 VIEWS), LEFT 1ST (CPT=73660)    Result Date: 5/29/2024  CONCLUSION:  Possible nondisplaced fracture of the 1st proximal phalanx distally with intra-articular extension into the interphalangeal joint, correlate clinically.  Probable remote appearing fractures of the 2nd and 3rd metatarsals.   LOCATION:  [facility]   Dictated by ([facility]): Deep Reeves MD on 5/29/2024 at 2:03 PM     Finalized by ([facility]): Deep Reeves MD on 5/29/2024 at 2:04 PM       XR PAIN CLINIC FLUOROSCOPY - N/C    Result Date: 4/30/2024  CONCLUSION:  Fluoroscopic guidance during epidural injection.   LOCATION:  [facility]    Dictated by ([facility]): Deep Morales MD on 4/30/2024 at 2:05 PM     Finalized by ([facility]): Deep Morales MD on 4/30/2024 at 2:06 PM         Assessment/Plan:   #1.Acute on chronic hypoxic respiratory failure  Previously was managed on 4L with activity at discharge from [facility] 8/20/23 for oxygen sats at 78%  However she had previously self discontinued the oxygen against medical advice in 2023  Now again noted to be quite hypoxic on room air during hospitalization June 2024 and restarted on o2 -  last 6MWT on 6/5/2024: 2L at rest, 4L on exertion  Given her ongoing epistaxis, will work to obtain oxygen mask rather than cannula. We again discussed the benefits and risks of oxygen and risks of no treatment which includes MI, stroke and death. She remains reluctant to use but willing to trial oxygen mask.     #2.  Moderate COPD  Former smoker, quit 11/2019, 50 pack years  Exacerbations: March 2020, ?August 2023  Stable symptoms including LOTT  Last PFTs: 9/2022 with FEV1 1.45L, 75%, %, DLCO 32% --> DLCO out of proportion to obstruction and suggests pulmonary hypertension or ILD  9/2022 echo with +RVSP 34  8/2023 HRCT with +peripheral reticular opacities with some honeycombing. +emphysema. Prominent nodes  Despite the ongoing epistaxis issue, her respiratory status is stable at present on anoro. Also has yupelri at home to use if anoro is too expensive -she is aware to not take both anoro yupelri.   Use albuterol MDI or neb PRN  Previously encouraged exercise and discussed pulmonary rehab but she has been hesitant to go given her ongoing joint symptoms limiting her ability to exercise. Advised to consider pulm rehab in the future    #3. ILD  9/2022 with FEV1 1.45L, 75%, %, DLCO 32% --> DLCO out of proportion to obstruction and suggests pulmonary hypertension or ILD  8/2023 HRCT with +peripheral reticular opacities with some honeycombing. +emphysema. Prominent nodes.  The reticular changes were only minimally present in RLL base in 2020 so this has certainly progressed  Imaging is possible UIP pattern.  She does have +ALLAN (centromere) in the past and following with DR. Croft raising concern for CTD related ILD  Reviewed today that we should obtain repeat PFTs and CT chest sacn to reassess  Bigger issue presently is the ongoing epistaxis. Once resolved she should obtain her testing and consider either antifibrotics or DMARD with Dr. Croft. also advised she could consider [facility] clinic at [facility] or [facility]    #4. Pulmonary hypertension  Noted on echo originally 2017 and again 2020; 9/2022 echo with +RVSP 34; to see her cardiologist later this month    #5. Pulmonary nodules  Noted on LDCT  No new nodules on 8/2023 HRCT  No need for f/u imaging    #6. Tobacco abuse  50 pack years, quit 11/2019  Congratulated on sustained smoking cessation  Qualifies for lung cancer screening by USPSTF guidelines however not per CMS criteria.    Nasir Maya MD  10/18/2023

## 2024-06-25 NOTE — PATIENT INSTRUCTIONS
Continue the anoro daily  Continue to use albuterol nebulizer every 6 hours as needed for your breathing or cough  We will work on changing the oxygen cannula to a mask  Follow up in one month

## 2024-06-27 ENCOUNTER — HOSPITAL ENCOUNTER (EMERGENCY)
Facility: HOSPITAL | Age: 79
Discharge: HOME | End: 2024-06-27
Attending: STUDENT IN AN ORGANIZED HEALTH CARE EDUCATION/TRAINING PROGRAM
Payer: MEDICARE

## 2024-06-27 VITALS
TEMPERATURE: 98 F | RESPIRATION RATE: 20 BRPM | HEIGHT: 64 IN | HEART RATE: 100 BPM | SYSTOLIC BLOOD PRESSURE: 93 MMHG | BODY MASS INDEX: 29.53 KG/M2 | WEIGHT: 173 LBS | OXYGEN SATURATION: 93 % | DIASTOLIC BLOOD PRESSURE: 51 MMHG

## 2024-06-27 DIAGNOSIS — R04.0 EPISTAXIS: Primary | ICD-10-CM

## 2024-06-27 LAB
ALBUMIN SERPL-MCNC: 3.2 G/DL (ref 3.4–5)
ALBUMIN/GLOB SERPL: 0.9 {RATIO} (ref 1–2)
ALP LIVER SERPL-CCNC: 127 U/L
ALT SERPL-CCNC: 15 U/L
ANION GAP SERPL CALC-SCNC: 8 MMOL/L (ref 0–18)
AST SERPL-CCNC: 7 U/L (ref 15–37)
BASOPHILS # BLD AUTO: 0.07 X10(3) UL (ref 0–0.2)
BASOPHILS NFR BLD AUTO: 0.8 %
BILIRUB SERPL-MCNC: 0.8 MG/DL (ref 0.1–2)
BUN BLD-MCNC: 22 MG/DL (ref 9–23)
CALCIUM BLD-MCNC: 9.3 MG/DL (ref 8.5–10.1)
CHLORIDE SERPL-SCNC: 106 MMOL/L (ref 98–112)
CO2 SERPL-SCNC: 25 MMOL/L (ref 21–32)
CREAT BLD-MCNC: 0.9 MG/DL
EGFRCR SERPLBLD CKD-EPI 2021: 65 ML/MIN/1.73M2 (ref 60–?)
EOSINOPHIL # BLD AUTO: 0.62 X10(3) UL (ref 0–0.7)
EOSINOPHIL NFR BLD AUTO: 7.4 %
ERYTHROCYTE [DISTWIDTH] IN BLOOD BY AUTOMATED COUNT: 17 %
GLOBULIN PLAS-MCNC: 3.5 G/DL (ref 2.8–4.4)
GLUCOSE BLD-MCNC: 139 MG/DL (ref 70–99)
HCT VFR BLD AUTO: 36.2 %
HGB BLD-MCNC: 11.6 G/DL
IMM GRANULOCYTES # BLD AUTO: 0.06 X10(3) UL (ref 0–1)
IMM GRANULOCYTES NFR BLD: 0.7 %
LYMPHOCYTES # BLD AUTO: 1.25 X10(3) UL (ref 1–4)
LYMPHOCYTES NFR BLD AUTO: 14.9 %
MCH RBC QN AUTO: 29.1 PG (ref 26–34)
MCHC RBC AUTO-ENTMCNC: 32 G/DL (ref 31–37)
MCV RBC AUTO: 90.7 FL
MONOCYTES # BLD AUTO: 0.85 X10(3) UL (ref 0.1–1)
MONOCYTES NFR BLD AUTO: 10.1 %
NEUTROPHILS # BLD AUTO: 5.56 X10 (3) UL (ref 1.5–7.7)
NEUTROPHILS # BLD AUTO: 5.56 X10(3) UL (ref 1.5–7.7)
NEUTROPHILS NFR BLD AUTO: 66.1 %
OSMOLALITY SERPL CALC.SUM OF ELEC: 294 MOSM/KG (ref 275–295)
PLATELET # BLD AUTO: 198 10(3)UL (ref 150–450)
POTASSIUM SERPL-SCNC: 4.2 MMOL/L (ref 3.5–5.1)
PROT SERPL-MCNC: 6.7 G/DL (ref 6.4–8.2)
RBC # BLD AUTO: 3.99 X10(6)UL
SODIUM SERPL-SCNC: 139 MMOL/L (ref 136–145)
WBC # BLD AUTO: 8.4 X10(3) UL (ref 4–11)

## 2024-06-27 PROCEDURE — 85025 COMPLETE CBC W/AUTO DIFF WBC: CPT | Performed by: STUDENT IN AN ORGANIZED HEALTH CARE EDUCATION/TRAINING PROGRAM

## 2024-06-27 PROCEDURE — 30901 CONTROL OF NOSEBLEED: CPT

## 2024-06-27 PROCEDURE — 80053 COMPREHEN METABOLIC PANEL: CPT | Performed by: STUDENT IN AN ORGANIZED HEALTH CARE EDUCATION/TRAINING PROGRAM

## 2024-06-27 PROCEDURE — 99283 EMERGENCY DEPT VISIT LOW MDM: CPT

## 2024-06-27 PROCEDURE — 36415 COLL VENOUS BLD VENIPUNCTURE: CPT

## 2024-06-27 RX ORDER — TRANEXAMIC ACID 100 MG/ML
INJECTION, SOLUTION INTRAVENOUS
Status: COMPLETED
Start: 2024-06-27 | End: 2024-06-27

## 2024-06-27 RX ORDER — OXYMETAZOLINE HYDROCHLORIDE 0.05 G/100ML
SPRAY NASAL
Status: COMPLETED
Start: 2024-06-27 | End: 2024-06-27

## 2024-06-27 NOTE — ED PROVIDER NOTES
Patient Seen in: [facility] Emergency Department      History     Chief Complaint   Patient presents with    Nose Bleed     Stated Complaint: bloody nose x5 days    Subjective:   HPI    The patient is a 78-year-old female presented to the emergency room with reports of epistasis intermittently over the last 5 days.  Patient states symptoms started after she was started on oxygen.  She has been using what sounds like humidified oxygen at home but still having nosebleeds.  She saw ENT today and was given the option of cauterization or ER for bilateral nasal packing and admission for observation.  Patient not had any significant nose bleeding today.  She tells me that she feels tired simply because she continues to wake up in the middle of the night with continuous nosebleeds.  Of note patient was started on oxygen due to her progressing COPD.    Objective:   Past Medical History:    Back problem    Cataract    Cholelithiasis    DIABETES    Diabetes (HCC)    Diverticulitis    suggestion of possible wall thickening involving sigmoid, possible mild diverticulitis    Diverticulosis    Essential hypertension    Gallbladder disease    thickened gallbladder with distended bile duct    High blood pressure    High cholesterol    HYPERLIPIDEMIA    Hyperlipidemia    IBS (irritable bowel syndrome)    Osteoarthritis    hips, back and knees    Thyroid disease    Visual impairment    reading glasses              Past Surgical History:   Procedure Laterality Date    Carpal tunnel release      Colonoscopy  2003,     Diverticulosis    Colonoscopy  2012    decreased anal sphincter tone, diverticulosis, internal hemorrhoids, normal random colon biopsies    Colonoscopy N/A 7/26/2017    Procedure: COLONOSCOPY;  Surgeon: Garrett Moody MD;  Location:  ENDOSCOPY    Other  1999    Carpel tnnel right hand    Other surgical history  1995    Fatty tumor from neck    Other surgical history  1999    Arthroscopy right knee    Other  surgical history      Carpal tunnel both hand    Other surgical history  2020    Cystoscopy, Dr Chun                Social History     Socioeconomic History    Marital status:    Tobacco Use    Smoking status: Former     Current packs/day: 0.00     Average packs/day: 0.5 packs/day for 50.0 years (25.0 ttl pk-yrs)     Types: Cigarettes     Start date: 10/22/1964     Quit date: 10/22/2014     Years since quittin.6    Smokeless tobacco: Never   Vaping Use    Vaping status: Never Used   Substance and Sexual Activity    Alcohol use: No     Alcohol/week: 0.0 standard drinks of alcohol    Drug use: No    Sexual activity: Not Currently   Other Topics Concern    Caffeine Concern Yes    Exercise Yes     Social Determinants of Health     Food Insecurity: No Food Insecurity (2024)    Food Insecurity     Food Insecurity: Never true   Transportation Needs: No Transportation Needs (2024)    Transportation Needs     Lack of Transportation: No   Housing Stability: Low Risk  (2024)    Housing Stability     Housing Instability: No              Review of Systems    Positive for stated Chief Complaint: Nose Bleed    Other systems are as noted in HPI.  Constitutional and vital signs reviewed.      All other systems reviewed and negative except as noted above.    Physical Exam     ED Triage Vitals [24 1649]   /64   Pulse 104   Resp 20   Temp 97.9 °F (36.6 °C)   Temp src Temporal   SpO2 (!) 84 %   O2 Device None (Room air)       Current Vitals:   Vital Signs  BP: 93/51  Pulse: 100  Resp: 20  Temp: 97.9 °F (36.6 °C)  Temp src: Temporal  MAP (mmHg): (!) 64    Oxygen Therapy  SpO2: 93 %  O2 Device: None (Room air)  O2 Flow Rate (L/min): 4 L/min            Physical Exam  Vitals and nursing note reviewed.   Constitutional:       General: She is not in acute distress.     Appearance: Normal appearance.   HENT:      Head: Normocephalic.      Nose: Nose normal.      Comments: Upon reviewing bilateral  naris no evidence or area of active bleeding observed     Mouth/Throat:      Mouth: Mucous membranes are moist.   Eyes:      Extraocular Movements: Extraocular movements intact.      Pupils: Pupils are equal, round, and reactive to light.   Cardiovascular:      Rate and Rhythm: Normal rate and regular rhythm.      Pulses: Normal pulses.   Pulmonary:      Effort: Pulmonary effort is normal.   Abdominal:      General: Abdomen is flat. Bowel sounds are normal. There is no distension.      Palpations: Abdomen is soft.      Tenderness: There is no abdominal tenderness. There is no right CVA tenderness, left CVA tenderness, guarding or rebound.      Hernia: No hernia is present.   Musculoskeletal:         General: No swelling or tenderness. Normal range of motion.      Cervical back: Normal range of motion.   Skin:     General: Skin is warm and dry.   Neurological:      Mental Status: She is alert and oriented to person, place, and time. Mental status is at baseline.   Psychiatric:         Mood and Affect: Mood normal.               ED Course     Labs Reviewed   COMP METABOLIC PANEL (14) - Abnormal; Notable for the following components:       Result Value    Glucose 139 (*)     AST 7 (*)     Albumin 3.2 (*)     A/G Ratio 0.9 (*)     All other components within normal limits   CBC W/ DIFFERENTIAL - Abnormal; Notable for the following components:    HGB 11.6 (*)     All other components within normal limits   CBC WITH DIFFERENTIAL WITH PLATELET    Narrative:     The following orders were created for panel order CBC With Differential With Platelet.  Procedure                               Abnormality         Status                     ---------                               -----------         ------                     CBC W/ DIFFERENTIAL[[id number]]          Abnormal            Final result                 Please view results for these tests on the individual orders.       7:23 PM  Txa on gauze -   Saline gel twice a day -  lubricating gelly twice a day   Call office tomorrow       8:47 PM  Pt has been here and no further nosebleeds         MDM        Patient is hemodynamically stable here.  I removed the Rhino Rocket in the right nare that was placed in her emergency room several days ago.  I had the patient blow her nose.  And then I used the nose  to directly view each nare and saw no evidence of active bleeding or areas that I could cauterize.  I then soaked a piece of cotton with Afrin and applied it to the right nare along with the nose clip for 40 minutes.  I removed it and during this time patient had no bleeding.  She also did not cough any blood up.    I had the patient ambulate and walk to the bathroom and she had no active bleeding.  I discussed the case with the ENT physician that the patient saw today, after Claribel.  She is recommended soaking applying TXA soaked cottonball in the nare for an additional hour and observing for any additional signs of bleeding.  I did do this.  Patient ultimately remained in the ER for total of 4 hours and 40 minutes.  During this time she ambulated she ate and drink soda.  She did not experience any further nosebleeds.  She feels comfortable being discharged home.  I recommended per Dr. Moser that the patient call ENT clinic tomorrow to get further recommendations on follow-up.             Medical Decision Making      Disposition and Plan     Clinical Impression:  1. Epistaxis         Disposition:  Discharge  6/27/2024  8:47 pm    Follow-up:  Bismark Cabrera MD  [address] [place] [id number]  [phone]    Follow up      Gabi Moser MD  [address] [place] [id number]  [phone]    Call in 1 day(s)            Medications Prescribed:  Discharge Medication List as of 6/27/2024  8:59 PM

## 2024-06-27 NOTE — ED INITIAL ASSESSMENT (HPI)
PT presents with packing to R nare. She reports still intermittent bleeding. Denies blood thinner use or trauma.

## 2024-06-28 NOTE — DISCHARGE INSTRUCTIONS
If your nose starts to bleed, please blow your nose. Then you can insert two sprays of affrin followed by applying nose clip.     If your bleeding does not stop in 20 minutes call 911.

## 2024-07-03 ENCOUNTER — OFFICE VISIT (OUTPATIENT)
Dept: ORTHOPEDICS CLINIC | Facility: CLINIC | Age: 79
End: 2024-07-03
Payer: MEDICARE

## 2024-07-03 VITALS — WEIGHT: 173 LBS | HEIGHT: 64 IN | BODY MASS INDEX: 29.53 KG/M2

## 2024-07-03 DIAGNOSIS — M17.11 PRIMARY OSTEOARTHRITIS OF RIGHT KNEE: ICD-10-CM

## 2024-07-03 DIAGNOSIS — M25.561 RIGHT KNEE PAIN, UNSPECIFIED CHRONICITY: Primary | ICD-10-CM

## 2024-07-03 PROCEDURE — 99213 OFFICE O/P EST LOW 20 MIN: CPT | Performed by: ORTHOPAEDIC SURGERY

## 2024-07-03 NOTE — PROGRESS NOTES
[facility] Progress Note    Subjective: Patient returns to clinic for discussion of her knee.  She states that the last injection which was Zilretta helped for about 5 days.  Since she was last seen she has had multiple ER visits, has had falls, sustained fractures to her toes and ribs.  She additionally was hospitalized for increased oxygen requirement, currently following with pulmonary still.  She is ambulating with a walker.  She notes back issues and she is following with Dr. Rich for radiofrequency ablation.    Objective: Patient is ambulating with a walker.  She demonstrates near full extension of the right knee.      Assessment/Plan: 78-year-old female with symptomatic bilateral knee osteoarthritis.  She had questions about knee replacement surgery today.  I counseled her that she has multiple risk factors for not doing well with major elective surgery at this time, in particular multiple recent ER visits, hospitalization for pulmonary issues and falls, and she is still recovering from hip fracture surgery 6 months ago as well as having back pain/back issues.  Counseled that knee replacement can leave patient's worse off following surgery in particular of having difficulty with required rehabilitation, and I do feel she would be at elevated risk of this.  I would recommend maximizing nonsurgical treatments.  She has not had great relief from injections.  I did discuss my recommendation for setting up with Dr. Rich for possible RFA to the knee.  She does follow with Dr. Rich for spine management.  We provided information for her on this.  All questions were answered.    Scott Sheehan MD, [facility] Orthopedic Surgery  Phone [phone]  Fax [phone]

## 2024-07-25 ENCOUNTER — APPOINTMENT (OUTPATIENT)
Dept: GENERAL RADIOLOGY | Facility: HOSPITAL | Age: 79
End: 2024-07-25
Attending: ANESTHESIOLOGY
Payer: MEDICARE

## 2024-07-25 ENCOUNTER — HOSPITAL ENCOUNTER (OUTPATIENT)
Facility: HOSPITAL | Age: 79
Setting detail: HOSPITAL OUTPATIENT SURGERY
Discharge: HOME | End: 2024-07-25
Attending: ANESTHESIOLOGY | Admitting: ANESTHESIOLOGY
Payer: MEDICARE

## 2024-07-25 VITALS
SYSTOLIC BLOOD PRESSURE: 123 MMHG | WEIGHT: 173 LBS | DIASTOLIC BLOOD PRESSURE: 69 MMHG | OXYGEN SATURATION: 96 % | HEIGHT: 64 IN | TEMPERATURE: 98 F | BODY MASS INDEX: 29.53 KG/M2 | RESPIRATION RATE: 18 BRPM

## 2024-07-25 LAB — GLUCOSE BLD-MCNC: 112 MG/DL (ref 70–99)

## 2024-07-25 PROCEDURE — 64493 INJ PARAVERT F JNT L/S 1 LEV: CPT | Performed by: ANESTHESIOLOGY

## 2024-07-25 PROCEDURE — 3E0T3BZ INTRODUCTION OF ANESTHETIC AGENT INTO PERIPHERAL NERVES AND PLEXI, PERCUTANEOUS APPROACH: ICD-10-PCS | Performed by: ANESTHESIOLOGY

## 2024-07-25 PROCEDURE — 64494 INJ PARAVERT F JNT L/S 2 LEV: CPT | Performed by: ANESTHESIOLOGY

## 2024-07-25 RX ORDER — METHYLPREDNISOLONE ACETATE 40 MG/ML
INJECTION, SUSPENSION INTRA-ARTICULAR; INTRALESIONAL; INTRAMUSCULAR; SOFT TISSUE
Status: DISCONTINUED | OUTPATIENT
Start: 2024-07-25 | End: 2024-07-25

## 2024-07-25 RX ORDER — BUPIVACAINE HYDROCHLORIDE 5 MG/ML
INJECTION, SOLUTION EPIDURAL; INTRACAUDAL
Status: DISCONTINUED | OUTPATIENT
Start: 2024-07-25 | End: 2024-07-25

## 2024-07-25 RX ORDER — LIDOCAINE HYDROCHLORIDE 10 MG/ML
INJECTION, SOLUTION EPIDURAL; INFILTRATION; INTRACAUDAL; PERINEURAL
Status: DISCONTINUED | OUTPATIENT
Start: 2024-07-25 | End: 2024-07-25

## 2024-07-25 NOTE — OPERATIVE REPORT
[facility]  Operative Report  2024     Gabi Kumar Patient Status:  Hospital Outpatient Surgery    1945 MRN [id number]   Location [facility] Attending Jose Rich MD   Hosp Day # 0 PCP Bismark Cabrera MD     Indication: Gabi is a 78 year old female with lumbar spondylosis    Preoperative Diagnosis:  Lumbar spondylosis [M47.816]    Postoperative Diagnosis: Same as above.    Procedure performed: BILATERAL LUMBAR  MEDIAL BRANCH BLOCK with local (L4-5, L5-S10    Anesthesia: Local      EBL: Less than 1 ml.    Procedure Description:  After reviewing the patient's history and performing a focused physical examination, the diagnosis was confirmed and contraindications such as infection and coagulopathy were ruled out.  Following review of allergy,  potential side effects and complications, including but not necessarily limited to infection, allergic reaction, local tissue breakdown, nerve injury, and paresis, the patient indicated they understood and agreed to proceed.  After obtaining the informed consent, the patient was brought to the procedure room and monitored.       The patient was placed prone on the table and the back was prepped and draped in a sterile fashion.  Attention was turned towards the patient's right side first.  The skin and subcutaneous tissue was anesthetized via 25-gauge 1.5\" needle with approximately 2 mL of 1% lidocaine.  Under fluoroscopy guidance, a 25-gauge 3.5\" Quincke spinal needle was introduced and advanced along the superior margin of the L3, L4, L5 transverse process and lateral to the L3, L4, L5 superior articular process, and it was directed inferiorly and medially so that the tip struck the superior aspect of the junction of the transverse process and the supe¬rior articular process.  Following negative aspiration for CSF and blood, approximately 1 mL of 1% Lidocaine was injected via each needle without complication.  The similar  procedure was repeated at the contralateral side.  The needles were removed with tips intact.  The patient tolerated procedure very well.  No complications were observed during or immediately after the procedure.  The patient was observed until discharge criteria met.  Discharge instructions were given and patient was released to a responsible adult.    Complications: None.    Follow up:  The patient was followed in the pain clinic as needed basis.    Jose Rich MD

## 2024-07-25 NOTE — H&P
History & Physical Examination    Patient Name: Gabi Kumar  MRN: [id number]  CSN: [id number]  YOB: 1945    Pre-Operative Diagnosis:  Lumbar spondylosis [M47.816]    Present Illness: Lumbar spondylosis    ASA: 3  MP class: 1  Sedation: Local      Medications Prior to Admission   Medication Sig Dispense Refill Last Dose    [] azithromycin (ZITHROMAX Z-MARIA DEL ROSARIO) 250 MG Oral Tab 500 mg once followed by 250 mg daily x 4 days 6 tablet 0     HYDROcodone-acetaminophen 5-325 MG Oral Tab Take 1 tablet by mouth every 6 (six) hours as needed. 20 tablet 0     traMADol 50 MG Oral Tab Take 1 tablet (50 mg total) by mouth every 6 (six) hours as needed. (Patient not taking: Reported on 7/3/2024) 20 tablet 0     loratadine 10 MG Oral Tab Take 1 tablet (10 mg total) by mouth daily.       DULoxetine (CYMBALTA) 60 MG Oral Cap DR Particles Take 1 capsule (60 mg total) by mouth daily. 90 capsule 1     umeclidinium-vilanterol (ANORO ELLIPTA) 62.5-25 MCG/ACT Inhalation Aerosol Powder, Breath Activated Inhale 1 puff into the lungs daily. 1 each 3     pantoprazole 40 MG Oral Tab EC Take 1 tablet (40 mg total) by mouth every morning before breakfast.       allopurinol 300 MG Oral Tab Take 1 tablet (300 mg total) by mouth daily. 90 tablet 3     ipratropium-albuterol 0.5-2.5 (3) MG/3ML Inhalation Solution Take 3 mL by nebulization every 6 (six) hours as needed. 180 mL 12     clonazePAM 1 MG Oral Tab Take 1 tablet (1 mg total) by mouth nightly as needed.       ONETOUCH ULTRA In Vitro Strip 2 (two) times daily.       FLUTICASONE PROPIONATE 50 MCG/ACT Nasal Suspension spray 2 sprays into each nostril daily 16 g 5     YUPELRI 175 MCG/3ML Inhalation Solution nebulizer solution ADMINISTER ONE (1) VIAL VIA NEBULIZER MACHINE DAILY 90 mL 11     Ascorbic Acid (VITAMIN C) 100 MG Oral Tab Take 10 tablets (1,000 mg total) by mouth daily.       pioglitazone 15 MG Oral Tab Take 1 tablet (15 mg total) by mouth daily.       glimepiride 2 MG  Oral Tab Take 1 tablet (2 mg total) by mouth 2 (two) times daily.       albuterol 108 (90 Base) MCG/ACT Inhalation Aero Soln Inhale 2 puffs into the lungs every 6 (six) hours as needed for Wheezing or Shortness of Breath. inhale 2 puff by inhalation route  every 4 - 6 hours as needed 1 each 11     METOPROLOL SUCCINATE ER 25 MG Oral Tablet 24 Hr TAKE ONE TABLET BY MOUTH ONE TIME DAILY 90 tablet 1     rosuvastatin 10 MG Oral Tab Take 1 tablet (10 mg total) by mouth nightly. Replaces simvastatin 90 tablet 3     Cholecalciferol (VITAMIN D-3 OR) Take 1 capsule by mouth once daily.       Multiple Vitamins-Minerals (CENTRUM) Oral Tab Take 1 tablet by mouth daily.        Current Facility-Administered Medications   Medication Dose Route Frequency    methylPREDNISolone acetate (DEPO-medrol) 40 MG/ML injection    PRN    bupivacaine PF (Marcaine) 0.5% injection    PRN    lidocaine PF (Xylocaine-MPF) 1% injection    PRN       Allergies:   Allergies   Allergen Reactions    Pcn [Bicillin C-R,] SWELLING     itching    Levofloxacin MYALGIA     Severe muscle and bone pain    Penicillins OTHER (SEE COMMENTS)       Past Medical History:    Back problem    Cataract    Cholelithiasis    DIABETES    Diabetes (HCC)    Diverticulitis    suggestion of possible wall thickening involving sigmoid, possible mild diverticulitis    Diverticulosis    Essential hypertension    Gallbladder disease    thickened gallbladder with distended bile duct    High blood pressure    High cholesterol    HYPERLIPIDEMIA    Hyperlipidemia    IBS (irritable bowel syndrome)    Osteoarthritis    hips, back and knees    Thyroid disease    Visual impairment    reading glasses     Past Surgical History:   Procedure Laterality Date    Carpal tunnel release      Colonoscopy  2003,     Diverticulosis    Colonoscopy  2012    decreased anal sphincter tone, diverticulosis, internal hemorrhoids, normal random colon biopsies    Colonoscopy N/A 7/26/2017    Procedure:  COLONOSCOPY;  Surgeon: Garrett Moody MD;  Location:  ENDOSCOPY    Other      Carpel tnnel right hand    Other surgical history      Fatty tumor from neck    Other surgical history      Arthroscopy right knee    Other surgical history      Carpal tunnel both hand    Other surgical history  2020    Cystoscopy, Dr Chun     Family History   Problem Relation Age of Onset    Cancer Other         Stomach cancer    Heart Disease Father     Hypertension Father     High Blood Pressure Father     Diabetes Father     Heart Disease Mother     Hypertension Mother     Lung Disorder Mother         Pulmonary fibrosis     Social History     Tobacco Use    Smoking status: Former     Current packs/day: 0.00     Average packs/day: 0.5 packs/day for 50.0 years (25.0 ttl pk-yrs)     Types: Cigarettes     Start date: 10/22/1964     Quit date: 10/22/2014     Years since quittin.7    Smokeless tobacco: Never   Substance Use Topics    Alcohol use: No     Alcohol/week: 0.0 standard drinks of alcohol       SYSTEM Check if Review is Normal Check if Physical Exam is Normal If not normal, please explain:   HEENT [x ] [x ]    NECK & BACK [x ] [x ]    HEART [x ] [x ]    LUNGS [x ] [x ]    ABDOMEN [x ] [x ]    UROGENITAL [x ] [x ]    EXTREMITIES [x ] [x ]    OTHER        [ x ] I have discussed the risks and benefits and alternatives with the patient/family.  They understand and agree to proceed with plan of care.  [ x ] I have reviewed the History and Physical done within the last 30 days.  Any changes noted above.    Jose Rich MD

## 2024-07-25 NOTE — DISCHARGE INSTRUCTIONS
Home Care Instructions Following Your Pain Procedure     Gabi,  It has been a pleasure to have you as our patient. To help you at home, you must follow these general discharge instructions. We will review these with you before you are discharged. It is our hope that you have a complete and uneventful recovery from our procedure.     General Instructions:  What to Expect:  Bandages from your procedure today can be removed when you get home.  Please avoid soaking and/or swimming for 24 hours.  Showering is okay  It is normal to have increased pain symptoms and/or pain at injection site for up to 3-5 days after procedure, you can use heat or ice (20 minutes on 20 minutes off) for comfort.  You may experience some temporary side effects which may include restlessness or insomnia, flushing of the face, or heart palpitations.  Please contact the provider if these symptoms do not resolve within 3-4 days.  Lightheadedness or nausea may occur and should resolve within 24 to 48 hours.  If you develop a headache after treatment, rest, drink fluids (with caffeine, if possible) and take mild over-the-counter pain medication.  If the headache does not improve with the above treatment, contact the physician.  Home Medications:  Resume all previously prescribed medication.  Please avoid taking NSAIDs (Non-Steriodal Anti-Inflammatory Drugs) such as:  Ibuprofen ( Advil, Motrin) Aleve (Naproxen), Diclofenac, Meloxicam for 6 hours after procedure.   If you are on Coumadin (Warfarin) or any other anti-coagulant (or \"blood thinning\") medication such as Plavix (Clopidogrel), Xarelto (Rivaroxaban), Eliquis (Apixaban), Effient (Prasugrel) etc., restart on the following day from the procedure unless otherwise directed by your provider.  If you are a diabetic, please increase the frequency of your glucose monitoring after the procedure as steroids may cause a temporary (2-3 day) increase in your blood sugar.  Contact your primary care  physician if your blood sugar remains elevated as you may require some medication adjustment.  Diet:  Resume your regular diet as tolerated.  Activity:  We recommend that you relax and rest during the rest of your procedure day.  If you feel weakness in your arms or legs do not drive.  Follow-up Appointment  Please schedule a follow-up visit within 3 to 4 weeks after your last procedure date.  Question or Concerns:  Feel free to call our office with any questions or concerns at [phone] (option #2)    Gabi  Thank you for coming to [facility] for your procedure.  The nurses try very hard to make sure you receive the best care possible.  Your trust in them as well as us is greatly appreciated.    Thanks so much,   Dr. Jose Rich

## 2024-07-26 ENCOUNTER — TELEPHONE (OUTPATIENT)
Dept: PAIN CLINIC | Facility: CLINIC | Age: 79
End: 2024-07-26

## 2024-08-07 ENCOUNTER — OFFICE VISIT (OUTPATIENT)
Dept: PAIN CLINIC | Facility: CLINIC | Age: 79
End: 2024-08-07
Payer: MEDICARE

## 2024-08-07 VITALS — SYSTOLIC BLOOD PRESSURE: 110 MMHG | DIASTOLIC BLOOD PRESSURE: 62 MMHG

## 2024-08-07 DIAGNOSIS — M47.816 LUMBAR FACET ARTHROPATHY: Primary | ICD-10-CM

## 2024-08-07 DIAGNOSIS — M47.899 FACET SYNDROME: ICD-10-CM

## 2024-08-07 PROCEDURE — 99214 OFFICE O/P EST MOD 30 MIN: CPT | Performed by: ANESTHESIOLOGY

## 2024-08-07 NOTE — PROGRESS NOTES
Name: Gabi Kumar   : 1945   DOS: 2024     Pain Clinic Follow Up Visit:     Chief Complaint   Patient presents with    Procedure Follow Up      F/U after Bilateral L4/5,L5/S1 MBB         Gabi Kumar is a 79 year old female with multilevel lumbar degenerative disc disease and facet arthropathy.  The patient complains of axial low back pain.  She is following up after diagnostic lumbar medial branch block.  Reports 100% improvement lasting for 24 hours.  Now pain has returned.    Pt denies any chills, fever, or weakness. There is no bladder or bowel incontinence associated with the pain.    REVIEW OF SYSTEMS:  A ten point review of systems was performed with pertinent positives and negatives in the HPI.    Allergies   Allergen Reactions    Pcn [Bicillin C-R,] SWELLING     itching    Levofloxacin MYALGIA     Severe muscle and bone pain    Penicillins OTHER (SEE COMMENTS)       Current Outpatient Medications   Medication Sig Dispense Refill    HYDROcodone-acetaminophen 5-325 MG Oral Tab Take 1 tablet by mouth every 6 (six) hours as needed. 20 tablet 0    loratadine 10 MG Oral Tab Take 1 tablet (10 mg total) by mouth daily.      DULoxetine (CYMBALTA) 60 MG Oral Cap DR Particles Take 1 capsule (60 mg total) by mouth daily. 90 capsule 1    umeclidinium-vilanterol (ANORO ELLIPTA) 62.5-25 MCG/ACT Inhalation Aerosol Powder, Breath Activated Inhale 1 puff into the lungs daily. 1 each 3    pantoprazole 40 MG Oral Tab EC Take 1 tablet (40 mg total) by mouth every morning before breakfast.      allopurinol 300 MG Oral Tab Take 1 tablet (300 mg total) by mouth daily. 90 tablet 3    ipratropium-albuterol 0.5-2.5 (3) MG/3ML Inhalation Solution Take 3 mL by nebulization every 6 (six) hours as needed. 180 mL 12    clonazePAM 1 MG Oral Tab Take 1 tablet (1 mg total) by mouth nightly as needed.      ONETOUCH ULTRA In Vitro Strip 2 (two) times daily.      FLUTICASONE PROPIONATE 50 MCG/ACT Nasal Suspension spray 2  sprays into each nostril daily 16 g 5    YUPELRI 175 MCG/3ML Inhalation Solution nebulizer solution ADMINISTER ONE (1) VIAL VIA NEBULIZER MACHINE DAILY 90 mL 11    Ascorbic Acid (VITAMIN C) 100 MG Oral Tab Take 10 tablets (1,000 mg total) by mouth daily.      pioglitazone 15 MG Oral Tab Take 1 tablet (15 mg total) by mouth daily.      glimepiride 2 MG Oral Tab Take 1 tablet (2 mg total) by mouth 2 (two) times daily.      albuterol 108 (90 Base) MCG/ACT Inhalation Aero Soln Inhale 2 puffs into the lungs every 6 (six) hours as needed for Wheezing or Shortness of Breath. inhale 2 puff by inhalation route  every 4 - 6 hours as needed 1 each 11    METOPROLOL SUCCINATE ER 25 MG Oral Tablet 24 Hr TAKE ONE TABLET BY MOUTH ONE TIME DAILY 90 tablet 1    rosuvastatin 10 MG Oral Tab Take 1 tablet (10 mg total) by mouth nightly. Replaces simvastatin 90 tablet 3    Cholecalciferol (VITAMIN D-3 OR) Take 1 capsule by mouth once daily.      Multiple Vitamins-Minerals (CENTRUM) Oral Tab Take 1 tablet by mouth daily.      traMADol 50 MG Oral Tab Take 1 tablet (50 mg total) by mouth every 6 (six) hours as needed. (Patient not taking: Reported on 7/3/2024) 20 tablet 0         EXAM:   /62 (BP Location: Left arm, Patient Position: Sitting)   LMP 06/07/1999   General:  Patient is a(n) 79 year old year old female in no acute distress.  Neurologic:: WNL-Orientation to time, place and person, normal mood & affect, concentration & attention span intact.   Inspection:  Ambulates with well-coordinated, fluid, non-antalgic gait.  Gait is normal.  Neck: Full range of motion  Cranial nerves: Grossly intact   respiratory: Nonlabored  Back: Gait intact      IMAGES:     No new imaging    ASSESSMENT AND PLAN:     1. Lumbar facet arthropathy    2. Facet syndrome      The patient is very pleasant 79-year-old female with history of axial low back pain.  Did well with lumbar medial branch block with appropriate diagnostic response.  Given under some  relief during the diagnostic phase recommend repeat lumbar medial branch block to facilitate radiofrequency ablation.    Current VAS Score: 7  Functional Deficits: Standing, walking  Duration of pain symptoms: Chronic  Specific Levels (Only 2 allowed): L foot, L5-S1  Initial treatment or subsequent? (Per area of the body): Initial  Radiculopathy & neurogenic claudication ruled out?:  Yes  Pre-procedure assessment completed on: 7/25/2024 pain score 8 out of 10 via scale  Post-procedure assessment completed on: 7/26/2024 pain score 0 out of 10 VAS scale    % of pain relief from previous procedure/s: 100%  VAS Score during diagnostic phase: 0  Duration of relief from previous procedure/s: 24-hour  Prior Conservative Treatment: home exercise, physical therapy, nonsteroidals, chiropractic care    Orders:  Orders Placed This Encounter   Procedures    [facility] PAIN NAVIGATOR         Radiology orders and consultations:None  The patient indicates understanding of these issues and agrees to the plan.  No follow-ups on file.    Jose Rich MD, 8/7/2024, 1:42 PM

## 2024-08-07 NOTE — PATIENT INSTRUCTIONS
Refill policies:    Allow 2-3 business days for refills; controlled substances may take longer.  Contact your pharmacy at least 5 days prior to running out of medication and have them send an electronic request or submit request through the “request refill” option in your [employer] account.  Refills are not addressed on weekends; covering physicians do not authorize routine medications on weekends.  No narcotics or controlled substances are refilled after noon on Fridays or by on call physicians.  By law, narcotics must be electronically prescribed.  A 30 day supply with no refills is the maximum allowed.  If your prescription is due for a refill, you may be due for a follow up appointment.  To best provide you care, patients receiving routine medications need to be seen at least once a year.  Patients receiving narcotic/controlled substance medications need to be seen at least once every 3 months.  In the event that your preferred pharmacy does not have the requested medication in stock (e.g. Backordered), it is your responsibility to find another pharmacy that has the requested medication available.  We will gladly send a new prescription to that pharmacy at your request.    Scheduling Tests:    If your physician has ordered radiology tests such as MRI or CT scans, please contact Central Scheduling at [phone] right away to schedule the test.  Once scheduled, the [facility] Centralized Referral Team will work with your insurance carrier to obtain pre-certification or prior authorization.  Depending on your insurance carrier, approval may take 3-10 days.  It is highly recommended patients assure they have received an authorization before having a test performed.  If test is done without insurance authorization, patient may be responsible for the entire amount billed.      Precertification and Prior Authorizations:  If your physician has recommended that you have a procedure or additional testing performed the [facility]  Centralized Referral Team will contact your insurance carrier to obtain pre-certification or prior authorization.    You are strongly encouraged to contact your insurance carrier to verify that your procedure/test has been approved and is a COVERED benefit.  Although the [facility] Centralized Referral Team does its due diligence, the insurance carrier gives the disclaimer that \"Although the procedure is authorized, this does not guarantee payment.\"    Ultimately the patient is responsible for payment.   Thank you for your understanding in this matter.  Paperwork Completion:  If you require FMLA or disability paperwork for your recovery, please make sure to either drop it off or have it faxed to our office at [phone]. Be sure the form has your name and date of birth on it.  The form will be faxed to our Forms Department and they will complete it for you.  There is a 25$ fee for all forms that need to be filled out.  Please be aware there is a 10-14 day turnaround time.  You will need to sign a release of information (BEATRICE) form if your paperwork does not come with one.  You may call the Forms Department with any questions at [phone].  Their fax number is [phone].

## 2024-08-07 NOTE — PROGRESS NOTES
Last procedure:   BILATERAL LUMBAR  MEDIAL BRANCH BLOCK with local     Date: 7/25/24  Percentage of relief obtained: 100%, then dropped to 0%  Duration of relief: 3 days    Current Pain Score: 7    Narcotic Contract Exp: NA

## 2024-08-08 ENCOUNTER — OFFICE VISIT (OUTPATIENT)
Facility: LOCATION | Age: 79
End: 2024-08-08
Payer: MEDICARE

## 2024-08-08 DIAGNOSIS — R04.0 EPISTAXIS: Primary | ICD-10-CM

## 2024-08-08 PROCEDURE — 99203 OFFICE O/P NEW LOW 30 MIN: CPT | Performed by: OTOLARYNGOLOGY

## 2024-08-08 NOTE — PROGRESS NOTES
Gabi Kumar is a 79 year old female. No chief complaint on file.    HPI:   She recently got put on oxygen.  She had some bad nosebleeds on the right side in June.  She had to go to the ER and get packing and then TXA.  Since then she has been doing better.  She is also using humidity on her oxygen  Current Outpatient Medications   Medication Sig Dispense Refill    HYDROcodone-acetaminophen 5-325 MG Oral Tab Take 1 tablet by mouth every 6 (six) hours as needed. 20 tablet 0    traMADol 50 MG Oral Tab Take 1 tablet (50 mg total) by mouth every 6 (six) hours as needed. (Patient not taking: Reported on 7/3/2024) 20 tablet 0    loratadine 10 MG Oral Tab Take 1 tablet (10 mg total) by mouth daily.      DULoxetine (CYMBALTA) 60 MG Oral Cap DR Particles Take 1 capsule (60 mg total) by mouth daily. 90 capsule 1    umeclidinium-vilanterol (ANORO ELLIPTA) 62.5-25 MCG/ACT Inhalation Aerosol Powder, Breath Activated Inhale 1 puff into the lungs daily. 1 each 3    pantoprazole 40 MG Oral Tab EC Take 1 tablet (40 mg total) by mouth every morning before breakfast.      allopurinol 300 MG Oral Tab Take 1 tablet (300 mg total) by mouth daily. 90 tablet 3    ipratropium-albuterol 0.5-2.5 (3) MG/3ML Inhalation Solution Take 3 mL by nebulization every 6 (six) hours as needed. 180 mL 12    clonazePAM 1 MG Oral Tab Take 1 tablet (1 mg total) by mouth nightly as needed.      ONETOUCH ULTRA In Vitro Strip 2 (two) times daily.      FLUTICASONE PROPIONATE 50 MCG/ACT Nasal Suspension spray 2 sprays into each nostril daily 16 g 5    YUPELRI 175 MCG/3ML Inhalation Solution nebulizer solution ADMINISTER ONE (1) VIAL VIA NEBULIZER MACHINE DAILY 90 mL 11    Ascorbic Acid (VITAMIN C) 100 MG Oral Tab Take 10 tablets (1,000 mg total) by mouth daily.      pioglitazone 15 MG Oral Tab Take 1 tablet (15 mg total) by mouth daily.      glimepiride 2 MG Oral Tab Take 1 tablet (2 mg total) by mouth 2 (two) times daily.      albuterol 108 (90 Base)  MCG/ACT Inhalation Aero Soln Inhale 2 puffs into the lungs every 6 (six) hours as needed for Wheezing or Shortness of Breath. inhale 2 puff by inhalation route  every 4 - 6 hours as needed 1 each 11    METOPROLOL SUCCINATE ER 25 MG Oral Tablet 24 Hr TAKE ONE TABLET BY MOUTH ONE TIME DAILY 90 tablet 1    rosuvastatin 10 MG Oral Tab Take 1 tablet (10 mg total) by mouth nightly. Replaces simvastatin 90 tablet 3    Cholecalciferol (VITAMIN D-3 OR) Take 1 capsule by mouth once daily.      Multiple Vitamins-Minerals (CENTRUM) Oral Tab Take 1 tablet by mouth daily.        Past Medical History:    Back problem    Cataract    Cholelithiasis    DIABETES    Diabetes (HCC)    Diverticulitis    suggestion of possible wall thickening involving sigmoid, possible mild diverticulitis    Diverticulosis    Essential hypertension    Gallbladder disease    thickened gallbladder with distended bile duct    High blood pressure    High cholesterol    HYPERLIPIDEMIA    Hyperlipidemia    IBS (irritable bowel syndrome)    Osteoarthritis    hips, back and knees    Thyroid disease    Visual impairment    reading glasses      Social History:  Social History     Socioeconomic History    Marital status:    Tobacco Use    Smoking status: Former     Current packs/day: 0.00     Average packs/day: 0.5 packs/day for 50.0 years (25.0 ttl pk-yrs)     Types: Cigarettes     Start date: 10/22/1964     Quit date: 10/22/2014     Years since quittin.8    Smokeless tobacco: Never   Vaping Use    Vaping status: Never Used   Substance and Sexual Activity    Alcohol use: No     Alcohol/week: 0.0 standard drinks of alcohol    Drug use: No    Sexual activity: Not Currently   Other Topics Concern    Caffeine Concern Yes    Exercise Yes     Social Determinants of Health     Food Insecurity: No Food Insecurity (2024)    Food Insecurity     Food Insecurity: Never true   Transportation Needs: No Transportation Needs (2024)    Transportation Needs      Lack of Transportation: No   Housing Stability: Low Risk  (5/29/2024)    Housing Stability     Housing Instability: No      Past Surgical History:   Procedure Laterality Date    Carpal tunnel release      Colonoscopy  2003,     Diverticulosis    Colonoscopy  2012    decreased anal sphincter tone, diverticulosis, internal hemorrhoids, normal random colon biopsies    Colonoscopy N/A 7/26/2017    Procedure: COLONOSCOPY;  Surgeon: Garrett Moody MD;  Location:  ENDOSCOPY    Other  1999    Carpel tnnel right hand    Other surgical history  1995    Fatty tumor from neck    Other surgical history  1999    Arthroscopy right knee    Other surgical history  2001    Carpal tunnel both hand    Other surgical history  12/22/2020    Cystoscopy, Dr Chun         REVIEW OF SYSTEMS:   GENERAL HEALTH: feels well otherwise  GENERAL : denies fever, chills, sweats, weight loss, weight gain  SKIN: denies any unusual skin lesions or rashes  RESPIRATORY: denies shortness of breath with exertion  NEURO: denies headaches    EXAM:   LMP 06/07/1999     System Findings Details   Constitutional  Overall appearance - Normal.   Psychiatric  Orientation - Oriented to time, place, person & situation. Appropriate mood and affect.   Head/Face  Facial features -- Normal. Skull - Normal.   Eyes  Pupils equal ,round ,react to light and accomidate   Ears, Nose, Throat, Neck  Nose is clear I see no signs of infection I see no vessels I see no masses oropharynx clear neck supple out masses   Neurological  Memory - Normal. Cranial nerves - Cranial nerves II through XII grossly intact.   Lymph Detail  Submental. Submandibular. Anterior cervical. Posterior cervical. Supraclavicular.       ASSESSMENT AND PLAN:   1. Epistaxis  Improved after adding humidity to the oxygen.  I gave her some measures if nosebleeds should return.  I also asked her to use nasal saline gel daily.      The patient indicates understanding of these issues and agrees to the plan.    No  follow-ups on file.    Ambrocio Kenny MD  8/8/2024  1:48 PM

## 2024-08-09 ENCOUNTER — TELEPHONE (OUTPATIENT)
Dept: PAIN CLINIC | Facility: CLINIC | Age: 79
End: 2024-08-09

## 2024-08-09 DIAGNOSIS — M47.816 LUMBAR SPONDYLOSIS: Primary | ICD-10-CM

## 2024-08-09 NOTE — TELEPHONE ENCOUNTER
Prior Authorization for bilateral L4/5,L5/S1 MBB    initiated with [facility]  CPT codes: 99619,41245   Clinical notes submitted via fax clinical to [phone]

## 2024-08-12 DIAGNOSIS — J43.2 CENTRILOBULAR EMPHYSEMA (HCC): ICD-10-CM

## 2024-08-12 RX ORDER — FLUTICASONE PROPIONATE 50 MCG
SPRAY, SUSPENSION (ML) NASAL
Qty: 16 G | Refills: 0 | Status: SHIPPED | OUTPATIENT
Start: 2024-08-12

## 2024-08-12 NOTE — TELEPHONE ENCOUNTER
Pt last seen by Dr. Maya on 6-25-24.  Per his notes, pt referred to ENT for nose bleeds which have been attributed to oxygen use.  Pt continues to use the oxygen intermittently and is using the fluticasone nasal spray.  Nose bleeds have resolve.  Pt cautioned about the use of steroid nasal  spray and nose bleeds and advised to stop if they recur.  She verbalizes understanding.  She has appointment with Dr. Maya.  Refill for fluticasone sent.

## 2024-08-13 ENCOUNTER — OFFICE VISIT (OUTPATIENT)
Facility: CLINIC | Age: 79
End: 2024-08-13
Payer: MEDICARE

## 2024-08-13 VITALS
DIASTOLIC BLOOD PRESSURE: 76 MMHG | OXYGEN SATURATION: 98 % | HEART RATE: 95 BPM | SYSTOLIC BLOOD PRESSURE: 110 MMHG | BODY MASS INDEX: 30.81 KG/M2 | HEIGHT: 64 IN | RESPIRATION RATE: 16 BRPM | WEIGHT: 180.5 LBS

## 2024-08-13 DIAGNOSIS — J84.9 ILD (INTERSTITIAL LUNG DISEASE) (HCC): ICD-10-CM

## 2024-08-13 DIAGNOSIS — R91.8 MULTIPLE PULMONARY NODULES: ICD-10-CM

## 2024-08-13 DIAGNOSIS — Z72.0 TOBACCO ABUSE: ICD-10-CM

## 2024-08-13 DIAGNOSIS — J43.2 CENTRILOBULAR EMPHYSEMA (HCC): Primary | ICD-10-CM

## 2024-08-13 DIAGNOSIS — J96.21 ACUTE ON CHRONIC RESPIRATORY FAILURE WITH HYPOXIA (HCC): ICD-10-CM

## 2024-08-13 PROCEDURE — 99214 OFFICE O/P EST MOD 30 MIN: CPT | Performed by: INTERNAL MEDICINE

## 2024-08-13 NOTE — PROGRESS NOTES
[facility] Pulmonary Progress Note    History of Present Illness:  Gabi Kumar is a 79 year old female former smoker (quit 11/2019, 50 pack years) with significant PMH moderate COPD, ILD, CAD, PAD, DM, HTN who presents today for follow up of COPD. Since last visit her epistaxis has improved. Seen by Dr. Kenny recently.  No new concerns  Only using o2 a few hours/day - does not always use when exerting herself    June 2024 previously  Since last visit she reports she had been well until she fell and fractured her right ribs. She was hospitalized and during her hospitalization found to have resting oximetry saturation in 80s on RA, she was restarted on home o2 and now presents for follow up.  Previously she had self discontinued the home oxygen and has only variably used it since her discharge. She developed epistaxis since her discharge and has returned to the ER and had nasal packing placed - planning to see ENT this week.  She presents today with no oxygen and denies change in chronic dyspnea.  Has had continued issues with the nose bleeding. Has facial pain related to packing. No fevers    October 2023 previously    Since last visit she self discontinued her oxygen against medical advice.  She denies new concerns today. Having chronic back pain, joint pains.  Stable LOTT.  Occasional cough with white/yellow phlegm. No f/c/s. No chest pain.  Using anoro off/on through the week, alternates with yupelri. Has albuterol MDI and neb PRN    August 2023 previously  who presents for hospital f/u for COPD exacerbation. Pt reports using Anoro daily without incident until a tree fell outside her house with her windows open and may have sent dust, dirt into her house.  She experienced increased SOB and started using Yupelri and rescue inhaler without relief and went to the hospital. She was hospitalized 8/16-8/20/23,  received steroids and found to need oxygen at discharge. Oxygen titration at discharge showed she needed  4L with activity (78% on RA with activity; 94% on RA at rest). Pt has not been using it at all. Today reports she is feeling better than she has in months and would like the oxygen picked up from her home. Reprots SOB at times but she paces herself.    Previously 10/2022 Dr Maya   77 year old female former smoker (quit 11/2019, 50 pack years) with significant PMH moderate COPD, CAD, PAD, DM, HTN who presents today for follow up of COPD. She denies new concerns today, has chronic dyspnea and limited exertion due to joint pain. Had improvement in symptoms with spinal cord simulator but since has been removed.  Using anoro daily - enrolled in program through [employer]. Also using yupelri daily. Rarely uses albuterol. Occasional cough related to sinus congestion/drainage. Noted mild epistaxis last week, seems better now. No hemoptysis. No pain. No pleurisy. No fever.        Past Medical History:   Past Medical History:    Back problem    Cataract    Cholelithiasis    DIABETES    Diabetes (HCC)    Diverticulitis    suggestion of possible wall thickening involving sigmoid, possible mild diverticulitis    Diverticulosis    Essential hypertension    Gallbladder disease    thickened gallbladder with distended bile duct    High blood pressure    High cholesterol    HYPERLIPIDEMIA    Hyperlipidemia    IBS (irritable bowel syndrome)    Osteoarthritis    hips, back and knees    Thyroid disease    Visual impairment    reading glasses        Past Surgical History:   Past Surgical History:   Procedure Laterality Date    Carpal tunnel release      Colonoscopy  2003,     Diverticulosis    Colonoscopy  2012    decreased anal sphincter tone, diverticulosis, internal hemorrhoids, normal random colon biopsies    Colonoscopy N/A 7/26/2017    Procedure: COLONOSCOPY;  Surgeon: Garrett Moody MD;  Location:  ENDOSCOPY    Other  1999    Carpel tnnel right hand    Other surgical history  1995    Fatty tumor from neck    Other surgical history       Arthroscopy right knee    Other surgical history      Carpal tunnel both hand    Other surgical history  2020    Cystoscopy, Dr Chun       Family Medical History:   Family History   Problem Relation Age of Onset    Cancer Other         Stomach cancer    Heart Disease Father     Hypertension Father     High Blood Pressure Father     Diabetes Father     Heart Disease Mother     Hypertension Mother     Lung Disorder Mother         Pulmonary fibrosis        Social History:   Social History     Socioeconomic History    Marital status:      Spouse name: Not on file    Number of children: Not on file    Years of education: Not on file    Highest education level: Not on file   Occupational History    Not on file   Tobacco Use    Smoking status: Former     Current packs/day: 0.00     Average packs/day: 0.8 packs/day for 50.0 years (37.5 ttl pk-yrs)     Types: Cigarettes     Start date: 10/22/1964     Quit date: 10/22/2014     Years since quittin.8    Smokeless tobacco: Never   Vaping Use    Vaping status: Never Used   Substance and Sexual Activity    Alcohol use: No     Alcohol/week: 0.0 standard drinks of alcohol    Drug use: No    Sexual activity: Not Currently   Other Topics Concern     Service Not Asked    Blood Transfusions Not Asked    Caffeine Concern Yes    Occupational Exposure Not Asked    Hobby Hazards Not Asked    Sleep Concern Not Asked    Stress Concern Not Asked    Weight Concern Not Asked    Special Diet Not Asked    Back Care Not Asked    Exercise Yes    Bike Helmet Not Asked    Seat Belt Not Asked    Self-Exams Not Asked   Social History Narrative    Not on file     Social Determinants of Health     Financial Resource Strain: Not on file   Food Insecurity: No Food Insecurity (2024)    Food Insecurity     Food Insecurity: Never true   Transportation Needs: No Transportation Needs (2024)    Transportation Needs     Lack of Transportation: No     Car Seat: Not on  file   Physical Activity: Not on file   Stress: Not on file   Social Connections: Not on file   Housing Stability: Low Risk  (5/29/2024)    Housing Stability     Housing Instability: No     Housing Instability Emergency: Not on file     Crib or Bassinette: Not on file        Medications:   Current Outpatient Medications   Medication Sig Dispense Refill    FLUTICASONE PROPIONATE 50 MCG/ACT Nasal Suspension spray 2 sprays into each nostril daily 16 g 0    HYDROcodone-acetaminophen 5-325 MG Oral Tab Take 1 tablet by mouth every 6 (six) hours as needed. 20 tablet 0    loratadine 10 MG Oral Tab Take 1 tablet (10 mg total) by mouth daily.      DULoxetine (CYMBALTA) 60 MG Oral Cap DR Particles Take 1 capsule (60 mg total) by mouth daily. 90 capsule 1    umeclidinium-vilanterol (ANORO ELLIPTA) 62.5-25 MCG/ACT Inhalation Aerosol Powder, Breath Activated Inhale 1 puff into the lungs daily. 1 each 3    pantoprazole 40 MG Oral Tab EC Take 1 tablet (40 mg total) by mouth every morning before breakfast.      allopurinol 300 MG Oral Tab Take 1 tablet (300 mg total) by mouth daily. 90 tablet 3    ipratropium-albuterol 0.5-2.5 (3) MG/3ML Inhalation Solution Take 3 mL by nebulization every 6 (six) hours as needed. 180 mL 12    clonazePAM 1 MG Oral Tab Take 1 tablet (1 mg total) by mouth nightly as needed.      ONETOUCH ULTRA In Vitro Strip 2 (two) times daily.      YUPELRI 175 MCG/3ML Inhalation Solution nebulizer solution ADMINISTER ONE (1) VIAL VIA NEBULIZER MACHINE DAILY 90 mL 11    Ascorbic Acid (VITAMIN C) 100 MG Oral Tab Take 10 tablets (1,000 mg total) by mouth daily.      pioglitazone 15 MG Oral Tab Take 1 tablet (15 mg total) by mouth daily.      glimepiride 2 MG Oral Tab Take 1 tablet (2 mg total) by mouth 2 (two) times daily.      albuterol 108 (90 Base) MCG/ACT Inhalation Aero Soln Inhale 2 puffs into the lungs every 6 (six) hours as needed for Wheezing or Shortness of Breath. inhale 2 puff by inhalation route  every 4 -  6 hours as needed 1 each 11    METOPROLOL SUCCINATE ER 25 MG Oral Tablet 24 Hr TAKE ONE TABLET BY MOUTH ONE TIME DAILY 90 tablet 1    rosuvastatin 10 MG Oral Tab Take 1 tablet (10 mg total) by mouth nightly. Replaces simvastatin 90 tablet 3    Cholecalciferol (VITAMIN D-3 OR) Take 1 capsule by mouth once daily.      Multiple Vitamins-Minerals (CENTRUM) Oral Tab Take 1 tablet by mouth daily.      traMADol 50 MG Oral Tab Take 1 tablet (50 mg total) by mouth every 6 (six) hours as needed. (Patient not taking: Reported on 7/3/2024) 20 tablet 0       Review of Systems: Review of Systems   Constitutional: Negative.    HENT:          Nasal packing with localized pain   Respiratory:  Positive for shortness of breath. Negative for cough.    Cardiovascular: Negative.    Musculoskeletal:  Positive for arthralgias and back pain.   All other systems reviewed and are negative.       Physical Exam:  /76 (BP Location: Left arm, Patient Position: Sitting, Cuff Size: adult)   Pulse 95   Resp 16   Ht 5' 4\" (1.626 m)   Wt 180 lb 8 oz (81.9 kg)   LMP 06/07/1999   SpO2 98%   BMI 30.98 kg/m²      Constitutional: alert, cooperative. No acute distress.  HEENT: Head NC/AT.    Cardio: Regular rate and rhythm. Normal S1 and S2. No murmurs.   Respiratory: Thorax symmetrical with no labored breathing. Crackles at bibases. No wheeze  GI: NABS. Abd soft, non-tender.  Extremities: No clubbing or cyanosis. No BLE edema.    Neurologic: A&Ox3. No gross motor deficits.  Skin: Warm, dry  Psych: Calm, cooperative. Pleasant affect.    Results:  Personally reviewed    WBC: 8.4, done on 6/27/2024.  HGB: 11.6, done on 6/27/2024.  PLT: 198, done on 6/27/2024.     Glucose: 139, done on 6/27/2024.  Cr: 0.9, done on 6/27/2024.  GFR(AA): 84, done on 12/1/2019.  GFR (non-AA): 73, done on 12/1/2019.  CA: 9.3, done on 6/27/2024.  Na: 139, done on 6/27/2024.  K: 4.2, done on 6/27/2024.  Cl: 106, done on 6/27/2024.  CO2: 25, done on 6/27/2024.  Last ALB  was 3.2% done on 6/27/2024.     XR PAIN CLINIC FLUOROSCOPY - N/C    Result Date: 7/25/2024  CONCLUSION:  Please see the procedure/operative report for further details.    LOCATION:  [facility]    Dictated by (CST): Stromberg, LeRoy, MD on 7/25/2024 at 2:59 PM     Finalized by (CST): Stromberg, LeRoy, MD on 7/25/2024 at 3:00 PM       XR RIBS WITH CHEST (3 VIEWS), RIGHT  (CPT=71101)    Result Date: 5/31/2024  CONCLUSION:  Acute right 5th and 6th rib fractures with questionable right 7th rib fracture.   LOCATION:  [id number]     Dictated by (CST): Deep Morales MD on 5/31/2024 at 7:29 PM     Finalized by (CST): Deep Morales MD on 5/31/2024 at 7:30 PM       XR RIBS WITH CHEST (3 VIEWS), LEFT  (CPT=71101)    Result Date: 5/29/2024  CONCLUSION:  Pulmonary vascular congestion, without overt edema.  No evidence of displaced left rib fracture.  If there is continued clinical concern, CT evaluation is recommended.   LOCATION:  [facility]     Dictated by (CST): Deep Reeves MD on 5/29/2024 at 2:06 PM     Finalized by (CST): Deep Reeves MD on 5/29/2024 at 2:08 PM       XR TOE(S) (MIN 2 VIEWS), LEFT 1ST (CPT=73660)    Result Date: 5/29/2024  CONCLUSION:  Possible nondisplaced fracture of the 1st proximal phalanx distally with intra-articular extension into the interphalangeal joint, correlate clinically.  Probable remote appearing fractures of the 2nd and 3rd metatarsals.   LOCATION:  [facility]   Dictated by (CST): Deep Reeves MD on 5/29/2024 at 2:03 PM     Finalized by (CST): Deep Reeves MD on 5/29/2024 at 2:04 PM         Assessment/Plan:   #1. chronic hypoxic respiratory failure  Previously was managed on 4L with activity at discharge from [facility] 8/20/23 for oxygen sats at 78%  However she had previously self discontinued the oxygen against medical advice in 2023  Now again noted to be quite hypoxic on room air during hospitalization June 2024 and restarted on o2 - last 6MWT on 6/5/2024: 2L at rest, 4L on exertion  Advised to use  o2 with exertion. We again discussed the benefits and risks of oxygen and risks of no treatment which includes MI, stroke and death. She remains reluctant to use      #2.  Moderate COPD  Former smoker, quit 11/2019, 50 pack years  Exacerbations: March 2020, ?August 2023  Stable symptoms including LOTT  Last PFTs: 9/2022 with FEV1 1.45L, 75%, %, DLCO 32% --> DLCO out of proportion to obstruction and suggests pulmonary hypertension or ILD  9/2022 echo with +RVSP 34  8/2023 HRCT with +peripheral reticular opacities with some honeycombing. +emphysema. Prominent nodes  her respiratory status is stable at present on anoro. Also has yupelri at home to use if anoro is too expensive -she is aware to not take both anoro yupelri.   Use albuterol MDI or neb PRN  Previously encouraged exercise and discussed pulmonary rehab but she has been hesitant to go given her ongoing joint symptoms limiting her ability to exercise. Advised to consider pulm rehab in the future    #3. ILD  9/2022 with FEV1 1.45L, 75%, %, DLCO 32% --> DLCO out of proportion to obstruction and suggests pulmonary hypertension or ILD  8/2023 HRCT with +peripheral reticular opacities with some honeycombing. +emphysema. Prominent nodes.  The reticular changes were only minimally present in RLL base in 2020 so this has certainly progressed  Imaging is possible UIP pattern.  She does have +ALLAN (centromere) in the past and following with DR. Croft raising concern for CTD related ILD  Plan to obtain PFTs and HRCT in future  We can consider either antifibrotics or DMARD with Dr. Croft. also advised she could consider ILD clinic at [facility] or [facility]    #4. Pulmonary hypertension  Noted on echo originally 2017 and again 2020; 9/2022 echo with +RVSP 34; to see her cardiologist later this month    #5. Pulmonary nodules  Noted on LDCT  No new nodules on 8/2023 HRCT  No need for f/u imaging    #6. Tobacco abuse  50 pack years, quit 11/2019  Congratulated on sustained  smoking cessation    Nasir Maya MD

## 2024-08-13 NOTE — PATIENT INSTRUCTIONS
Continue the anoro daily  Continue to use albuterol nebulizer every 6 hours as needed for your breathing or cough  Try using the generic claritin medicine 1-2 hours before bedtime and see if helps your sneezing  Try to use the oxygen whenever you exert yourself  Follow up in 3-4months  Call/message with questions/concerns

## 2024-08-21 NOTE — TELEPHONE ENCOUNTER
Patient advised of insurance approval to proceed with injections and is agreeable to scheduling. Patient scheduled for procedure, pre-procedure instructions reviewed. Patient prefers Local sedation. Reviewed sedation instructions including No Fasting & No  Required. Patient has no medications to hold prior to procedure. Patient verbalized understanding of instructions, no further needs at this time.    Patient declined to have pre procedure instructions mailed to her.      [facility]  PRE-PROCEDURE INSTRUCTIONS WITHOUT SEDATION    Procedure: Bilateral L4/5,L5/S1 MBB       Appointment Date: 09/03/2024  Check-In Time: 01:30 PM    Follow-Up Date/Time w/ : 09/16/2024 @ 01:00 PM    Prior to the procedure:  Please update us prior to the procedure if you are experiencing any symptoms of infection such as cough, fever, chills, urinary symptoms, or have recently been prescribed antibiotics, have open wounds, have recently had surgery or dental procedures.    Day of Procedure:  **Drivers will be required for patients who receive prescriptions for Valium.    NO FASTING REQUIRED  Please bring your Insurance Card, Photo ID, List of Current Medications and Referral (if applicable) to your appointment.  Please park in the [employer] and follow the signs to the [facility].  Check in at [facility] ([address] [place] [address]) outpatient registration in the [facility].  Please note-No prescriptions will be written by Pain Clinic in OR on the day of procedure. If you require a refill of medications, please contact the office 48 hours prior to your procedure.  If you have an implanted Spinal Cord or Peripheral Nerve Stimulator: Please remember to turn device off for procedure.        Medication Hold:    Number of days you need to be off for the following medications:    Aggrenox 10 days   Agrylin (Anagrelide) 10 days  Brilinta (Ticagrelor) 7 days  Imbruvica (Ibrutinib) 3  days   Enbrel (Etanercept) 24 hours   Fragmin (Dalteparin) 24 hours   Pletal (Cilostazol) 7 days  Effient (Prasugrel) 7 days  Pradaxa 10 days  Trental 7 days  Eliquis (Apixaban) 3 days  Xarelto (Rivaroxaban) 3 days  Lovenox (Enoxaparin) 24 hours  Aspirin  Greater than 81mg but less than 325mg   5 days  325mg and greater                  7 days  Coumadin       5 days  Procedure may be cancelled if INR is elevated.   Excedrin (with aspirin) 7 days  Plavix (Clopidogrel)                            7 days    NSAIDs: 24 hours preferred      Ibuprofen (Motrin, Advil, Vicoprofen), Naproxen (Naprosyn, Aleve), Piroxcam (Feldene), Meloxicam (Mobic), Oxaprozin (Daypro), Diclofenac (Voltaren), Indomethacin (Indocin), Etodolac (Lodine), Nabumetone (Relafen), Celebrex (Celecoxib)           HERBAL SUPPLEMENTS  5 days preferred  Fish oil, krill oil, Omega-3, Vascepa, Vitamin E, Turmeric, Garlic                       Insurance Authorization:   Most insurances are now requiring a preauthorization for all procedures.  In the event that your insurance does not authorize your procedure within 48 hours of the scheduled date, your procedure will be cancelled and rescheduled to a later date.  Please contact your insurance carrier to determine what your financial responsibility will be for the procedure(s).      Cancellation/Rescheduling Appointment:   In the event you need to cancel or reschedule your appointment, you must notify the office 24 hours prior.    Post-procedure instructions:        Please schedule a follow up visit within 2 to 4 weeks after your last procedure date   Please call our office with any questions or concerns before or after your procedure at  [phone].  If you are a diabetic, please increase the frequency of your glucose monitoring after the procedure as this may cause a temporary increase in your blood sugar.  Contact your primary care physician if your blood sugar rises as you may require some medication  adjustment.  It is normal to have increased pain at injection site for up to 3-5 days after procedure, you can use heat or ice (20 minutes on 20 minutes off) for comfort.    **To hear a recorded version of these instructions, please call [phone] and follow the prompts.  **Para escuchar las instrucciones en Español, por favor de llamar el gina [phone] opción 4.

## 2024-08-21 NOTE — TELEPHONE ENCOUNTER
Prior authorization request completed for: bilateral L4/5,L5/S1 MBB   Authorization #[id number]  Authorization dates: 8/19/24-12/16/24  CPT codes approved: 64314,96198  Number of visits/dates of service approved: 1  Physician: margo  Location: [facility]     Patient can be scheduled. Routed to Navigator.

## 2024-08-27 ENCOUNTER — TELEPHONE (OUTPATIENT)
Facility: CLINIC | Age: 79
End: 2024-08-27

## 2024-08-27 RX ORDER — UMECLIDINIUM BROMIDE AND VILANTEROL TRIFENATATE 62.5; 25 UG/1; UG/1
1 POWDER RESPIRATORY (INHALATION) DAILY
Qty: 1 EACH | Refills: 5 | Status: SHIPPED | OUTPATIENT
Start: 2024-08-27

## 2024-08-27 NOTE — TELEPHONE ENCOUNTER
Pt reports [facility] has sent refill request on Anoro but not receiving response, they advised pt to call to follow-up.

## 2024-08-27 NOTE — TELEPHONE ENCOUNTER
Pt last seen by Dr. Maya on 8-13-24 and per office visit notes, pt advised to continue the Anoro daily.  Pt instructed to follow up in 3-4 months and appointment scheduled for 12-26-24.  Refill for Anoro sent.  Pt notified.

## 2024-09-03 ENCOUNTER — APPOINTMENT (OUTPATIENT)
Dept: GENERAL RADIOLOGY | Facility: HOSPITAL | Age: 79
End: 2024-09-03
Attending: ANESTHESIOLOGY
Payer: MEDICARE

## 2024-09-03 ENCOUNTER — HOSPITAL ENCOUNTER (OUTPATIENT)
Facility: HOSPITAL | Age: 79
Setting detail: HOSPITAL OUTPATIENT SURGERY
Discharge: HOME | End: 2024-09-03
Attending: ANESTHESIOLOGY | Admitting: ANESTHESIOLOGY
Payer: MEDICARE

## 2024-09-03 VITALS
RESPIRATION RATE: 16 BRPM | TEMPERATURE: 99 F | HEART RATE: 85 BPM | SYSTOLIC BLOOD PRESSURE: 131 MMHG | OXYGEN SATURATION: 92 % | DIASTOLIC BLOOD PRESSURE: 64 MMHG

## 2024-09-03 LAB — GLUCOSE BLD-MCNC: 100 MG/DL (ref 70–99)

## 2024-09-03 PROCEDURE — 3E0T3BZ INTRODUCTION OF ANESTHETIC AGENT INTO PERIPHERAL NERVES AND PLEXI, PERCUTANEOUS APPROACH: ICD-10-PCS | Performed by: ANESTHESIOLOGY

## 2024-09-03 PROCEDURE — 64494 INJ PARAVERT F JNT L/S 2 LEV: CPT | Performed by: ANESTHESIOLOGY

## 2024-09-03 PROCEDURE — 64493 INJ PARAVERT F JNT L/S 1 LEV: CPT | Performed by: ANESTHESIOLOGY

## 2024-09-03 RX ORDER — ONDANSETRON 2 MG/ML
4 INJECTION INTRAMUSCULAR; INTRAVENOUS ONCE AS NEEDED
Status: CANCELLED | OUTPATIENT
Start: 2024-09-03 | End: 2024-09-03

## 2024-09-03 RX ORDER — BUPIVACAINE HYDROCHLORIDE 5 MG/ML
INJECTION, SOLUTION EPIDURAL; INTRACAUDAL
Status: DISCONTINUED | OUTPATIENT
Start: 2024-09-03 | End: 2024-09-03

## 2024-09-03 RX ORDER — LIDOCAINE HYDROCHLORIDE 10 MG/ML
INJECTION, SOLUTION EPIDURAL; INFILTRATION; INTRACAUDAL; PERINEURAL
Status: DISCONTINUED | OUTPATIENT
Start: 2024-09-03 | End: 2024-09-03

## 2024-09-03 RX ORDER — NALOXONE HYDROCHLORIDE 0.4 MG/ML
0.08 INJECTION, SOLUTION INTRAMUSCULAR; INTRAVENOUS; SUBCUTANEOUS AS NEEDED
Status: DISCONTINUED | OUTPATIENT
Start: 2024-09-03 | End: 2024-09-03

## 2024-09-03 RX ORDER — METHYLPREDNISOLONE ACETATE 40 MG/ML
INJECTION, SUSPENSION INTRA-ARTICULAR; INTRALESIONAL; INTRAMUSCULAR; SOFT TISSUE
Status: DISCONTINUED | OUTPATIENT
Start: 2024-09-03 | End: 2024-09-03

## 2024-09-03 NOTE — DISCHARGE INSTRUCTIONS
Home Care Instructions Following Your Pain Procedure     Gabi,  It has been a pleasure to have you as our patient. To help you at home, you must follow these general discharge instructions. We will review these with you before you are discharged. It is our hope that you have a complete and uneventful recovery from our procedure.     General Instructions:  What to Expect:  Bandages from your procedure today can be removed when you get home.  Please avoid soaking and/or swimming for 24 hours.  Showering is okay  It is normal to have increased pain symptoms and/or pain at injection site for up to 3-5 days after procedure, you can use heat or ice (20 minutes on 20 minutes off) for comfort.  You may experience some temporary side effects which may include restlessness or insomnia, flushing of the face, or heart palpitations.  Please contact the provider if these symptoms do not resolve within 3-4 days.  Lightheadedness or nausea may occur and should resolve within 24 to 48 hours.  If you develop a headache after treatment, rest, drink fluids (with caffeine, if possible) and take mild over-the-counter pain medication.  If the headache does not improve with the above treatment, contact the physician.  Home Medications:  Resume all previously prescribed medication.  Please avoid taking NSAIDs (Non-Steriodal Anti-Inflammatory Drugs) such as:  Ibuprofen ( Advil, Motrin) Aleve (Naproxen), Diclofenac, Meloxicam for 6 hours after procedure.   If you are on Coumadin (Warfarin) or any other anti-coagulant (or \"blood thinning\") medication such as Plavix (Clopidogrel), Xarelto (Rivaroxaban), Eliquis (Apixaban), Effient (Prasugrel) etc., restart on the following day from the procedure unless otherwise directed by your provider.  If you are a diabetic, please increase the frequency of your glucose monitoring after the procedure as steroids may cause a temporary (2-3 day) increase in your blood sugar.  Contact your primary care  physician if your blood sugar remains elevated as you may require some medication adjustment.  Diet:  Resume your regular diet as tolerated.  Activity:  We recommend that you relax and rest during the rest of your procedure day.  If you feel weakness in your arms or legs do not drive.  Follow-up Appointment  Please schedule a follow-up visit within 3 to 4 weeks after your last procedure date.  Question or Concerns:  Feel free to call our office with any questions or concerns at [phone] (option #2)    Gabi  Thank you for coming to [facility] for your procedure.  The nurses try very hard to make sure you receive the best care possible.  Your trust in them as well as us is greatly appreciated.    Thanks so much,   Dr. Jose Rich

## 2024-09-03 NOTE — OPERATIVE REPORT
[facility]  Operative Report  9/3/2024     Gabi Kumar Patient Status:  Hospital Outpatient Surgery    1945 MRN [id number]   Location [facility] Attending Jose Rich MD   Hosp Day # 0 PCP Bismark Cabrera MD     Indication: Gabi is a 79 year old female with lumbar spondylosis    Preoperative Diagnosis:  Lumbar spondylosis [M47.816]    Postoperative Diagnosis: Same as above.    Procedure performed: BILATERAL LUMBAR  MEDIAL BRANCH BLOCK with local (L4-5, L5-S1)    Anesthesia: Local      EBL: Less than 1 ml.    Procedure Description:  After reviewing the patient's history and performing a focused physical examination, the diagnosis was confirmed and contraindications such as infection and coagulopathy were ruled out.  Following review of allergy,  potential side effects and complications, including but not necessarily limited to infection, allergic reaction, local tissue breakdown, nerve injury, and paresis, the patient indicated they understood and agreed to proceed.  After obtaining the informed consent, the patient was brought to the procedure room and monitored.      The patient was placed prone on the table and the back was prepped and draped in a sterile fashion.  Attention was turned towards the patient's right side first.  The skin and subcutaneous tissue was anesthetized via 25-gauge 1.5\" needle with approximately 2 mL of 1% lidocaine.  Under fluoroscopy guidance, a 25-gauge 3.5\" Quincke spinal needle was introduced and advanced along the superior margin of the L3, L4, L5 transverse process and lateral to the L3, L4, L5 superior articular process, and it was directed inferiorly and medially so that the tip struck the superior aspect of the junction of the transverse process and the supe¬rior articular process.  Following negative aspiration for CSF and blood, approximately 1 mL of 1% Lidocaine was injected via each needle without complication.  The similar  procedure was repeated at the contralateral side.  The needles were removed with tips intact.  The patient tolerated procedure very well.  No complications were observed during or immediately after the procedure.  The patient was observed until discharge criteria met.  Discharge instructions were given and patient was released to a responsible adult.    Complications: None.    Follow up:  The patient was followed in the pain clinic as needed basis.    Jose Rich MD

## 2024-09-03 NOTE — H&P
History & Physical Examination    Patient Name: Gabi Kumar  MRN: [id number]  CSN: [id number]  YOB: 1945    Pre-Operative Diagnosis:  Lumbar spondylosis [M47.816]    Present Illness: Lumbar spondylosis    ASA: 3  MP class: 1  Sedation: Local     Medications Prior to Admission   Medication Sig Dispense Refill Last Dose    umeclidinium-vilanterol (ANORO ELLIPTA) 62.5-25 MCG/ACT Inhalation Aerosol Powder, Breath Activated Inhale 1 puff into the lungs daily. 1 each 5     FLUTICASONE PROPIONATE 50 MCG/ACT Nasal Suspension spray 2 sprays into each nostril daily 16 g 0     [] azithromycin (ZITHROMAX Z-MARIA DEL ROSARIO) 250 MG Oral Tab 500 mg once followed by 250 mg daily x 4 days 6 tablet 0     HYDROcodone-acetaminophen 5-325 MG Oral Tab Take 1 tablet by mouth every 6 (six) hours as needed. 20 tablet 0     traMADol 50 MG Oral Tab Take 1 tablet (50 mg total) by mouth every 6 (six) hours as needed. (Patient not taking: Reported on 7/3/2024) 20 tablet 0     loratadine 10 MG Oral Tab Take 1 tablet (10 mg total) by mouth daily.       DULoxetine (CYMBALTA) 60 MG Oral Cap DR Particles Take 1 capsule (60 mg total) by mouth daily. 90 capsule 1     pantoprazole 40 MG Oral Tab EC Take 1 tablet (40 mg total) by mouth every morning before breakfast.       allopurinol 300 MG Oral Tab Take 1 tablet (300 mg total) by mouth daily. 90 tablet 3     ipratropium-albuterol 0.5-2.5 (3) MG/3ML Inhalation Solution Take 3 mL by nebulization every 6 (six) hours as needed. 180 mL 12     clonazePAM 1 MG Oral Tab Take 1 tablet (1 mg total) by mouth nightly as needed.       ONETOUCH ULTRA In Vitro Strip 2 (two) times daily.       YUPELRI 175 MCG/3ML Inhalation Solution nebulizer solution ADMINISTER ONE (1) VIAL VIA NEBULIZER MACHINE DAILY 90 mL 11     Ascorbic Acid (VITAMIN C) 100 MG Oral Tab Take 10 tablets (1,000 mg total) by mouth daily.       pioglitazone 15 MG Oral Tab Take 1 tablet (15 mg total) by mouth daily.       glimepiride 2 MG  Oral Tab Take 1 tablet (2 mg total) by mouth 2 (two) times daily.       albuterol 108 (90 Base) MCG/ACT Inhalation Aero Soln Inhale 2 puffs into the lungs every 6 (six) hours as needed for Wheezing or Shortness of Breath. inhale 2 puff by inhalation route  every 4 - 6 hours as needed 1 each 11     METOPROLOL SUCCINATE ER 25 MG Oral Tablet 24 Hr TAKE ONE TABLET BY MOUTH ONE TIME DAILY 90 tablet 1     rosuvastatin 10 MG Oral Tab Take 1 tablet (10 mg total) by mouth nightly. Replaces simvastatin 90 tablet 3     Cholecalciferol (VITAMIN D-3 OR) Take 1 capsule by mouth once daily.       Multiple Vitamins-Minerals (CENTRUM) Oral Tab Take 1 tablet by mouth daily.        No current facility-administered medications for this encounter.       Allergies:   Allergies   Allergen Reactions    Pcn [Bicillin C-R,] SWELLING     itching    Levofloxacin MYALGIA     Severe muscle and bone pain    Penicillins OTHER (SEE COMMENTS)       Past Medical History:    Back problem    Cataract    Cholelithiasis    DIABETES    Diabetes (HCC)    Diverticulitis    suggestion of possible wall thickening involving sigmoid, possible mild diverticulitis    Diverticulosis    Essential hypertension    Gallbladder disease    thickened gallbladder with distended bile duct    High blood pressure    High cholesterol    HYPERLIPIDEMIA    Hyperlipidemia    IBS (irritable bowel syndrome)    Osteoarthritis    hips, back and knees    Thyroid disease    Visual impairment    reading glasses     Past Surgical History:   Procedure Laterality Date    Carpal tunnel release      Colonoscopy  2003,     Diverticulosis    Colonoscopy  2012    decreased anal sphincter tone, diverticulosis, internal hemorrhoids, normal random colon biopsies    Colonoscopy N/A 7/26/2017    Procedure: COLONOSCOPY;  Surgeon: Garrett Moody MD;  Location:  ENDOSCOPY    Other  1999    Carpel tnnel right hand    Other surgical history  1995    Fatty tumor from neck    Other surgical history       Arthroscopy right knee    Other surgical history      Carpal tunnel both hand    Other surgical history  2020    Cystoscopy, Dr Chun     Family History   Problem Relation Age of Onset    Cancer Other         Stomach cancer    Heart Disease Father     Hypertension Father     High Blood Pressure Father     Diabetes Father     Heart Disease Mother     Hypertension Mother     Lung Disorder Mother         Pulmonary fibrosis     Social History     Tobacco Use    Smoking status: Former     Current packs/day: 0.00     Average packs/day: 0.8 packs/day for 50.0 years (37.5 ttl pk-yrs)     Types: Cigarettes     Start date: 10/22/1964     Quit date: 10/22/2014     Years since quittin.8    Smokeless tobacco: Never   Substance Use Topics    Alcohol use: No     Alcohol/week: 0.0 standard drinks of alcohol       SYSTEM Check if Review is Normal Check if Physical Exam is Normal If not normal, please explain:   HEENT [x ] [x ]    NECK & BACK [x ] [x ]    HEART [x ] [x ]    LUNGS [x ] [x ]    ABDOMEN [x ] [x ]    UROGENITAL [x ] [x ]    EXTREMITIES [x ] [x ]    OTHER        [ x ] I have discussed the risks and benefits and alternatives with the patient/family.  They understand and agree to proceed with plan of care.  [ x ] I have reviewed the History and Physical done within the last 30 days.  Any changes noted above.    Jose Rich MD

## 2024-09-04 ENCOUNTER — TELEPHONE (OUTPATIENT)
Dept: PAIN CLINIC | Facility: CLINIC | Age: 79
End: 2024-09-04

## 2024-09-04 NOTE — TELEPHONE ENCOUNTER
Patient called asking if we prescribed her a Zpack post procedure, as she said this was in her paperwork. I advised that in per pre-procedure instructions it states to update us if she is put on any antibiotics, but we are not prescribing any zpack or antibiotics. Patient understood.    Patient also asked why her paperwork states she was given \"medication that makes her sleepy\", when her procedure was given locally. I explained this is a generic message and if you are receiving local, you will not experience any drowsiness or sleepiness. Furthermore, I advised her to keep track of any symptoms she has, and if any seem unusual to give us a call. Patient understood.

## 2024-09-04 NOTE — TELEPHONE ENCOUNTER
Patient called back to discuss how they are feeling after their injection. Patient stated she is feeling good with no complaints. Patient stated her last injection only lasted 3 days. I advised her to monitor how she is feeling and how much relief she is getting. Furthermore, advised to discuss this with Dr. Rich at her F/U. Patient understood. Has no more questions or concerns. Pt verbalized understanding to call with any questions or concerns.    Procedure:   BILATERAL LUMBAR  MEDIAL BRANCH BLOCK with local     Date: 9/3/24  Follow up Visit Scheduled: 9/16/24

## 2024-09-13 DIAGNOSIS — J43.2 CENTRILOBULAR EMPHYSEMA (HCC): ICD-10-CM

## 2024-09-13 RX ORDER — FLUTICASONE PROPIONATE 50 MCG
2 SPRAY, SUSPENSION (ML) NASAL DAILY
Qty: 16 G | Refills: 3 | Status: SHIPPED | OUTPATIENT
Start: 2024-09-13

## 2024-09-13 NOTE — TELEPHONE ENCOUNTER
Pt last seen by Dr. Maya on 8-13-24.  Follow up appointment scheduled for 12-26-24.  Refill for Fluticasone nasal spray sent.

## 2024-09-16 ENCOUNTER — OFFICE VISIT (OUTPATIENT)
Dept: PAIN CLINIC | Facility: CLINIC | Age: 79
End: 2024-09-16
Payer: MEDICARE

## 2024-09-16 ENCOUNTER — TELEPHONE (OUTPATIENT)
Dept: PAIN CLINIC | Facility: CLINIC | Age: 79
End: 2024-09-16

## 2024-09-16 VITALS — DIASTOLIC BLOOD PRESSURE: 60 MMHG | SYSTOLIC BLOOD PRESSURE: 108 MMHG

## 2024-09-16 DIAGNOSIS — M17.11 PRIMARY OSTEOARTHRITIS OF RIGHT KNEE: Primary | ICD-10-CM

## 2024-09-16 DIAGNOSIS — M47.816 LUMBAR FACET ARTHROPATHY: ICD-10-CM

## 2024-09-16 PROCEDURE — 99214 OFFICE O/P EST MOD 30 MIN: CPT | Performed by: ANESTHESIOLOGY

## 2024-09-16 NOTE — PROGRESS NOTES
Name: Gabi Kumar   : 1945   DOS: 2024     Pain Clinic Follow Up Visit:     Chief Complaint   Patient presents with    Follow - Up     BILATERAL LUMBAR  MEDIAL BRANCH BLOCK with local       Gabi Kumar is a 79 year old female with a history of multilevel lumbar degenerative disc disease with osteoarthritis here for follow-up.  The patient complains of axial back pain and is following up after mask lumbar medial branch block.  Overall, she has done quite well with 100% relief during the diagnostic phase of the injection.  This is very similar to response from her first diagnostic lumbar medial branch block.  Given these results, would recommend moving forward with radiofrequency ablation.    Patient also has known end-stage osteoarthritis of both knees.  The patient has been seen by Dr. Sheehan and has had multiple injections.  Unfortunately, starting to have decreased efficacy from injections.  She was informed that total knee arthroplasty may not be her best option.  She wants to discuss treatment options.    Pt denies any chills, fever, or weakness. There is no bladder or bowel incontinence associated with the pain.    REVIEW OF SYSTEMS:  A ten point review of systems was performed with pertinent positives and negatives in the HPI.    Allergies   Allergen Reactions    Pcn [Bicillin C-R,] SWELLING     itching    Levofloxacin MYALGIA     Severe muscle and bone pain    Penicillins OTHER (SEE COMMENTS)       Current Outpatient Medications   Medication Sig Dispense Refill    fluticasone propionate 50 MCG/ACT Nasal Suspension Use 2 sprays in each nostril once daily 16 g 3    umeclidinium-vilanterol (ANORO ELLIPTA) 62.5-25 MCG/ACT Inhalation Aerosol Powder, Breath Activated Inhale 1 puff into the lungs daily. 1 each 5    HYDROcodone-acetaminophen 5-325 MG Oral Tab Take 1 tablet by mouth every 6 (six) hours as needed. 20 tablet 0    traMADol 50 MG Oral Tab Take 1 tablet (50 mg total) by mouth  every 6 (six) hours as needed. 20 tablet 0    loratadine 10 MG Oral Tab Take 1 tablet (10 mg total) by mouth daily.      DULoxetine (CYMBALTA) 60 MG Oral Cap DR Particles Take 1 capsule (60 mg total) by mouth daily. 90 capsule 1    pantoprazole 40 MG Oral Tab EC Take 1 tablet (40 mg total) by mouth every morning before breakfast.      allopurinol 300 MG Oral Tab Take 1 tablet (300 mg total) by mouth daily. 90 tablet 3    ipratropium-albuterol 0.5-2.5 (3) MG/3ML Inhalation Solution Take 3 mL by nebulization every 6 (six) hours as needed. 180 mL 12    clonazePAM 1 MG Oral Tab Take 1 tablet (1 mg total) by mouth nightly as needed.      ONETOUCH ULTRA In Vitro Strip 2 (two) times daily.      YUPELRI 175 MCG/3ML Inhalation Solution nebulizer solution ADMINISTER ONE (1) VIAL VIA NEBULIZER MACHINE DAILY 90 mL 11    Ascorbic Acid (VITAMIN C) 100 MG Oral Tab Take 10 tablets (1,000 mg total) by mouth daily.      pioglitazone 15 MG Oral Tab Take 1 tablet (15 mg total) by mouth daily.      glimepiride 2 MG Oral Tab Take 1 tablet (2 mg total) by mouth 2 (two) times daily.      albuterol 108 (90 Base) MCG/ACT Inhalation Aero Soln Inhale 2 puffs into the lungs every 6 (six) hours as needed for Wheezing or Shortness of Breath. inhale 2 puff by inhalation route  every 4 - 6 hours as needed 1 each 11    METOPROLOL SUCCINATE ER 25 MG Oral Tablet 24 Hr TAKE ONE TABLET BY MOUTH ONE TIME DAILY 90 tablet 1    rosuvastatin 10 MG Oral Tab Take 1 tablet (10 mg total) by mouth nightly. Replaces simvastatin 90 tablet 3    Cholecalciferol (VITAMIN D-3 OR) Take 1 capsule by mouth once daily.      Multiple Vitamins-Minerals (CENTRUM) Oral Tab Take 1 tablet by mouth daily.           EXAM:   /60 (BP Location: Left arm, Patient Position: Sitting, Cuff Size: adult)   LMP 06/07/1999   General:  Patient is a(n) 79 year old year old female in no acute distress.  Neurologic:: WNL-Orientation to time, place and person, normal mood & affect,  concentration & attention span intact.   Inspection:  Ambulates with well-coordinated, fluid, non-antalgic gait.  Gait is normal.  Neck: Full range of motion  Cranial nerves: Grossly intact  Respiratory: Nonlabored  Back:  gait intact for injection site clear      IMAGES:     Knee x-rays reviewed with severe osteoarthritis with a right knee    ASSESSMENT AND PLAN:     1. Primary osteoarthritis of right knee    2. Lumbar facet arthropathy      The patient is a pleasant 79-year-old female with a longstanding history of low back pain.  Also has knee pain.  The patient is following up after her second set of diagnostic medial branch injections with 100% relief during the diagnostic phase.  Given this response, recommend moving forward with radiofrequency ablation of lumbar medial branches.    Patient also has severe osteoarthritis of the right knee.  Was informed that surgery is not her best option.  Recommend genicular nerve block and possible RFA.    Current VAS Score: 7    Functional Deficits: Standing, walking  Duration of pain symptoms: Chronic  Specific Levels (Only 2 allowed): L4-5 L5-S1  Initial treatment or subsequent? (Per area of the body): Initial  Radiculopathy & neurogenic claudication ruled out?:  Yes  Pre-procedure assessment completed on: July 25, 2024, September 3, 2024 pain score 8 out of 10 VAS scale  Post-procedure assessment completed on: July 26, 2024, September 4, 2024 pain score 0 out of 10 VAS scale  % of pain relief from previous procedure/s: 100%  VAS Score during diagnostic phase: 0  Duration of relief from previous procedure/s: 24 hours, 22 hours  Prior Conservative Treatment: PT, nonsteroidals, home exercise program        Orders:  Orders Placed This Encounter   Procedures    [facility] PAIN NAVIGATOR         Radiology orders and consultations:None  The patient indicates understanding of these issues and agrees to the plan.  No follow-ups on file.    Jose Rich MD, 9/16/2024, 2:11 PM

## 2024-09-16 NOTE — TELEPHONE ENCOUNTER
Order Questions    Question Answer   Anesthesia Type Local   Provider [facility]   CPT (Hit enter after each entry) NJX AA&/STRD GNCLR NRV [facility]   Medical clearance requested (will send to Pain Navigator) No   Patient has Medicare coverage? Yes   Comments (Please list entire procedure name here.) right genicular nerve block

## 2024-09-16 NOTE — PATIENT INSTRUCTIONS
Refill policies:    Allow 2-3 business days for refills; controlled substances may take longer.  Contact your pharmacy at least 5 days prior to running out of medication and have them send an electronic request or submit request through the “request refill” option in your [employer] account.  Refills are not addressed on weekends; covering physicians do not authorize routine medications on weekends.  No narcotics or controlled substances are refilled after noon on Fridays or by on call physicians.  By law, narcotics must be electronically prescribed.  A 30 day supply with no refills is the maximum allowed.  If your prescription is due for a refill, you may be due for a follow up appointment.  To best provide you care, patients receiving routine medications need to be seen at least once a year.  Patients receiving narcotic/controlled substance medications need to be seen at least once every 3 months.  In the event that your preferred pharmacy does not have the requested medication in stock (e.g. Backordered), it is your responsibility to find another pharmacy that has the requested medication available.  We will gladly send a new prescription to that pharmacy at your request.    Scheduling Tests:    If your physician has ordered radiology tests such as MRI or CT scans, please contact Central Scheduling at [phone] right away to schedule the test.  Once scheduled, the [facility] Centralized Referral Team will work with your insurance carrier to obtain pre-certification or prior authorization.  Depending on your insurance carrier, approval may take 3-10 days.  It is highly recommended patients assure they have received an authorization before having a test performed.  If test is done without insurance authorization, patient may be responsible for the entire amount billed.      Precertification and Prior Authorizations:  If your physician has recommended that you have a procedure or additional testing performed the [facility]  Centralized Referral Team will contact your insurance carrier to obtain pre-certification or prior authorization.    You are strongly encouraged to contact your insurance carrier to verify that your procedure/test has been approved and is a COVERED benefit.  Although the [facility] Centralized Referral Team does its due diligence, the insurance carrier gives the disclaimer that \"Although the procedure is authorized, this does not guarantee payment.\"    Ultimately the patient is responsible for payment.   Thank you for your understanding in this matter.  Paperwork Completion:  If you require FMLA or disability paperwork for your recovery, please make sure to either drop it off or have it faxed to our office at [phone]. Be sure the form has your name and date of birth on it.  The form will be faxed to our Forms Department and they will complete it for you.  There is a 25$ fee for all forms that need to be filled out.  Please be aware there is a 10-14 day turnaround time.  You will need to sign a release of information (BEATRICE) form if your paperwork does not come with one.  You may call the Forms Department with any questions at [phone].  Their fax number is [phone].

## 2024-09-16 NOTE — PROGRESS NOTES
Last procedure: BILATERAL LUMBAR  MEDIAL BRANCH BLOCK with local   Date: 9/3/24  Percentage of relief obtained: 100%  Duration of relief: 3 days    Current Pain Score: 5/10    Narcotic Contract Exp: n/a

## 2024-09-16 NOTE — TELEPHONE ENCOUNTER
Call transferred from PSR - patient returning call to r/s injection to 10/08/24 with check in time of 01:00 PM.

## 2024-09-16 NOTE — TELEPHONE ENCOUNTER
Patient advised of insurance approval to proceed with injections and is agreeable to scheduling. Patient scheduled for procedure, pre-procedure instructions reviewed. Patient prefers Local sedation. Reviewed sedation instructions including No Fasting & No  Required. Patient has no medications to hold prior to procedure. Patient verbalized understanding of instructions, no further needs at this time.    Pre Procedure Instruction sheet provided to patient at office visit.       [facility]IN [facility]  PRE-PROCEDURE INSTRUCTIONS WITHOUT SEDATION    Procedure: Right Genicular NB       Appointment Date: 10/08/2024  Check-In Time: 01:00 PM    Follow-Up Date/Time w/ : 10/21/2024 @ 01:00 PM    Prior to the procedure:  Please update us prior to the procedure if you are experiencing any symptoms of infection such as cough, fever, chills, urinary symptoms, or have recently been prescribed antibiotics, have open wounds, have recently had surgery or dental procedures.    Day of Procedure:  **Drivers will be required for patients who receive prescriptions for Valium.    NO FASTING REQUIRED  Please bring your Insurance Card, Photo ID, List of Current Medications and Referral (if applicable) to your appointment.  Please park in the [employer] and follow the signs to the [facility].  Check in at [facility] ([address] [place] [address]) outpatient registration in the [facility].  Please note-No prescriptions will be written by Pain Clinic in [place] on the day of procedure. If you require a refill of medications, please contact the office 48 hours prior to your procedure.  If you have an implanted Spinal Cord or Peripheral Nerve Stimulator: Please remember to turn device off for procedure.        Medication Hold:    Number of days you need to be off for the following medications:    Aggrenox 10 days   Agrylin (Anagrelide) 10 days  Brilinta (Ticagrelor) 7 days  Imbruvica (Ibrutinib) 3  days   Enbrel (Etanercept) 24 hours   Fragmin (Dalteparin) 24 hours   Pletal (Cilostazol) 7 days  Effient (Prasugrel) 7 days  Pradaxa 10 days  Trental 7 days  Eliquis (Apixaban) 3 days  Xarelto (Rivaroxaban) 3 days  Lovenox (Enoxaparin) 24 hours  Aspirin  Greater than 81mg but less than 325mg   5 days  325mg and greater                  7 days  Coumadin       5 days  Procedure may be cancelled if INR is elevated.   Excedrin (with aspirin) 7 days  Plavix (Clopidogrel)                            7 days    NSAIDs: 24 hours preferred      Ibuprofen (Motrin, Advil, Vicoprofen), Naproxen (Naprosyn, Aleve), Piroxcam (Feldene), Meloxicam (Mobic), Oxaprozin (Daypro), Diclofenac (Voltaren), Indomethacin (Indocin), Etodolac (Lodine), Nabumetone (Relafen), Celebrex (Celecoxib)           HERBAL SUPPLEMENTS  5 days preferred  Fish oil, krill oil, Omega-3, Vascepa, Vitamin E, Turmeric, Garlic                       Insurance Authorization:   Most insurances are now requiring a preauthorization for all procedures.  In the event that your insurance does not authorize your procedure within 48 hours of the scheduled date, your procedure will be cancelled and rescheduled to a later date.  Please contact your insurance carrier to determine what your financial responsibility will be for the procedure(s).      Cancellation/Rescheduling Appointment:   In the event you need to cancel or reschedule your appointment, you must notify the office 24 hours prior.    Post-procedure instructions:        Please schedule a follow up visit within 2 to 4 weeks after your last procedure date   Please call our office with any questions or concerns before or after your procedure at  [phone].  If you are a diabetic, please increase the frequency of your glucose monitoring after the procedure as this may cause a temporary increase in your blood sugar.  Contact your primary care physician if your blood sugar rises as you may require some medication  adjustment.  It is normal to have increased pain at injection site for up to 3-5 days after procedure, you can use heat or ice (20 minutes on 20 minutes off) for comfort.    **To hear a recorded version of these instructions, please call [phone] and follow the prompts.  **Para escuchar las instrucciones en Español, por favor de llamar el gina [phone] opción 4.

## 2024-09-19 ENCOUNTER — TELEPHONE (OUTPATIENT)
Dept: PAIN CLINIC | Facility: CLINIC | Age: 79
End: 2024-09-19

## 2024-09-19 DIAGNOSIS — M47.816 LUMBAR SPONDYLOSIS: Primary | ICD-10-CM

## 2024-09-19 NOTE — TELEPHONE ENCOUNTER
Prior Authorization for bilateral L4/5,L5/S1 RFA    initiated with [facility].  CPT codes: 89727,96791   Clinical notes submitted via fax clinical [phone]

## 2024-09-30 ENCOUNTER — LAB ENCOUNTER (OUTPATIENT)
Dept: LAB | Age: 79
End: 2024-09-30
Attending: INTERNAL MEDICINE
Payer: MEDICARE

## 2024-09-30 ENCOUNTER — TELEPHONE (OUTPATIENT)
Dept: RHEUMATOLOGY | Facility: CLINIC | Age: 79
End: 2024-09-30

## 2024-09-30 ENCOUNTER — HOSPITAL ENCOUNTER (OUTPATIENT)
Dept: GENERAL RADIOLOGY | Age: 79
Discharge: HOME | End: 2024-09-30
Attending: FAMILY MEDICINE
Payer: MEDICARE

## 2024-09-30 DIAGNOSIS — M81.0 AGE-RELATED OSTEOPOROSIS WITHOUT CURRENT PATHOLOGICAL FRACTURE: ICD-10-CM

## 2024-09-30 DIAGNOSIS — M79.18 CHRONIC MUSCULOSKELETAL PAIN: ICD-10-CM

## 2024-09-30 DIAGNOSIS — G89.29 CHRONIC MUSCULOSKELETAL PAIN: ICD-10-CM

## 2024-09-30 DIAGNOSIS — M1A.0411 IDIOPATHIC CHRONIC GOUT OF RIGHT HAND WITH TOPHUS: ICD-10-CM

## 2024-09-30 DIAGNOSIS — Z51.81 THERAPEUTIC DRUG MONITORING: ICD-10-CM

## 2024-09-30 DIAGNOSIS — R70.0 ELEVATED SED RATE: Primary | ICD-10-CM

## 2024-09-30 DIAGNOSIS — R76.8 CENTROMERE ANTIBODY POSITIVE: ICD-10-CM

## 2024-09-30 DIAGNOSIS — M25.552 PAIN IN LEFT HIP: ICD-10-CM

## 2024-09-30 LAB
ALBUMIN SERPL-MCNC: 4.3 G/DL (ref 3.2–4.8)
ALBUMIN/GLOB SERPL: 1.5 {RATIO} (ref 1–2)
ALP LIVER SERPL-CCNC: 132 U/L
ALT SERPL-CCNC: 11 U/L
ANION GAP SERPL CALC-SCNC: 10 MMOL/L (ref 0–18)
AST SERPL-CCNC: 22 U/L (ref ?–34)
BASOPHILS # BLD AUTO: 0.05 X10(3) UL (ref 0–0.2)
BASOPHILS NFR BLD AUTO: 0.6 %
BILIRUB SERPL-MCNC: 0.6 MG/DL (ref 0.2–1.1)
BUN BLD-MCNC: 28 MG/DL (ref 9–23)
C3 SERPL-MCNC: 131.6 MG/DL (ref 90–170)
C4 SERPL-MCNC: 27.9 MG/DL (ref 12–36)
CALCIUM BLD-MCNC: 10 MG/DL (ref 8.7–10.4)
CHLORIDE SERPL-SCNC: 106 MMOL/L (ref 98–112)
CO2 SERPL-SCNC: 24 MMOL/L (ref 21–32)
CREAT BLD-MCNC: 1.01 MG/DL
CRP SERPL-MCNC: <0.4 MG/DL (ref ?–0.5)
EGFRCR SERPLBLD CKD-EPI 2021: 56 ML/MIN/1.73M2 (ref 60–?)
EOSINOPHIL # BLD AUTO: 0.68 X10(3) UL (ref 0–0.7)
EOSINOPHIL NFR BLD AUTO: 8.1 %
ERYTHROCYTE [DISTWIDTH] IN BLOOD BY AUTOMATED COUNT: 17.2 %
ERYTHROCYTE [SEDIMENTATION RATE] IN BLOOD: 95 MM/HR
FASTING STATUS PATIENT QL REPORTED: YES
GLOBULIN PLAS-MCNC: 2.8 G/DL (ref 2–3.5)
GLUCOSE BLD-MCNC: 107 MG/DL (ref 70–99)
HCT VFR BLD AUTO: 47.5 %
HGB BLD-MCNC: 15.1 G/DL
IMM GRANULOCYTES # BLD AUTO: 0.06 X10(3) UL (ref 0–1)
IMM GRANULOCYTES NFR BLD: 0.7 %
LYMPHOCYTES # BLD AUTO: 1.29 X10(3) UL (ref 1–4)
LYMPHOCYTES NFR BLD AUTO: 15.3 %
MAGNESIUM SERPL-MCNC: 1.9 MG/DL (ref 1.6–2.6)
MCH RBC QN AUTO: 28.5 PG (ref 26–34)
MCHC RBC AUTO-ENTMCNC: 31.8 G/DL (ref 31–37)
MCV RBC AUTO: 89.8 FL
MONOCYTES # BLD AUTO: 0.89 X10(3) UL (ref 0.1–1)
MONOCYTES NFR BLD AUTO: 10.6 %
NEUTROPHILS # BLD AUTO: 5.46 X10 (3) UL (ref 1.5–7.7)
NEUTROPHILS # BLD AUTO: 5.46 X10(3) UL (ref 1.5–7.7)
NEUTROPHILS NFR BLD AUTO: 64.7 %
OSMOLALITY SERPL CALC.SUM OF ELEC: 296 MOSM/KG (ref 275–295)
PHOSPHATE SERPL-MCNC: 4.2 MG/DL (ref 2.4–5.1)
PLATELET # BLD AUTO: 150 10(3)UL (ref 150–450)
PLATELETS.RETICULATED NFR BLD AUTO: 8.2 % (ref 0–7)
POTASSIUM SERPL-SCNC: 4.5 MMOL/L (ref 3.5–5.1)
PROT SERPL-MCNC: 7.1 G/DL (ref 5.7–8.2)
PTH-INTACT SERPL-MCNC: 40.7 PG/ML (ref 18.5–88)
RBC # BLD AUTO: 5.29 X10(6)UL
SODIUM SERPL-SCNC: 140 MMOL/L (ref 136–145)
VIT D+METAB SERPL-MCNC: 46.8 NG/ML (ref 30–100)
WBC # BLD AUTO: 8.4 X10(3) UL (ref 4–11)

## 2024-09-30 PROCEDURE — 85652 RBC SED RATE AUTOMATED: CPT

## 2024-09-30 PROCEDURE — 86334 IMMUNOFIX E-PHORESIS SERUM: CPT

## 2024-09-30 PROCEDURE — 83735 ASSAY OF MAGNESIUM: CPT

## 2024-09-30 PROCEDURE — 85025 COMPLETE CBC W/AUTO DIFF WBC: CPT

## 2024-09-30 PROCEDURE — 86160 COMPLEMENT ANTIGEN: CPT

## 2024-09-30 PROCEDURE — 84165 PROTEIN E-PHORESIS SERUM: CPT

## 2024-09-30 PROCEDURE — 73502 X-RAY EXAM HIP UNI 2-3 VIEWS: CPT | Performed by: FAMILY MEDICINE

## 2024-09-30 PROCEDURE — 83970 ASSAY OF PARATHORMONE: CPT

## 2024-09-30 PROCEDURE — 36415 COLL VENOUS BLD VENIPUNCTURE: CPT

## 2024-09-30 PROCEDURE — 84100 ASSAY OF PHOSPHORUS: CPT

## 2024-09-30 PROCEDURE — 82306 VITAMIN D 25 HYDROXY: CPT

## 2024-09-30 PROCEDURE — 83521 IG LIGHT CHAINS FREE EACH: CPT

## 2024-09-30 PROCEDURE — 80053 COMPREHEN METABOLIC PANEL: CPT

## 2024-09-30 PROCEDURE — 86140 C-REACTIVE PROTEIN: CPT

## 2024-09-30 NOTE — TELEPHONE ENCOUNTER
Call patient. Any headaches, jaw pain, double vision (or vision coming and going)? Or any new bilateral shoulder/hip pain symptoms?     The following inflammation markers have resulted:  Sedimentation rate: elevated  C-reactive protein: low

## 2024-09-30 NOTE — TELEPHONE ENCOUNTER
Spoke with pt, pt denies any headaches, jaw pain or vision problems. Pt co bilateral elbow pain, wrists, hands, fingers, ankles, and shoulders.  Left hip is hurting after falling last week, had xray completed this am to RO fx.  Test results reviewed with pt per Dr. Croft. Pt feels medication proscribed is not working.

## 2024-10-01 NOTE — TELEPHONE ENCOUNTER
Prior authorization request completed for: bilateral L4/5,L5/S1 RFA  Authorization # [id number]  Authorization dates:  9/30/24-1/27/25  CPT codes approved: 29745,14470  Number of visits/dates of service approved: 1  Physician: margo  Location: [facility]     Patient can be scheduled. Routed to Navigator.

## 2024-10-01 NOTE — TELEPHONE ENCOUNTER
Patient advised of insurance approval to proceed with injections and is agreeable to scheduling. Patient scheduled for procedure, pre-procedure instructions reviewed. Patient prefers Local sedation. Reviewed sedation instructions including No Fasting & No  Required. Patient has no medications to hold prior to procedure. Patient verbalized understanding of instructions, no further needs at this time.      [facility]  PRE-PROCEDURE INSTRUCTIONS WITHOUT SEDATION    Procedure: Bilateral L4/5,L5/S1 RFA      Appointment Date: 10/24/2024  Check-In Time: 01:30 PM    Prior to the procedure:  Please update us prior to the procedure if you are experiencing any symptoms of infection such as cough, fever, chills, urinary symptoms, or have recently been prescribed antibiotics, have open wounds, have recently had surgery or dental procedures.    Day of Procedure:  **Drivers will be required for patients who receive prescriptions for Valium.    NO FASTING REQUIRED  Please bring your Insurance Card, Photo ID, List of Current Medications and Referral (if applicable) to your appointment.  Please park in the [facility] [employer] and follow the signs to the [facility].  Check in at [facility] ([address] [place] [address]) outpatient registration in the [facility].  Please note-No prescriptions will be written by Pain Clinic in OR on the day of procedure. If you require a refill of medications, please contact the office 48 hours prior to your procedure.  If you have an implanted Spinal Cord or Peripheral Nerve Stimulator: Please remember to turn device off for procedure.        Medication Hold:    Number of days you need to be off for the following medications:    Aggrenox 10 days   Agrylin (Anagrelide) 10 days  Brilinta (Ticagrelor) 7 days  Imbruvica (Ibrutinib) 3 days   Enbrel (Etanercept) 24 hours   Fragmin (Dalteparin) 24 hours   Pletal (Cilostazol) 7 days  Effient (Prasugrel) 7  days  Pradaxa 10 days  Trental 7 days  Eliquis (Apixaban) 3 days  Xarelto (Rivaroxaban) 3 days  Lovenox (Enoxaparin) 24 hours  Aspirin  Greater than 81mg but less than 325mg   5 days  325mg and greater                  7 days  Coumadin       5 days  Procedure may be cancelled if INR is elevated.   Excedrin (with aspirin) 7 days  Plavix (Clopidogrel)                            7 days    NSAIDs: 24 hours preferred      Ibuprofen (Motrin, Advil, Vicoprofen), Naproxen (Naprosyn, Aleve), Piroxcam (Feldene), Meloxicam (Mobic), Oxaprozin (Daypro), Diclofenac (Voltaren), Indomethacin (Indocin), Etodolac (Lodine), Nabumetone (Relafen), Celebrex (Celecoxib)           HERBAL SUPPLEMENTS  5 days preferred  Fish oil, krill oil, Omega-3, Vascepa, Vitamin E, Turmeric, Garlic                       Insurance Authorization:   Most insurances are now requiring a preauthorization for all procedures.  In the event that your insurance does not authorize your procedure within 48 hours of the scheduled date, your procedure will be cancelled and rescheduled to a later date.  Please contact your insurance carrier to determine what your financial responsibility will be for the procedure(s).      Cancellation/Rescheduling Appointment:   In the event you need to cancel or reschedule your appointment, you must notify the office 24 hours prior.    Post-procedure instructions:        Please schedule a follow up visit within 2 to 4 weeks after your last procedure date   Please call our office with any questions or concerns before or after your procedure at  [phone].  If you are a diabetic, please increase the frequency of your glucose monitoring after the procedure as this may cause a temporary increase in your blood sugar.  Contact your primary care physician if your blood sugar rises as you may require some medication adjustment.  It is normal to have increased pain at injection site for up to 3-5 days after procedure, you can use heat or  ice (20 minutes on 20 minutes off) for comfort.    **To hear a recorded version of these instructions, please call [phone] and follow the prompts.  **Para escuchar las instrucciones en Español, por favor de llamar el gina [phone] opción 4.

## 2024-10-02 LAB
ALBUMIN SERPL ELPH-MCNC: 3.92 G/DL (ref 3.75–5.21)
ALBUMIN/GLOB SERPL: 1.41 {RATIO} (ref 1–2)
ALPHA1 GLOB SERPL ELPH-MCNC: 0.37 G/DL (ref 0.19–0.46)
ALPHA2 GLOB SERPL ELPH-MCNC: 0.78 G/DL (ref 0.48–1.05)
B-GLOBULIN SERPL ELPH-MCNC: 0.8 G/DL (ref 0.68–1.23)
GAMMA GLOB SERPL ELPH-MCNC: 0.82 G/DL (ref 0.62–1.7)
KAPPA LC FREE SER-MCNC: 3.22 MG/DL (ref 0.33–1.94)
KAPPA LC FREE/LAMBDA FREE SER NEPH: 1.7 {RATIO} (ref 0.26–1.65)
LAMBDA LC FREE SERPL-MCNC: 1.89 MG/DL (ref 0.57–2.63)
PROT SERPL-MCNC: 6.7 G/DL (ref 5.7–8.2)

## 2024-10-08 ENCOUNTER — APPOINTMENT (OUTPATIENT)
Dept: GENERAL RADIOLOGY | Facility: HOSPITAL | Age: 79
End: 2024-10-08
Attending: ANESTHESIOLOGY
Payer: MEDICARE

## 2024-10-08 ENCOUNTER — HOSPITAL ENCOUNTER (OUTPATIENT)
Facility: HOSPITAL | Age: 79
Setting detail: HOSPITAL OUTPATIENT SURGERY
Discharge: HOME | End: 2024-10-08
Attending: ANESTHESIOLOGY | Admitting: ANESTHESIOLOGY
Payer: MEDICARE

## 2024-10-08 VITALS
SYSTOLIC BLOOD PRESSURE: 140 MMHG | TEMPERATURE: 97 F | OXYGEN SATURATION: 90 % | DIASTOLIC BLOOD PRESSURE: 69 MMHG | HEART RATE: 100 BPM | RESPIRATION RATE: 20 BRPM

## 2024-10-08 LAB — GLUCOSE BLD-MCNC: 112 MG/DL (ref 70–99)

## 2024-10-08 PROCEDURE — 64454 NJX AA&/STRD GNCLR NRV BRNCH: CPT | Performed by: ANESTHESIOLOGY

## 2024-10-08 PROCEDURE — 3E0T3BZ INTRODUCTION OF ANESTHETIC AGENT INTO PERIPHERAL NERVES AND PLEXI, PERCUTANEOUS APPROACH: ICD-10-PCS | Performed by: ANESTHESIOLOGY

## 2024-10-08 PROCEDURE — 3E0T33Z INTRODUCTION OF ANTI-INFLAMMATORY INTO PERIPHERAL NERVES AND PLEXI, PERCUTANEOUS APPROACH: ICD-10-PCS | Performed by: ANESTHESIOLOGY

## 2024-10-08 RX ORDER — BUPIVACAINE HYDROCHLORIDE 5 MG/ML
INJECTION, SOLUTION EPIDURAL; INTRACAUDAL
Status: DISCONTINUED | OUTPATIENT
Start: 2024-10-08 | End: 2024-10-08

## 2024-10-08 RX ORDER — NALOXONE HYDROCHLORIDE 0.4 MG/ML
0.08 INJECTION, SOLUTION INTRAMUSCULAR; INTRAVENOUS; SUBCUTANEOUS AS NEEDED
Status: DISCONTINUED | OUTPATIENT
Start: 2024-10-08 | End: 2024-10-08

## 2024-10-08 RX ORDER — METHYLPREDNISOLONE ACETATE 40 MG/ML
INJECTION, SUSPENSION INTRA-ARTICULAR; INTRALESIONAL; INTRAMUSCULAR; SOFT TISSUE
Status: DISCONTINUED | OUTPATIENT
Start: 2024-10-08 | End: 2024-10-08

## 2024-10-08 RX ORDER — LIDOCAINE HYDROCHLORIDE 10 MG/ML
INJECTION, SOLUTION EPIDURAL; INFILTRATION; INTRACAUDAL; PERINEURAL
Status: DISCONTINUED | OUTPATIENT
Start: 2024-10-08 | End: 2024-10-08

## 2024-10-08 NOTE — H&P
History & Physical Examination    Patient Name: Gabi Kumar  MRN: [id number]  [facility]: [id number]  YOB: 1945    Pre-Operative Diagnosis:  Primary osteoarthritis of right knee [M17.11]    Present Illness: Primary osteoarthritis of the knee    ASA: 3  MP class: 1  Sedation: Local      Medications Prior to Admission   Medication Sig Dispense Refill Last Dose    fluticasone propionate 50 MCG/ACT Nasal Suspension Use 2 sprays in each nostril once daily 16 g 3     umeclidinium-vilanterol (ANORO ELLIPTA) 62.5-25 MCG/ACT Inhalation Aerosol Powder, Breath Activated Inhale 1 puff into the lungs daily. 1 each 5     HYDROcodone-acetaminophen 5-325 MG Oral Tab Take 1 tablet by mouth every 6 (six) hours as needed. 20 tablet 0     traMADol 50 MG Oral Tab Take 1 tablet (50 mg total) by mouth every 6 (six) hours as needed. 20 tablet 0     loratadine 10 MG Oral Tab Take 1 tablet (10 mg total) by mouth daily.       DULoxetine (CYMBALTA) 60 MG Oral Cap DR Particles Take 1 capsule (60 mg total) by mouth daily. 90 capsule 1     pantoprazole 40 MG Oral Tab EC Take 1 tablet (40 mg total) by mouth every morning before breakfast.       allopurinol 300 MG Oral Tab Take 1 tablet (300 mg total) by mouth daily. 90 tablet 3     ipratropium-albuterol 0.5-2.5 (3) MG/3ML Inhalation Solution Take 3 mL by nebulization every 6 (six) hours as needed. 180 mL 12     clonazePAM 1 MG Oral Tab Take 1 tablet (1 mg total) by mouth nightly as needed.       ONETOUCH ULTRA In Vitro Strip 2 (two) times daily.       YUPELRI 175 MCG/3ML Inhalation Solution nebulizer solution ADMINISTER ONE (1) VIAL VIA NEBULIZER MACHINE DAILY 90 mL 11     Ascorbic Acid (VITAMIN C) 100 MG Oral Tab Take 10 tablets (1,000 mg total) by mouth daily.       pioglitazone 15 MG Oral Tab Take 1 tablet (15 mg total) by mouth daily.       glimepiride 2 MG Oral Tab Take 1 tablet (2 mg total) by mouth 2 (two) times daily.       albuterol 108 (90 Base) MCG/ACT Inhalation Aero Soln  Inhale 2 puffs into the lungs every 6 (six) hours as needed for Wheezing or Shortness of Breath. inhale 2 puff by inhalation route  every 4 - 6 hours as needed 1 each 11     METOPROLOL SUCCINATE ER 25 MG Oral Tablet 24 Hr TAKE ONE TABLET BY MOUTH ONE TIME DAILY 90 tablet 1     rosuvastatin 10 MG Oral Tab Take 1 tablet (10 mg total) by mouth nightly. Replaces simvastatin 90 tablet 3     Cholecalciferol (VITAMIN D-3 OR) Take 1 capsule by mouth once daily.       Multiple Vitamins-Minerals (CENTRUM) Oral Tab Take 1 tablet by mouth daily.        No current facility-administered medications for this encounter.       Allergies:   Allergies   Allergen Reactions    Pcn [Bicillin C-R,] SWELLING     itching    Levofloxacin MYALGIA     Severe muscle and bone pain    Penicillins OTHER (SEE COMMENTS)       Past Medical History:    Back problem    Cataract    Cholelithiasis    DIABETES    Diabetes (HCC)    Diverticulitis    suggestion of possible wall thickening involving sigmoid, possible mild diverticulitis    Diverticulosis    Essential hypertension    Gallbladder disease    thickened gallbladder with distended bile duct    High blood pressure    High cholesterol    HYPERLIPIDEMIA    Hyperlipidemia    IBS (irritable bowel syndrome)    Osteoarthritis    hips, back and knees    Thyroid disease    Visual impairment    reading glasses     Past Surgical History:   Procedure Laterality Date    Carpal tunnel release      Colonoscopy  2003,     Diverticulosis    Colonoscopy  2012    decreased anal sphincter tone, diverticulosis, internal hemorrhoids, normal random colon biopsies    Colonoscopy N/A 7/26/2017    Procedure: COLONOSCOPY;  Surgeon: Garrett Moody MD;  Location:  ENDOSCOPY    Other  1999    Carpel tnnel right hand    Other surgical history  1995    Fatty tumor from neck    Other surgical history  1999    Arthroscopy right knee    Other surgical history  2001    Carpal tunnel both hand    Other surgical history  12/22/2020     Cystoscopy, Dr Chun     Family History   Problem Relation Age of Onset    Cancer Other         Stomach cancer    Heart Disease Father     Hypertension Father     High Blood Pressure Father     Diabetes Father     Heart Disease Mother     Hypertension Mother     Lung Disorder Mother         Pulmonary fibrosis     Social History     Tobacco Use    Smoking status: Former     Current packs/day: 0.00     Average packs/day: 0.8 packs/day for 50.0 years (37.5 ttl pk-yrs)     Types: Cigarettes     Start date: 10/22/1964     Quit date: 10/22/2014     Years since quittin.9    Smokeless tobacco: Never   Substance Use Topics    Alcohol use: No     Alcohol/week: 0.0 standard drinks of alcohol       SYSTEM Check if Review is Normal Check if Physical Exam is Normal If not normal, please explain:   HEENT [x ] [x ]    NECK & BACK [x ] [x ]    HEART [x ] [x ]    LUNGS [x ] [x ]    ABDOMEN [x ] [x ]    UROGENITAL [x ] [x ]    EXTREMITIES [x ] [x ]    OTHER        [ x ] I have discussed the risks and benefits and alternatives with the patient/family.  They understand and agree to proceed with plan of care.  [ x ] I have reviewed the History and Physical done within the last 30 days.  Any changes noted above.    Jose Rich MD

## 2024-10-08 NOTE — DISCHARGE INSTRUCTIONS
Home Care Instructions Following Your Pain Procedure     Gabi,  It has been a pleasure to have you as our patient. To help you at home, you must follow these general discharge instructions. We will review these with you before you are discharged. It is our hope that you have a complete and uneventful recovery from our procedure.     General Instructions:  What to Expect:  Bandages from your procedure today can be removed when you get home.  Please avoid soaking and/or swimming for 24 hours.  Showering is okay  It is normal to have increased pain symptoms and/or pain at injection site for up to 3-5 days after procedure, you can use heat or ice (20 minutes on 20 minutes off) for comfort.  You may experience some temporary side effects which may include restlessness or insomnia, flushing of the face, or heart palpitations.  Please contact the provider if these symptoms do not resolve within 3-4 days.  Lightheadedness or nausea may occur and should resolve within 24 to 48 hours.  If you develop a headache after treatment, rest, drink fluids (with caffeine, if possible) and take mild over-the-counter pain medication.  If the headache does not improve with the above treatment, contact the physician.  Home Medications:  Resume all previously prescribed medication.  Please avoid taking NSAIDs (Non-Steriodal Anti-Inflammatory Drugs) such as:  Ibuprofen ( Advil, Motrin) Aleve (Naproxen), Diclofenac, Meloxicam for 6 hours after procedure.   If you are on Coumadin (Warfarin) or any other anti-coagulant (or \"blood thinning\") medication such as Plavix (Clopidogrel), Xarelto (Rivaroxaban), Eliquis (Apixaban), Effient (Prasugrel) etc., restart on the following day from the procedure unless otherwise directed by your provider.  If you are a diabetic, please increase the frequency of your glucose monitoring after the procedure as steroids may cause a temporary (2-3 day) increase in your blood sugar.  Contact your primary care  physician if your blood sugar remains elevated as you may require some medication adjustment.  Diet:  Resume your regular diet as tolerated.  Activity:  We recommend that you relax and rest during the rest of your procedure day.  If you feel weakness in your arms or legs do not drive.  Follow-up Appointment  Please schedule a follow-up visit within 3 to 4 weeks after your last procedure date.  Question or Concerns:  Feel free to call our office with any questions or concerns at [phone] (option #2)    Gabi  Thank you for coming to [facility] for your procedure.  The nurses try very hard to make sure you receive the best care possible.  Your trust in them as well as us is greatly appreciated.    Thanks so much,   Dr. Jose Rich

## 2024-10-08 NOTE — OPERATIVE REPORT
[facility]  Operative Report  10/8/2024     Gabi Kumar Patient Status:  Hospital Outpatient Surgery    1945 MRN [id number]   Location [facility] PAIN CENTER Attending Jose Rich MD   Hosp Day # 0 PCP Bismark Cabrera MD     Indication: Gabi is a 79 year old female primary osteoarthritis of the right knee    Preoperative Diagnosis:  Primary osteoarthritis of right knee [M17.11]    Postoperative Diagnosis: Same as above.    Procedure performed: right GENICULAR NERVE BLOCK with local    Anesthesia: Local  .    EBL: Less than 1 ml.    Procedure Description:  After reviewing the patient's history and performing a focused physical examination, the diagnosis and positive previous diagnostic tests were confirmed and contraindications such as infection and coagulopathy were ruled out.  Following review of allergies and potential side effects and complications, including but not necessarily limited to infection, allergic reaction, local tissue breakdown, nerve injury,  and paresis, the patient consented for the procedure.    The patient was brought to the procedure room in the supine position.   The operative knee was then identified and prepped and draped in the usual sterile fashion.  AP imaging was used to identify the path of the superior medial, superior lateral, inferior medial genicular nerves.  The overlying soft tissue was then anesthetized with 3 cc 1% lidocaine at each location.  Subsequently, a 3.5 inch 22-gauge spinal needle was advanced imaging guidance to contact bone along the path of the superior medial, superior lateral, and inferior medial genicular nerves.  Needle position was confirmed on both AP and lateral views.  After negative aspiration for heme, total mixture of 40 mg of Depo-Medrol with 9 cc of 0.5% bupivacaine was then evenly divided amongst the 3 sites.  The needle was then flushed with 1 cc 1% lidocaine, re-styletted and removed with tip intact.   The  patient tolerated the procedure very well without significant immediate complication.          Complications: None.    Follow up: The patient was followed in the pain clinic as needed basis.          Jose Rich MD

## 2024-10-09 ENCOUNTER — TELEPHONE (OUTPATIENT)
Dept: PAIN CLINIC | Facility: CLINIC | Age: 79
End: 2024-10-09

## 2024-10-09 NOTE — TELEPHONE ENCOUNTER
Bharat called placed to patient for post procedure follow up. Patient stated patient is feeling really good, no pain. Informed patient that soreness is to be expected after the procedure. Educated patient that it takes 3-5 days for the steroid to be effective and to allow adequate time for medication to work. Encouraged patient to alternate ice and heat and to take medications as prescribed. Pt verbalized understanding to call with any questions or concerns.      Procedure: right GENICULAR NERVE BLOCK with local   Date: 10/09/24  Follow up Visit Scheduled: 10/21/24

## 2024-10-15 ENCOUNTER — OFFICE VISIT (OUTPATIENT)
Dept: RHEUMATOLOGY | Facility: CLINIC | Age: 79
End: 2024-10-15
Payer: MEDICARE

## 2024-10-15 VITALS
WEIGHT: 173.81 LBS | RESPIRATION RATE: 16 BRPM | HEART RATE: 85 BPM | BODY MASS INDEX: 29.67 KG/M2 | DIASTOLIC BLOOD PRESSURE: 78 MMHG | OXYGEN SATURATION: 94 % | HEIGHT: 64 IN | SYSTOLIC BLOOD PRESSURE: 108 MMHG | TEMPERATURE: 97 F

## 2024-10-15 DIAGNOSIS — G89.29 CHRONIC MUSCULOSKELETAL PAIN: ICD-10-CM

## 2024-10-15 DIAGNOSIS — R76.8 CENTROMERE ANTIBODY POSITIVE: ICD-10-CM

## 2024-10-15 DIAGNOSIS — M1A.0411 IDIOPATHIC CHRONIC GOUT OF RIGHT HAND WITH TOPHUS: ICD-10-CM

## 2024-10-15 DIAGNOSIS — M10.9 GOUT, UNSPECIFIED CAUSE, UNSPECIFIED CHRONICITY, UNSPECIFIED SITE: Primary | ICD-10-CM

## 2024-10-15 DIAGNOSIS — M81.0 AGE-RELATED OSTEOPOROSIS WITHOUT CURRENT PATHOLOGICAL FRACTURE: ICD-10-CM

## 2024-10-15 DIAGNOSIS — J84.9 ILD (INTERSTITIAL LUNG DISEASE) (HCC): ICD-10-CM

## 2024-10-15 DIAGNOSIS — M79.18 CHRONIC MUSCULOSKELETAL PAIN: ICD-10-CM

## 2024-10-15 DIAGNOSIS — Z51.81 THERAPEUTIC DRUG MONITORING: ICD-10-CM

## 2024-10-15 LAB
BILIRUB UR QL STRIP.AUTO: NEGATIVE
CLARITY UR REFRACT.AUTO: CLEAR
COLOR UR AUTO: YELLOW
CREAT UR-SCNC: 57.1 MG/DL
GLUCOSE UR STRIP.AUTO-MCNC: NORMAL MG/DL
KETONES UR STRIP.AUTO-MCNC: NEGATIVE MG/DL
LEUKOCYTE ESTERASE UR QL STRIP.AUTO: 250
NITRITE UR QL STRIP.AUTO: NEGATIVE
PH UR STRIP.AUTO: 5.5 [PH] (ref 5–8)
PROT UR-MCNC: 24.7 MG/DL (ref ?–14)
RBC #/AREA URNS AUTO: >10 /HPF
SP GR UR STRIP.AUTO: 1.01 (ref 1–1.03)
UROBILINOGEN UR STRIP.AUTO-MCNC: NORMAL MG/DL

## 2024-10-15 PROCEDURE — 99214 OFFICE O/P EST MOD 30 MIN: CPT | Performed by: INTERNAL MEDICINE

## 2024-10-15 PROCEDURE — 87086 URINE CULTURE/COLONY COUNT: CPT | Performed by: INTERNAL MEDICINE

## 2024-10-15 PROCEDURE — G2211 COMPLEX E/M VISIT ADD ON: HCPCS | Performed by: INTERNAL MEDICINE

## 2024-10-15 PROCEDURE — 81001 URINALYSIS AUTO W/SCOPE: CPT | Performed by: INTERNAL MEDICINE

## 2024-10-15 PROCEDURE — 84156 ASSAY OF PROTEIN URINE: CPT | Performed by: INTERNAL MEDICINE

## 2024-10-15 PROCEDURE — 82570 ASSAY OF URINE CREATININE: CPT | Performed by: INTERNAL MEDICINE

## 2024-10-15 RX ORDER — CETIRIZINE HYDROCHLORIDE 10 MG/1
10 TABLET ORAL DAILY
COMMUNITY

## 2024-10-15 RX ORDER — ALLOPURINOL 300 MG/1
300 TABLET ORAL DAILY
Qty: 90 TABLET | Refills: 3 | Status: SHIPPED | OUTPATIENT
Start: 2024-10-15

## 2024-10-15 RX ORDER — DULOXETIN HYDROCHLORIDE 60 MG/1
60 CAPSULE, DELAYED RELEASE ORAL DAILY
Qty: 90 CAPSULE | Refills: 2 | Status: SHIPPED | OUTPATIENT
Start: 2024-10-15
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
## 2024-10-17 ENCOUNTER — TELEPHONE (OUTPATIENT)
Dept: RHEUMATOLOGY | Facility: CLINIC | Age: 79
End: 2024-10-17

## 2024-10-17 DIAGNOSIS — R31.9 HEMATURIA, UNSPECIFIED TYPE: Primary | ICD-10-CM

## 2024-10-21 ENCOUNTER — TELEPHONE (OUTPATIENT)
Dept: PAIN CLINIC | Facility: CLINIC | Age: 79
End: 2024-10-21

## 2024-10-21 ENCOUNTER — OFFICE VISIT (OUTPATIENT)
Dept: PAIN CLINIC | Facility: CLINIC | Age: 79
End: 2024-10-21
Payer: MEDICARE

## 2024-10-21 VITALS — SYSTOLIC BLOOD PRESSURE: 140 MMHG | DIASTOLIC BLOOD PRESSURE: 86 MMHG

## 2024-10-21 DIAGNOSIS — M17.12 PRIMARY OSTEOARTHRITIS OF LEFT KNEE: Primary | ICD-10-CM

## 2024-10-21 DIAGNOSIS — M17.11 PRIMARY OSTEOARTHRITIS OF RIGHT KNEE: ICD-10-CM

## 2024-10-21 NOTE — PROGRESS NOTES
Last procedure:   right GENICULAR NERVE BLOCK with local     Date: 10/8/24  Percentage of relief obtained: 50-60%  Duration of relief: Consistent    Current Pain Score: 5    Narcotic Contract Exp: NA

## 2024-10-21 NOTE — PATIENT INSTRUCTIONS
Refill policies:    Allow 2-3 business days for refills; controlled substances may take longer.  Contact your pharmacy at least 5 days prior to running out of medication and have them send an electronic request or submit request through the “request refill” option in your [facility] account.  Refills are not addressed on weekends; covering physicians do not authorize routine medications on weekends.  No narcotics or controlled substances are refilled after noon on Fridays or by on call physicians.  By law, narcotics must be electronically prescribed.  A 30 day supply with no refills is the maximum allowed.  If your prescription is due for a refill, you may be due for a follow up appointment.  To best provide you care, patients receiving routine medications need to be seen at least once a year.  Patients receiving narcotic/controlled substance medications need to be seen at least once every 3 months.  In the event that your preferred pharmacy does not have the requested medication in stock (e.g. Backordered), it is your responsibility to find another pharmacy that has the requested medication available.  We will gladly send a new prescription to that pharmacy at your request.    Scheduling Tests:    If your physician has ordered radiology tests such as MRI or CT scans, please contact Central Scheduling at [phone] right away to schedule the test.  Once scheduled, the [facility] Centralized Referral Team will work with your insurance carrier to obtain pre-certification or prior authorization.  Depending on your insurance carrier, approval may take 3-10 days.  It is highly recommended patients assure they have received an authorization before having a test performed.  If test is done without insurance authorization, patient may be responsible for the entire amount billed.      Precertification and Prior Authorizations:  If your physician has recommended that you have a procedure or additional testing performed the [facility]  Centralized Referral Team will contact your insurance carrier to obtain pre-certification or prior authorization.    You are strongly encouraged to contact your insurance carrier to verify that your procedure/test has been approved and is a COVERED benefit.  Although the [facility] Centralized Referral Team does its due diligence, the insurance carrier gives the disclaimer that \"Although the procedure is authorized, this does not guarantee payment.\"    Ultimately the patient is responsible for payment.   Thank you for your understanding in this matter.  Paperwork Completion:  If you require FMLA or disability paperwork for your recovery, please make sure to either drop it off or have it faxed to our office at [phone]. Be sure the form has your name and date of birth on it.  The form will be faxed to our Forms Department and they will complete it for you.  There is a 25$ fee for all forms that need to be filled out.  Please be aware there is a 10-14 day turnaround time.  You will need to sign a release of information (BEATRICE) form if your paperwork does not come with one.  You may call the Forms Department with any questions at [phone].  Their fax number is [phone].

## 2024-10-21 NOTE — TELEPHONE ENCOUNTER
Patient advised of insurance approval to proceed with injections and is agreeable to scheduling. Patient scheduled for procedure, pre-procedure instructions reviewed. Patient prefers Local sedation. Reviewed sedation instructions including No Fasting & No  Required. Patient has no medications to hold prior to procedure. Patient verbalized understanding of instructions, no further needs at this time.      Pre Procedure Instruction Sheet provided to patient at office visit.       [facility]  PRE-PROCEDURE INSTRUCTIONS WITHOUT SEDATION    Procedure: Left Genicular NB       Appointment Date: 11/05/2024  Check-In Time: 01:30 PM    Follow-Up Date/Time: 11/18/2024 @ 01:15 PM    Prior to the procedure:  Please update us prior to the procedure if you are experiencing any symptoms of infection such as cough, fever, chills, urinary symptoms, or have recently been prescribed antibiotics, have open wounds, have recently had surgery or dental procedures.    Day of Procedure:  **Drivers will be required for patients who receive prescriptions for Valium.    NO FASTING REQUIRED  Please bring your Insurance Card, Photo ID, List of Current Medications and Referral (if applicable) to your appointment.  Please park in the [employer] and follow the signs to the [facility].  Check in at [facility] ([address] [place] [address]) outpatient registration in the [facility].  Please note-No prescriptions will be written by Pain Clinic in [place] on the day of procedure. If you require a refill of medications, please contact the office 48 hours prior to your procedure.  If you have an implanted Spinal Cord or Peripheral Nerve Stimulator: Please remember to turn device off for procedure.        Medication Hold:    Number of days you need to be off for the following medications:    Aggrenox 10 days   Agrylin (Anagrelide) 10 days  Brilinta (Ticagrelor) 7 days  Imbruvica (Ibrutinib) 3 days   Enbrel  (Etanercept) 24 hours   Fragmin (Dalteparin) 24 hours   Pletal (Cilostazol) 7 days  Effient (Prasugrel) 7 days  Pradaxa 10 days  Trental 7 days  Eliquis (Apixaban) 3 days  Xarelto (Rivaroxaban) 3 days  Lovenox (Enoxaparin) 24 hours  Aspirin  Greater than 81mg but less than 325mg   5 days  325mg and greater                  7 days  Coumadin       5 days  Procedure may be cancelled if INR is elevated.   Excedrin (with aspirin) 7 days  Plavix (Clopidogrel)                            7 days    NSAIDs: 24 hours preferred      Ibuprofen (Motrin, Advil, Vicoprofen), Naproxen (Naprosyn, Aleve), Piroxcam (Feldene), Meloxicam (Mobic), Oxaprozin (Daypro), Diclofenac (Voltaren), Indomethacin (Indocin), Etodolac (Lodine), Nabumetone (Relafen), Celebrex (Celecoxib)           HERBAL SUPPLEMENTS  5 days preferred  Fish oil, krill oil, Omega-3, Vascepa, Vitamin E, Turmeric, Garlic                       Insurance Authorization:   Most insurances are now requiring a preauthorization for all procedures.  In the event that your insurance does not authorize your procedure within 48 hours of the scheduled date, your procedure will be cancelled and rescheduled to a later date.  Please contact your insurance carrier to determine what your financial responsibility will be for the procedure(s).      Cancellation/Rescheduling Appointment:   In the event you need to cancel or reschedule your appointment, you must notify the office 24 hours prior.    Post-procedure instructions:        Please schedule a follow up visit within 2 to 4 weeks after your last procedure date   Please call our office with any questions or concerns before or after your procedure at  [phone].  If you are a diabetic, please increase the frequency of your glucose monitoring after the procedure as this may cause a temporary increase in your blood sugar.  Contact your primary care physician if your blood sugar rises as you may require some medication adjustment.  It is  normal to have increased pain at injection site for up to 3-5 days after procedure, you can use heat or ice (20 minutes on 20 minutes off) for comfort.    **To hear a recorded version of these instructions, please call [phone] and follow the prompts.  **Para escuchar las instrucciones en Español, por favor de llamar el gina [phone] opción 4.

## 2024-10-21 NOTE — PROGRESS NOTES
Name: Gabi Kumar   : 1945   DOS: 10/21/2024     Pain Clinic Follow Up Visit:     Chief Complaint   Patient presents with    Procedure Follow Up     F/U after Right Genicular NB         Gabi Kumar is a 79 year old female with known osteoarthritis of both knees here for follow-up.  The patient is status post right genicular nerve block with 60% relief which is current and sustained.  Overall, she is very pleased with results of this procedure.  She now complains of left-sided knee pain..     Pt denies any chills, fever, or weakness. There is no bladder or bowel incontinence associated with the pain.    REVIEW OF SYSTEMS:  A ten point review of systems was performed with pertinent positives and negatives in the HPI.    Allergies[1]    Current Outpatient Medications   Medication Sig Dispense Refill    cetirizine 10 MG Oral Tab Take 1 tablet (10 mg total) by mouth daily.      allopurinol 300 MG Oral Tab Take 1 tablet (300 mg total) by mouth daily. 90 tablet 3    DULoxetine (CYMBALTA) 60 MG Oral Cap DR Particles Take 1 capsule (60 mg total) by mouth daily. 90 capsule 2    umeclidinium-vilanterol (ANORO ELLIPTA) 62.5-25 MCG/ACT Inhalation Aerosol Powder, Breath Activated Inhale 1 puff into the lungs daily. 1 each 5    HYDROcodone-acetaminophen 5-325 MG Oral Tab Take 1 tablet by mouth every 6 (six) hours as needed. 20 tablet 0    loratadine 10 MG Oral Tab Take 1 tablet (10 mg total) by mouth daily.      pantoprazole 40 MG Oral Tab EC Take 1 tablet (40 mg total) by mouth every morning before breakfast.      ipratropium-albuterol 0.5-2.5 (3) MG/3ML Inhalation Solution Take 3 mL by nebulization every 6 (six) hours as needed. 180 mL 12    YUPELRI 175 MCG/3ML Inhalation Solution nebulizer solution ADMINISTER ONE (1) VIAL VIA NEBULIZER MACHINE DAILY 90 mL 11    Ascorbic Acid (VITAMIN C) 100 MG Oral Tab Take 10 tablets (1,000 mg total) by mouth daily.      pioglitazone 15 MG Oral Tab Take 1 tablet (15 mg  total) by mouth daily.      glimepiride 2 MG Oral Tab Take 1 tablet (2 mg total) by mouth 2 (two) times daily.      albuterol 108 (90 Base) MCG/ACT Inhalation Aero Soln Inhale 2 puffs into the lungs every 6 (six) hours as needed for Wheezing or Shortness of Breath. inhale 2 puff by inhalation route  every 4 - 6 hours as needed 1 each 11    METOPROLOL SUCCINATE ER 25 MG Oral Tablet 24 Hr TAKE ONE TABLET BY MOUTH ONE TIME DAILY 90 tablet 1    rosuvastatin 10 MG Oral Tab Take 1 tablet (10 mg total) by mouth nightly. Replaces simvastatin 90 tablet 3    Cholecalciferol (VITAMIN D-3 OR) Take 1 capsule by mouth once daily.      Multiple Vitamins-Minerals (CENTRUM) Oral Tab Take 1 tablet by mouth daily.      ONETOUCH ULTRA In Vitro Strip 2 (two) times daily. (Patient not taking: Reported on 10/21/2024)           EXAM:   /86 (BP Location: Left arm, Patient Position: Sitting)   LMP 06/07/1999   General:  Patient is a(n) 79 year old year old female in no acute distress.  Neurologic:: WNL-Orientation to time, place and person, normal mood & affect, concentration & attention span intact.   Inspection:  Ambulates with well-coordinated, fluid, non-antalgic gait.  Gait is normal.  Neck: Full range of motion  Cranial nerves: Grossly intact  Respiratory: Speech nonlabored  Knee: Injection site clear.  Stable to varus and valgus  Back: Gait intact      IMAGES:     Knee x-ray reviewed with the moderate to severe middle compartment disease    ASSESSMENT AND PLAN:     1. Primary osteoarthritis of left knee    2. Primary osteoarthritis of right knee      The patient is a very pleasant 79-year-old female with a history of osteoarthritis of the knees.  She is status post right genicular nerve block with 60% relief which is current and sustained.  Now complains of pain in the left knee.  Does have medial compartment recommend genicular nerve block of the left knee.  Depending results, would be candidate for radiofrequency  ablation.    Orders:  Orders Placed This Encounter   Procedures    [facility] PAIN NAVIGATOR         Radiology orders and consultations:None  The patient indicates understanding of these issues and agrees to the plan.  No follow-ups on file.    Jose Rich MD, 10/21/2024, 1:34 PM              [1]   Allergies  Allergen Reactions    Pcn [Bicillin C-R,] SWELLING     itching    Levofloxacin MYALGIA     Severe muscle and bone pain    Penicillins OTHER (SEE COMMENTS)

## 2024-10-21 NOTE — TELEPHONE ENCOUNTER
Order Questions    Question Answer   Anesthesia Type Local   Provider [facility]   CPT (Hit enter after each entry) NJX AA&/STRD GNCLR [facility]   Medical clearance requested (will send to Pain Navigator) No   Patient has Medicare coverage? Yes   Comments (Please list entire procedure name here.) left genicular nerve block

## 2024-10-24 ENCOUNTER — APPOINTMENT (OUTPATIENT)
Dept: GENERAL RADIOLOGY | Facility: HOSPITAL | Age: 79
End: 2024-10-24
Attending: ANESTHESIOLOGY
Payer: MEDICARE

## 2024-10-24 ENCOUNTER — HOSPITAL ENCOUNTER (OUTPATIENT)
Facility: HOSPITAL | Age: 79
Setting detail: HOSPITAL OUTPATIENT SURGERY
Discharge: HOME | End: 2024-10-24
Attending: ANESTHESIOLOGY | Admitting: ANESTHESIOLOGY
Payer: MEDICARE

## 2024-10-24 VITALS
RESPIRATION RATE: 16 BRPM | DIASTOLIC BLOOD PRESSURE: 68 MMHG | HEART RATE: 72 BPM | WEIGHT: 173.81 LBS | BODY MASS INDEX: 29.67 KG/M2 | TEMPERATURE: 97 F | OXYGEN SATURATION: 94 % | HEIGHT: 64 IN | SYSTOLIC BLOOD PRESSURE: 128 MMHG

## 2024-10-24 LAB — GLUCOSE BLD-MCNC: 73 MG/DL (ref 70–99)

## 2024-10-24 PROCEDURE — 64635 DESTROY LUMB/SAC FACET JNT: CPT | Performed by: ANESTHESIOLOGY

## 2024-10-24 PROCEDURE — 01553ZZ DESTRUCTION OF MEDIAN NERVE, PERCUTANEOUS APPROACH: ICD-10-PCS | Performed by: ANESTHESIOLOGY

## 2024-10-24 PROCEDURE — 64636 DESTROY L/S FACET JNT ADDL: CPT | Performed by: ANESTHESIOLOGY

## 2024-10-24 RX ORDER — LIDOCAINE HYDROCHLORIDE 10 MG/ML
INJECTION, SOLUTION EPIDURAL; INFILTRATION; INTRACAUDAL; PERINEURAL
Status: DISCONTINUED | OUTPATIENT
Start: 2024-10-24 | End: 2024-10-24

## 2024-10-24 RX ORDER — METHYLPREDNISOLONE ACETATE 40 MG/ML
INJECTION, SUSPENSION INTRA-ARTICULAR; INTRALESIONAL; INTRAMUSCULAR; SOFT TISSUE
Status: DISCONTINUED | OUTPATIENT
Start: 2024-10-24 | End: 2024-10-24

## 2024-10-24 RX ORDER — NALOXONE HYDROCHLORIDE 0.4 MG/ML
0.08 INJECTION, SOLUTION INTRAMUSCULAR; INTRAVENOUS; SUBCUTANEOUS AS NEEDED
Status: DISCONTINUED | OUTPATIENT
Start: 2024-10-24 | End: 2024-10-24

## 2024-10-24 RX ORDER — ONDANSETRON 2 MG/ML
4 INJECTION INTRAMUSCULAR; INTRAVENOUS ONCE AS NEEDED
Status: DISCONTINUED | OUTPATIENT
Start: 2024-10-24 | End: 2024-10-24

## 2024-10-24 RX ORDER — SODIUM CHLORIDE, SODIUM LACTATE, POTASSIUM CHLORIDE, CALCIUM CHLORIDE 600; 310; 30; 20 MG/100ML; MG/100ML; MG/100ML; MG/100ML
100 INJECTION, SOLUTION INTRAVENOUS CONTINUOUS
Status: DISCONTINUED | OUTPATIENT
Start: 2024-10-24 | End: 2024-10-24

## 2024-10-24 NOTE — H&P
History & Physical Examination    Patient Name: Gabi Kumar  MRN: [id number]  CSN: [id number]  YOB: 1945    Pre-Operative Diagnosis:  Lumbar spondylosis [M47.816]    Present Illness: Lumbar spondylosis    ASA: 2  MP class: 1  Sedation:  IV sedation (anxiolysis)    Prescriptions Prior to Admission[1]  Current Facility-Administered Medications   Medication Dose Route Frequency    lactated ringers infusion  100 mL/hr Intravenous Continuous    ondansetron (Zofran) 4 MG/2ML injection 4 mg  4 mg Intravenous Once PRN       Allergies: Allergies[2]    Past Medical History:    Back problem    Cataract    Cholelithiasis    DIABETES    Diabetes (HCC)    Diverticulitis    suggestion of possible wall thickening involving sigmoid, possible mild diverticulitis    Diverticulosis    Essential hypertension    Gallbladder disease    thickened gallbladder with distended bile duct    High blood pressure    High cholesterol    HYPERLIPIDEMIA    Hyperlipidemia    IBS (irritable bowel syndrome)    Osteoarthritis    hips, back and knees    Thyroid disease    Visual impairment    reading glasses     Past Surgical History:   Procedure Laterality Date    Carpal tunnel release      Colonoscopy  2003,     Diverticulosis    Colonoscopy  2012    decreased anal sphincter tone, diverticulosis, internal hemorrhoids, normal random colon biopsies    Colonoscopy N/A 7/26/2017    Procedure: COLONOSCOPY;  Surgeon: Garrett Moody MD;  Location:  ENDOSCOPY    Other  1999    Carpel tnnel right hand    Other surgical history  1995    Fatty tumor from neck    Other surgical history  1999    Arthroscopy right knee    Other surgical history  2001    Carpal tunnel both hand    Other surgical history  12/22/2020    Cystoscopy, Dr Chun     Family History   Problem Relation Age of Onset    Cancer Other         Stomach cancer    Heart Disease Father     Hypertension Father     High Blood Pressure Father     Diabetes Father     Heart Disease Mother      Hypertension Mother     Lung Disorder Mother         Pulmonary fibrosis     Social History     Tobacco Use    Smoking status: Former     Current packs/day: 0.00     Average packs/day: 0.8 packs/day for 50.0 years (37.5 ttl pk-yrs)     Types: Cigarettes     Start date: 10/22/1964     Quit date: 10/22/2014     Years since quitting: 10.0    Smokeless tobacco: Never   Substance Use Topics    Alcohol use: No     Alcohol/week: 0.0 standard drinks of alcohol       SYSTEM Check if Review is Normal Check if Physical Exam is Normal If not normal, please explain:   HEENT [x ] [x ]    NECK & BACK [x ] [x ]    HEART [x ] [x ]    LUNGS [x ] [x ]    ABDOMEN [x ] [x ]    UROGENITAL [x ] [x ]    EXTREMITIES [x ] [x ]    OTHER        [ x ] I have discussed the risks and benefits and alternatives with the patient/family.  They understand and agree to proceed with plan of care.  [ x ] I have reviewed the History and Physical done within the last 30 days.  Any changes noted above.    Jose Rich MD              [1]   Medications Prior to Admission   Medication Sig Dispense Refill Last Dose/Taking    cetirizine 10 MG Oral Tab Take 1 tablet (10 mg total) by mouth daily.       allopurinol 300 MG Oral Tab Take 1 tablet (300 mg total) by mouth daily. 90 tablet 3     DULoxetine (CYMBALTA) 60 MG Oral Cap DR Particles Take 1 capsule (60 mg total) by mouth daily. 90 capsule 2     umeclidinium-vilanterol (ANORO ELLIPTA) 62.5-25 MCG/ACT Inhalation Aerosol Powder, Breath Activated Inhale 1 puff into the lungs daily. 1 each 5     HYDROcodone-acetaminophen 5-325 MG Oral Tab Take 1 tablet by mouth every 6 (six) hours as needed. 20 tablet 0     loratadine 10 MG Oral Tab Take 1 tablet (10 mg total) by mouth daily.       pantoprazole 40 MG Oral Tab EC Take 1 tablet (40 mg total) by mouth every morning before breakfast.       ipratropium-albuterol 0.5-2.5 (3) MG/3ML Inhalation Solution Take 3 mL by nebulization every 6 (six) hours as needed. 180 mL  12     ONETOUCH ULTRA In Vitro Strip 2 (two) times daily. (Patient not taking: Reported on 10/21/2024)       YUPELRI 175 MCG/3ML Inhalation Solution nebulizer solution ADMINISTER ONE (1) VIAL VIA NEBULIZER MACHINE DAILY 90 mL 11     Ascorbic Acid (VITAMIN C) 100 MG Oral Tab Take 10 tablets (1,000 mg total) by mouth daily.       pioglitazone 15 MG Oral Tab Take 1 tablet (15 mg total) by mouth daily.       glimepiride 2 MG Oral Tab Take 1 tablet (2 mg total) by mouth 2 (two) times daily.       albuterol 108 (90 Base) MCG/ACT Inhalation Aero Soln Inhale 2 puffs into the lungs every 6 (six) hours as needed for Wheezing or Shortness of Breath. inhale 2 puff by inhalation route  every 4 - 6 hours as needed 1 each 11     METOPROLOL SUCCINATE ER 25 MG Oral Tablet 24 Hr TAKE ONE TABLET BY MOUTH ONE TIME DAILY 90 tablet 1     rosuvastatin 10 MG Oral Tab Take 1 tablet (10 mg total) by mouth nightly. Replaces simvastatin 90 tablet 3     Cholecalciferol (VITAMIN D-3 OR) Take 1 capsule by mouth once daily.       Multiple Vitamins-Minerals (CENTRUM) Oral Tab Take 1 tablet by mouth daily.      [2]   Allergies  Allergen Reactions    Pcn [Bicillin C-R,] SWELLING     itching    Levofloxacin MYALGIA     Severe muscle and bone pain    Penicillins OTHER (SEE COMMENTS)

## 2024-10-24 NOTE — OPERATIVE REPORT
[facility]  Operative Report  10/24/2024     Gabi Kumar Patient Status:  Hospital Outpatient Surgery    1945 MRN [id number]   Location [facility] Attending Jose Rich MD   Hosp Day # 0 PCP Bismark Cabrera MD     Indication: Gabi is a 79 year old female with lumbar spondylosis    Preoperative Diagnosis:  Lumbar spondylosis [M47.816]    Postoperative Diagnosis: Same as above.    Procedure performed: RADIOFREQUENCY ABLATION OF BILATERAL LUMBAR MEDIAL BRANCH WITH LOCAL (L4-5, L5-S1)    Anesthesia: Local  .    EBL: Less than 1 ml.    Procedure Description:   After reviewing the patient's history and performing a focused physical examination, the diagnosis was confirmed and contraindications such as infection and coagulopathy were ruled out.  Following review of potential side effects and complications, including but not necessarily limited to infection, allergic reaction, local tissue breakdown, nerve injury, and paresis, the patient indicated they understood and agreed to proceed. After obtaining the informed consent, the patient was brought to the procedure room and monitored.      The patient was placed prone on the table.  The patient's back was prepped and draped in sterile fashion.  Attention was turned towards the patient's right side first.  The skin was anesthetized via 25-gauge 1.5\" needle with approximately 2 mL of 1% lidocaine for local anesthesia.  Under fluoroscopic guidance, a 22-gauge, 100-mm SMK needle was advanced to the junction of the superior aspect of the L3 transverse process and the lateral aspect of the same level superior articular process at right L3 level .  The needle was then walked off the bony tissue and advanced 2 to 3 mm to lie along the path of the L4 medial branch nerve.  AP and lateral radiographs were obtained to document proper needle position.  Sensory and motor stimulation were then performed, which elicited deep local back  discomfort but no evidence of motor stimulation in the gluteal muscles or extremities.  At this point, 0.5 mL of 1% lidocaine was injected to the tissues around the tip of the SMK needle.   Subsequently, a medial branch nerve denervation was performed for 90 seconds at 80 degrees centigrade without complication.  Similar procedures were performed at right L4-L5 and L5-S1 level. The radiofrequency probe was removed with the needle left in place and 1% lidocaine and 5 mg methylprednisolone was injected through each needle.  The needles were removed with tips intact.  This was then repeated on the patient's contralateral side.  The patient tolerated the procedure very well.  There was no subjective or objective loss of motor strength.  The patient was observed until discharge criteria met.  Discharge instructions were given and patient was released to a responsible adult.     Complications: None.    Follow up:  The patient will be followed in the pain clinic as needed basis.      Jose Rich MD

## 2024-10-24 NOTE — DISCHARGE INSTRUCTIONS
Home Care Instructions Following Your Pain Procedure     Gabi,  It has been a pleasure to have you as our patient. To help you at home, you must follow these general discharge instructions. We will review these with you before you are discharged. It is our hope that you have a complete and uneventful recovery from our procedure.     General Instructions:  What to Expect:  Bandages from your procedure today can be removed when you get home.  Please avoid soaking and/or swimming for 24 hours.  Showering is okay  It is normal to have increased pain symptoms and/or pain at injection site for up to 3-5 days after procedure, you can use heat or ice (20 minutes on 20 minutes off) for comfort.  You may experience some temporary side effects which may include restlessness or insomnia, flushing of the face, or heart palpitations.  Please contact the provider if these symptoms do not resolve within 3-4 days.  Lightheadedness or nausea may occur and should resolve within 24 to 48 hours.  If you develop a headache after treatment, rest, drink fluids (with caffeine, if possible) and take mild over-the-counter pain medication.  If the headache does not improve with the above treatment, contact the physician.  Home Medications:  Resume all previously prescribed medication.  Please avoid taking NSAIDs (Non-Steriodal Anti-Inflammatory Drugs) such as:  Ibuprofen ( Advil, Motrin) Aleve (Naproxen), Diclofenac, Meloxicam for 6 hours after procedure.   If you are on Coumadin (Warfarin) or any other anti-coagulant (or \"blood thinning\") medication such as Plavix (Clopidogrel), Xarelto (Rivaroxaban), Eliquis (Apixaban), Effient (Prasugrel) etc., restart on the following day from the procedure unless otherwise directed by your provider.  If you are a diabetic, please increase the frequency of your glucose monitoring after the procedure as steroids may cause a temporary (2-3 day) increase in your blood sugar.  Contact your primary care  physician if your blood sugar remains elevated as you may require some medication adjustment.  Diet:  Resume your regular diet as tolerated.  Activity:  We recommend that you relax and rest during the rest of your procedure day.  If you feel weakness in your arms or legs do not drive.  Follow-up Appointment  Please schedule a follow-up visit within 3 to 4 weeks after your last procedure date.  Question or Concerns:  Feel free to call our office with any questions or concerns at [phone] (option #2)    Gabi  Thank you for coming to [facility] for your procedure.  The nurses try very hard to make sure you receive the best care possible.  Your trust in them as well as us is greatly appreciated.    Thanks so much,   Dr. Jose Rich

## 2024-10-25 ENCOUNTER — TELEPHONE (OUTPATIENT)
Dept: PAIN CLINIC | Facility: CLINIC | Age: 79
End: 2024-10-25

## 2024-11-04 ENCOUNTER — OFFICE VISIT (OUTPATIENT)
Dept: PAIN CLINIC | Facility: CLINIC | Age: 79
End: 2024-11-04
Payer: MEDICARE

## 2024-11-04 ENCOUNTER — HOSPITAL ENCOUNTER (OUTPATIENT)
Dept: GENERAL RADIOLOGY | Facility: HOSPITAL | Age: 79
Discharge: HOME | End: 2024-11-04
Attending: ANESTHESIOLOGY
Payer: MEDICARE

## 2024-11-04 VITALS — HEART RATE: 100 BPM | DIASTOLIC BLOOD PRESSURE: 82 MMHG | SYSTOLIC BLOOD PRESSURE: 140 MMHG

## 2024-11-04 DIAGNOSIS — M53.3 SACRAL PAIN: ICD-10-CM

## 2024-11-04 DIAGNOSIS — M53.3 SACRAL PAIN: Primary | ICD-10-CM

## 2024-11-04 PROCEDURE — 99214 OFFICE O/P EST MOD 30 MIN: CPT | Performed by: ANESTHESIOLOGY

## 2024-11-04 PROCEDURE — 72220 X-RAY EXAM SACRUM TAILBONE: CPT | Performed by: ANESTHESIOLOGY

## 2024-11-04 NOTE — PROGRESS NOTES
Patient presents in office today with reported pain in right gluteal area    Current pain level reported = 9/10    Last reported dose of Grover at 12p      Narcotic Contract renewal na    Urine Drug screen na

## 2024-11-04 NOTE — PATIENT INSTRUCTIONS
Refill policies:    Allow 2-3 business days for refills; controlled substances may take longer.  Contact your pharmacy at least 5 days prior to running out of medication and have them send an electronic request or submit request through the “request refill” option in your [employer] account.  Refills are not addressed on weekends; covering physicians do not authorize routine medications on weekends.  No narcotics or controlled substances are refilled after noon on Fridays or by on call physicians.  By law, narcotics must be electronically prescribed.  A 30 day supply with no refills is the maximum allowed.  If your prescription is due for a refill, you may be due for a follow up appointment.  To best provide you care, patients receiving routine medications need to be seen at least once a year.  Patients receiving narcotic/controlled substance medications need to be seen at least once every 3 months.  In the event that your preferred pharmacy does not have the requested medication in stock (e.g. Backordered), it is your responsibility to find another pharmacy that has the requested medication available.  We will gladly send a new prescription to that pharmacy at your request.    Scheduling Tests:    If your physician has ordered radiology tests such as MRI or CT scans, please contact Central Scheduling at [phone] right away to schedule the test.  Once scheduled, the [facility] Centralized Referral Team will work with your insurance carrier to obtain pre-certification or prior authorization.  Depending on your insurance carrier, approval may take 3-10 days.  It is highly recommended patients assure they have received an authorization before having a test performed.  If test is done without insurance authorization, patient may be responsible for the entire amount billed.      Precertification and Prior Authorizations:  If your physician has recommended that you have a procedure or additional testing performed the [facility]  Centralized Referral Team will contact your insurance carrier to obtain pre-certification or prior authorization.    You are strongly encouraged to contact your insurance carrier to verify that your procedure/test has been approved and is a COVERED benefit.  Although the [facility] Centralized Referral Team does its due diligence, the insurance carrier gives the disclaimer that \"Although the procedure is authorized, this does not guarantee payment.\"    Ultimately the patient is responsible for payment.   Thank you for your understanding in this matter.  Paperwork Completion:  If you require FMLA or disability paperwork for your recovery, please make sure to either drop it off or have it faxed to our office at [phone]. Be sure the form has your name and date of birth on it.  The form will be faxed to our Forms Department and they will complete it for you.  There is a 25$ fee for all forms that need to be filled out.  Please be aware there is a 10-14 day turnaround time.  You will need to sign a release of information (BEATRICE) form if your paperwork does not come with one.  You may call the Forms Department with any questions at [phone].  Their fax number is [phone].

## 2024-11-04 NOTE — PROGRESS NOTES
Name: Gabi Kumar   : 1945   DOS: 2024     Pain Clinic Follow Up Visit:     Chief Complaint   Patient presents with    Follow - Up     FU regarding pain in right gluteal area        Gabi Kumar is a 79 year old female with a history of multilevel lumbar degenerative disc disease with a history of recent RFA.  The patient is doing well until she fell while navigating to her restroom.  She fell on her right buttock.  Now has 9 out of 10 pain.  This is focal and localized to the buttock and sacral region.  Made worse by standing and walking    Pt denies any chills, fever, or weakness. There is no bladder or bowel incontinence associated with the pain.    REVIEW OF SYSTEMS:  A ten point review of systems was performed with pertinent positives and negatives in the HPI.    Allergies[1]    Current Outpatient Medications   Medication Sig Dispense Refill    cetirizine 10 MG Oral Tab Take 1 tablet (10 mg total) by mouth daily.      allopurinol 300 MG Oral Tab Take 1 tablet (300 mg total) by mouth daily. 90 tablet 3    DULoxetine (CYMBALTA) 60 MG Oral Cap DR Particles Take 1 capsule (60 mg total) by mouth daily. 90 capsule 2    umeclidinium-vilanterol (ANORO ELLIPTA) 62.5-25 MCG/ACT Inhalation Aerosol Powder, Breath Activated Inhale 1 puff into the lungs daily. 1 each 5    HYDROcodone-acetaminophen 5-325 MG Oral Tab Take 1 tablet by mouth every 6 (six) hours as needed. 20 tablet 0    loratadine 10 MG Oral Tab Take 1 tablet (10 mg total) by mouth daily.      pantoprazole 40 MG Oral Tab EC Take 1 tablet (40 mg total) by mouth every morning before breakfast.      ipratropium-albuterol 0.5-2.5 (3) MG/3ML Inhalation Solution Take 3 mL by nebulization every 6 (six) hours as needed. 180 mL 12    YUPELRI 175 MCG/3ML Inhalation Solution nebulizer solution ADMINISTER ONE (1) VIAL VIA NEBULIZER MACHINE DAILY 90 mL 11    Ascorbic Acid (VITAMIN C) 100 MG Oral Tab Take 10 tablets (1,000 mg total) by mouth daily.       pioglitazone 15 MG Oral Tab Take 1 tablet (15 mg total) by mouth daily.      glimepiride 2 MG Oral Tab Take 1 tablet (2 mg total) by mouth 2 (two) times daily.      albuterol 108 (90 Base) MCG/ACT Inhalation Aero Soln Inhale 2 puffs into the lungs every 6 (six) hours as needed for Wheezing or Shortness of Breath. inhale 2 puff by inhalation route  every 4 - 6 hours as needed 1 each 11    METOPROLOL SUCCINATE ER 25 MG Oral Tablet 24 Hr TAKE ONE TABLET BY MOUTH ONE TIME DAILY 90 tablet 1    rosuvastatin 10 MG Oral Tab Take 1 tablet (10 mg total) by mouth nightly. Replaces simvastatin 90 tablet 3    Cholecalciferol (VITAMIN D-3 OR) Take 1 capsule by mouth once daily.      Multiple Vitamins-Minerals (CENTRUM) Oral Tab Take 1 tablet by mouth daily.      ONETOUCH ULTRA In Vitro Strip 2 (two) times daily. (Patient not taking: Reported on 10/15/2024)           EXAM:   /82 (BP Location: Left arm, Patient Position: Sitting)   Pulse 100   LMP 06/07/1999   General:  Patient is a(n) 79 year old year old female in no acute distress.  Neurologic:: WNL-Orientation to time, place and person, normal mood & affect, concentration & attention span intact.   Inspection: Ambulates with walker  Neck: Full range of motion  Back: Does have 2 half dollar sized bruises in the upper portion of the right gluteal region  Cranial nerves: Grossly intact  Respiratory: Nonlabored  IMAGES:     No new imaging    ASSESSMENT AND PLAN:     1. Sacral pain        The patient is a pleasant 79-year-old female with history of multilevel lumbar degenerative disc disease.  It was doing very well after radiofrequency ablation until her fal  She fell while using the restroom.  Now complains of significant localized pain.  Differential diagnosis include soft tissue injury from fall versus sacral insufficiency fracture.  Will update imaging.    Radiology orders and consultations:XR SACRUM + COCCYX (MIN 2 VIEWS) (CPT=72220)  The patient indicates  understanding of these issues and agrees to the plan.  No follow-ups on file.    Jose Rich MD, 11/4/2024, 5:03 PM              [1]   Allergies  Allergen Reactions    Pcn [Bicillin C-R,] SWELLING     itching    Levofloxacin MYALGIA     Severe muscle and bone pain    Penicillins OTHER (SEE COMMENTS)

## 2024-11-06 ENCOUNTER — TELEPHONE (OUTPATIENT)
Dept: PAIN CLINIC | Facility: CLINIC | Age: 79
End: 2024-11-06

## 2024-11-06 NOTE — TELEPHONE ENCOUNTER
Jose Rich MD  You7 minutes ago (10:51 AM)       She does have a degree of osteopenia but no sacral insufficiency fracture.  Does have pelvic rami fractures.  Nothing to do about those.  Likely myofascial pain and bruising from fall.

## 2024-11-06 NOTE — TELEPHONE ENCOUNTER
Contacted pt at this time and relayed the results to pt. Advised pt that she can take OTC Tylenol/Ibuprofen if she can tolerate her discomfort. Pt vu and no further questions at this time.

## 2024-11-26 ENCOUNTER — HOSPITAL ENCOUNTER (OUTPATIENT)
Facility: HOSPITAL | Age: 79
Setting detail: HOSPITAL OUTPATIENT SURGERY
Discharge: HOME | End: 2024-11-26
Attending: ANESTHESIOLOGY | Admitting: ANESTHESIOLOGY
Payer: MEDICARE

## 2024-11-26 ENCOUNTER — APPOINTMENT (OUTPATIENT)
Dept: GENERAL RADIOLOGY | Facility: HOSPITAL | Age: 79
End: 2024-11-26
Attending: ANESTHESIOLOGY
Payer: MEDICARE

## 2024-11-26 VITALS
RESPIRATION RATE: 16 BRPM | DIASTOLIC BLOOD PRESSURE: 72 MMHG | HEART RATE: 90 BPM | SYSTOLIC BLOOD PRESSURE: 154 MMHG | TEMPERATURE: 99 F | OXYGEN SATURATION: 94 %

## 2024-11-26 LAB — GLUCOSE BLD-MCNC: 126 MG/DL (ref 70–99)

## 2024-11-26 PROCEDURE — 3E0T3BZ INTRODUCTION OF ANESTHETIC AGENT INTO PERIPHERAL NERVES AND PLEXI, PERCUTANEOUS APPROACH: ICD-10-PCS | Performed by: ANESTHESIOLOGY

## 2024-11-26 PROCEDURE — 3E0T33Z INTRODUCTION OF ANTI-INFLAMMATORY INTO PERIPHERAL NERVES AND PLEXI, PERCUTANEOUS APPROACH: ICD-10-PCS | Performed by: ANESTHESIOLOGY

## 2024-11-26 PROCEDURE — 64454 NJX AA&/STRD GNCLR NRV BRNCH: CPT | Performed by: ANESTHESIOLOGY

## 2024-11-26 RX ORDER — METHYLPREDNISOLONE ACETATE 40 MG/ML
INJECTION, SUSPENSION INTRA-ARTICULAR; INTRALESIONAL; INTRAMUSCULAR; SOFT TISSUE
Status: DISCONTINUED | OUTPATIENT
Start: 2024-11-26 | End: 2024-11-26

## 2024-11-26 RX ORDER — NALOXONE HYDROCHLORIDE 0.4 MG/ML
0.08 INJECTION, SOLUTION INTRAMUSCULAR; INTRAVENOUS; SUBCUTANEOUS AS NEEDED
Status: DISCONTINUED | OUTPATIENT
Start: 2024-11-26 | End: 2024-11-26

## 2024-11-26 RX ORDER — LIDOCAINE HYDROCHLORIDE 10 MG/ML
INJECTION, SOLUTION EPIDURAL; INFILTRATION; INTRACAUDAL; PERINEURAL
Status: DISCONTINUED | OUTPATIENT
Start: 2024-11-26 | End: 2024-11-26

## 2024-11-26 RX ORDER — BUPIVACAINE HYDROCHLORIDE 5 MG/ML
INJECTION, SOLUTION EPIDURAL; INTRACAUDAL
Status: DISCONTINUED | OUTPATIENT
Start: 2024-11-26 | End: 2024-11-26

## 2024-11-26 NOTE — OPERATIVE REPORT
[facility]  Operative Report  2024     Gabi Kumar Patient Status:  Hospital Outpatient Surgery    1945 MRN [id number]   Location [facility] PAIN CENTER Attending Jose Rich MD   Hosp Day # 0 PCP Bismark Cabrera MD     Indication: Gabi is a 79 year old female osteoarthritis of the knee    Preoperative Diagnosis:  Primary osteoarthritis of left knee [M17.12]    Postoperative Diagnosis: Same as above.    Procedure performed: left GENICULAR NERVE BLOCK with local    Anesthesia: Local  .    EBL: Less than 1 ml.    Procedure Description:  After reviewing the patient's history and performing a focused physical examination, the diagnosis and positive previous diagnostic tests were confirmed and contraindications such as infection and coagulopathy were ruled out.  Following review of allergies and potential side effects and complications, including but not necessarily limited to infection, allergic reaction, local tissue breakdown, nerve injury,  and paresis, the patient consented for the procedure.    The patient was brought to the procedure room in the supine position.   The operative knee was then identified and prepped and draped in the usual sterile fashion.  AP imaging was used to identify the path of the superior medial, superior lateral, inferior medial genicular nerves.  The overlying soft tissue was then anesthetized with 3 cc 1% lidocaine at each location.  Subsequently, a 3.5 inch 22-gauge spinal needle was advanced imaging guidance to contact bone along the path of the superior medial, superior lateral, and inferior medial genicular nerves.  Needle position was confirmed on both AP and lateral views.  After negative aspiration for heme, total mixture of 40 mg of Depo-Medrol with 9 cc of 0.5% bupivacaine was then evenly divided amongst the 3 sites.  The needle was then flushed with 1 cc 1% lidocaine, re-styletted and removed with tip intact.   The patient tolerated the  procedure very well without significant immediate complication.          Complications: None.    Follow up: The patient was followed in the pain clinic as needed basis.          Jose Rich MD

## 2024-11-26 NOTE — H&P
History & Physical Examination    Patient Name: Gabi Kumar  MRN: [id number]  CSN: [id number]  YOB: 1945    Pre-Operative Diagnosis:  Primary osteoarthritis of left knee [M17.12]    Present Illness: Knee pain    ASA: 3  MP class: 1  Sedation: Local      Prescriptions Prior to Admission[1]  No current facility-administered medications for this encounter.       Allergies: Allergies[2]    Past Medical History:    Back problem    Cataract    Cholelithiasis    DIABETES    Diabetes (HCC)    Diverticulitis    suggestion of possible wall thickening involving sigmoid, possible mild diverticulitis    Diverticulosis    Essential hypertension    Gallbladder disease    thickened gallbladder with distended bile duct    High blood pressure    High cholesterol    HYPERLIPIDEMIA    Hyperlipidemia    IBS (irritable bowel syndrome)    Osteoarthritis    hips, back and knees    Thyroid disease    Visual impairment    reading glasses     Past Surgical History:   Procedure Laterality Date    Carpal tunnel release      Colonoscopy  2003,     Diverticulosis    Colonoscopy  2012    decreased anal sphincter tone, diverticulosis, internal hemorrhoids, normal random colon biopsies    Colonoscopy N/A 7/26/2017    Procedure: COLONOSCOPY;  Surgeon: Garrett Moody MD;  Location:  ENDOSCOPY    Other  1999    Carpel tnnel right hand    Other surgical history  1995    Fatty tumor from neck    Other surgical history  1999    Arthroscopy right knee    Other surgical history  2001    Carpal tunnel both hand    Other surgical history  12/22/2020    Cystoscopy, Dr Chun     Family History   Problem Relation Age of Onset    Cancer Other         Stomach cancer    Heart Disease Father     Hypertension Father     High Blood Pressure Father     Diabetes Father     Heart Disease Mother     Hypertension Mother     Lung Disorder Mother         Pulmonary fibrosis     Social History     Tobacco Use    Smoking status: Former     Current packs/day:  0.00     Average packs/day: 0.8 packs/day for 50.0 years (37.5 ttl pk-yrs)     Types: Cigarettes     Start date: 10/22/1964     Quit date: 10/22/2014     Years since quitting: 10.1    Smokeless tobacco: Never   Substance Use Topics    Alcohol use: No     Alcohol/week: 0.0 standard drinks of alcohol       SYSTEM Check if Review is Normal Check if Physical Exam is Normal If not normal, please explain:   HEENT [x ] [x ]    NECK & BACK [x ] [x ]    HEART [x ] [x ]    LUNGS [x ] [x ]    ABDOMEN [x ] [x ]    UROGENITAL [x ] [x ]    EXTREMITIES [x ] [x ]    OTHER        [ x ] I have discussed the risks and benefits and alternatives with the patient/family.  They understand and agree to proceed with plan of care.  [ x ] I have reviewed the History and Physical done within the last 30 days.  Any changes noted above.    Jose Rich MD              [1]   Medications Prior to Admission   Medication Sig Dispense Refill Last Dose/Taking    cetirizine 10 MG Oral Tab Take 1 tablet (10 mg total) by mouth daily.       allopurinol 300 MG Oral Tab Take 1 tablet (300 mg total) by mouth daily. 90 tablet 3     DULoxetine (CYMBALTA) 60 MG Oral Cap DR Particles Take 1 capsule (60 mg total) by mouth daily. 90 capsule 2     umeclidinium-vilanterol (ANORO ELLIPTA) 62.5-25 MCG/ACT Inhalation Aerosol Powder, Breath Activated Inhale 1 puff into the lungs daily. 1 each 5     HYDROcodone-acetaminophen 5-325 MG Oral Tab Take 1 tablet by mouth every 6 (six) hours as needed. 20 tablet 0     loratadine 10 MG Oral Tab Take 1 tablet (10 mg total) by mouth daily.       pantoprazole 40 MG Oral Tab EC Take 1 tablet (40 mg total) by mouth every morning before breakfast.       ipratropium-albuterol 0.5-2.5 (3) MG/3ML Inhalation Solution Take 3 mL by nebulization every 6 (six) hours as needed. 180 mL 12     ONETOUCH ULTRA In Vitro Strip 2 (two) times daily. (Patient not taking: Reported on 10/15/2024)       YUPELRI 175 MCG/3ML Inhalation Solution nebulizer  solution ADMINISTER ONE (1) VIAL VIA NEBULIZER MACHINE DAILY 90 mL 11     Ascorbic Acid (VITAMIN C) 100 MG Oral Tab Take 10 tablets (1,000 mg total) by mouth daily.       pioglitazone 15 MG Oral Tab Take 1 tablet (15 mg total) by mouth daily.       glimepiride 2 MG Oral Tab Take 1 tablet (2 mg total) by mouth 2 (two) times daily.       albuterol 108 (90 Base) MCG/ACT Inhalation Aero Soln Inhale 2 puffs into the lungs every 6 (six) hours as needed for Wheezing or Shortness of Breath. inhale 2 puff by inhalation route  every 4 - 6 hours as needed 1 each 11     METOPROLOL SUCCINATE ER 25 MG Oral Tablet 24 Hr TAKE ONE TABLET BY MOUTH ONE TIME DAILY 90 tablet 1     rosuvastatin 10 MG Oral Tab Take 1 tablet (10 mg total) by mouth nightly. Replaces simvastatin 90 tablet 3     Cholecalciferol (VITAMIN D-3 OR) Take 1 capsule by mouth once daily.       Multiple Vitamins-Minerals (CENTRUM) Oral Tab Take 1 tablet by mouth daily.      [2]   Allergies  Allergen Reactions    Pcn [Bicillin C-R,] SWELLING     itching    Levofloxacin MYALGIA     Severe muscle and bone pain    Penicillins OTHER (SEE COMMENTS)

## 2024-11-26 NOTE — DISCHARGE INSTRUCTIONS
Home Care Instructions Following Your Pain Procedure     Gabi,  It has been a pleasure to have you as our patient. To help you at home, you must follow these general discharge instructions. We will review these with you before you are discharged. It is our hope that you have a complete and uneventful recovery from our procedure.     General Instructions:  What to Expect:  Bandages from your procedure today can be removed when you get home.  Please avoid soaking and/or swimming for 24 hours.  Showering is okay  It is normal to have increased pain symptoms and/or pain at injection site for up to 3-5 days after procedure, you can use heat or ice (20 minutes on 20 minutes off) for comfort.  You may experience some temporary side effects which may include restlessness or insomnia, flushing of the face, or heart palpitations.  Please contact the provider if these symptoms do not resolve within 3-4 days.  Lightheadedness or nausea may occur and should resolve within 24 to 48 hours.  If you develop a headache after treatment, rest, drink fluids (with caffeine, if possible) and take mild over-the-counter pain medication.  If the headache does not improve with the above treatment, contact the physician.  Home Medications:  Resume all previously prescribed medication.  Please avoid taking NSAIDs (Non-Steriodal Anti-Inflammatory Drugs) such as:  Ibuprofen ( Advil, Motrin) Aleve (Naproxen), Diclofenac, Meloxicam for 6 hours after procedure.   If you are on Coumadin (Warfarin) or any other anti-coagulant (or \"blood thinning\") medication such as Plavix (Clopidogrel), Xarelto (Rivaroxaban), Eliquis (Apixaban), Effient (Prasugrel) etc., restart on the following day from the procedure unless otherwise directed by your provider.  If you are a diabetic, please increase the frequency of your glucose monitoring after the procedure as steroids may cause a temporary (2-3 day) increase in your blood sugar.  Contact your primary care  physician if your blood sugar remains elevated as you may require some medication adjustment.  Diet:  Resume your regular diet as tolerated.  Activity:  We recommend that you relax and rest during the rest of your procedure day.  If you feel weakness in your arms or legs do not drive.  Follow-up Appointment  Please schedule a follow-up visit within 3 to 4 weeks after your last procedure date.  Question or Concerns:  Feel free to call our office with any questions or concerns at [phone] (option #2)    Gabi  Thank you for coming to [facility] for your procedure.  The nurses try very hard to make sure you receive the best care possible.  Your trust in them as well as us is greatly appreciated.    Thanks so much,   Dr. Jose Rich

## 2024-11-30 ENCOUNTER — ANESTHESIA (OUTPATIENT)
Dept: ENDOSCOPY | Facility: HOSPITAL | Age: 79
End: 2024-11-30
Payer: MEDICARE

## 2024-11-30 ENCOUNTER — ANESTHESIA EVENT (OUTPATIENT)
Dept: ENDOSCOPY | Facility: HOSPITAL | Age: 79
End: 2024-11-30
Payer: MEDICARE

## 2024-11-30 ENCOUNTER — HOSPITAL ENCOUNTER (OUTPATIENT)
Facility: HOSPITAL | Age: 79
Setting detail: OBSERVATION
Discharge: HOME | End: 2024-12-01
Attending: EMERGENCY MEDICINE | Admitting: HOSPITALIST
Payer: MEDICARE

## 2024-11-30 ENCOUNTER — APPOINTMENT (OUTPATIENT)
Dept: GENERAL RADIOLOGY | Facility: HOSPITAL | Age: 79
End: 2024-11-30
Attending: EMERGENCY MEDICINE
Payer: MEDICARE

## 2024-11-30 DIAGNOSIS — K92.2 GI BLEED: ICD-10-CM

## 2024-11-30 DIAGNOSIS — K92.2 GASTROINTESTINAL HEMORRHAGE, UNSPECIFIED GASTROINTESTINAL HEMORRHAGE TYPE: Primary | ICD-10-CM

## 2024-11-30 LAB
ALBUMIN SERPL-MCNC: 4.5 G/DL (ref 3.2–4.8)
ALBUMIN/GLOB SERPL: 2.1 {RATIO} (ref 1–2)
ALP LIVER SERPL-CCNC: 189 U/L
ALT SERPL-CCNC: 14 U/L
ANION GAP SERPL CALC-SCNC: 7 MMOL/L (ref 0–18)
ANTIBODY SCREEN: NEGATIVE
AST SERPL-CCNC: 25 U/L (ref ?–34)
BASOPHILS # BLD AUTO: 0.1 X10(3) UL (ref 0–0.2)
BASOPHILS NFR BLD AUTO: 1.2 %
BILIRUB SERPL-MCNC: 0.5 MG/DL (ref 0.2–1.1)
BUN BLD-MCNC: 25 MG/DL (ref 9–23)
CALCIUM BLD-MCNC: 9.6 MG/DL (ref 8.7–10.4)
CHLORIDE SERPL-SCNC: 107 MMOL/L (ref 98–112)
CO2 SERPL-SCNC: 27 MMOL/L (ref 21–32)
CREAT BLD-MCNC: 0.87 MG/DL
EGFRCR SERPLBLD CKD-EPI 2021: 67 ML/MIN/1.73M2 (ref 60–?)
EOSINOPHIL # BLD AUTO: 0.42 X10(3) UL (ref 0–0.7)
EOSINOPHIL NFR BLD AUTO: 5.1 %
ERYTHROCYTE [DISTWIDTH] IN BLOOD BY AUTOMATED COUNT: 15.4 %
EST. AVERAGE GLUCOSE BLD GHB EST-MCNC: 148 MG/DL (ref 68–126)
GGT SERPL-CCNC: 18 U/L
GLOBULIN PLAS-MCNC: 2.1 G/DL (ref 2–3.5)
GLUCOSE BLD-MCNC: 109 MG/DL (ref 70–99)
GLUCOSE BLD-MCNC: 120 MG/DL (ref 70–99)
GLUCOSE BLD-MCNC: 125 MG/DL (ref 70–99)
GLUCOSE BLD-MCNC: 133 MG/DL (ref 70–99)
GLUCOSE BLD-MCNC: 139 MG/DL (ref 70–99)
HBA1C MFR BLD: 6.8 % (ref ?–5.7)
HCT VFR BLD AUTO: 44.3 %
HCT VFR BLD AUTO: 44.7 %
HGB BLD-MCNC: 14 G/DL
HGB BLD-MCNC: 14.1 G/DL
HGB BLD-MCNC: 14.2 G/DL
IMM GRANULOCYTES # BLD AUTO: 0.07 X10(3) UL (ref 0–1)
IMM GRANULOCYTES NFR BLD: 0.8 %
LYMPHOCYTES # BLD AUTO: 1.34 X10(3) UL (ref 1–4)
LYMPHOCYTES NFR BLD AUTO: 16.2 %
MCH RBC QN AUTO: 29.9 PG (ref 26–34)
MCHC RBC AUTO-ENTMCNC: 31.3 G/DL (ref 31–37)
MCV RBC AUTO: 95.3 FL
MONOCYTES # BLD AUTO: 0.85 X10(3) UL (ref 0.1–1)
MONOCYTES NFR BLD AUTO: 10.3 %
NEUTROPHILS # BLD AUTO: 5.47 X10 (3) UL (ref 1.5–7.7)
NEUTROPHILS # BLD AUTO: 5.47 X10(3) UL (ref 1.5–7.7)
NEUTROPHILS NFR BLD AUTO: 66.4 %
OSMOLALITY SERPL CALC.SUM OF ELEC: 299 MOSM/KG (ref 275–295)
PLATELET # BLD AUTO: 207 10(3)UL (ref 150–450)
POTASSIUM SERPL-SCNC: 4.6 MMOL/L (ref 3.5–5.1)
PROT SERPL-MCNC: 6.6 G/DL (ref 5.7–8.2)
RBC # BLD AUTO: 4.69 X10(6)UL
RH BLOOD TYPE: NEGATIVE
RH BLOOD TYPE: NEGATIVE
SODIUM SERPL-SCNC: 141 MMOL/L (ref 136–145)
WBC # BLD AUTO: 8.3 X10(3) UL (ref 4–11)

## 2024-11-30 PROCEDURE — 99223 1ST HOSP IP/OBS HIGH 75: CPT | Performed by: INTERNAL MEDICINE

## 2024-11-30 PROCEDURE — 0DB68ZX EXCISION OF STOMACH, VIA NATURAL OR ARTIFICIAL OPENING ENDOSCOPIC, DIAGNOSTIC: ICD-10-PCS | Performed by: INTERNAL MEDICINE

## 2024-11-30 PROCEDURE — 71045 X-RAY EXAM CHEST 1 VIEW: CPT | Performed by: EMERGENCY MEDICINE

## 2024-11-30 PROCEDURE — 0DBN8ZX EXCISION OF SIGMOID COLON, VIA NATURAL OR ARTIFICIAL OPENING ENDOSCOPIC, DIAGNOSTIC: ICD-10-PCS | Performed by: INTERNAL MEDICINE

## 2024-11-30 RX ORDER — DEXTROSE MONOHYDRATE 25 G/50ML
50 INJECTION, SOLUTION INTRAVENOUS
Status: DISCONTINUED | OUTPATIENT
Start: 2024-11-30 | End: 2024-12-01

## 2024-11-30 RX ORDER — ONDANSETRON 2 MG/ML
4 INJECTION INTRAMUSCULAR; INTRAVENOUS EVERY 6 HOURS PRN
Status: DISCONTINUED | OUTPATIENT
Start: 2024-11-30 | End: 2024-12-01

## 2024-11-30 RX ORDER — ACETAMINOPHEN 500 MG
500 TABLET ORAL EVERY 4 HOURS PRN
Status: DISCONTINUED | OUTPATIENT
Start: 2024-11-30 | End: 2024-12-01

## 2024-11-30 RX ORDER — LIDOCAINE HYDROCHLORIDE 10 MG/ML
INJECTION, SOLUTION EPIDURAL; INFILTRATION; INTRACAUDAL; PERINEURAL AS NEEDED
Status: DISCONTINUED | OUTPATIENT
Start: 2024-11-30 | End: 2024-11-30 | Stop reason: SURG

## 2024-11-30 RX ORDER — SODIUM CHLORIDE 9 MG/ML
125 INJECTION, SOLUTION INTRAVENOUS CONTINUOUS
Status: DISCONTINUED | OUTPATIENT
Start: 2024-11-30 | End: 2024-12-01

## 2024-11-30 RX ORDER — GARLIC EXTRACT 500 MG
1 CAPSULE ORAL DAILY
COMMUNITY

## 2024-11-30 RX ORDER — SENNOSIDES 8.6 MG
17.2 TABLET ORAL NIGHTLY PRN
Status: DISCONTINUED | OUTPATIENT
Start: 2024-11-30 | End: 2024-12-01

## 2024-11-30 RX ORDER — NICOTINE POLACRILEX 4 MG
30 LOZENGE BUCCAL
Status: DISCONTINUED | OUTPATIENT
Start: 2024-11-30 | End: 2024-12-01

## 2024-11-30 RX ORDER — LABETALOL HYDROCHLORIDE 5 MG/ML
INJECTION, SOLUTION INTRAVENOUS AS NEEDED
Status: DISCONTINUED | OUTPATIENT
Start: 2024-11-30 | End: 2024-11-30 | Stop reason: SURG

## 2024-11-30 RX ORDER — FLUTICASONE PROPIONATE 50 MCG
2 SPRAY, SUSPENSION (ML) NASAL DAILY
COMMUNITY

## 2024-11-30 RX ORDER — POLYETHYLENE GLYCOL 3350 17 G/17G
17 POWDER, FOR SOLUTION ORAL DAILY PRN
Status: DISCONTINUED | OUTPATIENT
Start: 2024-11-30 | End: 2024-12-01

## 2024-11-30 RX ORDER — NICOTINE POLACRILEX 4 MG
15 LOZENGE BUCCAL
Status: DISCONTINUED | OUTPATIENT
Start: 2024-11-30 | End: 2024-12-01

## 2024-11-30 RX ORDER — SODIUM PHOSPHATE, DIBASIC AND SODIUM PHOSPHATE, MONOBASIC 7; 19 G/230ML; G/230ML
1 ENEMA RECTAL ONCE AS NEEDED
Status: DISCONTINUED | OUTPATIENT
Start: 2024-11-30 | End: 2024-12-01

## 2024-11-30 RX ORDER — BISACODYL 10 MG
10 SUPPOSITORY, RECTAL RECTAL
Status: DISCONTINUED | OUTPATIENT
Start: 2024-11-30 | End: 2024-12-01

## 2024-11-30 RX ORDER — METOCLOPRAMIDE HYDROCHLORIDE 5 MG/ML
5 INJECTION INTRAMUSCULAR; INTRAVENOUS EVERY 8 HOURS PRN
Status: DISCONTINUED | OUTPATIENT
Start: 2024-11-30 | End: 2024-12-01

## 2024-11-30 RX ADMIN — LIDOCAINE HYDROCHLORIDE 50 MG: 10 INJECTION, SOLUTION EPIDURAL; INFILTRATION; INTRACAUDAL; PERINEURAL at 10:04:00

## 2024-11-30 RX ADMIN — LABETALOL HYDROCHLORIDE 10 MG: 5 INJECTION, SOLUTION INTRAVENOUS at 10:00:00

## 2024-11-30 NOTE — CONSULTS
GASTROENTEROLOGY CONSULTATION  Jaswinder Covington MD    Department of Gastroenterology  [facility]    Gabi Kumar Patient Status:  Observation    1945 MRN [id number]   Location [facility] 5NW-A Attending Manuelito Flores MD   Hosp Day # 0 PCP Bismark Cabrera MD     Reason for Consultation:  Rectal bleeding  Maroon stool    History of Present Illness:  Gabi Kumar is a a(n) 79 year old female with hx of c diff, DM, HTN, HL, IBS, DJD, last colonoscopy ./  GI consult requested for evaluation of rectal bleeding.  Since last pm, pt has had multiple dark maroon and blood stool, with normal hgb, BUN 25 and creat 0.87.  Ibuprofen use is daily for control of back pain.  No other GI complaints.  Hx of diverticulosis and diverticulitis.   Pt was on antibiotics in the last 2 weeks post dental extractions.  Prior hx of c diff.  Has seen GI in the past for chronic diarrhea presumed to be related to IBS.   Home meds include norco, pantoprazole    History:  Past Medical History:    Back problem    Cataract    Cholelithiasis    DIABETES    Diabetes (HCC)    Diverticulitis    suggestion of possible wall thickening involving sigmoid, possible mild diverticulitis    Diverticulosis    Essential hypertension    Gallbladder disease    thickened gallbladder with distended bile duct    High blood pressure    High cholesterol    HYPERLIPIDEMIA    Hyperlipidemia    IBS (irritable bowel syndrome)    Osteoarthritis    hips, back and knees    Thyroid disease    Visual impairment    reading glasses     Past Surgical History:   Procedure Laterality Date    Carpal tunnel release      Colonoscopy  ,     Diverticulosis    Colonoscopy      decreased anal sphincter tone, diverticulosis, internal hemorrhoids, normal random colon biopsies    Colonoscopy N/A 2017    Procedure: COLONOSCOPY;  Surgeon: Garrett Moody MD;  Location:  ENDOSCOPY    Other      Carpel tnnel right hand    Other surgical history       Fatty tumor from neck    Other surgical history  1999    Arthroscopy right knee    Other surgical history  2001    Carpal tunnel both hand    Other surgical history  12/22/2020    Cystoscopy, Dr Chun     Family History   Problem Relation Age of Onset    Cancer Other         Stomach cancer    Heart Disease Father     Hypertension Father     High Blood Pressure Father     Diabetes Father     Heart Disease Mother     Hypertension Mother     Lung Disorder Mother         Pulmonary fibrosis      reports that she quit smoking about 10 years ago. Her smoking use included cigarettes. She started smoking about 60 years ago. She has a 37.5 pack-year smoking history. She has never used smokeless tobacco. She reports that she does not drink alcohol and does not use drugs.    Allergies:  Allergies[1]    Medications:    Current Facility-Administered Medications:     sodium chloride 0.9% infusion, 125 mL/hr, Intravenous, Continuous    Review of Systems:  Gastrointestinal: See above  General: Denies fatigue, chills/fever, night sweats, weight loss, loss of appetite, weight gain, sleep disturbance.  Cardiovascular: Denies history of heart murmur, chest pain or angina.  Respiratory: Denies shortness of breath, chronic/frequent hoarseness, wheezing, chronic cough, cough up sputum.  Genitourinary: Denies kidney stones, painful/difficult urination, frequent urinary infections, frequent urination, blood in urine, incontinence, kidney failure.  Psychosocial: noncontributory  Neuro: no tremor, dysarthria, gait disturbance  Skin: Denies severe itching, unusual moles, rash, flushing, change in hair or nails.  Bone/joint: No joint pain or inflammation  Heme/Lymphatic: Denies easy bruising, anemia, excessive bleeding, enlarging or painful lymph nodes.  Allergy: Denies medication allergy, latex/rubber allergy, anaphylactic or other reaction to anesthesia, food allergy.   Eyes: Denies blurred/double vision, eye disease, glasses or contacts,  glaucoma.  ENT: Denies nose or gums bleeding, mouth sores, bad breath or bad taste in mouth, hearing loss.  Physical Exam:    Blood pressure (!) 170/83, pulse 77, temperature 98.6 °F (37 °C), temperature source Temporal, resp. rate 13, last menstrual period 06/07/1999, SpO2 98%, not currently breastfeeding.    General: Appears alert, oriented x3 and in no acute distress.  HEENT: Normal. No neck vein distention. Thyroid not enlarged.  No lymphadenopathy.  CV: S1 and S2 normal.  No murmurs or gallops.  Lungs: Clear to auscultation.  Abdomen: Soft and nondistended.  Nontender.  No masses.  Bowel sounds are present.  Back: No CVA tenderness.  Extremities: No edema, cyanosis, or clubbing.  Skin: Warm and dry.  Rectal: bloody stool  Laboratory Data:  Lab Results   Component Value Date    WBC 8.3 11/30/2024    HGB 14.0 11/30/2024    HCT 44.7 11/30/2024    .0 11/30/2024    CREATSERUM 0.87 11/30/2024    BUN 25 11/30/2024     11/30/2024    K 4.6 11/30/2024     11/30/2024    CO2 27.0 11/30/2024     11/30/2024    CA 9.6 11/30/2024    ALB 4.5 11/30/2024    ALKPHO 189 11/30/2024    BILT 0.5 11/30/2024    TP 6.6 11/30/2024    AST 25 11/30/2024    ALT 14 11/30/2024         Assessment:     Rectal bleeding  Maroon colored stool   Suspect LGI source of bleeding, diverticulosis, c diff colitis - given prior hx of c diff and recent antibiotic exposure) colon cancer / polyps, vs IBD.  UGI source of bleeding is possible given elevated BUN : creat ratio and chronic use of nsaids with increased risk for PUD.  Plan:  1. EGD with MAC now.  2. If EGD negative, will proceed with colonoscopy in AM.  3. NPO.  4. Monitor H and H.  5. Check stool for c diff.    Cc: Dr. Flores      Thank you for allowing to participate in the care of this patient.    Jaswinder Covington MD  11/30/2024  8:41 AM        [1]   Allergies  Allergen Reactions    Pcn [Bicillin C-R,] SWELLING     itching    Levofloxacin MYALGIA     Severe muscle and  bone pain    Penicillins OTHER (SEE COMMENTS)

## 2024-11-30 NOTE — ANESTHESIA POSTPROCEDURE EVALUATION
[facility]    Gabi Kumar Patient Status:  Observation   Age/Gender 79 year old female MRN [id number]   Location [facility] ENDOSCOPY PAIN CENTER Attending Manuelito Flores MD   Hosp Day # 0 PCP Bismark Cabrera MD       Anesthesia Post-op Note    ESOPHAGOGASTRODUODENOSCOPY (EGD)    Procedure Summary       Date: 11/30/24 Room / Location:  ENDOSCOPY 02 / [facility] END[facility]    Anesthesia Start: 1000 Anesthesia Stop: 1021    Procedure: ESOPHAGOGASTRODUODENOSCOPY (EGD) Diagnosis: (GASTRITIS,HIATAL HERNIA)    Surgeons: Jaswinder Covington MD Anesthesiologist: Inderjit Velásquez DO    Anesthesia Type: MAC ASA Status: 3            Anesthesia Type: MAC    Vitals Value Taken Time   /66 11/30/24 1021   Temp 97 °F (36.1 °C) 11/30/24 1021   Pulse 88 11/30/24 1021   Resp 22 11/30/24 1021   SpO2 93 % 11/30/24 1021       Patient Location: Endoscopy    Anesthesia Type: MAC    Airway Patency: patent    Postop Pain Control: adequate    Mental Status: preanesthetic baseline    Nausea/Vomiting: none    Cardiopulmonary/Hydration status: stable euvolemic    Complications: no apparent anesthesia related complications    Postop vital signs: stable    Dental Exam: Unchanged from Preop    Patient to be discharged from PACU when criteria met.

## 2024-11-30 NOTE — BRIEF OP NOTE
Pre-Operative Diagnosis: GI BLEED,MARROON STOOLS,RECTAL BLEEDING     Post-Operative Diagnosis: GASTRITIS,HIATAL HERNIA      Procedure Performed:   ESOPHAGOGASTRODUODENOSCOPY (EGD)    Surgeons and Role:     * Jaswinder Covington MD - Primary    Assistant(s):        Surgical Findings: see above     Specimen: gastric     Estimated Blood Loss: No data recorded    Dictation Number:  none    Jaswinder Covington MD  11/30/2024  10:14 AM

## 2024-11-30 NOTE — ANESTHESIA PREPROCEDURE EVALUATION
PRE-OP EVALUATION    Patient Name: Gabi Kumar    Admit Diagnosis: Gastrointestinal hemorrhage, unspecified gastrointestinal hemorrhage type [K92.2]    Pre-op Diagnosis: GI BLEED    ESOPHAGOGASTRODUODENOSCOPY (EGD)    Anesthesia Procedure: ESOPHAGOGASTRODUODENOSCOPY (EGD)    Surgeons and Role:     * Jaswinder Covington MD - Primary    Pre-op vitals reviewed.  Temp: 98.6 °F (37 °C)  Pulse: 77  Resp: 13  BP: 170/83  SpO2: 98 %  There is no height or weight on file to calculate BMI.    Current medications reviewed.  Hospital Medications:   sodium chloride 0.9% infusion  125 mL/hr Intravenous Continuous    [COMPLETED] pantoprazole (Protonix) 40 mg in sodium chloride 0.9% PF 10 mL IV push  40 mg Intravenous Once    acetaminophen (Tylenol Extra Strength) tab 500 mg  500 mg Oral Q4H PRN    melatonin tab 3 mg  3 mg Oral Nightly PRN    polyethylene glycol (PEG 3350) (Miralax) 17 g oral packet 17 g  17 g Oral Daily PRN    sennosides (Senokot) tab 17.2 mg  17.2 mg Oral Nightly PRN    bisacodyl (Dulcolax) 10 MG rectal suppository 10 mg  10 mg Rectal Daily PRN    fleet enema (Fleet) rectal enema 133 mL  1 enema Rectal Once PRN    ondansetron (Zofran) 4 MG/2ML injection 4 mg  4 mg Intravenous Q6H PRN    metoclopramide (Reglan) 5 mg/mL injection 5 mg  5 mg Intravenous Q8H PRN    glucose (Dex4) 15 GM/59ML oral liquid 15 g  15 g Oral Q15 Min PRN    Or    glucose (Glutose) 40% oral gel 15 g  15 g Oral Q15 Min PRN    Or    glucose-vitamin C (Dex-4) chewable tab 4 tablet  4 tablet Oral Q15 Min PRN    Or    dextrose 50% injection 50 mL  50 mL Intravenous Q15 Min PRN    Or    glucose (Dex4) 15 GM/59ML oral liquid 30 g  30 g Oral Q15 Min PRN    Or    glucose (Glutose) 40% oral gel 30 g  30 g Oral Q15 Min PRN    Or    glucose-vitamin C (Dex-4) chewable tab 8 tablet  8 tablet Oral Q15 Min PRN    insulin aspart (NovoLOG) 100 Units/mL FlexPen 1-5 Units  1-5 Units Subcutaneous TID AC and HS       Outpatient Medications:   Prescriptions  Prior to Admission[1]    Allergies: Pcn [bicillin c-r,]; Levofloxacin; and Penicillins      Anesthesia Evaluation        Anesthetic Complications  (-) history of anesthetic complications         GI/Hepatic/Renal           (+) PUD                 (+) irritable bowel syndrome     Cardiovascular      ECG reviewed.      MET: >4      (+) hypertension and well controlled  (+) hyperlipidemia                                  Endo/Other      (+) diabetes  type 2, not using insulin                         Pulmonary  Comment: 40 pyh quit 2014      (+) COPD and moderate  COPD not requiring home oxygen.                Neuro/Psych                 (+) neuromuscular disease             Presented with rectal bleeding        Past Surgical History:   Procedure Laterality Date    Carpal tunnel release      Colonoscopy  2003,     Diverticulosis    Colonoscopy  2012    decreased anal sphincter tone, diverticulosis, internal hemorrhoids, normal random colon biopsies    Colonoscopy N/A 7/26/2017    Procedure: COLONOSCOPY;  Surgeon: Garrett Moody MD;  Location:  ENDOSCOPY    Other  1999    Carpel tnnel right hand    Other surgical history  1995    Fatty tumor from neck    Other surgical history  1999    Arthroscopy right knee    Other surgical history  2001    Carpal tunnel both hand    Other surgical history  12/22/2020    Cystoscopy, Dr Chun     Social History     Socioeconomic History    Marital status:    Tobacco Use    Smoking status: Former     Current packs/day: 0.00     Average packs/day: 0.8 packs/day for 50.0 years (37.5 ttl pk-yrs)     Types: Cigarettes     Start date: 10/22/1964     Quit date: 10/22/2014     Years since quitting: 10.1    Smokeless tobacco: Never   Vaping Use    Vaping status: Never Used   Substance and Sexual Activity    Alcohol use: No     Alcohol/week: 0.0 standard drinks of alcohol    Drug use: No    Sexual activity: Not Currently   Other Topics Concern    Caffeine Concern Yes    Exercise Yes      History   Drug Use No     Available pre-op labs reviewed.  Lab Results   Component Value Date    WBC 8.3 11/30/2024    RBC 4.69 11/30/2024    HGB 14.0 11/30/2024    HCT 44.7 11/30/2024    MCV 95.3 11/30/2024    MCH 29.9 11/30/2024    MCHC 31.3 11/30/2024    RDW 15.4 11/30/2024    .0 11/30/2024     Lab Results   Component Value Date     11/30/2024    K 4.6 11/30/2024     11/30/2024    CO2 27.0 11/30/2024    BUN 25 (H) 11/30/2024    CREATSERUM 0.87 11/30/2024     (H) 11/30/2024    CA 9.6 11/30/2024            Airway      Mallampati: II  Mouth opening: 3 FB  TM distance: > 6 cm  Neck ROM: limited Cardiovascular      Rhythm: regular  Rate: normal     Dental             Pulmonary      Breath sounds clear to auscultation bilaterally.               Other findings              ASA: 3   Plan: MAC  NPO status verified and         Comment: discussed  Plan/risks discussed with: patient                Present on Admission:  **None**             [1]   Medications Prior to Admission   Medication Sig Dispense Refill Last Dose/Taking    cetirizine 10 MG Oral Tab Take 1 tablet (10 mg total) by mouth daily.       allopurinol 300 MG Oral Tab Take 1 tablet (300 mg total) by mouth daily. 90 tablet 3     DULoxetine (CYMBALTA) 60 MG Oral Cap DR Particles Take 1 capsule (60 mg total) by mouth daily. 90 capsule 2     umeclidinium-vilanterol (ANORO ELLIPTA) 62.5-25 MCG/ACT Inhalation Aerosol Powder, Breath Activated Inhale 1 puff into the lungs daily. 1 each 5     HYDROcodone-acetaminophen 5-325 MG Oral Tab Take 1 tablet by mouth every 6 (six) hours as needed. 20 tablet 0     loratadine 10 MG Oral Tab Take 1 tablet (10 mg total) by mouth daily.       pantoprazole 40 MG Oral Tab EC Take 1 tablet (40 mg total) by mouth every morning before breakfast.       ipratropium-albuterol 0.5-2.5 (3) MG/3ML Inhalation Solution Take 3 mL by nebulization every 6 (six) hours as needed. 180 mL 12     ONETOUCH ULTRA In Vitro Strip  2 (two) times daily. (Patient not taking: Reported on 10/15/2024)       YUPELRI 175 MCG/3ML Inhalation Solution nebulizer solution ADMINISTER ONE (1) VIAL VIA NEBULIZER MACHINE DAILY 90 mL 11     Ascorbic Acid (VITAMIN C) 100 MG Oral Tab Take 10 tablets (1,000 mg total) by mouth daily.       pioglitazone 15 MG Oral Tab Take 1 tablet (15 mg total) by mouth daily.       glimepiride 2 MG Oral Tab Take 1 tablet (2 mg total) by mouth 2 (two) times daily.       albuterol 108 (90 Base) MCG/ACT Inhalation Aero Soln Inhale 2 puffs into the lungs every 6 (six) hours as needed for Wheezing or Shortness of Breath. inhale 2 puff by inhalation route  every 4 - 6 hours as needed 1 each 11     METOPROLOL SUCCINATE ER 25 MG Oral Tablet 24 Hr TAKE ONE TABLET BY MOUTH ONE TIME DAILY 90 tablet 1     rosuvastatin 10 MG Oral Tab Take 1 tablet (10 mg total) by mouth nightly. Replaces simvastatin 90 tablet 3     Cholecalciferol (VITAMIN D-3 OR) Take 1 capsule by mouth once daily.       Multiple Vitamins-Minerals (CENTRUM) Oral Tab Take 1 tablet by mouth daily.

## 2024-11-30 NOTE — ED QUICK NOTES
Orders for admission, patient is aware of plan and ready to go upstairs. Any questions, please call ED RN Cady at extension [phone].     Patient Covid vaccination status: Fully vaccinated     COVID Test Ordered in ED: None    COVID Suspicion at Admission: N/A    Running Infusions:    sodium chloride 125 mL/hr (11/30/24 0517)        Mental Status/LOC at time of transport: A&OX3    Other pertinent information:   CIWA score: N/A   NIH score:  N/A

## 2024-11-30 NOTE — H&P
[facility]     Gabi Kumar Patient Status:  Emergency    1945 MRN [id number]   Location [facility] EMERGENCY DEPARTMENT Attending Anthony Estrada MD   Hosp Day # 0 PCP Bismark Cabrera MD     Chief Complaint: BRBPR    Subjective:    History of Present Illness:     Gabi Kumar is a 79 year old female with history of CAD, PAD, pulmonary hypertension, DMII, HTN, HLD, COPD, ILD, hypothyroidism, gout, chronic musculoskeletal pain, former smoker presented with multiple episodes of rectal bleeding.     Patient was at baseline health until  yesterday. She have 4 episodes of BRBPR mixed with brown stool. Denies associated lightheadedness, dizziness, chest pain, abdominal discomfort, weight loss.     Patient was hypertensive to 198/99 on arrival. Hgb 14.0. EGD was done with gastritis, 2 cm hiatal hernia.    History/Other:    Past Medical History:  Past Medical History:    Back problem    Cataract    Cholelithiasis    DIABETES    Diabetes (HCC)    Diverticulitis    suggestion of possible wall thickening involving sigmoid, possible mild diverticulitis    Diverticulosis    Essential hypertension    Gallbladder disease    thickened gallbladder with distended bile duct    High blood pressure    High cholesterol    HYPERLIPIDEMIA    Hyperlipidemia    IBS (irritable bowel syndrome)    Osteoarthritis    hips, back and knees    Thyroid disease    Visual impairment    reading glasses     Past Surgical History:   Past Surgical History:   Procedure Laterality Date    Carpal tunnel release      Colonoscopy  ,     Diverticulosis    Colonoscopy      decreased anal sphincter tone, diverticulosis, internal hemorrhoids, normal random colon biopsies    Colonoscopy N/A 2017    Procedure: COLONOSCOPY;  Surgeon: Garrett Moody MD;  Location:  ENDOSCOPY    Other      Carpel tnnel right hand    Other surgical history      Fatty tumor from neck    Other surgical history   1999    Arthroscopy right knee    Other surgical history  2001    Carpal tunnel both hand    Other surgical history  12/22/2020    Cystoscopy, Dr Chun      Family History:   Family History   Problem Relation Age of Onset    Cancer Other         Stomach cancer    Heart Disease Father     Hypertension Father     High Blood Pressure Father     Diabetes Father     Heart Disease Mother     Hypertension Mother     Lung Disorder Mother         Pulmonary fibrosis     Social History:    reports that she quit smoking about 10 years ago. Her smoking use included cigarettes. She started smoking about 60 years ago. She has a 37.5 pack-year smoking history. She has never used smokeless tobacco. She reports that she does not drink alcohol and does not use drugs.     Allergies: Allergies[1]    Medications:  Medications Ordered Prior to Encounter[2]    Review of Systems:   A comprehensive review of systems was completed.    Pertinent positives and negatives noted in the HPI.    Objective:   Physical Exam:    BP (!) 172/89   Pulse 73   Temp 98.6 °F (37 °C) (Temporal)   Resp 14   LMP 06/07/1999   SpO2 93%   General: No acute distress, Alert  Respiratory: No rhonchi, no wheezes  Cardiovascular: S1, S2. Regular rate and rhythm  Abdomen: Soft, Non-tender, non-distended, positive bowel sounds  Neuro: No new focal deficits  Extremities: No edema    Results:    Labs:      Labs Last 24 Hours:    Recent Labs   Lab 11/30/24  0504   RBC 4.69   HGB 14.0   HCT 44.7   MCV 95.3   MCH 29.9   MCHC 31.3   RDW 15.4   NEPRELIM 5.47   WBC 8.3   .0       Recent Labs   Lab 11/30/24  0504   *   BUN 25*   CREATSERUM 0.87   EGFRCR 67   CA 9.6   ALB 4.5      K 4.6      CO2 27.0   ALKPHO 189*   AST 25   ALT 14   BILT 0.5   TP 6.6       Lab Results   Component Value Date    INR 0.99 06/01/2024    INR 1.03 12/12/2023       No results for input(s): \"TROP\", \"TROPHS\", \"CK\" in the last 168 hours.    No results for input(s): \"TROP\",  \"PBNP\" in the last 168 hours.    No results for input(s): \"PCT\" in the last 168 hours.    Imaging: Imaging data reviewed in Epic.    Assessment & Plan:      #BRBPR   -Hgb 14 on admission   -GI consulted  -s/p EGD 11/30 with gastritis, biopsies pending  -Repeat H&H in afternoon  -Supportive care    #Elevated ALP - check GGT    #CAD  #PAD  #Pulmonary hypertension   #COPD  #ILD  #Gout  #Chronic MSK pain   #DMII  #HTN  #HLD  #Hypothyroidism     MED REC PENDING    Plan of care discussed with patient    Oralia Thomas MD    Supplementary Documentation:     The 21st Century Cures Act makes medical notes like these available to patients in the interest of transparency. Please be advised this is a medical document. Medical documents are intended to carry relevant information, facts as evident, and the clinical opinion of the practitioner. The medical note is intended as peer to peer communication and may appear blunt or direct. It is written in medical language and may contain abbreviations or verbiage that are unfamiliar.                                       [1]   Allergies  Allergen Reactions    Pcn [Bicillin C-R,] SWELLING     itching    Levofloxacin MYALGIA     Severe muscle and bone pain    Penicillins OTHER (SEE COMMENTS)   [2]   No current facility-administered medications on file prior to encounter.     Current Outpatient Medications on File Prior to Encounter   Medication Sig Dispense Refill    cetirizine 10 MG Oral Tab Take 1 tablet (10 mg total) by mouth daily.      allopurinol 300 MG Oral Tab Take 1 tablet (300 mg total) by mouth daily. 90 tablet 3    DULoxetine (CYMBALTA) 60 MG Oral Cap DR Particles Take 1 capsule (60 mg total) by mouth daily. 90 capsule 2    umeclidinium-vilanterol (ANORO ELLIPTA) 62.5-25 MCG/ACT Inhalation Aerosol Powder, Breath Activated Inhale 1 puff into the lungs daily. 1 each 5    HYDROcodone-acetaminophen 5-325 MG Oral Tab Take 1 tablet by mouth every 6 (six) hours as needed. 20 tablet  0    loratadine 10 MG Oral Tab Take 1 tablet (10 mg total) by mouth daily.      pantoprazole 40 MG Oral Tab EC Take 1 tablet (40 mg total) by mouth every morning before breakfast.      ipratropium-albuterol 0.5-2.5 (3) MG/3ML Inhalation Solution Take 3 mL by nebulization every 6 (six) hours as needed. 180 mL 12    ONETOUCH ULTRA In Vitro Strip 2 (two) times daily. (Patient not taking: Reported on 10/15/2024)      YUPELRI 175 MCG/3ML Inhalation Solution nebulizer solution ADMINISTER ONE (1) VIAL VIA NEBULIZER MACHINE DAILY 90 mL 11    Ascorbic Acid (VITAMIN C) 100 MG Oral Tab Take 10 tablets (1,000 mg total) by mouth daily.      pioglitazone 15 MG Oral Tab Take 1 tablet (15 mg total) by mouth daily.      glimepiride 2 MG Oral Tab Take 1 tablet (2 mg total) by mouth 2 (two) times daily.      albuterol 108 (90 Base) MCG/ACT Inhalation Aero Soln Inhale 2 puffs into the lungs every 6 (six) hours as needed for Wheezing or Shortness of Breath. inhale 2 puff by inhalation route  every 4 - 6 hours as needed 1 each 11    METOPROLOL SUCCINATE ER 25 MG Oral Tablet 24 Hr TAKE ONE TABLET BY MOUTH ONE TIME DAILY 90 tablet 1    rosuvastatin 10 MG Oral Tab Take 1 tablet (10 mg total) by mouth nightly. Replaces simvastatin 90 tablet 3    Cholecalciferol (VITAMIN D-3 OR) Take 1 capsule by mouth once daily.      Multiple Vitamins-Minerals (CENTRUM) Oral Tab Take 1 tablet by mouth daily.

## 2024-11-30 NOTE — ED PROVIDER NOTES
Patient Seen in: [facility] Emergency Department      History     Chief Complaint   Patient presents with    Bleeding     Stated Complaint:     Subjective:   HPI      79-year-old female comes to the hospital stating that since about 10 PM last night she has had 4 episodes of maroon clotted bloody stools.  She is no abdominal pain.  She denies any nausea or vomiting.  She has no history of having in the past.  She had a history of having polyps in the past.  She has any headaches.  She is no chest pain or shortness of breath.  She denies being on any anticoagulants at all.  Has not had no change in medications.  She is denying any other specific complaints this time.    Objective:     Past Medical History:    Back problem    Cataract    Cholelithiasis    DIABETES    Diabetes (HCC)    Diverticulitis    suggestion of possible wall thickening involving sigmoid, possible mild diverticulitis    Diverticulosis    Essential hypertension    Gallbladder disease    thickened gallbladder with distended bile duct    High blood pressure    High cholesterol    HYPERLIPIDEMIA    Hyperlipidemia    IBS (irritable bowel syndrome)    Osteoarthritis    hips, back and knees    Thyroid disease    Visual impairment    reading glasses              Past Surgical History:   Procedure Laterality Date    Carpal tunnel release      Colonoscopy  2003,     Diverticulosis    Colonoscopy  2012    decreased anal sphincter tone, diverticulosis, internal hemorrhoids, normal random colon biopsies    Colonoscopy N/A 7/26/2017    Procedure: COLONOSCOPY;  Surgeon: Garrett Moody MD;  Location:  ENDOSCOPY    Other  1999    Carpel tnnel right hand    Other surgical history  1995    Fatty tumor from neck    Other surgical history  1999    Arthroscopy right knee    Other surgical history  2001    Carpal tunnel both hand    Other surgical history  12/22/2020    Cystoscopy, Dr Chun                Social History     Socioeconomic History    Marital  status:    Tobacco Use    Smoking status: Former     Current packs/day: 0.00     Average packs/day: 0.8 packs/day for 50.0 years (37.5 ttl pk-yrs)     Types: Cigarettes     Start date: 10/22/1964     Quit date: 10/22/2014     Years since quitting: 10.1    Smokeless tobacco: Never   Vaping Use    Vaping status: Never Used   Substance and Sexual Activity    Alcohol use: No     Alcohol/week: 0.0 standard drinks of alcohol    Drug use: No    Sexual activity: Not Currently   Other Topics Concern    Caffeine Concern Yes    Exercise Yes     Social Drivers of Health     Food Insecurity: No Food Insecurity (5/29/2024)    Food Insecurity     Food Insecurity: Never true   Transportation Needs: No Transportation Needs (5/29/2024)    Transportation Needs     Lack of Transportation: No   Housing Stability: Low Risk  (5/29/2024)    Housing Stability     Housing Instability: No                  Physical Exam     ED Triage Vitals [11/30/24 0455]   BP (!) 198/99   Pulse 83   Resp 18   Temp 98.6 °F (37 °C)   Temp src Temporal   SpO2 98 %   O2 Device None (Room air)       Current Vitals:   Vital Signs  BP: (!) 172/89  Pulse: 73  Resp: 14  Temp: 98.6 °F (37 °C)  Temp src: Temporal  MAP (mmHg): (!) 112    Oxygen Therapy  SpO2: 93 %  O2 Device: None (Room air)        Physical Exam  HEENT; NCAT, EOMI, throat clear, neck supple, no LAD, no JVD  Heart S1S2 RRR  lungs: CTAB  abd: Soft NT, ND,  NABS without rebound or guarding  Ext no C/C/E  Rectal exam with gross blood appreciated  ED Course     Labs Reviewed   COMP METABOLIC PANEL (14) - Abnormal; Notable for the following components:       Result Value    Glucose 139 (*)     BUN 25 (*)     Calculated Osmolality 299 (*)     Alkaline Phosphatase 189 (*)     A/G Ratio 2.1 (*)     All other components within normal limits   CBC WITH DIFFERENTIAL WITH PLATELET   TYPE AND SCREEN    Narrative:     The following orders were created for panel order Type and screen.  Procedure                                Abnormality         Status                     ---------                               -----------         ------                     ABORH (Blood Type)[[id number]]                               Final result               Antibody Screen[[id number]]                                  Final result                 Please view results for these tests on the individual orders.   ABORH (BLOOD TYPE)   ANTIBODY SCREEN   ABORH CONFIRMATION   RAINBOW DRAW LAVENDER   RAINBOW DRAW LIGHT GREEN   RAINBOW DRAW BLUE     EKG    Rate, intervals and axes as noted on EKG Report.  Rate: 73  Rhythm: Sinus Rhythm  Reading: , QRS of 84, patient is normal sinus rhythm without ischemic change.           ED Course as of 11/30/24 0738  ------------------------------------------------------------  Time: 11/30 0737  Comment: While here the patient was typed and screened.  Her hemoglobin was 14.  Her platelets were normal.  Her electrolytes had no significant abnormalities with a BUN of 25.  She had a chest x-ray on that upper central region no acute cardiopulmonary process.  With radiology report as well.  She was given IV Protonix and will be admitted to the hospitalist with GI consult.       Medications   sodium chloride 0.9% infusion (125 mL/hr Intravenous New Bag 11/30/24 0517)   pantoprazole (Protonix) 40 mg in sodium chloride 0.9% PF 10 mL IV push (40 mg Intravenous Given 11/30/24 0517)       XR CHEST AP PORTABLE  (CPT=71045)    Result Date: 11/30/2024  PROCEDURE:  XR CHEST AP PORTABLE  (CPT=71045)  TECHNIQUE:  AP chest radiograph was obtained.  COMPARISON:  [facility] , XR, XR RIBS WITH CHEST (3 VIEWS), RIGHT  (CPT=71101), 5/31/2024, 7:20 PM.  [facility] , XR, XR RIBS WITH CHEST (3 VIEWS), LEFT  (CPT=71101), 5/29/2024, 1:44 PM.  INDICATIONS:  gi bleed, SOB  PATIENT STATED HISTORY: (As transcribed by Technologist)  Patient offered no additional history.                 CONCLUSION:  The lungs are hyperinflated.  There are coarse  reticular changes noted in the which is moderate in degree, bilateral and peripheral in distribution slightly greater in the upper lobes than the lower lobes.  Tract these findings are consistent with pulmonary fibrosis/interstitial lung disease.  No mass or pneumonia.. Is mild cardiomegaly with slight vascular redistribution without CHF.  Slight blunting of the CP angles.  Degenerative change of the spine noted.   LOCATION:  [facility]      Dictated by (CST): Anthony Oneill MD on 11/30/2024 at 7:02 AM     Finalized by (CST): Anthony Oneill MD on 11/30/2024 at 7:04 AM       XR PAIN CLINIC FLUOROSCOPY - N/C    Result Date: 11/26/2024  PROCEDURE:  XR PAIN CLINIC FLUOROSCOPY - N/C  TECHNIQUE:  Tech Time and fluoroscopy time were provided to the pain specialist during performance of a Pain Clinic Procedure. See Anesthesiologist note in Epic/Chart/Chart Review/Notes tab for details.  COMPARISON:  [facility] , XR, XR PAIN CLINIC FLUOROSCOPY - N/C, 10/24/2024, 2:40 PM.  [facility] , XR, XR PAIN CLINIC FLUOROSCOPY - N/C, 10/08/2024, 2:43 PM.  INDICATIONS:  Intraoperative fluoroscopic guidance  HISTORY: (As transcribed by Technologist)  Left genicular nerve block.   FLUORSCOPY IMAGES OBTAINED:  1 FLUOROSCOPY TIME:  9 seconds TECHNOLOGIST TIME:  6 minutes RADIATION DOSE (AIR KERMA PRODUCT):  0.22 mGy   the          CONCLUSION:  1 fluoroscopic image(s) obtained and submitted during left knee procedure.  No radiologist was present for the exam.  Correlation with real-time fluoroscopy and operative report are recommended.   LOCATION:  [facility]    Dictated by (CST): Deep Reeves MD on 11/26/2024 at 2:19 PM     Finalized by (CST): Deep Reeves MD on 11/26/2024 at 2:20 PM       XR SACRUM + COCCYX (MIN 2 VIEWS) (CPT=72220)    Result Date: 11/4/2024  PROCEDURE:  XR SACRUM + COCCYX (MIN 2 VIEWS) (CPT=72220)  INDICATIONS:  Status post fall, sacrococcygeal pain.  COMPARISON:  [facility] , XR, XR HIP W OR WO PELVIS 2 OR 3 VIEWS, LEFT  (CPT=73502), 12/12/2023, 12:54 PM.  [facility] [place], XR, XR HIP W OR WO PELVIS 2 OR 3 VIEWS, LEFT (CPT=73502), 9/30/2024, 8:29 AM.  PATIENT STATED HISTORY: (As transcribed by Technologist)  Patient had a fall and is expeirencing pain to her tailbone. Pain increases with movement and sitting.    FINDINGS:  BONES:  There is evidence of a fracture along the junction of the left superior pubic ramus and anterior column of the left acetabulum.  There is a nondisplaced fracture through the midportion of the left inferior pubic ramus.  No additional fractures are identified.  Specifically, no sacral fractures can be appreciated although there is a degree of osseous demineralization diffusely which limits evaluation of the sacrum.  There is moderate bilateral hip osteoarthritis.  There is grade 1 anterolisthesis at L5-S1 of approximately 8 mm.  The possibility of bilateral L5 spondylolysis cannot be excluded on this exam SOFT TISSUES:  Negative.  No visible soft tissue swelling.            CONCLUSION:  1. Diffuse osseous demineralization suggesting osteopenia versus osteoporosis.  Due to the demineralization, there is relative decreased sensitivity for sacral fractures.  No gross evidence of sacral alar fractures are noted, however if there is continued clinical concern, this may be further evaluated with MR imaging. 2. There is evidence of a fracture along the junction of the left superior pubic ramus with the anterior column of the left acetabulum.  This appears nondisplaced.  There is also a nondisplaced fracture through the midportion of the left inferior pubic ramus. 3. Moderate bilateral hip osteoarthritis. 4. Grade 1 anterolisthesis at L5-S1 with extensive disc and facet arthropathy.  Spondylolysis at L5 cannot be excluded.   LOCATION:  [facility]   Dictated by (CST): Susu Choudhury DO on 11/04/2024 at 6:24 PM     Finalized by (CST): Susu Choudhury DO on 11/04/2024 at 6:29 PM             MDM      Differential diagnosis  included upper versus lower GI bleed.  At this time the patient be admitted to the hospitalist with GI consult for further management for GI bleed at this time.    This note was prepared using Dragon Medical voice recognition dictation software.  As a result errors may occur.  When identified to these areas have been corrected.  While every attempt is made to correct errors during dictation discrepancies may still exist.  Please contact if there are any errors.    Admission disposition: 11/30/2024  7:37 AM           Medical Decision Making      Disposition and Plan     Clinical Impression:  1. Gastrointestinal hemorrhage, unspecified gastrointestinal hemorrhage type         Disposition:  Admit  11/30/2024  7:37 am    Follow-up:  No follow-up provider specified.        Medications Prescribed:  Current Discharge Medication List              Supplementary Documentation:         Hospital Problems       Present on Admission  Date Reviewed: 11/4/2024            ICD-10-CM Noted POA    * (Principal) Gastrointestinal hemorrhage, unspecified gastrointestinal hemorrhage type K92.2 11/30/2024 Unknown

## 2024-11-30 NOTE — OPERATIVE REPORT
PATIENT NAME: Gabi Kumar  MRN: [id number]  DATE OF OPERATION:  11/30/24  REFERRING PHYSICIAN: Dr. Thomas  Medications:  MAC  PREOPERATIVE DIAGNOSIS    Maroon and bloody stools  POSTOPERATIVE DIAGNOSIS:  2 cm hiatal hernia.  Gastritis with scattered prepyloric and antral erosions.  Biopsies were done to assess for h pylori infection.   Normal duodenum.    PROCEDURE PERFORMED:                Esophagogastroduodenoscopy with biopsies   Endoscopist:  Jaswinder Covington MD     PROCEDURE AND FINDINGS: The patient was placed into the left lateral decubitus after informed consent was obtained.  All questions were answered.  An updated history and physical were performed and an ASA score of 3 was assigned. Informed consent was obtained prior to the procedure, with review of possible complications including bleeding, infection, and missed cancer.  MAC sedation was administered.        The [employer] video gastroscope was introduced into the mouth and passed into the esophogus after administration of MAC sedation.  The entire examined esophagus was remarkable for a 2 cm hiatal hernia.  The entire examined stomach,  including retroflexion view was remarkable for gastritis with scattered prepyloric and antral erosions.  Biopsies were done to assess for h pylori infection.  The examined duodenum to the third portion was normal.   The gastroscope was removed from the patient.  The procedure was completed. The patient tolerated the procedure well.    RECOMMENDATIONS   Clear liquid diet.  Will plan for colonoscopy tomorrow as not source of bleeding was identified in the UGI tract.  Monitor the H and H.  Jaswinder Covington MD, Gastroenterologist  Cc: Dr. Thomas

## 2024-12-01 ENCOUNTER — ANESTHESIA (OUTPATIENT)
Dept: ENDOSCOPY | Facility: HOSPITAL | Age: 79
End: 2024-12-01
Payer: MEDICARE

## 2024-12-01 VITALS
OXYGEN SATURATION: 82 % | RESPIRATION RATE: 17 BRPM | WEIGHT: 172.5 LBS | HEART RATE: 62 BPM | SYSTOLIC BLOOD PRESSURE: 110 MMHG | TEMPERATURE: 98 F | DIASTOLIC BLOOD PRESSURE: 52 MMHG | BODY MASS INDEX: 30 KG/M2

## 2024-12-01 LAB
ALBUMIN SERPL-MCNC: 4.6 G/DL (ref 3.2–4.8)
ALBUMIN/GLOB SERPL: 2.2 {RATIO} (ref 1–2)
ALP LIVER SERPL-CCNC: 198 U/L
ALT SERPL-CCNC: 12 U/L
ANION GAP SERPL CALC-SCNC: 12 MMOL/L (ref 0–18)
AST SERPL-CCNC: 17 U/L (ref ?–34)
BILIRUB SERPL-MCNC: 0.6 MG/DL (ref 0.2–1.1)
BUN BLD-MCNC: 20 MG/DL (ref 9–23)
CALCIUM BLD-MCNC: 9.5 MG/DL (ref 8.7–10.4)
CHLORIDE SERPL-SCNC: 107 MMOL/L (ref 98–112)
CO2 SERPL-SCNC: 23 MMOL/L (ref 21–32)
CREAT BLD-MCNC: 1 MG/DL
EGFRCR SERPLBLD CKD-EPI 2021: 57 ML/MIN/1.73M2 (ref 60–?)
ERYTHROCYTE [DISTWIDTH] IN BLOOD BY AUTOMATED COUNT: 15.4 %
GLOBULIN PLAS-MCNC: 2.1 G/DL (ref 2–3.5)
GLUCOSE BLD-MCNC: 123 MG/DL (ref 70–99)
GLUCOSE BLD-MCNC: 151 MG/DL (ref 70–99)
GLUCOSE BLD-MCNC: 152 MG/DL (ref 70–99)
HCT VFR BLD AUTO: 43.3 %
HGB BLD-MCNC: 13.3 G/DL
HGB BLD-MCNC: 13.5 G/DL
HGB BLD-MCNC: 13.5 G/DL
MAGNESIUM SERPL-MCNC: 1.9 MG/DL (ref 1.6–2.6)
MCH RBC QN AUTO: 29.7 PG (ref 26–34)
MCHC RBC AUTO-ENTMCNC: 31.2 G/DL (ref 31–37)
MCV RBC AUTO: 95.4 FL
OSMOLALITY SERPL CALC.SUM OF ELEC: 300 MOSM/KG (ref 275–295)
PHOSPHATE SERPL-MCNC: 5.1 MG/DL (ref 2.4–5.1)
PLATELET # BLD AUTO: 212 10(3)UL (ref 150–450)
POTASSIUM SERPL-SCNC: 3.9 MMOL/L (ref 3.5–5.1)
PROT SERPL-MCNC: 6.7 G/DL (ref 5.7–8.2)
RBC # BLD AUTO: 4.54 X10(6)UL
SODIUM SERPL-SCNC: 142 MMOL/L (ref 136–145)
WBC # BLD AUTO: 9.9 X10(3) UL (ref 4–11)

## 2024-12-01 PROCEDURE — 99239 HOSP IP/OBS DSCHRG MGMT >30: CPT | Performed by: INTERNAL MEDICINE

## 2024-12-01 RX ORDER — SODIUM CHLORIDE, SODIUM LACTATE, POTASSIUM CHLORIDE, CALCIUM CHLORIDE 600; 310; 30; 20 MG/100ML; MG/100ML; MG/100ML; MG/100ML
INJECTION, SOLUTION INTRAVENOUS CONTINUOUS
OUTPATIENT
Start: 2024-12-01

## 2024-12-01 RX ORDER — HYDROMORPHONE HYDROCHLORIDE 1 MG/ML
0.2 INJECTION, SOLUTION INTRAMUSCULAR; INTRAVENOUS; SUBCUTANEOUS EVERY 5 MIN PRN
OUTPATIENT
Start: 2024-12-01 | End: 2024-12-01

## 2024-12-01 RX ORDER — HYDROCODONE BITARTRATE AND ACETAMINOPHEN 5; 325 MG/1; MG/1
2 TABLET ORAL ONCE AS NEEDED
OUTPATIENT
Start: 2024-12-01 | End: 2024-12-01

## 2024-12-01 RX ORDER — LABETALOL HYDROCHLORIDE 5 MG/ML
5 INJECTION, SOLUTION INTRAVENOUS EVERY 5 MIN PRN
OUTPATIENT
Start: 2024-12-01 | End: 2024-12-01

## 2024-12-01 RX ORDER — ACETAMINOPHEN 500 MG
1000 TABLET ORAL ONCE AS NEEDED
OUTPATIENT
Start: 2024-12-01 | End: 2024-12-01

## 2024-12-01 RX ORDER — LIDOCAINE HYDROCHLORIDE 10 MG/ML
INJECTION, SOLUTION EPIDURAL; INFILTRATION; INTRACAUDAL; PERINEURAL AS NEEDED
Status: DISCONTINUED | OUTPATIENT
Start: 2024-12-01 | End: 2024-12-01 | Stop reason: SURG

## 2024-12-01 RX ORDER — NALOXONE HYDROCHLORIDE 0.4 MG/ML
0.08 INJECTION, SOLUTION INTRAMUSCULAR; INTRAVENOUS; SUBCUTANEOUS AS NEEDED
OUTPATIENT
Start: 2024-12-01 | End: 2024-12-01

## 2024-12-01 RX ORDER — METOCLOPRAMIDE HYDROCHLORIDE 5 MG/ML
5 INJECTION INTRAMUSCULAR; INTRAVENOUS EVERY 8 HOURS PRN
OUTPATIENT
Start: 2024-12-01

## 2024-12-01 RX ORDER — SODIUM CHLORIDE, SODIUM LACTATE, POTASSIUM CHLORIDE, CALCIUM CHLORIDE 600; 310; 30; 20 MG/100ML; MG/100ML; MG/100ML; MG/100ML
INJECTION, SOLUTION INTRAVENOUS CONTINUOUS PRN
Status: DISCONTINUED | OUTPATIENT
Start: 2024-12-01 | End: 2024-12-01 | Stop reason: SURG

## 2024-12-01 RX ORDER — ONDANSETRON 2 MG/ML
4 INJECTION INTRAMUSCULAR; INTRAVENOUS EVERY 6 HOURS PRN
OUTPATIENT
Start: 2024-12-01

## 2024-12-01 RX ORDER — HYDROMORPHONE HYDROCHLORIDE 1 MG/ML
0.4 INJECTION, SOLUTION INTRAMUSCULAR; INTRAVENOUS; SUBCUTANEOUS EVERY 5 MIN PRN
OUTPATIENT
Start: 2024-12-01 | End: 2024-12-01

## 2024-12-01 RX ORDER — HYDROMORPHONE HYDROCHLORIDE 1 MG/ML
0.6 INJECTION, SOLUTION INTRAMUSCULAR; INTRAVENOUS; SUBCUTANEOUS EVERY 5 MIN PRN
OUTPATIENT
Start: 2024-12-01 | End: 2024-12-01

## 2024-12-01 RX ORDER — HYDROCODONE BITARTRATE AND ACETAMINOPHEN 5; 325 MG/1; MG/1
1 TABLET ORAL ONCE AS NEEDED
OUTPATIENT
Start: 2024-12-01 | End: 2024-12-01

## 2024-12-01 RX ADMIN — SODIUM CHLORIDE, SODIUM LACTATE, POTASSIUM CHLORIDE, CALCIUM CHLORIDE: 600; 310; 30; 20 INJECTION, SOLUTION INTRAVENOUS at 09:12:00

## 2024-12-01 RX ADMIN — LIDOCAINE HYDROCHLORIDE 5 ML: 10 INJECTION, SOLUTION EPIDURAL; INFILTRATION; INTRACAUDAL; PERINEURAL at 09:15:00

## 2024-12-01 RX ADMIN — SODIUM CHLORIDE, SODIUM LACTATE, POTASSIUM CHLORIDE, CALCIUM CHLORIDE: 600; 310; 30; 20 INJECTION, SOLUTION INTRAVENOUS at 09:32:00

## 2024-12-01 NOTE — DISCHARGE SUMMARY
[facility]     Gabi Kumar Patient Status:  Observation    1945 MRN [id number]   Location [facility] 5NW-A Attending Manuelito Flores MD    Day # 0 PCP Bismark Cabrera MD     Date of Admission: 2024  Date of Discharge:   2024    Discharge Disposition: Home or Self Care    Discharge Diagnosis:  Rectal bleeding secondary to ischemic colitis  Elevated alk phos  CAD  PAD  Pulmonary hypertension  COPD  ILD  Gout  Chronic MSK pain  Type 2 diabetes  Hypertension  Hyperlipidemia  Hypothyroidism    History of Present Illness:   79 year old female with history of CAD, PAD, pulmonary hypertension, DMII, HTN, HLD, COPD, ILD, hypothyroidism, gout, chronic musculoskeletal pain, former smoker presented with multiple episodes of rectal bleeding.      Patient was at baseline health until  yesterday. She have 4 episodes of BRBPR mixed with brown stool. Denies associated lightheadedness, dizziness, chest pain, abdominal discomfort, weight loss.      Patient was hypertensive to 198/99 on arrival. Hgb 14.0. EGD was done with gastritis, 2 cm hiatal hernia.    Brief Synopsis:   #Rectal bleeding  Hemoglobin stable around 13-14 during admission.  She underwent EGD on  with gastritis.  Biopsies pending at the time of discharge.  Colonoscopy on 2024 with changes in the left colon suggestive of ischemia with submucosal hemorrhages, edema and friability.  Biopsies pending at the time of discharge.  There is diverticulosis in the sigmoid colon.  Scope was unable to be passed beyond 35 cm in the sigmoid colon due to fixation of the sigmoid in the pelvis and highly redundant sigmoid colon.  She was tolerating a general diet at the time of discharge.  She was discharged home with PCP follow-up.    #Elevated alk phos.  GGT within normal limits.  PCP follow-up.    Lace+ Score: 79  59-90 High Risk  29-58 Medium Risk  0-28   Low Risk       TCM Follow-Up Recommendation:  LACE > 58:  High Risk of readmission after discharge from the hospital.      Procedures during hospitalization:   Colonoscopy and endoscopy    Incidental or significant findings and recommendations (brief descriptions):  Elevated alkaline phosphatase will need further workup as an outpatient    Lab/Test results pending at Discharge:   Biopsies from colonoscopy and endoscopy are pending gastroenterology    Consultants:  Gastroenterology    Discharge Medication List:     Discharge Medications        CHANGE how you take these medications        Instructions Prescription details   ipratropium-albuterol 0.5-2.5 (3) MG/3ML Soln  Commonly known as: Duoneb      Take 3 mL by nebulization every 6 (six) hours as needed.   Quantity: 180 mL  Refills: 12            CONTINUE taking these medications        Instructions Prescription details   acidophilus-pectin Caps  Commonly known as: Probiotic      Take 1 capsule by mouth daily.   Refills: 0     albuterol 108 (90 Base) MCG/ACT Aers  Commonly known as: Ventolin HFA      Inhale 2 puffs into the lungs every 6 (six) hours as needed for Wheezing or Shortness of Breath. inhale 2 puff by inhalation route  every 4 - 6 hours as needed   Quantity: 1 each  Refills: 11     allopurinol 300 MG Tabs  Commonly known as: Zyloprim      Take 1 tablet (300 mg total) by mouth daily.   Quantity: 90 tablet  Refills: 3     Anoro Ellipta 62.5-25 MCG/ACT Aepb  Generic drug: umeclidinium-vilanterol      Inhale 1 puff into the lungs daily.   Quantity: 1 each  Refills: 5     Centrum Tabs      Take 1 tablet by mouth daily.   Refills: 0     cetirizine 10 MG Tabs  Commonly known as: ZyrTEC      Take 1 tablet (10 mg total) by mouth daily.   Refills: 0     DULoxetine 60 MG Cpep  Commonly known as: Cymbalta      Take 1 capsule (60 mg total) by mouth daily.   Quantity: 90 capsule  Refills: 2     fluticasone propionate 50 MCG/ACT Susp  Commonly known as: Flonase      2 sprays by Each Nare route daily.   Refills: 0      glimepiride 2 MG Tabs  Commonly known as: Amaryl      Take 1 tablet (2 mg total) by mouth daily with breakfast.   Refills: 0     HYDROcodone-acetaminophen 5-325 MG Tabs  Commonly known as: Norco      Take 1 tablet by mouth every 6 (six) hours as needed.   Quantity: 20 tablet  Refills: 0     loratadine 10 MG Tabs  Commonly known as: Claritin      Take 1 tablet (10 mg total) by mouth daily.   Refills: 0     metoprolol succinate ER 25 MG Tb24  Commonly known as: Toprol XL      TAKE ONE TABLET BY MOUTH ONE TIME DAILY   Quantity: 90 tablet  Refills: 1     OneTouch Ultra Strp  Generic drug: Glucose Blood      2 (two) times daily.   Refills: 0     pantoprazole 40 MG Tbec  Commonly known as: Protonix      Take 1 tablet (40 mg total) by mouth every morning before breakfast.   Refills: 0     pioglitazone 15 MG Tabs  Commonly known as: Actos      Take 1 tablet (15 mg total) by mouth daily.   Refills: 0     rosuvastatin 10 MG Tabs  Commonly known as: Crestor      Take 1 tablet (10 mg total) by mouth nightly. Replaces simvastatin   Quantity: 90 tablet  Refills: 3     vitamin C 100 MG Tabs      Take 10 tablets (1,000 mg total) by mouth daily.   Refills: 0     VITAMIN D-3 OR      Take 1 capsule by mouth once daily.   Refills: 0     Yupelri 175 MCG/3ML Soln nebulizer solution  Generic drug: revefenacin      ADMINISTER ONE (1) VIAL VIA NEBULIZER MACHINE DAILY   Quantity: 90 mL  Refills: 11              [place]    Follow-up appointment:   Bismark Cabrera MD  [address] [place] [id number]  [phone]    Call in 1 week(s)  Hospital follow up    Appointments for Next 30 Days 12/1/2024 - 12/31/2024        Date Arrival Time Visit Type Length Department Provider     12/11/2024  3:30 PM  FOLLOW UP VISIT [2828] 15 min [facility] Jose Rich MD    Patient Instructions:         Location Instructions:     Masks are optional for all patients and visitors, unless  otherwise indicated.               2024  2:00 PM  EXAM - ESTABLISHED [2844] 20 min [facility] Nsair Maya MD    Patient Instructions:         Location Instructions:     Masks are optional for all patients and visitors, unless otherwise indicated.                      Vital signs:  Temp:  [97.7 °F (36.5 °C)-98 °F (36.7 °C)] 98 °F (36.7 °C)  Pulse:  [] 62  Resp:  [16-20] 17  BP: (110-156)/(52-78) 110/52  SpO2:  [82 %-96 %] 82 %    Physical Exam:    General: No acute distress   Lungs: clear to auscultation  Cardiovascular: S1, S2  Abdomen: Soft    -----------------------------------------------------------------------------------------------  PATIENT DISCHARGE INSTRUCTIONS: See electronic chart    Oralia Thomas MD    Total time spent on discharge plannin minutes     The  [employer] Cures Act makes medical notes like these available to patients in the interest of transparency. Please be advised this is a medical document. Medical documents are intended to carry relevant information, facts as evident, and the clinical opinion of the practitioner. The medical note is intended as peer to peer communication and may appear blunt or direct. It is written in medical language and may contain abbreviations or verbiage that are unfamiliar.

## 2024-12-01 NOTE — BRIEF OP NOTE
Pre-Operative Diagnosis: GI bleed [K92.2]     Post-Operative Diagnosis: * No post-op diagnosis entered *      Procedure Performed:   IMCOMPLETE COLONOSCOPY WITH BIOPSIES    Surgeons and Role:     * Jaswinder Covington MD - Primary    Assistant(s):        Surgical Findings: see op note     Specimen: sigmoid colon     Estimated Blood Loss: No data recorded    Dictation Number:  none    Jaswinder Covington MD  12/1/2024  9:39 AM

## 2024-12-01 NOTE — PLAN OF CARE
Problem: Patient/Family Goals  Goal: Patient/Family Long Term Goal  Description: Patient's Long Term Goal:   Resolution of GI bleed    Interventions:  - Prep for ED/Colonoscopy as ordered  - Monitor stool, Monitor lab cvalues  - See additional Care Plan goals for specific interventions  Outcome: Progressing     Problem: Patient/Family Goals  Goal: Patient/Family Short Term Goal  Description: Patient's Short Term Goal:   11/30/224 - finish golytely prep    Interventions:   - encourage to drink one cup every 20 minutes as ordered  - See additional Care Plan goals for specific interventions  Outcome: Progressing

## 2024-12-01 NOTE — OPERATIVE REPORT
PATIENT NAME: Gabi Kumar  MRN: [id number]  DATE OF OPERATION:  12/1/24  REFERRING PHYSICIAN: Dr. Thomas  Medications:  MAC  Date of last COLONOSCOPY:  2017  PREOPERATIVE DIAGNOSIS                Rectal bleeding  POSTOPERATIVE DIAGNOSIS:  Unable to pass the scope beyond 35 cm in the sigmoid colon due to fixation of the sigmoid in the pelvis and highly redundant sigmoid colon.  Changes in the left colon suggestive of ischemia with submucosal hemorrhages, edema and friability.  Biopsies were done.  Diverticulosis noted in the sigmoid colon.   Enlarged internal hemorrhoids.    PROCEDURE PERFORMED:               Colonoscopy - incomplete with biopsies  Endoscopist:                                             Jaswinder Covington MD     PROCEDURE AND FINDINGS: The patient was placed into the left lateral decubitus after informed consent was obtained.  All questions were answered.  An updated history and physical were performed and an ASA score of 3 was assigned.  Informed consent was obtained prior to the procedure, with review of possible complications including bleeding, infection, and missed polyps and or cancer.  MAC sedation was administered.    A digital rectal exam was performed and was normal.  The video pediatric colonoscope was passed from anus to 35 cm.   The bowel preparation was rated on the Aronchick bowel prep scale as 1. (1 - excellent > 95% mucosa seen; 2 - good - clear liquid up to 25% of the mucosa, 90% mucosa seen;  3 - fair - semisolid stool not suctioned, but 90% of the mucosa seen; 4 - poor - semisolid stool not suctioned, but < 90% mucosa seen; 5 - inadequate - repeat prep needed) .               Findings included normal rectum.  Unable to pass the scope beyond 35 cm in the sigmoid colon due to fixation of the sigmoid in the pelvis and highly redundant sigmoid colon.  Changes in the left colon suggestive of ischemia with submucosal hemorrhages, edema and friability.  Biopsies were done.  Multiple sigmoid  diverticulosis noted.  On retroflexion of the scope in the anorectum, enlarged internal hemorrhoids were noted.      RECOMMENDATIONS         1. General diet.        2. If no further bleeding today and hgb is stable, possible late discharge today.        3. If bleeding recurs, would check a CT angiogram of the abdomen and pelvix.            Jaswinder Covington MD, Gastroenterologist  Cc: Dr. Thomas

## 2024-12-01 NOTE — PROGRESS NOTES
Patient currently sleeping.  She drank about 3850cc of Golytely,  Return appears clear moody brown liquid stool with flaky dark green residue.

## 2024-12-01 NOTE — ANESTHESIA PREPROCEDURE EVALUATION
PRE-OP EVALUATION    Patient Name: Gabi Kumar    Admit Diagnosis: Gastrointestinal hemorrhage, unspecified gastrointestinal hemorrhage type [K92.2]    Pre-op Diagnosis: GI bleed [K92.2]    COLONOSCOPY    Anesthesia Procedure: COLONOSCOPY    Surgeons and Role:     * Jaswinder Covington MD - Primary    Pre-op vitals reviewed.  Temp: 97.9 °F (36.6 °C)  Pulse: 93  Resp: 18  BP: 128/61  SpO2: 95 %  Body mass index is 29.61 kg/m².    Current medications reviewed.  Hospital Medications:  • sodium chloride 0.9% infusion  125 mL/hr Intravenous Continuous   • [COMPLETED] pantoprazole (Protonix) 40 mg in sodium chloride 0.9% PF 10 mL IV push  40 mg Intravenous Once   • acetaminophen (Tylenol Extra Strength) tab 500 mg  500 mg Oral Q4H PRN   • melatonin tab 3 mg  3 mg Oral Nightly PRN   • polyethylene glycol (PEG 3350) (Miralax) 17 g oral packet 17 g  17 g Oral Daily PRN   • sennosides (Senokot) tab 17.2 mg  17.2 mg Oral Nightly PRN   • bisacodyl (Dulcolax) 10 MG rectal suppository 10 mg  10 mg Rectal Daily PRN   • fleet enema (Fleet) rectal enema 133 mL  1 enema Rectal Once PRN   • ondansetron (Zofran) 4 MG/2ML injection 4 mg  4 mg Intravenous Q6H PRN   • metoclopramide (Reglan) 5 mg/mL injection 5 mg  5 mg Intravenous Q8H PRN   • glucose (Dex4) 15 GM/59ML oral liquid 15 g  15 g Oral Q15 Min PRN    Or   • glucose (Glutose) 40% oral gel 15 g  15 g Oral Q15 Min PRN    Or   • glucose-vitamin C (Dex-4) chewable tab 4 tablet  4 tablet Oral Q15 Min PRN    Or   • dextrose 50% injection 50 mL  50 mL Intravenous Q15 Min PRN    Or   • glucose (Dex4) 15 GM/59ML oral liquid 30 g  30 g Oral Q15 Min PRN    Or   • glucose (Glutose) 40% oral gel 30 g  30 g Oral Q15 Min PRN    Or   • glucose-vitamin C (Dex-4) chewable tab 8 tablet  8 tablet Oral Q15 Min PRN   • insulin aspart (NovoLOG) 100 Units/mL FlexPen 1-5 Units  1-5 Units Subcutaneous TID AC and HS   • [COMPLETED] polyethylene glycol-electrolyte (Golytely) 236 g oral solution 4,000 mL   4,000 mL Oral Once       Outpatient Medications:   Prescriptions Prior to Admission[1]    Allergies: Pcn [bicillin c-r,]; Levofloxacin; and Penicillins      Anesthesia Evaluation    Patient summary reviewed.    Anesthetic Complications  (-) history of anesthetic complications         GI/Hepatic/Renal           (+) PUD                 (+) irritable bowel syndrome     Cardiovascular      ECG reviewed.      MET: >4      (+) hypertension and well controlled  (+) hyperlipidemia                                  Endo/Other      (+) diabetes  type 2, not using insulin                         Pulmonary  Comment: 40 pyh quit 2014      (+) COPD and moderate  COPD not requiring home oxygen.                Neuro/Psych                 (+) neuromuscular disease             Presented with rectal bleeding    Past Surgical History:   Procedure Laterality Date   • Carpal tunnel release     • Colonoscopy  2003,     Diverticulosis   • Colonoscopy  2012    decreased anal sphincter tone, diverticulosis, internal hemorrhoids, normal random colon biopsies   • Colonoscopy N/A 7/26/2017    Procedure: COLONOSCOPY;  Surgeon: Garrett Moody MD;  Location:  ENDOSCOPY   • Other  1999    Carpel tnnel right hand   • Other surgical history  1995    Fatty tumor from neck   • Other surgical history  1999    Arthroscopy right knee   • Other surgical history  2001    Carpal tunnel both hand   • Other surgical history  12/22/2020    Cystoscopy, Dr Chun     Social History     Socioeconomic History   • Marital status:    Tobacco Use   • Smoking status: Former     Current packs/day: 0.00     Average packs/day: 0.8 packs/day for 50.0 years (37.5 ttl pk-yrs)     Types: Cigarettes     Start date: 10/22/1964     Quit date: 10/22/2014     Years since quitting: 10.1   • Smokeless tobacco: Never   Vaping Use   • Vaping status: Never Used   Substance and Sexual Activity   • Alcohol use: No     Alcohol/week: 0.0 standard drinks of alcohol   • Drug use: No    • Sexual activity: Not Currently   Other Topics Concern   • Caffeine Concern Yes   • Exercise Yes     History   Drug Use No     Available pre-op labs reviewed.  Lab Results   Component Value Date    WBC 9.9 12/01/2024    RBC 4.54 12/01/2024    HGB 13.5 12/01/2024    HGB 13.5 12/01/2024    HCT 43.3 12/01/2024    MCV 95.4 12/01/2024    MCH 29.7 12/01/2024    MCHC 31.2 12/01/2024    RDW 15.4 12/01/2024    .0 12/01/2024     Lab Results   Component Value Date     12/01/2024    K 3.9 12/01/2024     12/01/2024    CO2 23.0 12/01/2024    BUN 20 12/01/2024    CREATSERUM 1.00 12/01/2024     (H) 12/01/2024    CA 9.5 12/01/2024            Airway      Mallampati: II  Mouth opening: >3 FB  TM distance: > 6 cm   Cardiovascular    Cardiovascular exam normal.         Dental             Pulmonary    Pulmonary exam normal.                 Other findings        ASA: 3   Plan: MAC  NPO status verified and patient meets guidelines.          Plan/risks discussed with: patient            Present on Admission:  **None**             [1]   Medications Prior to Admission   Medication Sig Dispense Refill Last Dose/Taking   • fluticasone propionate 50 MCG/ACT Nasal Suspension 2 sprays by Each Nare route daily.   Taking   • acidophilus-pectin Oral Cap Take 1 capsule by mouth daily.   11/29/2024 at  9:00 AM   • cetirizine 10 MG Oral Tab Take 1 tablet (10 mg total) by mouth daily.   11/29/2024 at  9:00 AM   • allopurinol 300 MG Oral Tab Take 1 tablet (300 mg total) by mouth daily. 90 tablet 3 11/29/2024 at  9:00 AM   • DULoxetine (CYMBALTA) 60 MG Oral Cap DR Particles Take 1 capsule (60 mg total) by mouth daily. 90 capsule 2 11/29/2024 at  9:00 AM   • umeclidinium-vilanterol (ANORO ELLIPTA) 62.5-25 MCG/ACT Inhalation Aerosol Powder, Breath Activated Inhale 1 puff into the lungs daily. 1 each 5 11/29/2024 at  9:00 AM   • pantoprazole 40 MG Oral Tab EC Take 1 tablet (40 mg total) by mouth every morning before breakfast.    11/29/2024 at  9:00 AM   • Ascorbic Acid (VITAMIN C) 100 MG Oral Tab Take 10 tablets (1,000 mg total) by mouth daily.   11/29/2024 at  9:00 AM   • pioglitazone 15 MG Oral Tab Take 1 tablet (15 mg total) by mouth daily.   11/29/2024 at  9:00 AM   • glimepiride 2 MG Oral Tab Take 1 tablet (2 mg total) by mouth daily with breakfast.   11/29/2024 at  9:00 AM   • METOPROLOL SUCCINATE ER 25 MG Oral Tablet 24 Hr TAKE ONE TABLET BY MOUTH ONE TIME DAILY 90 tablet 1 11/29/2024 at  9:00 AM   • Cholecalciferol (VITAMIN D-3 OR) Take 1 capsule by mouth once daily.   11/29/2024 at  9:00 AM   • Multiple Vitamins-Minerals (CENTRUM) Oral Tab Take 1 tablet by mouth daily.   11/29/2024 at  9:00 AM   • HYDROcodone-acetaminophen 5-325 MG Oral Tab Take 1 tablet by mouth every 6 (six) hours as needed. 20 tablet 0 Unknown   • loratadine 10 MG Oral Tab Take 1 tablet (10 mg total) by mouth daily.      • ipratropium-albuterol 0.5-2.5 (3) MG/3ML Inhalation Solution Take 3 mL by nebulization every 6 (six) hours as needed. (Patient taking differently: Take 3 mL by nebulization 4 (four) times daily.) 180 mL 12 Unknown   • ONETOUCH ULTRA In Vitro Strip 2 (two) times daily. (Patient not taking: Reported on 10/15/2024)      • YUPELRI 175 MCG/3ML Inhalation Solution nebulizer solution ADMINISTER ONE (1) VIAL VIA NEBULIZER MACHINE DAILY 90 mL 11 Unknown   • albuterol 108 (90 Base) MCG/ACT Inhalation Aero Soln Inhale 2 puffs into the lungs every 6 (six) hours as needed for Wheezing or Shortness of Breath. inhale 2 puff by inhalation route  every 4 - 6 hours as needed 1 each 11 Unknown   • rosuvastatin 10 MG Oral Tab Take 1 tablet (10 mg total) by mouth nightly. Replaces simvastatin 90 tablet 3

## 2024-12-01 NOTE — PROGRESS NOTES
NURSING DISCHARGE NOTE    Discharged Home via Wheelchair.  Accompanied by Support staff  Belongings Taken by patient/family    Discharge instructions given. IV removed.

## 2024-12-01 NOTE — PROGRESS NOTES
Patient is struggling with drinking her Golytely prep.  She still has about about 1500cc remaining.  Return is currently light brown with tiny hard balled stools.  No visible blood.   Colonoscopy is scheduled for 0900 tomorrow,    Dr Covington was paged and notified.  He said patient has until 0500 to finish the prep.  Patient informed. She verbalized understanding.

## 2024-12-01 NOTE — PROGRESS NOTES
Pt off floor via transport to planned procedure. Pt family accompanied pt down along with transport staff.

## 2024-12-01 NOTE — PLAN OF CARE
Pt A&Ox4. On RA. On  and tele. Continent x2. Slighly bloody, soft to formed stools. EGD done. Colonoscopy tomorrow, bowel prep started. Pt updated on plan of care, all questions and concerns addressed, verbalized understanding. Safety precautions in place, no further needs at this time.     Problem: GASTROINTESTINAL - ADULT  Goal: Maintains or returns to baseline bowel function  Description: INTERVENTIONS:  - Assess bowel function  - Maintain adequate hydration with IV or PO as ordered and tolerated  - Evaluate effectiveness of GI medications  - Encourage mobilization and activity  - Obtain nutritional consult as needed  - Establish a toileting routine/schedule  - Consider collaborating with pharmacy to review patient's medication profile  Outcome: Progressing

## 2024-12-01 NOTE — ANESTHESIA POSTPROCEDURE EVALUATION
[facility]    Gabi Kumar Patient Status:  Observation   Age/Gender 79 year old female MRN [id number]   Location [facility] 5NW-A Attending Manuelito Flores MD   Hosp Day # 0 PCP Bismark Cabrera MD       Anesthesia Post-op Note    INCOMPLETE COLONOSCOPY WITH BIOPSIES    Procedure Summary       Date: 12/01/24 Room / Location:  ENDOSCOPY 03 / [facility]    Anesthesia Start: 0912 Anesthesia Stop: 0938    Procedure: INCOMPLETE COLONOSCOPY WITH BIOPSIES Diagnosis:       GI bleed      (DIVERTICULOSIS)    Surgeons: Jaswinder Covington MD Anesthesiologist: Kurtis Parker MD    Anesthesia Type: MAC ASA Status: 3            Anesthesia Type: MAC    Vitals Value Taken Time   /60 12/01/24 0955   Temp  12/01/24 1051   Pulse 92 12/01/24 0955   Resp 17 12/01/24 0955   SpO2 91 % 12/01/24 0950       Patient Location: Endoscopy    Anesthesia Type: MAC    Airway Patency: patent    Postop Pain Control: adequate    Mental Status: preanesthetic baseline    Nausea/Vomiting: none    Cardiopulmonary/Hydration status: stable euvolemic    Complications: no apparent anesthesia related complications    Postop vital signs: stable    Dental Exam: Unchanged from Preop    Patient to be discharged home when criteria met.

## 2024-12-01 NOTE — PROGRESS NOTES
Patient currently having light brown liquid stools. No visible blood.  Needs a lot of encouraement to drink the Golytely.   She denies nausea.  Tylenol iven for c/o 4/10 headache.

## 2024-12-02 LAB
ATRIAL RATE: 73 BPM
P AXIS: 65 DEGREES
P-R INTERVAL: 144 MS
Q-T INTERVAL: 406 MS
QRS DURATION: 84 MS
QTC CALCULATION (BEZET): 447 MS
R AXIS: 33 DEGREES
T AXIS: 46 DEGREES
VENTRICULAR RATE: 73 BPM

## 2024-12-11 ENCOUNTER — OFFICE VISIT (OUTPATIENT)
Dept: PAIN CLINIC | Facility: CLINIC | Age: 79
End: 2024-12-11
Payer: MEDICARE

## 2024-12-11 ENCOUNTER — TELEPHONE (OUTPATIENT)
Dept: PAIN CLINIC | Facility: CLINIC | Age: 79
End: 2024-12-11

## 2024-12-11 VITALS — HEART RATE: 104 BPM | DIASTOLIC BLOOD PRESSURE: 70 MMHG | SYSTOLIC BLOOD PRESSURE: 124 MMHG

## 2024-12-11 DIAGNOSIS — M17.11 PRIMARY OSTEOARTHRITIS OF RIGHT KNEE: Primary | ICD-10-CM

## 2024-12-11 PROCEDURE — 99213 OFFICE O/P EST LOW 20 MIN: CPT | Performed by: ANESTHESIOLOGY

## 2024-12-11 PROCEDURE — G2211 COMPLEX E/M VISIT ADD ON: HCPCS | Performed by: ANESTHESIOLOGY

## 2024-12-11 NOTE — PROGRESS NOTES
Last procedure: Left Genicular NB  Date: 11/26/2024  Percentage of relief obtained: 85%  Duration of relief: sustained    Current Pain Score: 0/10

## 2024-12-11 NOTE — TELEPHONE ENCOUNTER
Patient advised of insurance approval to proceed with injections and is agreeable to scheduling. Patient scheduled for procedure, pre-procedure instructions reviewed. Patient prefers Local sedation. Reviewed sedation instructions including No Fasting & No  Required. Patient has no medications to hold prior to procedure. Patient verbalized understanding of instructions, no further needs at this time.    Pre Procedure Instruction Sheet provided to patient at office visit.       [facility]  PRE-PROCEDURE INSTRUCTIONS WITHOUT SEDATION    Procedure: Right Genicular NB       Appointment Date: 12/17/2024  Check-In Time: 01:45 PM    Follow-Up Date/Time: 01/13/2025 @ 11:45 AM    Prior to the procedure:  Please update us prior to the procedure if you are experiencing any symptoms of infection such as cough, fever, chills, urinary symptoms, or have recently been prescribed antibiotics, have open wounds, have recently had surgery or dental procedures.    Day of Procedure:  **Drivers will be required for patients who receive prescriptions for Valium.    NO FASTING REQUIRED  Please bring your Insurance Card, Photo ID, List of Current Medications and Referral (if applicable) to your appointment.  Please park in the [employer] and follow the signs to the [facility].  Check in at [facility] ([address] [place] [address]) outpatient registration in the [facility].  Please note-No prescriptions will be written by Pain Clinic in OR on the day of procedure. If you require a refill of medications, please contact the office 48 hours prior to your procedure.  If you have an implanted Spinal Cord or Peripheral Nerve Stimulator: Please remember to turn device off for procedure.        Medication Hold:    Number of days you need to be off for the following medications:    Aggrenox 10 days   Agrylin (Anagrelide) 10 days  Brilinta (Ticagrelor) 7 days  Imbruvica (Ibrutinib) 3 days   Enbrel  (Etanercept) 24 hours   Fragmin (Dalteparin) 24 hours   Pletal (Cilostazol) 7 days  Effient (Prasugrel) 7 days  Pradaxa 10 days  Trental 7 days  Eliquis (Apixaban) 3 days  Xarelto (Rivaroxaban) 3 days  Lovenox (Enoxaparin) 24 hours  Aspirin  Greater than 81mg but less than 325mg   5 days  325mg and greater                  7 days  Coumadin       5 days  Procedure may be cancelled if INR is elevated.   Excedrin (with aspirin) 7 days  Plavix (Clopidogrel)                            7 days    NSAIDs: 24 hours preferred      Ibuprofen (Motrin, Advil, Vicoprofen), Naproxen (Naprosyn, Aleve), Piroxcam (Feldene), Meloxicam (Mobic), Oxaprozin (Daypro), Diclofenac (Voltaren), Indomethacin (Indocin), Etodolac (Lodine), Nabumetone (Relafen), Celebrex (Celecoxib)           HERBAL SUPPLEMENTS  5 days preferred  Fish oil, krill oil, Omega-3, Vascepa, Vitamin E, Turmeric, Garlic                       Insurance Authorization:   Most insurances are now requiring a preauthorization for all procedures.  In the event that your insurance does not authorize your procedure within 48 hours of the scheduled date, your procedure will be cancelled and rescheduled to a later date.  Please contact your insurance carrier to determine what your financial responsibility will be for the procedure(s).      Cancellation/Rescheduling Appointment:   In the event you need to cancel or reschedule your appointment, you must notify the office 24 hours prior.    Post-procedure instructions:        Please schedule a follow up visit within 2 to 4 weeks after your last procedure date   Please call our office with any questions or concerns before or after your procedure at  [phone].  If you are a diabetic, please increase the frequency of your glucose monitoring after the procedure as this may cause a temporary increase in your blood sugar.  Contact your primary care physician if your blood sugar rises as you may require some medication adjustment.  It is  normal to have increased pain at injection site for up to 3-5 days after procedure, you can use heat or ice (20 minutes on 20 minutes off) for comfort.    **To hear a recorded version of these instructions, please call [phone] and follow the prompts.  **Para escuchar las instrucciones en Español, por favor de llamar el gina [phone] opción 4.

## 2024-12-11 NOTE — TELEPHONE ENCOUNTER
Order Questions    Question Answer   Anesthesia Type Local   Provider [facility] Procedure Lab   CPT (Hit enter after each entry) NJX AA&/STRD GNCLR NRV [facility]   Medical clearance requested (will send to Pain Navigator) No   Patient has Medicare coverage? Yes   Comments (Please list entire procedure name here.) right genicluar nerve block

## 2024-12-11 NOTE — PROGRESS NOTES
Name: Gbai Kumar   : 1945   DOS: 2024     Pain Clinic Follow Up Visit:     Chief Complaint   Patient presents with    Procedure Follow Up     Post Genicular NB       Gabi Kumar is a 79 year old female with a history advanced osteoarthritis of the knee who presents for follow-up after genicular nerve block.  Reports 85% improvement of left-sided knee pain.  Now would like procedure to be done for the right-hand side.    Pt denies any chills, fever, or weakness. There is no bladder or bowel incontinence associated with the pain.    REVIEW OF SYSTEMS:  A ten point review of systems was performed with pertinent positives and negatives in the HPI.    Allergies[1]    Current Outpatient Medications   Medication Sig Dispense Refill    fluticasone propionate 50 MCG/ACT Nasal Suspension 2 sprays by Each Nare route daily.      acidophilus-pectin Oral Cap Take 1 capsule by mouth daily.      cetirizine 10 MG Oral Tab Take 1 tablet (10 mg total) by mouth daily.      allopurinol 300 MG Oral Tab Take 1 tablet (300 mg total) by mouth daily. 90 tablet 3    DULoxetine (CYMBALTA) 60 MG Oral Cap DR Particles Take 1 capsule (60 mg total) by mouth daily. 90 capsule 2    umeclidinium-vilanterol (ANORO ELLIPTA) 62.5-25 MCG/ACT Inhalation Aerosol Powder, Breath Activated Inhale 1 puff into the lungs daily. 1 each 5    HYDROcodone-acetaminophen 5-325 MG Oral Tab Take 1 tablet by mouth every 6 (six) hours as needed. 20 tablet 0    loratadine 10 MG Oral Tab Take 1 tablet (10 mg total) by mouth daily.      pantoprazole 40 MG Oral Tab EC Take 1 tablet (40 mg total) by mouth every morning before breakfast.      ipratropium-albuterol 0.5-2.5 (3) MG/3ML Inhalation Solution Take 3 mL by nebulization every 6 (six) hours as needed. 180 mL 12    ONETOUCH ULTRA In Vitro Strip 2 (two) times daily.      YUPELRI 175 MCG/3ML Inhalation Solution nebulizer solution ADMINISTER ONE (1) VIAL VIA NEBULIZER MACHINE DAILY 90 mL 11     Ascorbic Acid (VITAMIN C) 100 MG Oral Tab Take 10 tablets (1,000 mg total) by mouth daily.      pioglitazone 15 MG Oral Tab Take 1 tablet (15 mg total) by mouth daily.      glimepiride 2 MG Oral Tab Take 1 tablet (2 mg total) by mouth daily with breakfast.      albuterol 108 (90 Base) MCG/ACT Inhalation Aero Soln Inhale 2 puffs into the lungs every 6 (six) hours as needed for Wheezing or Shortness of Breath. inhale 2 puff by inhalation route  every 4 - 6 hours as needed 1 each 11    METOPROLOL SUCCINATE ER 25 MG Oral Tablet 24 Hr TAKE ONE TABLET BY MOUTH ONE TIME DAILY 90 tablet 1    rosuvastatin 10 MG Oral Tab Take 1 tablet (10 mg total) by mouth nightly. Replaces simvastatin 90 tablet 3    Cholecalciferol (VITAMIN D-3 OR) Take 1 capsule by mouth once daily.      Multiple Vitamins-Minerals (CENTRUM) Oral Tab Take 1 tablet by mouth daily.           EXAM:   /70 (BP Location: Left arm, Patient Position: Sitting)   Pulse 104   LMP 06/07/1999   General:  Patient is a(n) 79 year old year old female in no acute distress.  Neurologic:: WNL-Orientation to time, place and person, normal mood & affect, concentration & attention span intact.   Inspection:  Ambulates with well-coordinated, fluid, non-antalgic gait.  Gait is normal.  Neck: Full range of motion  Cranial nerves: Grossly intact  Respiratory: Nonlabored  Back: Gait intact  Knee: Skin intact.  Injection site clear.       IMAGES:     Knee x-ray with severe osteoarthritis    ASSESSMENT AND PLAN:     1. Primary osteoarthritis of right knee        The patient is an 79-year-old female with a history of osteoarthritis of the knee.  He is status post genicular nerve block with 85% relief of left-sided knee pain.  I would like procedure to repeated on the right-hand side.  Does have severe osteoarthritis.  Risk and benefits reviewed again.    Orders:  Orders Placed This Encounter   Procedures    [facility] PAIN NAVIGATOR         Radiology orders and consultations:None  The  patient indicates understanding of these issues and agrees to the plan.  No follow-ups on file.    Jose Rich MD, 12/11/2024, 3:52 PM              [1]   Allergies  Allergen Reactions    Pcn [Bicillin C-R,] SWELLING     itching    Levofloxacin MYALGIA     Severe muscle and bone pain    Penicillins OTHER (SEE COMMENTS)

## 2024-12-17 ENCOUNTER — HOSPITAL ENCOUNTER (OUTPATIENT)
Facility: HOSPITAL | Age: 79
Setting detail: HOSPITAL OUTPATIENT SURGERY
Discharge: HOME | End: 2024-12-17
Attending: ANESTHESIOLOGY | Admitting: ANESTHESIOLOGY
Payer: MEDICARE

## 2024-12-17 ENCOUNTER — APPOINTMENT (OUTPATIENT)
Dept: GENERAL RADIOLOGY | Facility: HOSPITAL | Age: 79
End: 2024-12-17
Attending: ANESTHESIOLOGY
Payer: MEDICARE

## 2024-12-17 VITALS
DIASTOLIC BLOOD PRESSURE: 63 MMHG | HEART RATE: 82 BPM | WEIGHT: 172 LBS | TEMPERATURE: 97 F | BODY MASS INDEX: 29.37 KG/M2 | SYSTOLIC BLOOD PRESSURE: 138 MMHG | RESPIRATION RATE: 20 BRPM | OXYGEN SATURATION: 97 % | HEIGHT: 64 IN

## 2024-12-17 LAB — GLUCOSE BLD-MCNC: 129 MG/DL (ref 70–99)

## 2024-12-17 PROCEDURE — 64454 NJX AA&/STRD GNCLR NRV BRNCH: CPT | Performed by: ANESTHESIOLOGY

## 2024-12-17 PROCEDURE — 3E0T3BZ INTRODUCTION OF ANESTHETIC AGENT INTO PERIPHERAL NERVES AND PLEXI, PERCUTANEOUS APPROACH: ICD-10-PCS | Performed by: ANESTHESIOLOGY

## 2024-12-17 RX ORDER — LIDOCAINE HYDROCHLORIDE 10 MG/ML
INJECTION, SOLUTION EPIDURAL; INFILTRATION; INTRACAUDAL; PERINEURAL
Status: DISCONTINUED | OUTPATIENT
Start: 2024-12-17 | End: 2024-12-17

## 2024-12-17 RX ORDER — METHYLPREDNISOLONE ACETATE 40 MG/ML
INJECTION, SUSPENSION INTRA-ARTICULAR; INTRALESIONAL; INTRAMUSCULAR; SOFT TISSUE
Status: DISCONTINUED | OUTPATIENT
Start: 2024-12-17 | End: 2024-12-17

## 2024-12-17 RX ORDER — NALOXONE HYDROCHLORIDE 0.4 MG/ML
0.08 INJECTION, SOLUTION INTRAMUSCULAR; INTRAVENOUS; SUBCUTANEOUS AS NEEDED
Status: DISCONTINUED | OUTPATIENT
Start: 2024-12-17 | End: 2024-12-17

## 2024-12-17 RX ORDER — BUPIVACAINE HYDROCHLORIDE 5 MG/ML
INJECTION, SOLUTION EPIDURAL; INTRACAUDAL
Status: DISCONTINUED | OUTPATIENT
Start: 2024-12-17 | End: 2024-12-17

## 2024-12-17 NOTE — H&P
History & Physical Examination    Patient Name: Gabi Kumar  MRN: [id number]  CSN: [id number]  YOB: 1945    Pre-Operative Diagnosis:  Primary osteoarthritis of right knee [M17.11]    Present Illness: Knee pain    ASA: 3  MP class: 1  Sedation: Local    Prescriptions Prior to Admission[1]  No current facility-administered medications for this encounter.       Allergies: Allergies[2]    Past Medical History:    Back problem    Cataract    Cholelithiasis    DIABETES    Diabetes (HCC)    Diverticulitis    suggestion of possible wall thickening involving sigmoid, possible mild diverticulitis    Diverticulosis    Essential hypertension    Gallbladder disease    thickened gallbladder with distended bile duct    High blood pressure    High cholesterol    HYPERLIPIDEMIA    Hyperlipidemia    IBS (irritable bowel syndrome)    Osteoarthritis    hips, back and knees    Rectal bleeding    Thyroid disease    Visual impairment    reading glasses     Past Surgical History:   Procedure Laterality Date    Carpal tunnel release      Colonoscopy  2003    Diverticulosis    Colonoscopy  2012    decreased anal sphincter tone, diverticulosis, internal hemorrhoids, normal random colon biopsies    Colonoscopy N/A 07/26/2017    Procedure: COLONOSCOPY;  Surgeon: Garrett Moody MD;  Location:  ENDOSCOPY    Colonoscopy N/A 12/01/2024    Procedure: INCOMPLETE COLONOSCOPY WITH BIOPSIES;  Surgeon: Jaswinder Covington MD;  Location:  ENDOSCOPY    Egd      Other  1999    Carpel tnnel right hand    Other surgical history  1995    Fatty tumor from neck    Other surgical history  1999    Arthroscopy right knee    Other surgical history  2001    Carpal tunnel both hand    Other surgical history  12/22/2020    Cystoscopy, Dr Chun     Family History   Problem Relation Age of Onset    Cancer Other         Stomach cancer    Heart Disease Father     Hypertension Father     High Blood Pressure Father     Diabetes Father     Heart Disease  Mother     Hypertension Mother     Lung Disorder Mother         Pulmonary fibrosis     Social History     Tobacco Use    Smoking status: Former     Current packs/day: 0.00     Average packs/day: 0.8 packs/day for 50.0 years (37.5 ttl pk-yrs)     Types: Cigarettes     Start date: 10/22/1964     Quit date: 10/22/2014     Years since quitting: 10.1    Smokeless tobacco: Never   Substance Use Topics    Alcohol use: No     Alcohol/week: 0.0 standard drinks of alcohol       SYSTEM Check if Review is Normal Check if Physical Exam is Normal If not normal, please explain:   HEENT [x ] [x ]    NECK & BACK [x ] [x ]    HEART [x ] [x ]    LUNGS [x ] [x ]    ABDOMEN [x ] [x ]    UROGENITAL [x ] [x ]    EXTREMITIES [x ] [x ]    OTHER        [ x ] I have discussed the risks and benefits and alternatives with the patient/family.  They understand and agree to proceed with plan of care.  [ x ] I have reviewed the History and Physical done within the last 30 days.  Any changes noted above.    Jose Rich MD              [1]   Medications Prior to Admission   Medication Sig Dispense Refill Last Dose/Taking    fluticasone propionate 50 MCG/ACT Nasal Suspension 2 sprays by Each Nare route daily.       acidophilus-pectin Oral Cap Take 1 capsule by mouth daily.       cetirizine 10 MG Oral Tab Take 1 tablet (10 mg total) by mouth daily.       allopurinol 300 MG Oral Tab Take 1 tablet (300 mg total) by mouth daily. 90 tablet 3     DULoxetine (CYMBALTA) 60 MG Oral Cap DR Particles Take 1 capsule (60 mg total) by mouth daily. 90 capsule 2     umeclidinium-vilanterol (ANORO ELLIPTA) 62.5-25 MCG/ACT Inhalation Aerosol Powder, Breath Activated Inhale 1 puff into the lungs daily. 1 each 5     HYDROcodone-acetaminophen 5-325 MG Oral Tab Take 1 tablet by mouth every 6 (six) hours as needed. 20 tablet 0     loratadine 10 MG Oral Tab Take 1 tablet (10 mg total) by mouth daily.       pantoprazole 40 MG Oral Tab EC Take 1 tablet (40 mg total) by  mouth every morning before breakfast.       ipratropium-albuterol 0.5-2.5 (3) MG/3ML Inhalation Solution Take 3 mL by nebulization every 6 (six) hours as needed. 180 mL 12     ONETOUCH ULTRA In Vitro Strip 2 (two) times daily.       YUPELRI 175 MCG/3ML Inhalation Solution nebulizer solution ADMINISTER ONE (1) VIAL VIA NEBULIZER MACHINE DAILY 90 mL 11     Ascorbic Acid (VITAMIN C) 100 MG Oral Tab Take 10 tablets (1,000 mg total) by mouth daily.       pioglitazone 15 MG Oral Tab Take 1 tablet (15 mg total) by mouth daily.       glimepiride 2 MG Oral Tab Take 1 tablet (2 mg total) by mouth daily with breakfast.       albuterol 108 (90 Base) MCG/ACT Inhalation Aero Soln Inhale 2 puffs into the lungs every 6 (six) hours as needed for Wheezing or Shortness of Breath. inhale 2 puff by inhalation route  every 4 - 6 hours as needed 1 each 11     METOPROLOL SUCCINATE ER 25 MG Oral Tablet 24 Hr TAKE ONE TABLET BY MOUTH ONE TIME DAILY 90 tablet 1     rosuvastatin 10 MG Oral Tab Take 1 tablet (10 mg total) by mouth nightly. Replaces simvastatin 90 tablet 3     Cholecalciferol (VITAMIN D-3 OR) Take 1 capsule by mouth once daily.       Multiple Vitamins-Minerals (CENTRUM) Oral Tab Take 1 tablet by mouth daily.      [2]   Allergies  Allergen Reactions    Pcn [Bicillin C-R,] SWELLING     itching    Levofloxacin MYALGIA     Severe muscle and bone pain    Penicillins OTHER (SEE COMMENTS)

## 2024-12-17 NOTE — DISCHARGE INSTRUCTIONS
Home Care Instructions Following Your Pain Procedure     Gabi,  It has been a pleasure to have you as our patient. To help you at home, you must follow these general discharge instructions. We will review these with you before you are discharged. It is our hope that you have a complete and uneventful recovery from our procedure.     General Instructions:  What to Expect:  Bandages from your procedure today can be removed when you get home.  Please avoid soaking and/or swimming for 24 hours.  Showering is okay  It is normal to have increased pain symptoms and/or pain at injection site for up to 3-5 days after procedure, you can use heat or ice (20 minutes on 20 minutes off) for comfort.  You may experience some temporary side effects which may include restlessness or insomnia, flushing of the face, or heart palpitations.  Please contact the provider if these symptoms do not resolve within 3-4 days.  Lightheadedness or nausea may occur and should resolve within 24 to 48 hours.  If you develop a headache after treatment, rest, drink fluids (with caffeine, if possible) and take mild over-the-counter pain medication.  If the headache does not improve with the above treatment, contact the physician.  Home Medications:  Resume all previously prescribed medication.  Please avoid taking NSAIDs (Non-Steriodal Anti-Inflammatory Drugs) such as:  Ibuprofen ( Advil, Motrin) Aleve (Naproxen), Diclofenac, Meloxicam for 6 hours after procedure.   If you are on Coumadin (Warfarin) or any other anti-coagulant (or \"blood thinning\") medication such as Plavix (Clopidogrel), Xarelto (Rivaroxaban), Eliquis (Apixaban), Effient (Prasugrel) etc., restart on the following day from the procedure unless otherwise directed by your provider.  If you are a diabetic, please increase the frequency of your glucose monitoring after the procedure as steroids may cause a temporary (2-3 day) increase in your blood sugar.  Contact your primary care  physician if your blood sugar remains elevated as you may require some medication adjustment.  Diet:  Resume your regular diet as tolerated.  Activity:  We recommend that you relax and rest during the rest of your procedure day.  If you feel weakness in your arms or legs do not drive.  Follow-up Appointment  Please schedule a follow-up visit within 3 to 4 weeks after your last procedure date.  Question or Concerns:  Feel free to call our office with any questions or concerns at [phone] (option #2)    Gabi  Thank you for coming to [facility] for your procedure.  The nurses try very hard to make sure you receive the best care possible.  Your trust in them as well as us is greatly appreciated.    Thanks so much,   Dr. Jose Rich   nontender/no distention/no organomegaly/no rebound tenderness/soft soft/no masses palpable/no distention/nontender/no rebound tenderness/no organomegaly nontender/no distention/no rebound tenderness/no organomegaly/no masses palpable/bowel sounds normal/soft soft/no distention/no masses palpable/nontender/no organomegaly

## 2024-12-17 NOTE — OPERATIVE REPORT
[facility]  Operative Report  2024     Gabi Kumar Patient Status:  Hospital Outpatient Surgery    1945 MRN [id number]   Location [facility] PAIN CENTER Attending Jose Rich MD   Hosp Day # 0 PCP Bismark Cabrera MD     Indication: Gabi is a 79 year old female primary osteoarthritis    Preoperative Diagnosis:  Primary osteoarthritis of right knee [M17.11]    Postoperative Diagnosis: Same as above.    Procedure performed: RIGHT GENICULAR NERVE BLOCK with local    Anesthesia: Local      EBL: Less than 1 ml.    Procedure Description:  After reviewing the patient's history and performing a focused physical examination, the diagnosis and positive previous diagnostic tests were confirmed and contraindications such as infection and coagulopathy were ruled out.  Following review of allergies and potential side effects and complications, including but not necessarily limited to infection, allergic reaction, local tissue breakdown, nerve injury,  and paresis, the patient consented for the procedure.    The patient was brought to the procedure room in the supine position.  The operative knee was then identified and prepped and draped in the usual sterile fashion.  AP imaging was used to identify the path of the superior medial, superior lateral, inferior medial genicular nerves.  The overlying soft tissue was then anesthetized with 3 cc 1% lidocaine at each location.  Subsequently, a 3.5 inch 22-gauge spinal needle was advanced imaging guidance to contact bone along the path of the superior medial, superior lateral, and inferior medial genicular nerves.  Needle position was confirmed on both AP and lateral views.  After negative aspiration for heme, total mixture of 40 mg of Depo-Medrol with 9 cc of 0.5% bupivacaine was then evenly divided amongst the 3 sites.  The needle was then flushed with 1 cc 1% lidocaine, re-styletted and removed with tip intact.   The patient tolerated the  procedure very well without significant immediate complication.          Complications: None.    Follow up: The patient was followed in the pain clinic as needed basis.          Jose Rich MD

## 2025-01-13 ENCOUNTER — TELEPHONE (OUTPATIENT)
Dept: PAIN CLINIC | Facility: CLINIC | Age: 80
End: 2025-01-13

## 2025-01-13 ENCOUNTER — OFFICE VISIT (OUTPATIENT)
Dept: PAIN CLINIC | Facility: CLINIC | Age: 80
End: 2025-01-13
Payer: MEDICARE

## 2025-01-13 VITALS — SYSTOLIC BLOOD PRESSURE: 122 MMHG | DIASTOLIC BLOOD PRESSURE: 60 MMHG

## 2025-01-13 DIAGNOSIS — M17.11 PRIMARY OSTEOARTHRITIS OF RIGHT KNEE: Primary | ICD-10-CM

## 2025-01-13 DIAGNOSIS — R91.8 MULTIPLE PULMONARY NODULES: ICD-10-CM

## 2025-01-13 DIAGNOSIS — M17.12 PRIMARY OSTEOARTHRITIS OF LEFT KNEE: ICD-10-CM

## 2025-01-13 DIAGNOSIS — J43.2 CENTRILOBULAR EMPHYSEMA (HCC): ICD-10-CM

## 2025-01-13 PROCEDURE — G2211 COMPLEX E/M VISIT ADD ON: HCPCS | Performed by: ANESTHESIOLOGY

## 2025-01-13 PROCEDURE — 99214 OFFICE O/P EST MOD 30 MIN: CPT | Performed by: ANESTHESIOLOGY

## 2025-01-13 RX ORDER — IPRATROPIUM BROMIDE AND ALBUTEROL SULFATE 2.5; .5 MG/3ML; MG/3ML
SOLUTION RESPIRATORY (INHALATION)
Qty: 180 ML | Refills: 12 | Status: SHIPPED | OUTPATIENT
Start: 2025-01-13

## 2025-01-13 NOTE — TELEPHONE ENCOUNTER
Received request for:Ipratropium-Albuterol. Neb solution    Patient last seen by:  on 08/13/24    Has scheduled appointment: 01/16/2025    Physician recommendation: Continue to use albuterol nebulizer every 6 hrs as needed for your breathing or cough.

## 2025-01-13 NOTE — TELEPHONE ENCOUNTER
Order Questions    Question Answer   Anesthesia Type Local   Provider [facility] Procedure Lab   Procedure RFA   Laterality/Level right genicular nerve   CPT (Hit enter after each entry) DSTRJ NUACET AGT GNCLR NRV   Medical clearance requested (will send to Pain Navigator) No   Patient has Medicare coverage? Yes   Comments (Please list entire procedure name here.) right genicular nerve RFA

## 2025-01-13 NOTE — PATIENT INSTRUCTIONS
Refill policies:    Allow 2-3 business days for refills; controlled substances may take longer.  Contact your pharmacy at least 5 days prior to running out of medication and have them send an electronic request or submit request through the “request refill” option in your [employer] account.  Refills are not addressed on weekends; covering physicians do not authorize routine medications on weekends.  No narcotics or controlled substances are refilled after noon on Fridays or by on call physicians.  By law, narcotics must be electronically prescribed.  A 30 day supply with no refills is the maximum allowed.  If your prescription is due for a refill, you may be due for a follow up appointment.  To best provide you care, patients receiving routine medications need to be seen at least once a year.  Patients receiving narcotic/controlled substance medications need to be seen at least once every 3 months.  In the event that your preferred pharmacy does not have the requested medication in stock (e.g. Backordered), it is your responsibility to find another pharmacy that has the requested medication available.  We will gladly send a new prescription to that pharmacy at your request.    Scheduling Tests:    If your physician has ordered radiology tests such as MRI or CT scans, please contact Central Scheduling at [phone] right away to schedule the test.  Once scheduled, the [facility] hospital Centralized Referral Team will work with your insurance carrier to obtain pre-certification or prior authorization.  Depending on your insurance carrier, approval may take 3-10 days.  It is highly recommended patients assure they have received an authorization before having a test performed.  If test is done without insurance authorization, patient may be responsible for the entire amount billed.      Precertification and Prior Authorizations:  If your physician has recommended that you have a procedure or additional testing performed the Critical access hospital  Centralized Referral Team will contact your insurance carrier to obtain pre-certification or prior authorization.    You are strongly encouraged to contact your insurance carrier to verify that your procedure/test has been approved and is a COVERED benefit.  Although the [facility] Centralized Referral Team does its due diligence, the insurance carrier gives the disclaimer that \"Although the procedure is authorized, this does not guarantee payment.\"    Ultimately the patient is responsible for payment.   Thank you for your understanding in this matter.  Paperwork Completion:  If you require FMLA or disability paperwork for your recovery, please make sure to either drop it off or have it faxed to our office at [phone]. Be sure the form has your name and date of birth on it.  The form will be faxed to our Forms Department and they will complete it for you.  There is a 25$ fee for all forms that need to be filled out.  Please be aware there is a 10-14 day turnaround time.  You will need to sign a release of information (BEATRICE) form if your paperwork does not come with one.  You may call the Forms Department with any questions at [phone].  Their fax number is [phone].

## 2025-01-13 NOTE — PROGRESS NOTES
Name: Gabi Kumar   : 1945   DOS: 2025     Pain Clinic Follow Up Visit:     Chief Complaint   Patient presents with    Follow - Up     RIGHT GENICULAR NERVE BLOCK with local       Gabi Kumar is a 79 year old female with multiple pain complaints here for follow-up.  The patient has end-stage osteoarthritis of the right knee but is not a surgical candidate.  She is status post genicular nerve block with the 100% pain relief for 2 days.    Pt denies any chills, fever, or weakness. There is no bladder or bowel incontinence associated with the pain.    REVIEW OF SYSTEMS:  A ten point review of systems was performed with pertinent positives and negatives in the HPI.    Allergies[1]    Current Outpatient Medications   Medication Sig Dispense Refill    fluticasone propionate 50 MCG/ACT Nasal Suspension 2 sprays by Each Nare route daily.      acidophilus-pectin Oral Cap Take 1 capsule by mouth daily.      cetirizine 10 MG Oral Tab Take 1 tablet (10 mg total) by mouth daily.      allopurinol 300 MG Oral Tab Take 1 tablet (300 mg total) by mouth daily. 90 tablet 3    DULoxetine (CYMBALTA) 60 MG Oral Cap DR Particles Take 1 capsule (60 mg total) by mouth daily. 90 capsule 2    umeclidinium-vilanterol (ANORO ELLIPTA) 62.5-25 MCG/ACT Inhalation Aerosol Powder, Breath Activated Inhale 1 puff into the lungs daily. 1 each 5    HYDROcodone-acetaminophen 5-325 MG Oral Tab Take 1 tablet by mouth every 6 (six) hours as needed. 20 tablet 0    loratadine 10 MG Oral Tab Take 1 tablet (10 mg total) by mouth daily.      pantoprazole 40 MG Oral Tab EC Take 1 tablet (40 mg total) by mouth every morning before breakfast.      ipratropium-albuterol 0.5-2.5 (3) MG/3ML Inhalation Solution Take 3 mL by nebulization every 6 (six) hours as needed. 180 mL 12    ONETOUCH ULTRA In Vitro Strip 2 (two) times daily.      YUPELRI 175 MCG/3ML Inhalation Solution nebulizer solution ADMINISTER ONE (1) VIAL VIA NEBULIZER MACHINE DAILY  90 mL 11    Ascorbic Acid (VITAMIN C) 100 MG Oral Tab Take 10 tablets (1,000 mg total) by mouth daily.      pioglitazone 15 MG Oral Tab Take 1 tablet (15 mg total) by mouth daily.      glimepiride 2 MG Oral Tab Take 1 tablet (2 mg total) by mouth daily with breakfast.      albuterol 108 (90 Base) MCG/ACT Inhalation Aero Soln Inhale 2 puffs into the lungs every 6 (six) hours as needed for Wheezing or Shortness of Breath. inhale 2 puff by inhalation route  every 4 - 6 hours as needed 1 each 11    METOPROLOL SUCCINATE ER 25 MG Oral Tablet 24 Hr TAKE ONE TABLET BY MOUTH ONE TIME DAILY 90 tablet 1    rosuvastatin 10 MG Oral Tab Take 1 tablet (10 mg total) by mouth nightly. Replaces simvastatin 90 tablet 3    Cholecalciferol (VITAMIN D-3 OR) Take 1 capsule by mouth once daily.      Multiple Vitamins-Minerals (CENTRUM) Oral Tab Take 1 tablet by mouth daily.           EXAM:   /60 (BP Location: Right arm, Patient Position: Sitting, Cuff Size: adult)   [facility] 06/07/1999   General:  Patient is a(n) 79 year old year old female in no acute distress.  Neurologic:: WNL-Orientation to time, place and person, normal mood & affect, concentration & attention span intact.   Inspection:  Ambulates with well-coordinated, fluid, non-antalgic gait.  Gait is normal.  Neck: Full range of motion  Back: Gait intact with walker  Cranial nerves: Grossly intact  Respiratory: Nonlabored  IMAGES:     Knee x-ray reviewed with end-stage osteoarthritis    ASSESSMENT AND PLAN:     1. Primary osteoarthritis of right knee    2. Primary osteoarthritis of left knee      The patient is a very pleasant 79-year-old female who has axial pain complaints.  Complains of back pain and knee pain.  From a knee pain perspective, there is end-stage osteoarthritis but she is not a surgical candidate.  Discussed moving forward with genicular nerve radiofrequency ablation given positive diagnostic response.  Additionally had detailed discussion outlining her multiple  structural findings on her lumbar MRI which would explain her continued claudication symptoms.  Discussed management options and patient is not a surgical candidate for decompressive lumbar surgery.  Therefore, may benefit from epidural injection on as-needed basis.    Orders:  Orders Placed This Encounter   Procedures    [facility] PAIN NAVIGATOR         Radiology orders and consultations:None  The patient indicates understanding of these issues and agrees to the plan.  No follow-ups on file.    Jose Rich MD, 1/13/2025, 12:42 PM              [1]   Allergies  Allergen Reactions    Pcn [Bicillin C-R,] SWELLING     itching    Levofloxacin MYALGIA     Severe muscle and bone pain    Penicillins OTHER (SEE COMMENTS)

## 2025-01-13 NOTE — PROGRESS NOTES
Last procedure: RIGHT GENICULAR NERVE BLOCK with local   Date: 12/17/24  Percentage of relief obtained: 100%  Duration of relief: 2-3 days    Current Pain Score: 8-9/10    Narcotic Contract Exp: n/a

## 2025-01-13 NOTE — TELEPHONE ENCOUNTER
Patient advised of insurance approval to proceed with injections and is agreeable to scheduling. Patient scheduled for procedure, pre-procedure instructions reviewed. Patient prefers Local sedation. Reviewed sedation instructions including No Fasting & No  Required. Patient has no medications to hold prior to procedure. Patient verbalized understanding of instructions, no further needs at this time.    Pre Procedure Instruction Sheet provided to patient at office visit.       [facility] PAIN CLINIC  PRE-PROCEDURE INSTRUCTIONS WITHOUT SEDATION    Procedure: Right Genicular Nerve Knee RFA      Appointment Date: 01/23/2025  Check-In Time: 01:00 PM    Follow-Up Date/Time: 02/21/2025 @ 01:00 PM    Prior to the procedure:  Please update us prior to the procedure if you are experiencing any symptoms of infection such as cough, fever, chills, urinary symptoms, or have recently been prescribed antibiotics, have open wounds, have recently had surgery or dental procedures.    Day of Procedure:  **Drivers will be required for patients who receive prescriptions for Valium.    NO FASTING REQUIRED  Please bring your Insurance Card, Photo ID, List of Current Medications and Referral (if applicable) to your appointment.  Please park in the [employer] [facility] and follow the signs to the [facility].  Check in at [facility] ([address] [place] [address]) outpatient registration in the [facility].  Please note-No prescriptions will be written by Pain Clinic in OR on the day of procedure. If you require a refill of medications, please contact the office 48 hours prior to your procedure.  If you have an implanted Spinal Cord or Peripheral Nerve Stimulator: Please remember to turn device off for procedure.        Medication Hold:    Number of days you need to be off for the following medications:    Aggrenox 10 days   Agrylin (Anagrelide) 10 days  Brilinta (Ticagrelor) 7 days  Imbruvica (Ibrutinib) 3  days   Enbrel (Etanercept) 24 hours   Fragmin (Dalteparin) 24 hours   Pletal (Cilostazol) 7 days  Effient (Prasugrel) 7 days  Pradaxa 10 days  Trental 7 days  Eliquis (Apixaban) 3 days  Xarelto (Rivaroxaban) 3 days  Lovenox (Enoxaparin) 24 hours  Aspirin  Greater than 81mg but less than 325mg   5 days  325mg and greater                  7 days  Coumadin       5 days  Procedure may be cancelled if INR is elevated.   Excedrin (with aspirin) 7 days  Plavix (Clopidogrel)                            7 days    NSAIDs: 24 hours preferred      Ibuprofen (Motrin, Advil, Vicoprofen), Naproxen (Naprosyn, Aleve), Piroxcam (Feldene), Meloxicam (Mobic), Oxaprozin (Daypro), Diclofenac (Voltaren), Indomethacin (Indocin), Etodolac (Lodine), Nabumetone (Relafen), Celebrex (Celecoxib)           HERBAL SUPPLEMENTS  5 days preferred  Fish oil, krill oil, Omega-3, Vascepa, Vitamin E, Turmeric, Garlic                       Insurance Authorization:   Most insurances are now requiring a preauthorization for all procedures.  In the event that your insurance does not authorize your procedure within 48 hours of the scheduled date, your procedure will be cancelled and rescheduled to a later date.  Please contact your insurance carrier to determine what your financial responsibility will be for the procedure(s).      Cancellation/Rescheduling Appointment:   In the event you need to cancel or reschedule your appointment, you must notify the office 24 hours prior.    Post-procedure instructions:        Please schedule a follow up visit within 2 to 4 weeks after your last procedure date   Please call our office with any questions or concerns before or after your procedure at  [phone].  If you are a diabetic, please increase the frequency of your glucose monitoring after the procedure as this may cause a temporary increase in your blood sugar.  Contact your primary care physician if your blood sugar rises as you may require some medication  adjustment.  It is normal to have increased pain at injection site for up to 3-5 days after procedure, you can use heat or ice (20 minutes on 20 minutes off) for comfort.    **To hear a recorded version of these instructions, please call [phone] and follow the prompts.  **Para escuchar las instrucciones en Español, por favor de llamar el gina [phone] opción 4.

## 2025-01-16 ENCOUNTER — OFFICE VISIT (OUTPATIENT)
Age: 80
End: 2025-01-16
Payer: MEDICARE

## 2025-01-16 VITALS
RESPIRATION RATE: 16 BRPM | SYSTOLIC BLOOD PRESSURE: 110 MMHG | DIASTOLIC BLOOD PRESSURE: 70 MMHG | HEIGHT: 64 IN | BODY MASS INDEX: 30 KG/M2

## 2025-01-16 DIAGNOSIS — J84.9 ILD (INTERSTITIAL LUNG DISEASE) (HCC): ICD-10-CM

## 2025-01-16 DIAGNOSIS — J43.2 CENTRILOBULAR EMPHYSEMA (HCC): Primary | ICD-10-CM

## 2025-01-16 DIAGNOSIS — Z72.0 TOBACCO ABUSE: ICD-10-CM

## 2025-01-16 DIAGNOSIS — R91.8 MULTIPLE PULMONARY NODULES: ICD-10-CM

## 2025-01-16 PROCEDURE — 99214 OFFICE O/P EST MOD 30 MIN: CPT | Performed by: INTERNAL MEDICINE

## 2025-01-16 RX ORDER — LEVALBUTEROL INHALATION SOLUTION 0.63 MG/3ML
3 SOLUTION RESPIRATORY (INHALATION) EVERY 6 HOURS PRN
Qty: 360 ML | Refills: 3 | Status: SHIPPED | OUTPATIENT
Start: 2025-01-16

## 2025-01-16 NOTE — PATIENT INSTRUCTIONS
Continue the anoro daily  If you take the anoro, do NOT take the Yupelri.    If you take Yupelri nebulizer, do NOT take the anoro  I will change the albuterol nebulizer to LEVALBUTEROL.  Use the LEVALBUTEROL one vial every 6 hours as needed for your breathing or cough    Try to use the oxygen whenever you exert yourself  Obtain the CT scan in April/May 2025.  Call central scheduling at [phone] to schedule the CT scan   Call/message with questions/concerns

## 2025-01-16 NOTE — PROGRESS NOTES
[facility] General Pulmonary Progress Note    History of Present Illness:  Gabi Kumar is a 79 year old female former smoker (quit 11/2019, 50 pack years) with significant PMH moderate COPD, ILD, CAD, PAD, DM, HTN who presents today for follow up of COPD.. she has ongoing issues with arthritis limiting her mobility and thinks it worsens her breathing. Has been using both anoro and yupelri daily. Not using albuterol since it makes her shaky    August 2024 previously  Since last visit her epistaxis has improved. Seen by Dr. Kenny recently.  No new concerns  Only using o2 a few hours/day - does not always use when exerting herself    June 2024 previously  Since last visit she reports she had been well until she fell and fractured her right ribs. She was hospitalized and during her hospitalization found to have resting oximetry saturation in 80s on RA, she was restarted on home o2 and now presents for follow up.  Previously she had self discontinued the home oxygen and has only variably used it since her discharge. She developed epistaxis since her discharge and has returned to the ER and had nasal packing placed - planning to see ENT this week.  She presents today with no oxygen and denies change in chronic dyspnea.  Has had continued issues with the nose bleeding. Has facial pain related to packing. No fevers    October 2023 previously    Since last visit she self discontinued her oxygen against medical advice.  She denies new concerns today. Having chronic back pain, joint pains.  Stable LOTT.  Occasional cough with white/yellow phlegm. No f/c/s. No chest pain.  Using anoro off/on through the week, alternates with yupelri. Has albuterol MDI and neb PRN    August 2023 previously  who presents for hospital f/u for COPD exacerbation. Pt reports using Anoro daily without incident until a tree fell outside her house with her windows open and may have sent dust, dirt into her house.  She experienced increased SOB and started  using Yupelri and rescue inhaler without relief and went to the hospital. She was hospitalized 8/16-8/20/23,  received steroids and found to need oxygen at discharge. Oxygen titration at discharge showed she needed 4L with activity (78% on RA with activity; 94% on RA at rest). Pt has not been using it at all. Today reports she is feeling better than she has in months and would like the oxygen picked up from her home. Reprots SOB at times but she paces herself.    Previously 10/2022 Dr Maya   77 year old female former smoker (quit 11/2019, 50 pack years) with significant PMH moderate COPD, CAD, PAD, DM, HTN who presents today for follow up of COPD. She denies new concerns today, has chronic dyspnea and limited exertion due to joint pain. Had improvement in symptoms with spinal cord simulator but since has been removed.  Using anoro daily - enrolled in program through [employer]. Also using yupelri daily. Rarely uses albuterol. Occasional cough related to sinus congestion/drainage. Noted mild epistaxis last week, seems better now. No hemoptysis. No pain. No pleurisy. No fever.        Past Medical History:   Past Medical History:    Back problem    Cataract    Cholelithiasis    DIABETES    Diabetes (HCC)    Diverticulitis    suggestion of possible wall thickening involving sigmoid, possible mild diverticulitis    Diverticulosis    Essential hypertension    Gallbladder disease    thickened gallbladder with distended bile duct    High blood pressure    High cholesterol    HYPERLIPIDEMIA    Hyperlipidemia    IBS (irritable bowel syndrome)    Osteoarthritis    hips, back and knees    Rectal bleeding    Thyroid disease    Visual impairment    reading glasses        Past Surgical History:   Past Surgical History:   Procedure Laterality Date    Carpal tunnel release      Colonoscopy  2003    Diverticulosis    Colonoscopy  2012    decreased anal sphincter tone, diverticulosis, internal hemorrhoids, normal random colon biopsies     Colonoscopy N/A 07/26/2017    Procedure: COLONOSCOPY;  Surgeon: Garrett Moody MD;  Location:  ENDOSCOPY    Colonoscopy N/A 12/01/2024    Procedure: INCOMPLETE COLONOSCOPY WITH BIOPSIES;  Surgeon: Jaswinder Covington MD;  Location:  ENDOSCOPY    [facility]      Other  1999    Carpel tnnel right hand    Other surgical history  1995    Fatty tumor from neck    Other surgical history  1999    Arthroscopy right knee    Other surgical history  2001    Carpal tunnel both hand    Other surgical history  12/22/2020    Cystoscopy, Dr Chun       Family Medical History:   Family History   Problem Relation Age of Onset    Cancer Other         Stomach cancer    Heart Disease Father     Hypertension Father     High Blood Pressure Father     Diabetes Father     Heart Disease Mother     Hypertension Mother     Lung Disorder Mother         Pulmonary fibrosis        Social History:   Social History     Socioeconomic History    Marital status:      Spouse name: Not on file    Number of children: Not on file    Years of education: Not on file    Highest education level: Not on file   Occupational History    Not on file   Tobacco Use    Smoking status: Former     Current packs/day: 0.00     Average packs/day: 0.8 packs/day for 50.0 years (37.5 ttl pk-yrs)     Types: Cigarettes     Start date: 10/22/1964     Quit date: 10/22/2014     Years since quitting: 10.2    Smokeless tobacco: Never   Vaping Use    Vaping status: Never Used   Substance and Sexual Activity    Alcohol use: No     Alcohol/week: 0.0 standard drinks of alcohol    Drug use: No    Sexual activity: Not Currently   Other Topics Concern     Service Not Asked    Blood Transfusions Not Asked    Caffeine Concern Yes    Occupational Exposure Not Asked    Hobby Hazards Not Asked    Sleep Concern Not Asked    Stress Concern Not Asked    Weight Concern Not Asked    Special Diet Not Asked    Back Care Not Asked    Exercise Yes    Bike Helmet Not Asked    Seat Belt Not Asked     Self-Exams Not Asked   Social History Narrative    Not on file     Social Drivers of Health     Financial Resource Strain: Not on file   Food Insecurity: No Food Insecurity (11/30/2024)    Food Insecurity     Food Insecurity: Never true   Transportation Needs: No Transportation Needs (11/30/2024)    Transportation Needs     Lack of Transportation: No     Car Seat: Not on file   Physical Activity: Not on file   Stress: Not on file   Social Connections: Not on file   Housing Stability: Low Risk  (11/30/2024)    Housing Stability     Housing Instability: No     Housing Instability Emergency: Not on file     Crib or Bassinette: Not on file        Medications:   Current Outpatient Medications   Medication Sig Dispense Refill    Levalbuterol HCl 0.63 MG/3ML Inhalation Nebu Soln Take 3 mL by nebulization every 6 (six) hours as needed for Wheezing or Shortness of Breath (cough). 360 mL 3    fluticasone propionate 50 MCG/ACT Nasal Suspension 2 sprays by Each Nare route daily.      acidophilus-pectin Oral Cap Take 1 capsule by mouth daily.      cetirizine 10 MG Oral Tab Take 1 tablet (10 mg total) by mouth daily.      allopurinol 300 MG Oral Tab Take 1 tablet (300 mg total) by mouth daily. 90 tablet 3    DULoxetine (CYMBALTA) 60 MG Oral Cap DR Particles Take 1 capsule (60 mg total) by mouth daily. 90 capsule 2    umeclidinium-vilanterol (ANORO ELLIPTA) 62.5-25 MCG/ACT Inhalation Aerosol Powder, Breath Activated Inhale 1 puff into the lungs daily. 1 each 5    HYDROcodone-acetaminophen 5-325 MG Oral Tab Take 1 tablet by mouth every 6 (six) hours as needed. 20 tablet 0    loratadine 10 MG Oral Tab Take 1 tablet (10 mg total) by mouth daily.      pantoprazole 40 MG Oral Tab EC Take 1 tablet (40 mg total) by mouth every morning before breakfast.      ONETOUCH ULTRA In Vitro Strip 2 (two) times daily.      YUPELRI 175 MCG/3ML Inhalation Solution nebulizer solution ADMINISTER ONE (1) VIAL VIA NEBULIZER MACHINE DAILY 90 mL 11     Ascorbic Acid (VITAMIN C) 100 MG Oral Tab Take 10 tablets (1,000 mg total) by mouth daily.      pioglitazone 15 MG Oral Tab Take 1 tablet (15 mg total) by mouth daily.      glimepiride 2 MG Oral Tab Take 1 tablet (2 mg total) by mouth daily with breakfast.      albuterol 108 (90 Base) MCG/ACT Inhalation Aero Soln Inhale 2 puffs into the lungs every 6 (six) hours as needed for Wheezing or Shortness of Breath. inhale 2 puff by inhalation route  every 4 - 6 hours as needed 1 each 11    METOPROLOL SUCCINATE ER 25 MG Oral Tablet 24 Hr TAKE ONE TABLET BY MOUTH ONE TIME DAILY 90 tablet 1    rosuvastatin 10 MG Oral Tab Take 1 tablet (10 mg total) by mouth nightly. Replaces simvastatin 90 tablet 3    Cholecalciferol (VITAMIN D-3 OR) Take 1 capsule by mouth once daily.      Multiple Vitamins-Minerals (CENTRUM) Oral Tab Take 1 tablet by mouth daily.      IPRATROPIUM-ALBUTEROL 0.5-2.5 (3) MG/3ML Inhalation Solution ADMINISTER ONE (1) VIAL VIA NEBULIZER MACHINE EVERY SIX (6) HOURS AS NEEDED. (Patient not taking: Reported on 1/16/2025) 180 mL 12       Review of Systems: Review of Systems   Constitutional: Negative.    HENT:          Nasal packing with localized pain   Respiratory:  Positive for shortness of breath. Negative for cough.    Cardiovascular: Negative.    Musculoskeletal:  Positive for arthralgias and back pain.   All other systems reviewed and are negative.       Physical Exam:  /70 (BP Location: Right arm, Patient Position: Sitting, Cuff Size: adult)   Resp 16   Ht 5' 4\" (1.626 m)   LMP 06/07/1999   BMI 29.52 kg/m²      Constitutional: alert, cooperative. No acute distress.  HEENT: Head NC/AT.    Cardio: Regular rate and rhythm. Normal S1 and S2. No murmurs.   Respiratory: Thorax symmetrical with no labored breathing. Crackles at bibases. No wheeze  Extremities: No clubbing or cyanosis. No BLE edema.    Neurologic: A&Ox3. No gross motor deficits.  Skin: Warm, dry  Psych: Calm, cooperative. Pleasant  affect.    Results:  Personally reviewed    WBC: 9.9, done on 12/1/2024.  HGB: 13.3, done on 12/1/2024.  PLT: 212, done on 12/1/2024.     Glucose: 152, done on 12/1/2024.  Cr: 1, done on 12/1/2024.  CA: 9.5, done on 12/1/2024.  Na: 142, done on 12/1/2024.  K: 3.9, done on 12/1/2024.  Cl: 107, done on 12/1/2024.  CO2: 23, done on 12/1/2024.  Last ALB was 4.6% done on 12/1/2024.     XR PAIN CLINIC FLUOROSCOPY - N/C    Result Date: 12/17/2024  CONCLUSION:  2 needles overlying the distal right femoral diaphysis and a needle overlying the proximal medial aspect of the metaphysis of the right tibia.   LOCATION:  [place]    Dictated by ([facility]): Hiren Shen MD on 12/17/2024 at 4:53 PM     Finalized by (CST): Hiren Shen MD on 12/17/2024 at 4:53 PM       XR CHEST AP PORTABLE  (CPT=71045)    Result Date: 11/30/2024  CONCLUSION:  The lungs are hyperinflated.  There are coarse reticular changes noted in the which is moderate in degree, bilateral and peripheral in distribution slightly greater in the upper lobes than the lower lobes.  Tract these findings are consistent with pulmonary fibrosis/interstitial lung disease.  No mass or pneumonia.. Is mild cardiomegaly with slight vascular redistribution without CHF.  Slight blunting of the CP angles.  Degenerative change of the spine noted.   LOCATION:  [facility]      Dictated by ([facility]): Anthony Oneill MD on 11/30/2024 at 7:02 AM     Finalized by (CST): Anthony Oneill MD on 11/30/2024 at 7:04 AM       XR PAIN CLINIC FLUOROSCOPY - N/C    Result Date: 11/26/2024  CONCLUSION:  1 fluoroscopic image(s) obtained and submitted during left knee procedure.  No radiologist was present for the exam.  Correlation with real-time fluoroscopy and operative report are recommended.   LOCATION:  [facility]    Dictated by ([facility]): Deep Reeves MD on 11/26/2024 at 2:19 PM     Finalized by (CST): Deep Reeves MD on 11/26/2024 at 2:20 PM       XR SACRUM + COCCYX (MIN 2 VIEWS)  (QFB=62520)    Result Date: 11/4/2024  CONCLUSION:  1. Diffuse osseous demineralization suggesting osteopenia versus osteoporosis.  Due to the demineralization, there is relative decreased sensitivity for sacral fractures.  No gross evidence of sacral alar fractures are noted, however if there is continued clinical concern, this may be further evaluated with MR imaging. 2. There is evidence of a fracture along the junction of the left superior pubic ramus with the anterior column of the left acetabulum.  This appears nondisplaced.  There is also a nondisplaced fracture through the midportion of the left inferior pubic ramus. 3. Moderate bilateral hip osteoarthritis. 4. Grade 1 anterolisthesis at L5-S1 with extensive disc and facet arthropathy.  Spondylolysis at L5 cannot be excluded.   LOCATION:  [facility]   Dictated by (CST): Susu Choudhury DO on 11/04/2024 at 6:24 PM     Finalized by (CST): Susu Choudhury DO on 11/04/2024 at 6:29 PM       XR PAIN CLINIC FLUOROSCOPY - N/C    Result Date: 10/24/2024  CONCLUSION:  Imaging provided during pain management procedure as directed by the performing physician.   LOCATION:  [facility]    Dictated by (CST): Jabari Currie MD on 10/24/2024 at 3:13 PM     Finalized by (CST): Jabari Currie MD on 10/24/2024 at 3:14 PM         Assessment/Plan:   #1. chronic hypoxic respiratory failure  Previously was managed on 4L with activity at discharge from [facility] 8/20/23 for oxygen sats at 78%  However she had previously self discontinued the oxygen against medical advice in 2023  Now again noted to be quite hypoxic on room air during hospitalization June 2024 and restarted on o2 - last 6MWT on 6/5/2024: 2L at rest, 4L on exertion  Advised to use o2 with exertion. We again discussed the benefits and risks of oxygen and risks of no treatment which includes MI, stroke and death. She remains reluctant to use      #2.  Moderate COPD  Former smoker, quit 11/2019, 50 pack years  Exacerbations: March  2020, ?August 2023  Stable symptoms including LOTT  Last PFTs: 9/2022 with FEV1 1.45L, 75%, %, DLCO 32% --> DLCO out of proportion to obstruction and suggests pulmonary hypertension or ILD  9/2022 echo with +RVSP 34  8/2023 HRCT with +peripheral reticular opacities with some honeycombing. +emphysema. Prominent nodes  We discussed her inhaler and neb regimen. She has been duplicating her treatment with both anoro and yupelri. Advised to use one or the other  Will change albuterol to LEVALBUTEROL given albuterol causes tremors/shakiness  Previously encouraged exercise and discussed pulmonary rehab but she has been hesitant to go given her ongoing joint symptoms limiting her ability to exercise. Advised to consider pulm rehab in the future    #3. ILD  9/2022 with FEV1 1.45L, 75%, %, DLCO 32% --> DLCO out of proportion to obstruction and suggests pulmonary hypertension or ILD  8/2023 HRCT with +peripheral reticular opacities with some honeycombing. +emphysema. Prominent nodes.  The reticular changes were only minimally present in RLL base in 2020 so this has certainly progressed  Imaging is possible UIP pattern.  She does have +ALLAN (centromere) in the past and following with DR. Croft raising concern for CTD related ILD  Plan to obtain PFTs and HRCT in future  We can consider either antifibrotics or DMARD with Dr. Croft. also advised she could consider ILD clinic at [facility] or [facility]    #4. Pulmonary hypertension  Noted on echo originally 2017 and again 2020; 9/2022 echo with +RVSP 34; to see her cardiologist later this month    #5. Pulmonary nodules  Noted on LDCT  No new nodules on 8/2023 HRCT  No need for f/u imaging    #6. Tobacco abuse  50 pack years, quit 11/2019  Congratulated on sustained smoking cessation    Nasir Maya MD

## 2025-01-23 ENCOUNTER — APPOINTMENT (OUTPATIENT)
Dept: GENERAL RADIOLOGY | Facility: HOSPITAL | Age: 80
End: 2025-01-23
Attending: ANESTHESIOLOGY
Payer: MEDICARE

## 2025-01-23 ENCOUNTER — HOSPITAL ENCOUNTER (OUTPATIENT)
Facility: HOSPITAL | Age: 80
Setting detail: HOSPITAL OUTPATIENT SURGERY
Discharge: HOME | End: 2025-01-23
Attending: ANESTHESIOLOGY | Admitting: ANESTHESIOLOGY
Payer: MEDICARE

## 2025-01-23 VITALS
HEART RATE: 83 BPM | HEIGHT: 64 IN | RESPIRATION RATE: 16 BRPM | WEIGHT: 172 LBS | SYSTOLIC BLOOD PRESSURE: 155 MMHG | OXYGEN SATURATION: 92 % | DIASTOLIC BLOOD PRESSURE: 72 MMHG | TEMPERATURE: 98 F | BODY MASS INDEX: 29.37 KG/M2

## 2025-01-23 LAB — GLUCOSE BLD-MCNC: 99 MG/DL (ref 70–99)

## 2025-01-23 PROCEDURE — 01553ZZ DESTRUCTION OF MEDIAN NERVE, PERCUTANEOUS APPROACH: ICD-10-PCS | Performed by: ANESTHESIOLOGY

## 2025-01-23 PROCEDURE — 64454 NJX AA&/STRD GNCLR NRV BRNCH: CPT | Performed by: ANESTHESIOLOGY

## 2025-01-23 RX ORDER — LIDOCAINE HYDROCHLORIDE 10 MG/ML
INJECTION, SOLUTION EPIDURAL; INFILTRATION; INTRACAUDAL; PERINEURAL
Status: DISCONTINUED | OUTPATIENT
Start: 2025-01-23 | End: 2025-01-23

## 2025-01-23 RX ORDER — NALOXONE HYDROCHLORIDE 0.4 MG/ML
0.08 INJECTION, SOLUTION INTRAMUSCULAR; INTRAVENOUS; SUBCUTANEOUS AS NEEDED
Status: DISCONTINUED | OUTPATIENT
Start: 2025-01-23 | End: 2025-01-23

## 2025-01-23 RX ORDER — METHYLPREDNISOLONE ACETATE 40 MG/ML
INJECTION, SUSPENSION INTRA-ARTICULAR; INTRALESIONAL; INTRAMUSCULAR; SOFT TISSUE
Status: DISCONTINUED | OUTPATIENT
Start: 2025-01-23 | End: 2025-01-23

## 2025-01-23 RX ORDER — BUPIVACAINE HYDROCHLORIDE 5 MG/ML
INJECTION, SOLUTION EPIDURAL; INTRACAUDAL
Status: DISCONTINUED | OUTPATIENT
Start: 2025-01-23 | End: 2025-01-23

## 2025-01-23 NOTE — DISCHARGE INSTRUCTIONS
Home Care Instructions Following Your Pain Procedure     Gabi,  It has been a pleasure to have you as our patient. To help you at home, you must follow these general discharge instructions. We will review these with you before you are discharged. It is our hope that you have a complete and uneventful recovery from our procedure.     General Instructions:  What to Expect:  Bandages from your procedure today can be removed when you get home.  Please avoid soaking and/or swimming for 24 hours.  Showering is okay  It is normal to have increased pain symptoms and/or pain at injection site for up to 3-5 days after procedure, you can use heat or ice (20 minutes on 20 minutes off) for comfort.  You may experience some temporary side effects which may include restlessness or insomnia, flushing of the face, or heart palpitations.  Please contact the provider if these symptoms do not resolve within 3-4 days.  Lightheadedness or nausea may occur and should resolve within 24 to 48 hours.  If you develop a headache after treatment, rest, drink fluids (with caffeine, if possible) and take mild over-the-counter pain medication.  If the headache does not improve with the above treatment, contact the physician.  Home Medications:  Resume all previously prescribed medication.  Please avoid taking NSAIDs (Non-Steriodal Anti-Inflammatory Drugs) such as:  Ibuprofen ( Advil, Motrin) Aleve (Naproxen), Diclofenac, Meloxicam for 6 hours after procedure.   If you are on Coumadin (Warfarin) or any other anti-coagulant (or \"blood thinning\") medication such as Plavix (Clopidogrel), Xarelto (Rivaroxaban), Eliquis (Apixaban), Effient (Prasugrel) etc., restart on the following day from the procedure unless otherwise directed by your provider.  If you are a diabetic, please increase the frequency of your glucose monitoring after the procedure as steroids may cause a temporary (2-3 day) increase in your blood sugar.  Contact your primary care  physician if your blood sugar remains elevated as you may require some medication adjustment.  Diet:  Resume your regular diet as tolerated.  Activity:  We recommend that you relax and rest during the rest of your procedure day.  If you feel weakness in your arms or legs do not drive.  Follow-up Appointment  Please schedule a follow-up visit within 3 to 4 weeks after your last procedure date.  Question or Concerns:  Feel free to call our office with any questions or concerns at [phone] (option #2)    Gabi  Thank you for coming to [facility] for your procedure.  The nurses try very hard to make sure you receive the best care possible.  Your trust in them as well as us is greatly appreciated.    Thanks so much,   Dr. Jose Rich

## 2025-01-23 NOTE — H&P
History & Physical Examination    Patient Name: Gabi Kumar  MRN: [id number]  CSN: [id number]  YOB: 1945    Pre-Operative Diagnosis:  Primary osteoarthritis of right knee [M17.11]    Present Illness: Knee pain    ASA: 3  MP class: 1  Sedation: Local      Prescriptions Prior to Admission[1]  Current Facility-Administered Medications   Medication Dose Route Frequency    bupivacaine PF (Marcaine) 0.5% injection    PRN    methylPREDNISolone acetate (DEPO-medrol) 40 MG/ML injection    PRN    lidocaine PF (Xylocaine-MPF) 1% injection    PRN       Allergies: Allergies[2]    Past Medical History:    Back problem    Cataract    Cholelithiasis    DIABETES    Diabetes (HCC)    Diverticulitis    suggestion of possible wall thickening involving sigmoid, possible mild diverticulitis    Diverticulosis    Essential hypertension    Gallbladder disease    thickened gallbladder with distended bile duct    High blood pressure    High cholesterol    HYPERLIPIDEMIA    Hyperlipidemia    IBS (irritable bowel syndrome)    Osteoarthritis    hips, back and knees    Rectal bleeding    Thyroid disease    Visual impairment    reading glasses     Past Surgical History:   Procedure Laterality Date    Carpal tunnel release      Colonoscopy  2003    Diverticulosis    Colonoscopy  2012    decreased anal sphincter tone, diverticulosis, internal hemorrhoids, normal random colon biopsies    Colonoscopy N/A 07/26/2017    Procedure: COLONOSCOPY;  Surgeon: Garrett Moody MD;  Location:  ENDOSCOPY    Colonoscopy N/A 12/01/2024    Procedure: INCOMPLETE COLONOSCOPY WITH BIOPSIES;  Surgeon: Jaswinder Covington MD;  Location:  ENDOSCOPY    Egd      Other  1999    Carpel tnnel right hand    Other surgical history  1995    Fatty tumor from neck    Other surgical history  1999    Arthroscopy right knee    Other surgical history  2001    Carpal tunnel both hand    Other surgical history  12/22/2020    Cystoscopy, Dr Chun     Family History    Problem Relation Age of Onset    Cancer Other         Stomach cancer    Heart Disease Father     Hypertension Father     High Blood Pressure Father     Diabetes Father     Heart Disease Mother     Hypertension Mother     Lung Disorder Mother         Pulmonary fibrosis     Social History     Tobacco Use    Smoking status: Former     Current packs/day: 0.00     Average packs/day: 0.8 packs/day for 50.0 years (37.5 ttl pk-yrs)     Types: Cigarettes     Start date: 10/22/1964     Quit date: 10/22/2014     Years since quitting: 10.2    Smokeless tobacco: Never   Substance Use Topics    Alcohol use: No     Alcohol/week: 0.0 standard drinks of alcohol       SYSTEM Check if Review is Normal Check if Physical Exam is Normal If not normal, please explain:   HEENT [x ] [x ]    NECK & BACK [x ] [x ]    HEART [x ] [x ]    LUNGS [x ] [x ]    ABDOMEN [x ] [x ]    UROGENITAL [x ] [x ]    EXTREMITIES [x ] [x ]    OTHER        [ x ] I have discussed the risks and benefits and alternatives with the patient/family.  They understand and agree to proceed with plan of care.  [ x ] I have reviewed the History and Physical done within the last 30 days.  Any changes noted above.    Jose Rich MD              [1]   Medications Prior to Admission   Medication Sig Dispense Refill Last Dose/Taking    cetirizine 10 MG Oral Tab Take 1 tablet (10 mg total) by mouth daily.   1/23/2025    allopurinol 300 MG Oral Tab Take 1 tablet (300 mg total) by mouth daily. 90 tablet 3 1/23/2025 at  9:00 AM    DULoxetine (CYMBALTA) 60 MG Oral Cap DR Particles Take 1 capsule (60 mg total) by mouth daily. 90 capsule 2 1/23/2025 at  9:00 AM    umeclidinium-vilanterol (ANORO ELLIPTA) 62.5-25 MCG/ACT Inhalation Aerosol Powder, Breath Activated Inhale 1 puff into the lungs daily. 1 each 5 1/23/2025 at  9:00 AM    HYDROcodone-acetaminophen 5-325 MG Oral Tab Take 1 tablet by mouth every 6 (six) hours as needed. 20 tablet 0 1/23/2025 at  9:00 AM    loratadine 10 MG  Oral Tab Take 1 tablet (10 mg total) by mouth daily.   1/23/2025 at  9:00 AM    pantoprazole 40 MG Oral Tab EC Take 1 tablet (40 mg total) by mouth every morning before breakfast.   1/23/2025 at  9:00 AM    pioglitazone 15 MG Oral Tab Take 1 tablet (15 mg total) by mouth daily.   1/23/2025 at  9:00 AM    glimepiride 2 MG Oral Tab Take 1 tablet (2 mg total) by mouth daily with breakfast.   1/23/2025 at  9:00 AM    METOPROLOL SUCCINATE ER 25 MG Oral Tablet 24 Hr TAKE ONE TABLET BY MOUTH ONE TIME DAILY 90 tablet 1 1/22/2025 at  9:00 PM    rosuvastatin 10 MG Oral Tab Take 1 tablet (10 mg total) by mouth nightly. Replaces simvastatin 90 tablet 3 1/22/2025    Levalbuterol HCl 0.63 MG/3ML Inhalation Nebu Soln Take 3 mL by nebulization every 6 (six) hours as needed for Wheezing or Shortness of Breath (cough). 360 mL 3     IPRATROPIUM-ALBUTEROL 0.5-2.5 (3) MG/3ML Inhalation Solution ADMINISTER ONE (1) VIAL VIA NEBULIZER MACHINE EVERY SIX (6) HOURS AS NEEDED. (Patient not taking: Reported on 1/16/2025) 180 mL 12     fluticasone propionate 50 MCG/ACT Nasal Suspension 2 sprays by Each Nare route daily.       acidophilus-pectin Oral Cap Take 1 capsule by mouth daily.       ONETOUCH ULTRA In Vitro Strip 2 (two) times daily.       YUPELRI 175 MCG/3ML Inhalation Solution nebulizer solution ADMINISTER ONE (1) VIAL VIA NEBULIZER MACHINE DAILY 90 mL 11     Ascorbic Acid (VITAMIN C) 100 MG Oral Tab Take 10 tablets (1,000 mg total) by mouth daily.       albuterol 108 (90 Base) MCG/ACT Inhalation Aero Soln Inhale 2 puffs into the lungs every 6 (six) hours as needed for Wheezing or Shortness of Breath. inhale 2 puff by inhalation route  every 4 - 6 hours as needed 1 each 11     Cholecalciferol (VITAMIN D-3 OR) Take 1 capsule by mouth once daily.       Multiple Vitamins-Minerals (CENTRUM) Oral Tab Take 1 tablet by mouth daily.      [2]   Allergies  Allergen Reactions    Pcn [Bicillin C-R,] SWELLING     itching    Levofloxacin MYALGIA      Severe muscle and bone pain    Penicillins OTHER (SEE COMMENTS)

## 2025-01-23 NOTE — OPERATIVE REPORT
[facility]  Operative Report  2025     Gabi Kumar Patient Status:  Hospital Outpatient Surgery    1945 MRN [id number]   Location [facility] Attending Jose Rich MD   Hosp Day # 0 PCP Bismark Cabrera MD     Indication: Gabi is a 79 year old female with right knee pain    Preoperative Diagnosis:  Primary osteoarthritis of right knee [M17.11]    Postoperative Diagnosis: Same as above.    Procedure performed: RIGHT GENICULAR NERVE RFA with local    Anesthesia: Local      EBL: Less than 1 ml.    Procedure Description:  After reviewing the patient's history and performing a focused physical examination, the diagnosis and positive previous diagnostic tests were confirmed and contraindications such as infection and coagulopathy were ruled out.  Following review of allergies and potential side effects and complications, including but not necessarily limited to infection, allergic reaction, local tissue breakdown, nerve injury, and paresis, the patient consented for the procedure.    The patient was brought to the procedure room in the supine position.   The operative knee was then identified and prepped and draped in the usual sterile fashion.  AP imaging was used to identify the path of the superior medial, superior lateral, inferior medial genicular nerves.  The overlying soft tissue was then anesthetized with 3 cc 1% lidocaine at each location.  Subsequently, a radiofrequency ablation needle was advanced imaging guidance to contact bone along the path of the superior medial, superior lateral, and inferior medial genicular nerves.  Needle position was confirmed on both AP and lateral views.  Motor testing was negative from local motor involvement.  Subsequently, 1 cc of 1% lidocaine was instilled through each needle.  Radiofrequency ablation was completed for 90 seconds at 80 °C.  After negative aspiration for heme, total mixture of 40 mg of Depo-Medrol with 9 cc of  0.5% bupivacaine was then evenly divided amongst the 3 sites.  The needle was then flushed with 1 cc 1% lidocaine, re-styletted and removed with tip intact.   The patient tolerated the procedure very well without significant immediate complication.        Complications: None.    Follow up: The patient was followed in the pain clinic as needed basis.          Jose Rich MD

## 2025-01-24 ENCOUNTER — TELEPHONE (OUTPATIENT)
Dept: PAIN CLINIC | Facility: CLINIC | Age: 80
End: 2025-01-24

## 2025-01-24 NOTE — TELEPHONE ENCOUNTER
Courtesy called placed to patient for post procedure follow up. Patient stated doing fine, relaxing. Informed patient that soreness is to be expected after the procedure. Educated patient that it takes 3-5 days for the steroid to be effective and to allow adequate time for medication to work. Encouraged patient to alternate ice and heat and to take medications as prescribed. Pt verbalized understanding to call with any questions or concerns.      Procedure: RIGHT GENICULAR NERVE RFA with local   Date: 01/23/25  Follow up Visit Scheduled: 02/21/25

## 2025-02-07 ENCOUNTER — HOSPITAL ENCOUNTER (OUTPATIENT)
Dept: MAMMOGRAPHY | Age: 80
Discharge: HOME | End: 2025-02-07
Attending: FAMILY MEDICINE
Payer: MEDICARE

## 2025-02-07 DIAGNOSIS — Z12.31 ENCOUNTER FOR SCREENING MAMMOGRAM FOR MALIGNANT NEOPLASM OF BREAST: ICD-10-CM

## 2025-02-07 PROCEDURE — 77067 SCR MAMMO BI INCL CAD: CPT | Performed by: FAMILY MEDICINE

## 2025-02-07 PROCEDURE — 77063 BREAST TOMOSYNTHESIS BI: CPT | Performed by: FAMILY MEDICINE

## 2025-02-21 ENCOUNTER — OFFICE VISIT (OUTPATIENT)
Dept: PAIN CLINIC | Facility: CLINIC | Age: 80
End: 2025-02-21
Payer: MEDICARE

## 2025-02-21 VITALS — DIASTOLIC BLOOD PRESSURE: 64 MMHG | SYSTOLIC BLOOD PRESSURE: 118 MMHG

## 2025-02-21 DIAGNOSIS — M54.16 LUMBAR RADICULITIS: Primary | ICD-10-CM

## 2025-02-21 NOTE — PROGRESS NOTES
Last procedure:   RIGHT GENICULAR NERVE RFA with local     Date: 1/23/25  Percentage of relief obtained: 80%, 0% currently  Duration of relief: 3 days    Current Pain Score: 8/10    Narcotic Contract Exp: NA

## 2025-02-21 NOTE — PROGRESS NOTES
Name: Gabi Kumar   : 1945   DOS: 2025     Pain Clinic Follow Up Visit:     Chief Complaint   Patient presents with    Procedure Follow Up     RIGHT GENICULAR NERVE RFA with local  25       Gabi Kumar is a 79 year old female with a longstanding history of back and knee pain.  The patient does have imaging evidence of end-stage osteoarthritis of the right knee.  This is bone-on-bone.  She is status post genicular nerve block and subsequently genicular nerve radiofrequency ablation.  Reported 80% relief but this is only during the first 3 days following the procedure.  Now complains of returning knee pain.  Also complains of back pain with radiation towards the left hip.    Pt denies any chills, fever, or weakness. There is no bladder or bowel incontinence associated with the pain.    REVIEW OF SYSTEMS:  A ten point review of systems was performed with pertinent positives and negatives in the HPI.    Allergies[1]    Current Outpatient Medications   Medication Sig Dispense Refill    Levalbuterol HCl 0.63 MG/3ML Inhalation Nebu Soln Take 3 mL by nebulization every 6 (six) hours as needed for Wheezing or Shortness of Breath (cough). 360 mL 3    IPRATROPIUM-ALBUTEROL 0.5-2.5 (3) MG/3ML Inhalation Solution ADMINISTER ONE (1) VIAL VIA NEBULIZER MACHINE EVERY SIX (6) HOURS AS NEEDED. 180 mL 12    fluticasone propionate 50 MCG/ACT Nasal Suspension 2 sprays by Each Nare route daily.      acidophilus-pectin Oral Cap Take 1 capsule by mouth as needed.      cetirizine 10 MG Oral Tab Take 1 tablet (10 mg total) by mouth daily.      allopurinol 300 MG Oral Tab Take 1 tablet (300 mg total) by mouth daily. 90 tablet 3    DULoxetine (CYMBALTA) 60 MG Oral Cap DR Particles Take 1 capsule (60 mg total) by mouth daily. 90 capsule 2    umeclidinium-vilanterol (ANORO ELLIPTA) 62.5-25 MCG/ACT Inhalation Aerosol Powder, Breath Activated Inhale 1 puff into the lungs daily. 1 each 5    HYDROcodone-acetaminophen  5-325 MG Oral Tab Take 1 tablet by mouth every 6 (six) hours as needed. 20 tablet 0    loratadine 10 MG Oral Tab Take 1 tablet (10 mg total) by mouth daily.      pantoprazole 40 MG Oral Tab EC Take 1 tablet (40 mg total) by mouth every morning before breakfast.      ONETOUCH ULTRA In Vitro Strip 2 (two) times daily.      YUPELRI 175 MCG/3ML Inhalation Solution nebulizer solution ADMINISTER ONE (1) VIAL VIA NEBULIZER MACHINE DAILY 90 mL 11    Ascorbic Acid (VITAMIN C) 100 MG Oral Tab Take 10 tablets (1,000 mg total) by mouth daily.      pioglitazone 15 MG Oral Tab Take 1 tablet (15 mg total) by mouth daily.      glimepiride 2 MG Oral Tab Take 1 tablet (2 mg total) by mouth daily with breakfast.      albuterol 108 (90 Base) MCG/ACT Inhalation Aero Soln Inhale 2 puffs into the lungs every 6 (six) hours as needed for Wheezing or Shortness of Breath. inhale 2 puff by inhalation route  every 4 - 6 hours as needed 1 each 11    METOPROLOL SUCCINATE ER 25 MG Oral Tablet 24 Hr TAKE ONE TABLET BY MOUTH ONE TIME DAILY 90 tablet 1    rosuvastatin 10 MG Oral Tab Take 1 tablet (10 mg total) by mouth nightly. Replaces simvastatin 90 tablet 3    Cholecalciferol (VITAMIN D-3 OR) Take 1 capsule by mouth once daily.      Multiple Vitamins-Minerals (CENTRUM) Oral Tab Take 1 tablet by mouth daily.           EXAM:   /64   LMP 06/07/1999   General:  Patient is a(n) 79 year old year old female in no acute distress.  Neurologic:: WNL-Orientation to time, place and person, normal mood & affect, concentration & attention span intact.   Inspection:  Ambulates with well-coordinated, fluid, non-antalgic gait.  Gait is normal.  Neck: Full range of motion  Cranial nerve: Grossly intact  Respiratory: Nonlabored  Back: Gait is intact  IMAGES:     Knee x-ray reviewed with bone-on-bone appearance of the right knee  Lumbar MRI with extensive disc desiccation and bilateral foraminal stenosis at L4-5    ASSESSMENT AND PLAN:     1. Lumbar  radiculitis      The patient is a 79-year-old female with longstanding history of chronic back pain.  This is complicated by severe osteoarthritis in both knees.  Patient was told that she is not a candidate for total knee replacement.  Discussed role for spinal cord stimulation.  Perhaps this could help with both managing knee and back pain.  Will refer to pain psychology for initial evaluation.  Will also update lumbar imaging    Radiology orders and consultations:OP REFERRAL TO [facility] PSYCH TESTING  XR LUMBAR SPINE (MIN 4 VIEWS) (CPT=72110)  The patient indicates understanding of these issues and agrees to the plan.  No follow-ups on file.    Jose Rich MD, 2/21/2025, 1:39 PM              [1]   Allergies  Allergen Reactions    Pcn [Bicillin C-R,] SWELLING     itching    Levofloxacin MYALGIA     Severe muscle and bone pain    Penicillins OTHER (SEE COMMENTS)

## 2025-02-25 RX ORDER — UMECLIDINIUM BROMIDE AND VILANTEROL TRIFENATATE 62.5; 25 UG/1; UG/1
1 POWDER RESPIRATORY (INHALATION) DAILY
Qty: 1 EACH | Refills: 2 | Status: SHIPPED | OUTPATIENT
Start: 2025-02-25

## 2025-02-25 NOTE — TELEPHONE ENCOUNTER
Received request for: Anoro Ellipta 62.5-25 mcg    Patient last seen by: Dr. Maya on 1/16/2025    Has scheduled appointment: 5/22/2025    Physician recommendation:Continue the anoro daily     Prescription refilled.

## 2025-04-24 ENCOUNTER — HOSPITAL ENCOUNTER (OUTPATIENT)
Dept: CT IMAGING | Facility: HOSPITAL | Age: 80
Discharge: HOME | End: 2025-04-24
Attending: INTERNAL MEDICINE
Payer: MEDICARE

## 2025-04-24 DIAGNOSIS — J84.9 ILD (INTERSTITIAL LUNG DISEASE) (HCC): ICD-10-CM

## 2025-04-24 PROCEDURE — 71250 CT THORAX DX C-: CPT | Performed by: INTERNAL MEDICINE

## 2025-05-02 ENCOUNTER — LAB ENCOUNTER (OUTPATIENT)
Dept: LAB | Age: 80
End: 2025-05-02
Attending: INTERNAL MEDICINE
Payer: MEDICARE

## 2025-05-02 DIAGNOSIS — M10.9 GOUT, UNSPECIFIED CAUSE, UNSPECIFIED CHRONICITY, UNSPECIFIED SITE: ICD-10-CM

## 2025-05-02 DIAGNOSIS — J84.9 ILD (INTERSTITIAL LUNG DISEASE) (HCC): ICD-10-CM

## 2025-05-02 DIAGNOSIS — R76.8 CENTROMERE ANTIBODY POSITIVE: ICD-10-CM

## 2025-05-02 DIAGNOSIS — M1A.0411 IDIOPATHIC CHRONIC GOUT OF RIGHT HAND WITH TOPHUS: ICD-10-CM

## 2025-05-02 LAB
ALBUMIN SERPL-MCNC: 4.5 G/DL (ref 3.2–4.8)
ALBUMIN/GLOB SERPL: 1.9 {RATIO} (ref 1–2)
ALP LIVER SERPL-CCNC: 133 U/L (ref 55–142)
ALT SERPL-CCNC: 11 U/L (ref 10–49)
ANION GAP SERPL CALC-SCNC: 8 MMOL/L (ref 0–18)
AST SERPL-CCNC: 27 U/L (ref ?–34)
BASOPHILS # BLD AUTO: 0.08 X10(3) UL (ref 0–0.2)
BASOPHILS NFR BLD AUTO: 0.9 %
BILIRUB SERPL-MCNC: 0.5 MG/DL (ref 0.2–1.1)
BUN BLD-MCNC: 25 MG/DL (ref 9–23)
C3 SERPL-MCNC: 152.8 MG/DL (ref 90–170)
C4 SERPL-MCNC: 31.3 MG/DL (ref 12–36)
CALCIUM BLD-MCNC: 9.9 MG/DL (ref 8.7–10.6)
CHLORIDE SERPL-SCNC: 106 MMOL/L (ref 98–112)
CO2 SERPL-SCNC: 26 MMOL/L (ref 21–32)
CREAT BLD-MCNC: 0.99 MG/DL (ref 0.55–1.02)
CRP SERPL-MCNC: <0.4 MG/DL (ref ?–0.5)
EGFRCR SERPLBLD CKD-EPI 2021: 57 ML/MIN/1.73M2 (ref 60–?)
EOSINOPHIL # BLD AUTO: 0.54 X10(3) UL (ref 0–0.7)
EOSINOPHIL NFR BLD AUTO: 5.9 %
ERYTHROCYTE [DISTWIDTH] IN BLOOD BY AUTOMATED COUNT: 14.5 %
ERYTHROCYTE [SEDIMENTATION RATE] IN BLOOD: 19 MM/HR (ref 0–30)
FASTING STATUS PATIENT QL REPORTED: NO
GLOBULIN PLAS-MCNC: 2.4 G/DL (ref 2–3.5)
GLUCOSE BLD-MCNC: 92 MG/DL (ref 70–99)
HCT VFR BLD AUTO: 46.5 % (ref 35–48)
HGB BLD-MCNC: 15 G/DL (ref 12–16)
IMM GRANULOCYTES # BLD AUTO: 0.05 X10(3) UL (ref 0–1)
IMM GRANULOCYTES NFR BLD: 0.5 %
LYMPHOCYTES # BLD AUTO: 1.35 X10(3) UL (ref 1–4)
LYMPHOCYTES NFR BLD AUTO: 14.7 %
MCH RBC QN AUTO: 30.1 PG (ref 26–34)
MCHC RBC AUTO-ENTMCNC: 32.3 G/DL (ref 31–37)
MCV RBC AUTO: 93.2 FL (ref 80–100)
MONOCYTES # BLD AUTO: 0.9 X10(3) UL (ref 0.1–1)
MONOCYTES NFR BLD AUTO: 9.8 %
NEUTROPHILS # BLD AUTO: 6.25 X10 (3) UL (ref 1.5–7.7)
NEUTROPHILS # BLD AUTO: 6.25 X10(3) UL (ref 1.5–7.7)
NEUTROPHILS NFR BLD AUTO: 68.2 %
OSMOLALITY SERPL CALC.SUM OF ELEC: 294 MOSM/KG (ref 275–295)
PLATELET # BLD AUTO: 182 10(3)UL (ref 150–450)
POTASSIUM SERPL-SCNC: 4.5 MMOL/L (ref 3.5–5.1)
PROT SERPL-MCNC: 6.9 G/DL (ref 5.7–8.2)
RBC # BLD AUTO: 4.99 X10(6)UL (ref 3.8–5.3)
SODIUM SERPL-SCNC: 140 MMOL/L (ref 136–145)
WBC # BLD AUTO: 9.2 X10(3) UL (ref 4–11)

## 2025-05-02 PROCEDURE — 36415 COLL VENOUS BLD VENIPUNCTURE: CPT

## 2025-05-02 PROCEDURE — 83521 IG LIGHT CHAINS FREE EACH: CPT

## 2025-05-02 PROCEDURE — 86334 IMMUNOFIX E-PHORESIS SERUM: CPT

## 2025-05-02 PROCEDURE — 84550 ASSAY OF BLOOD/URIC ACID: CPT

## 2025-05-02 PROCEDURE — 85652 RBC SED RATE AUTOMATED: CPT

## 2025-05-02 PROCEDURE — 84165 PROTEIN E-PHORESIS SERUM: CPT

## 2025-05-02 PROCEDURE — 85025 COMPLETE CBC W/AUTO DIFF WBC: CPT

## 2025-05-02 PROCEDURE — 80053 COMPREHEN METABOLIC PANEL: CPT

## 2025-05-02 PROCEDURE — 86160 COMPLEMENT ANTIGEN: CPT

## 2025-05-02 PROCEDURE — 86140 C-REACTIVE PROTEIN: CPT

## 2025-05-03 ENCOUNTER — TELEPHONE (OUTPATIENT)
Facility: CLINIC | Age: 80
End: 2025-05-03

## 2025-05-03 DIAGNOSIS — M10.9 GOUT, UNSPECIFIED CAUSE, UNSPECIFIED CHRONICITY, UNSPECIFIED SITE: Primary | ICD-10-CM

## 2025-05-03 LAB — URATE SERPL-MCNC: 4 MG/DL (ref 3.1–7.8)

## 2025-05-06 LAB
ALBUMIN SERPL ELPH-MCNC: 3.81 G/DL (ref 3.75–5.21)
ALBUMIN/GLOB SERPL: 1.53 {RATIO} (ref 1–2)
ALPHA1 GLOB SERPL ELPH-MCNC: 0.31 G/DL (ref 0.19–0.46)
ALPHA2 GLOB SERPL ELPH-MCNC: 0.7 G/DL (ref 0.48–1.05)
B-GLOBULIN SERPL ELPH-MCNC: 0.75 G/DL (ref 0.68–1.23)
GAMMA GLOB SERPL ELPH-MCNC: 0.73 G/DL (ref 0.62–1.7)
KAPPA LC FREE SER-MCNC: 2.77 MG/DL (ref 0.33–1.94)
KAPPA LC FREE/LAMBDA FREE SER NEPH: 1.35 {RATIO} (ref 0.26–1.65)
LAMBDA LC FREE SERPL-MCNC: 2.06 MG/DL (ref 0.57–2.63)
PROT SERPL-MCNC: 6.3 G/DL (ref 5.7–8.2)

## 2025-05-16 ENCOUNTER — OFFICE VISIT (OUTPATIENT)
Dept: RHEUMATOLOGY | Facility: CLINIC | Age: 80
End: 2025-05-16
Payer: MEDICARE

## 2025-05-16 VITALS
SYSTOLIC BLOOD PRESSURE: 130 MMHG | OXYGEN SATURATION: 83 % | WEIGHT: 180.81 LBS | HEART RATE: 91 BPM | BODY MASS INDEX: 30.87 KG/M2 | TEMPERATURE: 97 F | RESPIRATION RATE: 16 BRPM | HEIGHT: 64 IN | DIASTOLIC BLOOD PRESSURE: 62 MMHG

## 2025-05-16 DIAGNOSIS — M10.9 GOUT, UNSPECIFIED CAUSE, UNSPECIFIED CHRONICITY, UNSPECIFIED SITE: ICD-10-CM

## 2025-05-16 DIAGNOSIS — I27.21 HIGH PULMONARY ARTERIAL PRESSURE (HCC): ICD-10-CM

## 2025-05-16 DIAGNOSIS — R76.8 CENTROMERE ANTIBODY POSITIVE: ICD-10-CM

## 2025-05-16 DIAGNOSIS — F11.90 CHRONIC, CONTINUOUS USE OF OPIOIDS: ICD-10-CM

## 2025-05-16 DIAGNOSIS — M17.0 PRIMARY OSTEOARTHRITIS OF BOTH KNEES: ICD-10-CM

## 2025-05-16 DIAGNOSIS — M25.522 LEFT ELBOW PAIN: ICD-10-CM

## 2025-05-16 DIAGNOSIS — R06.09 DOE (DYSPNEA ON EXERTION): ICD-10-CM

## 2025-05-16 DIAGNOSIS — Z51.81 THERAPEUTIC DRUG MONITORING: ICD-10-CM

## 2025-05-16 DIAGNOSIS — J84.9 ILD (INTERSTITIAL LUNG DISEASE) (HCC): Primary | ICD-10-CM

## 2025-05-16 PROCEDURE — G2211 COMPLEX E/M VISIT ADD ON: HCPCS | Performed by: INTERNAL MEDICINE

## 2025-05-16 PROCEDURE — 99214 OFFICE O/P EST MOD 30 MIN: CPT | Performed by: INTERNAL MEDICINE
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
## 2025-05-16 NOTE — H&P
The following individual(s) verbally consented to be recorded using ambient AI listening technology and understand that they can each withdraw their consent to this listening technology at any point by asking the clinician to turn off or pause the recording:    Patient name: Gabi Renetta Stacy  Additional names:

## 2025-05-22 ENCOUNTER — OFFICE VISIT (OUTPATIENT)
Facility: CLINIC | Age: 80
End: 2025-05-22
Payer: MEDICARE

## 2025-05-22 VITALS
HEIGHT: 64 IN | RESPIRATION RATE: 16 BRPM | WEIGHT: 180 LBS | BODY MASS INDEX: 30.73 KG/M2 | SYSTOLIC BLOOD PRESSURE: 110 MMHG | DIASTOLIC BLOOD PRESSURE: 72 MMHG

## 2025-05-22 DIAGNOSIS — J43.2 CENTRILOBULAR EMPHYSEMA (HCC): ICD-10-CM

## 2025-05-22 DIAGNOSIS — R91.8 MULTIPLE PULMONARY NODULES: ICD-10-CM

## 2025-05-22 DIAGNOSIS — J96.21 ACUTE ON CHRONIC RESPIRATORY FAILURE WITH HYPOXIA (HCC): ICD-10-CM

## 2025-05-22 DIAGNOSIS — Z72.0 TOBACCO ABUSE: ICD-10-CM

## 2025-05-22 DIAGNOSIS — J84.9 ILD (INTERSTITIAL LUNG DISEASE) (HCC): Primary | ICD-10-CM

## 2025-05-22 PROCEDURE — 99214 OFFICE O/P EST MOD 30 MIN: CPT | Performed by: INTERNAL MEDICINE

## 2025-05-22 RX ORDER — UMECLIDINIUM BROMIDE AND VILANTEROL TRIFENATATE 62.5; 25 UG/1; UG/1
1 POWDER RESPIRATORY (INHALATION) DAILY
Qty: 3 EACH | Refills: 3 | Status: SHIPPED | OUTPATIENT
Start: 2025-05-22

## 2025-05-22 RX ORDER — ALBUTEROL SULFATE 90 UG/1
2 INHALANT RESPIRATORY (INHALATION) EVERY 6 HOURS PRN
Qty: 1 EACH | Refills: 11 | Status: SHIPPED | OUTPATIENT
Start: 2025-05-22

## 2025-05-22 NOTE — PATIENT INSTRUCTIONS
Continue the anoro daily  Use the LEVALBUTEROL one vial every 6 hours as needed for your breathing or cough  Try to use the oxygen at rest and increase to the higher level whenever you exert yourself  Obtain a pulmonary function test in the next few weeks/month - Call central scheduling at [phone] to schedule the test  Obtain a pulmonary function test next year - Call central scheduling at [phone] to schedule the test

## 2025-05-22 NOTE — PROGRESS NOTES
[facility] Pulmonary Progress Note    History of Present Illness:  Gabi Kumar is a 79 year old female former smoker (quit 11/2019, 50 pack years) with significant PMH moderate COPD, ILD, CAD, PAD, DM, HTN who presents today for follow up of COPD. Since last visit she feels the same. No change in sx. Using anoro daily, has albuterol MDI and uses rarely.  Has levalbuterol neb and uses rarely. Does not get shaking with MDI or levalbuterol neb  Knees and back pain prevent her from exercising    Jan 2025 previously  she has ongoing issues with arthritis limiting her mobility and thinks it worsens her breathing. Has been using both anoro and yupelri daily. Not using albuterol since it makes her shaky    August 2024 previously  Since last visit her epistaxis has improved. Seen by Dr. Kenny recently.  No new concerns  Only using o2 a few hours/day - does not always use when exerting herself    June 2024 previously  Since last visit she reports she had been well until she fell and fractured her right ribs. She was hospitalized and during her hospitalization found to have resting oximetry saturation in 80s on RA, she was restarted on home o2 and now presents for follow up.  Previously she had self discontinued the home oxygen and has only variably used it since her discharge. She developed epistaxis since her discharge and has returned to the ER and had nasal packing placed - planning to see ENT this week.  She presents today with no oxygen and denies change in chronic dyspnea.  Has had continued issues with the nose bleeding. Has facial pain related to packing. No fevers    October 2023 previously    Since last visit she self discontinued her oxygen against medical advice.  She denies new concerns today. Having chronic back pain, joint pains.  Stable LOTT.  Occasional cough with white/yellow phlegm. No f/c/s. No chest pain.  Using anoro off/on through the week, alternates with yupelri. Has albuterol MDI and neb  PRN    August 2023 previously  who presents for hospital f/u for COPD exacerbation. Pt reports using Anoro daily without incident until a tree fell outside her house with her windows open and may have sent dust, dirt into her house.  She experienced increased SOB and started using Yupelri and rescue inhaler without relief and went to the hospital. She was hospitalized 8/16-8/20/23,  received steroids and found to need oxygen at discharge. Oxygen titration at discharge showed she needed 4L with activity (78% on RA with activity; 94% on RA at rest). Pt has not been using it at all. Today reports she is feeling better than she has in months and would like the oxygen picked up from her home. Reprots SOB at times but she paces herself.    Previously 10/2022 Dr Maya   77 year old female former smoker (quit 11/2019, 50 pack years) with significant PMH moderate COPD, CAD, PAD, DM, HTN who presents today for follow up of COPD. She denies new concerns today, has chronic dyspnea and limited exertion due to joint pain. Had improvement in symptoms with spinal cord simulator but since has been removed.  Using anoro daily - enrolled in program through [email or web address]. Also using yupelri daily. Rarely uses albuterol. Occasional cough related to sinus congestion/drainage. Noted mild epistaxis last week, seems better now. No hemoptysis. No pain. No pleurisy. No fever.        Past Medical History:   Past Medical History:    Back problem    Cataract    Cholelithiasis    DIABETES    Diabetes (HCC)    Diverticulitis    suggestion of possible wall thickening involving sigmoid, possible mild diverticulitis    Diverticulosis    Essential hypertension    Gallbladder disease    thickened gallbladder with distended bile duct    High blood pressure    High cholesterol    HYPERLIPIDEMIA    Hyperlipidemia    IBS (irritable bowel syndrome)    Osteoarthritis    hips, back and knees    Rectal bleeding    Thyroid disease    Visual impairment    reading  glasses        Past Surgical History:   Past Surgical History:   Procedure Laterality Date    Carpal tunnel release      Colonoscopy  2003    Diverticulosis    Colonoscopy  2012    decreased anal sphincter tone, diverticulosis, internal hemorrhoids, normal random colon biopsies    Colonoscopy N/A 07/26/2017    Procedure: COLONOSCOPY;  Surgeon: Garrett Moody MD;  Location:  ENDOSCOPY    Colonoscopy N/A 12/01/2024    Procedure: INCOMPLETE COLONOSCOPY WITH BIOPSIES;  Surgeon: Jaswinder Covington MD;  Location:  ENDOSCOPY    [facility]      Other  1999    Carpel tnnel right hand    Other surgical history  1995    Fatty tumor from neck    Other surgical history  1999    Arthroscopy right knee    Other surgical history  2001    Carpal tunnel both hand    Other surgical history  12/22/2020    Cystoscopy, Dr Chun       Family Medical History:   Family History   Problem Relation Age of Onset    Cancer Other         Stomach cancer    Heart Disease Father     Hypertension Father     High Blood Pressure Father     Diabetes Father     Heart Disease Mother     Hypertension Mother     Lung Disorder Mother         Pulmonary fibrosis        Social History:   Social History     Socioeconomic History    Marital status:      Spouse name: Not on file    Number of children: Not on file    Years of education: Not on file    Highest education level: Not on file   Occupational History    Not on file   Tobacco Use    Smoking status: Former     Current packs/day: 0.00     Average packs/day: 0.8 packs/day for 50.0 years (37.5 ttl pk-yrs)     Types: Cigarettes     Start date: 10/22/1964     Quit date: 10/22/2014     Years since quitting: 10.5    Smokeless tobacco: Never   Vaping Use    Vaping status: Never Used   Substance and Sexual Activity    Alcohol use: No     Alcohol/week: 0.0 standard drinks of alcohol    Drug use: No    Sexual activity: Not Currently   Other Topics Concern     Service Not Asked    Blood Transfusions Not Asked     Caffeine Concern Yes    Occupational Exposure Not Asked    Hobby Hazards Not Asked    Sleep Concern Not Asked    Stress Concern Not Asked    Weight Concern Not Asked    Special Diet Not Asked    Back Care Not Asked    Exercise Yes    Bike Helmet Not Asked    Seat Belt Not Asked    Self-Exams Not Asked   Social History Narrative    Not on file     Social Drivers of Health     Food Insecurity: No Food Insecurity (11/30/2024)    Food Insecurity     Food Insecurity: Never true   Transportation Needs: No Transportation Needs (11/30/2024)    Transportation Needs     Lack of Transportation: No     Car Seat: Not on file   Stress: Not on file   Housing Stability: Low Risk  (11/30/2024)    Housing Stability     Housing Instability: No     Housing Instability Emergency: Not on file     Crib or Bassinette: Not on file        Medications:   Current Outpatient Medications   Medication Sig Dispense Refill    umeclidinium-vilanterol (ANORO ELLIPTA) 62.5-25 MCG/ACT Inhalation Aerosol Powder, Breath Activated Inhale 1 puff into the lungs daily. 3 each 3    albuterol 108 (90 Base) MCG/ACT Inhalation Aero Soln Inhale 2 puffs into the lungs every 6 (six) hours as needed for Wheezing or Shortness of Breath. 1 each 11    Naloxone HCl 4 MG/0.1ML Nasal Liquid 4 mg by Nasal route as needed. If patient remains unresponsive, repeat dose in other nostril 2-5 minutes after first dose. 1 kit 0    Levalbuterol HCl 0.63 MG/3ML Inhalation Nebu Soln Take 3 mL by nebulization every 6 (six) hours as needed for Wheezing or Shortness of Breath (cough). 360 mL 3    IPRATROPIUM-ALBUTEROL 0.5-2.5 (3) MG/3ML Inhalation Solution ADMINISTER ONE (1) VIAL VIA NEBULIZER MACHINE EVERY SIX (6) HOURS AS NEEDED. 180 mL 12    acidophilus-pectin Oral Cap Take 1 capsule by mouth as needed.      cetirizine 10 MG Oral Tab Take 1 tablet (10 mg total) by mouth daily.      allopurinol 300 MG Oral Tab Take 1 tablet (300 mg total) by mouth daily. 90 tablet 3    DULoxetine  (CYMBALTA) 60 MG Oral Cap DR Particles Take 1 capsule (60 mg total) by mouth daily. 90 capsule 2    HYDROcodone-acetaminophen 5-325 MG Oral Tab Take 1 tablet by mouth every 6 (six) hours as needed. 20 tablet 0    loratadine 10 MG Oral Tab Take 1 tablet (10 mg total) by mouth daily.      pantoprazole 40 MG Oral Tab EC Take 1 tablet (40 mg total) by mouth every morning before breakfast.      ONETOUCH ULTRA In Vitro Strip 2 (two) times daily.      YUPELRI 175 MCG/3ML Inhalation Solution nebulizer solution ADMINISTER ONE (1) VIAL VIA NEBULIZER MACHINE DAILY 90 mL 11    Ascorbic Acid (VITAMIN C) 100 MG Oral Tab Take 10 tablets (1,000 mg total) by mouth daily.      pioglitazone 15 MG Oral Tab Take 1 tablet (15 mg total) by mouth daily.      glimepiride 2 MG Oral Tab Take 1 tablet (2 mg total) by mouth daily with breakfast.      METOPROLOL SUCCINATE ER 25 MG Oral Tablet 24 Hr TAKE ONE TABLET BY MOUTH ONE TIME DAILY 90 tablet 1    rosuvastatin 10 MG Oral Tab Take 1 tablet (10 mg total) by mouth nightly. Replaces simvastatin 90 tablet 3    Cholecalciferol (VITAMIN D-3 OR) Take 1 capsule by mouth once daily.      Multiple Vitamins-Minerals (CENTRUM) Oral Tab Take 1 tablet by mouth daily.         Review of Systems: Review of Systems   Constitutional: Negative.    HENT:          Nasal packing with localized pain   Respiratory:  Positive for shortness of breath. Negative for cough.    Cardiovascular: Negative.    Musculoskeletal:  Positive for arthralgias and back pain.   All other systems reviewed and are negative.       Physical Exam:  /72 (BP Location: Right arm, Patient Position: Sitting, Cuff Size: adult)   Resp 16   Ht 5' 4\" (1.626 m)   Wt 180 lb (81.6 kg)   LMP 06/07/1999   BMI 30.90 kg/m²      Constitutional: alert, cooperative. No acute distress.  HEENT: Head NC/AT.    Cardio: Regular rate and rhythm. Normal S1 and S2. No murmurs.   Respiratory: Thorax symmetrical with no labored breathing. Crackles at  bibases. No wheeze  Extremities: No clubbing or cyanosis. No BLE edema.    Neurologic: A&Ox3. No gross motor deficits.  Skin: Warm, dry  Psych: Calm, cooperative. Pleasant affect.    Results:  Personally reviewed    WBC: 9.2, done on 5/2/2025.  HGB: 15, done on 5/2/2025.  PLT: 182, done on 5/2/2025.     Glucose: 92, done on 5/2/2025.  Cr: 0.99, done on 5/2/2025.  CA: 9.9, done on 5/2/2025.  Na: 140, done on 5/2/2025.  K: 4.5, done on 5/2/2025.  Cl: 106, done on 5/2/2025.  CO2: 26, done on 5/2/2025.  Last ALB was 4.5% done on 5/2/2025.  Last ALB was 3.81% done on 5/2/2025.     CT CHEST HI RESOLUTION (CPT=71250)  Result Date: 4/24/2025  CONCLUSION:  1. Moderate fibrotic changes throughout the lungs with regions of honeycombing are suspicious for UIP.  The overall extent and distribution of this fibrotic change is similar since 8/17/2023. 2. Severe centrilobular emphysema. 3. There is a 2 mm nodule within left lower lobe which is more conspicuous since prior imaging. 4. Main pulmonary artery is dilated measuring 3.1 cm which is nonspecific but can be indicative of pulmonary hypertension. 5. Possible cholelithiasis, partially visualized.     LOCATION:  [place]   The findings include a single, incidentally detected, solid pulmonary nodule, measuring less than 6mm.  2017 guidelines from the Fleischner Society for the follow-up and management of incidentally detected indeterminate pulmonary nodules in persons at least 35 years of age depend on nodule size (average length and width) and underlying risk factors (including smoking and other risk factors). Please consider the following recommendations after clinical assessment of risk factors.  For <6mm solid nodules: In low risk patients, no follow-up required.  If suspicious morphology or upper lobe location, consider 12 month follow-up.  In high risk patients, optional CT in 12 months.   Dictated by (CST): Stromberg, LeRoy, MD on 4/24/2025 at 1:12 PM     Finalized by  (CST): Stromberg, LeRoy, MD on 4/24/2025 at 1:24 PM          Assessment/Plan:   #1. chronic hypoxic respiratory failure  Previously was managed on 4L with activity at discharge from [facility] 8/20/23 for oxygen sats at 78%  However she had previously self discontinued the oxygen against medical advice in 2023 6/5/2024: 2L at rest, 4L on exertion  Again advised to use o2 at rest and on exertion  We again discussed the benefits and risks of oxygen and risks of no treatment which includes MI, stroke and death. She remains reluctant to use      #2.  Moderate COPD  Former smoker, quit 11/2019, 50 pack years  Exacerbations: March 2020, ?August 2023  Stable symptoms including LOTT  Last PFTs: 9/2022 with FEV1 1.45L, 75%, %, DLCO 32% --> DLCO out of proportion to obstruction and suggests pulmonary hypertension or ILD  9/2022 echo with +RVSP 34  8/2023 HRCT with +peripheral reticular opacities with some honeycombing. +emphysema. Prominent nodes  5/2025 HRCT with stable findings  We discussed her inhaler and neb regimen.   Continue on anoro daily, levalbuterol neb PRN; no albuterol due to albuterol causes tremors/shakiness  Previously encouraged exercise and discussed pulmonary rehab but she has been hesitant to go given her ongoing joint symptoms limiting her ability to exercise. Advised to consider pulm rehab in the future- not wiling to consider now due to joint/back pains    #3. ILD  9/2022 with FEV1 1.45L, 75%, %, DLCO 32% --> DLCO out of proportion to obstruction and suggests pulmonary hypertension or ILD  8/2023 HRCT with +peripheral reticular opacities with some honeycombing. +emphysema. Prominent nodes.  The reticular changes were only minimally present in RLL base in 2020 so this has certainly progressed  5/2025 HRCT with no change  Imaging is possible UIP pattern.  She does have +ALLAN (centromere) in the past and following with DR. Croft raising concern for CTD related ILD  Plan to obtain PFTs in the next  few weeks and then f/u in one year  If worsening, would consider antifibrotics or DMARD with Dr. Croft. also advised she could consider ILD clinic at [facility] or [facility]    #4. Pulmonary hypertension  Noted on echo originally 2017 and again 2020; 9/2022 echo with +RVSP 34; to see her cardiologist later this month    #5. Pulmonary nodules  Noted on LDCT  No new nodules on 8/2023 HRCT  No need for f/u imaging    #6. Tobacco abuse  50 pack years, quit 11/2019  Congratulated on sustained smoking cessation    Nasir Maya MD

## 2025-06-02 ENCOUNTER — TELEPHONE (OUTPATIENT)
Dept: PAIN CLINIC | Facility: CLINIC | Age: 80
End: 2025-06-02

## 2025-06-02 DIAGNOSIS — M96.1 FAILED BACK SURGICAL SYNDROME: Primary | ICD-10-CM

## 2025-06-02 NOTE — TELEPHONE ENCOUNTER
Patient states she had 2/21 OV with Dr Rich and 3/18 Psych Eval.  Patient states someone was going to get back to her with next steps.

## 2025-06-03 NOTE — TELEPHONE ENCOUNTER
I called the patient and informed her that we had just gotten her psychological evaluation, which Dr. Rich needs to evaluate. If everything is in order, Dr. Rich will arrange a trial, but this requires her insurance to authorize it. informed the patient that our navigator will contact her as soon as we received the insurance company's decision. Pt. Vu, and there are now no more inquiries.

## 2025-06-04 NOTE — TELEPHONE ENCOUNTER
Psychological Evaluation 05/26/25 reviewed by .     Per  --- patient good to move forward w/SCS trial.     TE routed to  to place order for SCS trial.

## 2025-06-05 NOTE — TELEPHONE ENCOUNTER
Medicare Prior Authorization Form completed for SCS Trial.  CPT codes: 58073  Request for procedure pending review. Clinical notes/Form faxed to [phone]; confirmation received.

## 2025-07-23 DIAGNOSIS — G89.29 CHRONIC MUSCULOSKELETAL PAIN: ICD-10-CM

## 2025-07-23 DIAGNOSIS — M79.18 CHRONIC MUSCULOSKELETAL PAIN: ICD-10-CM

## 2025-07-23 DIAGNOSIS — R76.8 CENTROMERE ANTIBODY POSITIVE: ICD-10-CM

## 2025-07-23 DIAGNOSIS — Z51.81 THERAPEUTIC DRUG MONITORING: ICD-10-CM

## 2025-07-23 DIAGNOSIS — M1A.0411 IDIOPATHIC CHRONIC GOUT OF RIGHT HAND WITH TOPHUS: Primary | ICD-10-CM

## 2025-07-23 DIAGNOSIS — M81.0 AGE-RELATED OSTEOPOROSIS WITHOUT CURRENT PATHOLOGICAL FRACTURE: ICD-10-CM

## 2025-07-23 RX ORDER — DULOXETIN HYDROCHLORIDE 60 MG/1
60 CAPSULE, DELAYED RELEASE ORAL DAILY
Qty: 90 CAPSULE | Refills: 0 | Status: SHIPPED | OUTPATIENT
Start: 2025-07-23

## 2025-07-23 NOTE — TELEPHONE ENCOUNTER
Duloxetine 60mg, 90 tablets, 0 refills    Last office visit: 5/16/25    Next Rheum Apt:5/19/2026 Coin Croft MD    Last fill: 10/15/24    Labs:   Lab Results   Component Value Date    CREATSERUM 0.99 05/02/2025    ALKPHO 133 05/02/2025    AST 27 05/02/2025    ALT 11 05/02/2025    BILT 0.5 05/02/2025    TP 6.9 05/02/2025    TP 6.3 05/02/2025    ALB 4.5 05/02/2025    ALB 3.81 05/02/2025       Lab Results   Component Value Date    WBC 9.2 05/02/2025    HGB 15.0 05/02/2025    .0 05/02/2025    NEPRELIM 6.25 05/02/2025    NEPERCENT 68.2 05/02/2025    LYPERCENT 14.7 05/02/2025    NE 6.25 05/02/2025    LYMABS 1.35 05/02/2025

## 2025-08-05 ENCOUNTER — MED REC SCAN ONLY (OUTPATIENT)
Facility: CLINIC | Age: 80
End: 2025-08-05

## 2025-08-19 ENCOUNTER — APPOINTMENT (OUTPATIENT)
Dept: GENERAL RADIOLOGY | Facility: HOSPITAL | Age: 80
End: 2025-08-19
Attending: ANESTHESIOLOGY

## 2025-08-19 ENCOUNTER — TELEPHONE (OUTPATIENT)
Dept: PAIN CLINIC | Facility: CLINIC | Age: 80
End: 2025-08-19

## 2025-08-19 ENCOUNTER — HOSPITAL ENCOUNTER (OUTPATIENT)
Facility: HOSPITAL | Age: 80
Setting detail: HOSPITAL OUTPATIENT SURGERY
Discharge: HOME | End: 2025-08-19
Attending: ANESTHESIOLOGY | Admitting: ANESTHESIOLOGY

## 2025-08-19 VITALS
HEART RATE: 36 BPM | TEMPERATURE: 98 F | DIASTOLIC BLOOD PRESSURE: 61 MMHG | RESPIRATION RATE: 16 BRPM | OXYGEN SATURATION: 98 % | SYSTOLIC BLOOD PRESSURE: 117 MMHG

## 2025-08-19 PROBLEM — M96.1 FAILED BACK SURGICAL SYNDROME: Status: ACTIVE | Noted: 2025-08-19

## 2025-08-19 LAB — GLUCOSE BLD-MCNC: 123 MG/DL (ref 70–99)

## 2025-08-19 PROCEDURE — 99152 MOD SED SAME PHYS/QHP 5/>YRS: CPT | Performed by: ANESTHESIOLOGY

## 2025-08-19 PROCEDURE — 82962 GLUCOSE BLOOD TEST: CPT

## 2025-08-19 DEVICE — IMPLANTABLE DEVICE: Type: IMPLANTABLE DEVICE | Status: FUNCTIONAL

## 2025-08-19 RX ORDER — CLINDAMYCIN PHOSPHATE 600 MG/50ML
600 INJECTION, SOLUTION INTRAVENOUS ONCE
Status: COMPLETED | OUTPATIENT
Start: 2025-08-19 | End: 2025-08-19

## 2025-08-19 RX ORDER — CLINDAMYCIN HYDROCHLORIDE 300 MG/1
300 CAPSULE ORAL 3 TIMES DAILY
Qty: 21 CAPSULE | Refills: 0 | Status: SHIPPED | OUTPATIENT
Start: 2025-08-19

## 2025-08-19 RX ORDER — MIDAZOLAM HYDROCHLORIDE 1 MG/ML
INJECTION INTRAMUSCULAR; INTRAVENOUS
Status: DISCONTINUED | OUTPATIENT
Start: 2025-08-19 | End: 2025-08-19

## 2025-08-19 RX ORDER — IBUPROFEN 200 MG
CAPSULE ORAL
Status: DISCONTINUED | OUTPATIENT
Start: 2025-08-19 | End: 2025-08-19

## 2025-08-19 RX ORDER — ONDANSETRON 2 MG/ML
4 INJECTION INTRAMUSCULAR; INTRAVENOUS ONCE AS NEEDED
Status: DISCONTINUED | OUTPATIENT
Start: 2025-08-19 | End: 2025-08-19

## 2025-08-19 RX ORDER — DIPHENHYDRAMINE HYDROCHLORIDE 50 MG/ML
50 INJECTION, SOLUTION INTRAMUSCULAR; INTRAVENOUS ONCE AS NEEDED
Status: DISCONTINUED | OUTPATIENT
Start: 2025-08-19 | End: 2025-08-19

## 2025-08-19 RX ORDER — SODIUM CHLORIDE, SODIUM LACTATE, POTASSIUM CHLORIDE, CALCIUM CHLORIDE 600; 310; 30; 20 MG/100ML; MG/100ML; MG/100ML; MG/100ML
100 INJECTION, SOLUTION INTRAVENOUS CONTINUOUS
Status: DISCONTINUED | OUTPATIENT
Start: 2025-08-19 | End: 2025-08-19

## 2025-08-19 RX ORDER — NALOXONE HYDROCHLORIDE 0.4 MG/ML
0.08 INJECTION, SOLUTION INTRAMUSCULAR; INTRAVENOUS; SUBCUTANEOUS AS NEEDED
Status: DISCONTINUED | OUTPATIENT
Start: 2025-08-19 | End: 2025-08-19

## 2025-08-19 RX ORDER — LIDOCAINE HYDROCHLORIDE 10 MG/ML
INJECTION, SOLUTION EPIDURAL; INFILTRATION; INTRACAUDAL; PERINEURAL
Status: DISCONTINUED | OUTPATIENT
Start: 2025-08-19 | End: 2025-08-19

## 2025-08-20 ENCOUNTER — TELEPHONE (OUTPATIENT)
Dept: PAIN CLINIC | Facility: CLINIC | Age: 80
End: 2025-08-20

## 2025-08-20 DIAGNOSIS — R07.81 RIB PAIN ON LEFT SIDE: Primary | ICD-10-CM

## 2025-08-21 ENCOUNTER — HOSPITAL ENCOUNTER (OUTPATIENT)
Dept: GENERAL RADIOLOGY | Age: 80
Discharge: HOME | End: 2025-08-21
Attending: PHYSICIAN ASSISTANT

## 2025-08-21 ENCOUNTER — RESULTS FOLLOW-UP (OUTPATIENT)
Dept: PAIN CLINIC | Facility: CLINIC | Age: 80
End: 2025-08-21

## 2025-08-21 DIAGNOSIS — R07.81 RIB PAIN ON LEFT SIDE: ICD-10-CM

## 2025-08-21 PROCEDURE — 71101 X-RAY EXAM UNILAT RIBS/CHEST: CPT | Performed by: PHYSICIAN ASSISTANT

## 2025-08-24 ENCOUNTER — HOSPITAL ENCOUNTER (INPATIENT)
Facility: HOSPITAL | Age: 80
LOS: 1 days | Discharge: HOME | End: 2025-08-25
Attending: EMERGENCY MEDICINE | Admitting: HOSPITALIST

## 2025-08-24 ENCOUNTER — APPOINTMENT (OUTPATIENT)
Dept: CT IMAGING | Facility: HOSPITAL | Age: 80
End: 2025-08-24
Attending: EMERGENCY MEDICINE

## 2025-08-24 DIAGNOSIS — K57.92 ACUTE DIVERTICULITIS: ICD-10-CM

## 2025-08-24 DIAGNOSIS — R07.89 CHEST PAIN, ATYPICAL: Primary | ICD-10-CM

## 2025-08-24 DIAGNOSIS — R09.02 HYPOXIA: ICD-10-CM

## 2025-08-24 DIAGNOSIS — R10.12 LUQ PAIN: ICD-10-CM

## 2025-08-24 LAB
ALBUMIN SERPL-MCNC: 4.2 G/DL (ref 3.2–4.8)
ALBUMIN/GLOB SERPL: 1.7 (ref 1–2)
ALP LIVER SERPL-CCNC: 115 U/L (ref 55–142)
ALT SERPL-CCNC: 7 U/L (ref 10–49)
ANION GAP SERPL CALC-SCNC: 12 MMOL/L (ref 0–18)
APTT PPP: 28.6 SECONDS (ref 23–36)
AST SERPL-CCNC: 22 U/L (ref ?–34)
ATRIAL RATE: 79 BPM
BASOPHILS # BLD AUTO: 0.07 X10(3) UL (ref 0–0.2)
BASOPHILS NFR BLD AUTO: 0.7 %
BILIRUB SERPL-MCNC: 0.8 MG/DL (ref 0.2–1.1)
BUN BLD-MCNC: 23 MG/DL (ref 9–23)
CALCIUM BLD-MCNC: 10 MG/DL (ref 8.7–10.6)
CHLORIDE SERPL-SCNC: 104 MMOL/L (ref 98–112)
CO2 SERPL-SCNC: 24 MMOL/L (ref 21–32)
CREAT BLD-MCNC: 1.18 MG/DL (ref 0.55–1.02)
EGFRCR SERPLBLD CKD-EPI 2021: 46 ML/MIN/1.73M2 (ref 60–?)
EOSINOPHIL # BLD AUTO: 0.45 X10(3) UL (ref 0–0.7)
EOSINOPHIL NFR BLD AUTO: 4.4 %
ERYTHROCYTE [DISTWIDTH] IN BLOOD BY AUTOMATED COUNT: 14.6 %
EST. AVERAGE GLUCOSE BLD GHB EST-MCNC: 160 MG/DL (ref 68–126)
GLOBULIN PLAS-MCNC: 2.5 G/DL (ref 2–3.5)
GLUCOSE BLD-MCNC: 100 MG/DL (ref 70–99)
GLUCOSE BLD-MCNC: 108 MG/DL (ref 70–99)
GLUCOSE BLD-MCNC: 156 MG/DL (ref 70–99)
GLUCOSE BLD-MCNC: 184 MG/DL (ref 70–99)
HBA1C MFR BLD: 7.2 % (ref ?–5.7)
HCT VFR BLD AUTO: 47.1 % (ref 35–48)
HGB BLD-MCNC: 15.5 G/DL (ref 12–16)
IMM GRANULOCYTES # BLD AUTO: 0.05 X10(3) UL (ref 0–1)
IMM GRANULOCYTES NFR BLD: 0.5 %
INR BLD: 1 (ref 0.8–1.2)
LIPASE SERPL-CCNC: 26 U/L (ref 12–53)
LYMPHOCYTES # BLD AUTO: 1.13 X10(3) UL (ref 1–4)
LYMPHOCYTES NFR BLD AUTO: 10.9 %
MCH RBC QN AUTO: 29.5 PG (ref 26–34)
MCHC RBC AUTO-ENTMCNC: 32.9 G/DL (ref 31–37)
MCV RBC AUTO: 89.5 FL (ref 80–100)
MONOCYTES # BLD AUTO: 0.92 X10(3) UL (ref 0.1–1)
MONOCYTES NFR BLD AUTO: 8.9 %
NEUTROPHILS # BLD AUTO: 7.72 X10 (3) UL (ref 1.5–7.7)
NEUTROPHILS # BLD AUTO: 7.72 X10(3) UL (ref 1.5–7.7)
NEUTROPHILS NFR BLD AUTO: 74.6 %
OSMOLALITY SERPL CALC.SUM OF ELEC: 298 MOSM/KG (ref 275–295)
P AXIS: 70 DEGREES
P-R INTERVAL: 146 MS
PLATELET # BLD AUTO: 167 10(3)UL (ref 150–450)
POTASSIUM SERPL-SCNC: 4.2 MMOL/L (ref 3.5–5.1)
PROT SERPL-MCNC: 6.7 G/DL (ref 5.7–8.2)
PROTHROMBIN TIME: 13.5 SECONDS (ref 11.6–14.8)
Q-T INTERVAL: 412 MS
QRS DURATION: 90 MS
QTC CALCULATION (BEZET): 472 MS
R AXIS: 31 DEGREES
RBC # BLD AUTO: 5.26 X10(6)UL (ref 3.8–5.3)
SODIUM SERPL-SCNC: 140 MMOL/L (ref 136–145)
T AXIS: 56 DEGREES
TROPONIN I SERPL HS-MCNC: 24 NG/L (ref ?–34)
VENTRICULAR RATE: 79 BPM
WBC # BLD AUTO: 10.3 X10(3) UL (ref 4–11)

## 2025-08-24 PROCEDURE — 99223 1ST HOSP IP/OBS HIGH 75: CPT | Performed by: HOSPITALIST

## 2025-08-24 PROCEDURE — 71275 CT ANGIOGRAPHY CHEST: CPT | Performed by: EMERGENCY MEDICINE

## 2025-08-24 PROCEDURE — 74177 CT ABD & PELVIS W/CONTRAST: CPT | Performed by: EMERGENCY MEDICINE

## 2025-08-24 RX ORDER — DEXTROSE MONOHYDRATE 25 G/50ML
50 INJECTION, SOLUTION INTRAVENOUS
Status: DISCONTINUED | OUTPATIENT
Start: 2025-08-24 | End: 2025-08-25

## 2025-08-24 RX ORDER — NICOTINE POLACRILEX 4 MG
30 LOZENGE BUCCAL
Status: DISCONTINUED | OUTPATIENT
Start: 2025-08-24 | End: 2025-08-25

## 2025-08-24 RX ORDER — SODIUM PHOSPHATE, DIBASIC AND SODIUM PHOSPHATE, MONOBASIC 7; 19 G/230ML; G/230ML
1 ENEMA RECTAL ONCE AS NEEDED
Status: DISCONTINUED | OUTPATIENT
Start: 2025-08-24 | End: 2025-08-25

## 2025-08-24 RX ORDER — ONDANSETRON 2 MG/ML
4 INJECTION INTRAMUSCULAR; INTRAVENOUS EVERY 6 HOURS PRN
Status: DISCONTINUED | OUTPATIENT
Start: 2025-08-24 | End: 2025-08-25

## 2025-08-24 RX ORDER — ACETAMINOPHEN 500 MG
500 TABLET ORAL EVERY 4 HOURS PRN
Status: DISCONTINUED | OUTPATIENT
Start: 2025-08-24 | End: 2025-08-25

## 2025-08-24 RX ORDER — SODIUM CHLORIDE 9 MG/ML
1000 INJECTION, SOLUTION INTRAVENOUS ONCE
Status: COMPLETED | OUTPATIENT
Start: 2025-08-24 | End: 2025-08-24

## 2025-08-24 RX ORDER — ASCORBIC ACID 500 MG
1000 TABLET ORAL DAILY
Status: DISCONTINUED | OUTPATIENT
Start: 2025-08-24 | End: 2025-08-25

## 2025-08-24 RX ORDER — SODIUM CHLORIDE 9 MG/ML
INJECTION, SOLUTION INTRAVENOUS CONTINUOUS
Status: ACTIVE | OUTPATIENT
Start: 2025-08-24 | End: 2025-08-24

## 2025-08-24 RX ORDER — ALLOPURINOL 100 MG/1
300 TABLET ORAL DAILY
Status: DISCONTINUED | OUTPATIENT
Start: 2025-08-24 | End: 2025-08-25

## 2025-08-24 RX ORDER — HYDROMORPHONE HYDROCHLORIDE 1 MG/ML
0.5 INJECTION, SOLUTION INTRAMUSCULAR; INTRAVENOUS; SUBCUTANEOUS ONCE
Refills: 0 | Status: COMPLETED | OUTPATIENT
Start: 2025-08-24 | End: 2025-08-24

## 2025-08-24 RX ORDER — KETOROLAC TROMETHAMINE 15 MG/ML
15 INJECTION, SOLUTION INTRAMUSCULAR; INTRAVENOUS ONCE
Status: COMPLETED | OUTPATIENT
Start: 2025-08-24 | End: 2025-08-24

## 2025-08-24 RX ORDER — ROSUVASTATIN CALCIUM 10 MG/1
10 TABLET, COATED ORAL NIGHTLY
Status: DISCONTINUED | OUTPATIENT
Start: 2025-08-24 | End: 2025-08-25

## 2025-08-24 RX ORDER — METOPROLOL SUCCINATE 25 MG/1
25 TABLET, EXTENDED RELEASE ORAL DAILY
Status: DISCONTINUED | OUTPATIENT
Start: 2025-08-24 | End: 2025-08-25

## 2025-08-24 RX ORDER — NICOTINE POLACRILEX 4 MG
15 LOZENGE BUCCAL
Status: DISCONTINUED | OUTPATIENT
Start: 2025-08-24 | End: 2025-08-25

## 2025-08-24 RX ORDER — HYDROCODONE BITARTRATE AND ACETAMINOPHEN 5; 325 MG/1; MG/1
1 TABLET ORAL EVERY 6 HOURS PRN
Refills: 0 | Status: DISCONTINUED | OUTPATIENT
Start: 2025-08-24 | End: 2025-08-25

## 2025-08-24 RX ORDER — SENNOSIDES 8.6 MG
17.2 TABLET ORAL NIGHTLY PRN
Status: DISCONTINUED | OUTPATIENT
Start: 2025-08-24 | End: 2025-08-25

## 2025-08-24 RX ORDER — METOCLOPRAMIDE HYDROCHLORIDE 5 MG/ML
5 INJECTION INTRAMUSCULAR; INTRAVENOUS EVERY 8 HOURS PRN
Status: DISCONTINUED | OUTPATIENT
Start: 2025-08-24 | End: 2025-08-25

## 2025-08-24 RX ORDER — UMECLIDINIUM BROMIDE AND VILANTEROL TRIFENATATE 62.5; 25 UG/1; UG/1
1 POWDER RESPIRATORY (INHALATION) DAILY
Status: DISCONTINUED | OUTPATIENT
Start: 2025-08-24 | End: 2025-08-25

## 2025-08-24 RX ORDER — POLYETHYLENE GLYCOL 3350 17 G/17G
17 POWDER, FOR SOLUTION ORAL DAILY PRN
Status: DISCONTINUED | OUTPATIENT
Start: 2025-08-24 | End: 2025-08-25

## 2025-08-24 RX ORDER — BISACODYL 10 MG
10 SUPPOSITORY, RECTAL RECTAL
Status: DISCONTINUED | OUTPATIENT
Start: 2025-08-24 | End: 2025-08-25

## 2025-08-24 RX ORDER — DULOXETIN HYDROCHLORIDE 60 MG/1
60 CAPSULE, DELAYED RELEASE ORAL DAILY
Status: DISCONTINUED | OUTPATIENT
Start: 2025-08-24 | End: 2025-08-25

## 2025-08-25 VITALS
WEIGHT: 179 LBS | DIASTOLIC BLOOD PRESSURE: 70 MMHG | SYSTOLIC BLOOD PRESSURE: 133 MMHG | HEIGHT: 64 IN | BODY MASS INDEX: 30.56 KG/M2 | RESPIRATION RATE: 20 BRPM | TEMPERATURE: 98 F | OXYGEN SATURATION: 92 % | HEART RATE: 91 BPM

## 2025-08-25 LAB
GLUCOSE BLD-MCNC: 108 MG/DL (ref 70–99)
GLUCOSE BLD-MCNC: 135 MG/DL (ref 70–99)
GLUCOSE BLD-MCNC: 147 MG/DL (ref 70–99)

## 2025-08-25 PROCEDURE — 99024 POSTOP FOLLOW-UP VISIT: CPT | Performed by: ANESTHESIOLOGY

## 2025-08-25 PROCEDURE — 99239 HOSP IP/OBS DSCHRG MGMT >30: CPT | Performed by: HOSPITALIST

## 2025-08-25 RX ORDER — HYDROCODONE BITARTRATE AND ACETAMINOPHEN 5; 325 MG/1; MG/1
1 TABLET ORAL EVERY 4 HOURS PRN
Refills: 0 | Status: DISCONTINUED | OUTPATIENT
Start: 2025-08-25 | End: 2025-08-25

## 2025-08-26 ENCOUNTER — TELEPHONE (OUTPATIENT)
Dept: PAIN CLINIC | Facility: CLINIC | Age: 80
End: 2025-08-26

## 2025-08-26 ENCOUNTER — PATIENT OUTREACH (OUTPATIENT)
Age: 80
End: 2025-08-26

## (undated) DEVICE — GLOVE,SURG,SENSICARE,ALOE,LF,PF,7: Brand: MEDLINE

## (undated) DEVICE — PATIENT RETURN ELECTRODE, SINGLE-USE, CONTACT QUALITY MONITORING, ADULT, WITH 9FT CORD, FOR PATIENTS WEIGING OVER 33LBS. (15KG): Brand: MEGADYNE

## (undated) DEVICE — REMOVER LOT 4OZ N IRRIG UNSCNT SFT MOIST LIQ

## (undated) DEVICE — NEEDLE SPNL 22GA L3.5IN BLK QNCKE STYL DISP

## (undated) DEVICE — PAIN TRAY: Brand: MEDLINE INDUSTRIES, INC.

## (undated) DEVICE — FILTERLINE NASAL ADULT O2/CO2

## (undated) DEVICE — SKIN REG/FINE DUAL MARKER, RULER, LABELS: Brand: MEDLINE

## (undated) DEVICE — MARKER SKIN 2 TIP

## (undated) DEVICE — BANDAGE ADH 1INX3IN NAT FAB N ADH PD CURAD

## (undated) DEVICE — RF CANNULA, CVD: Brand: VENOM

## (undated) DEVICE — GLOVE SUR 7.5 SENSICARE PIP WHT PWD F

## (undated) DEVICE — V2 SPECIMEN COLLECTION MANIFOLD KIT: Brand: NEPTUNE

## (undated) DEVICE — AVANOS* TUOHY EPIDURAL NEEDLE: Brand: AVANOS

## (undated) DEVICE — COVER C ARM W36XL28IN BND BG SNAP KOVER

## (undated) DEVICE — SUT PERMA- 2-0 30IN FSL NABSRB BLK L30MM 3

## (undated) DEVICE — ELECTRODE ES RET 2 PATE W/ 10FT CRD MPLR DISP

## (undated) DEVICE — SHEET, T, LAPAROTOMY, STERILE: Brand: MEDLINE

## (undated) DEVICE — GLOVE SUR 6.5 SENSICARE PIP WHT PWD F

## (undated) DEVICE — Device

## (undated) DEVICE — PACK PAIN MGMT

## (undated) DEVICE — SPONGE GZ 4X4IN COT 12 PLY TYP VII WVN C

## (undated) DEVICE — GIJAW SINGLE-USE BIOPSY FORCEPS WITH NEEDLE: Brand: GIJAW

## (undated) DEVICE — 3M™ RED DOT™ MONITORING ELECTRODE WITH FOAM TAPE AND STICKY GEL, 50/BAG, 20/CASE, 72/PLT 2570: Brand: RED DOT™

## (undated) DEVICE — KIT VLV 5 PC AIR H2O SUCT BX ENDOGATOR CONN

## (undated) DEVICE — 10FT COMBINED O2 DELIVERY/CO2 MONITORING. FILTER WITH MICROSTREAM TYPE LUER: Brand: DUAL ADULT NASAL CANNULA

## (undated) DEVICE — 1200CC GUARDIAN II: Brand: GUARDIAN

## (undated) DEVICE — SYRINGE 10ML SLIP TIP LOSS OF RESIST PLAS

## (undated) DEVICE — ENDOSCOPY PACK - LOWER: Brand: MEDLINE INDUSTRIES, INC.

## (undated) DEVICE — FORCEP BIOPSY RJ4 LG CAP W/ND

## (undated) DEVICE — KIT CUSTOM ENDOPROCEDURE STERIS

## (undated) DEVICE — Device: Brand: DEFENDO AIR/WATER/SUCTION AND BIOPSY VALVE

## (undated) DEVICE — BITEBLOCK ENDOSCP 60FR MAXI STRP

## (undated) DEVICE — DRAPE LAP 102X78X121IN POLYPR SMS STD T INSTR

## (undated) NOTE — LETTER
[address]  [address]  Authorization for Surgical Operation and Procedure     Date:___________                                                                                                         Time:__________  I hereby authorize Surgeon(s):  Jaswinder Covington MD, my physician and his/her assistants (if applicable), which may include medical students, residents, and/or fellows, to perform the following surgical operation/ procedure and administer such anesthesia as may be determined necessary by my physician:  Operation/Procedure name (s) Procedure(s):  ESOPHAGOGASTRODUODENOSCOPY (EGD) on Gabi Kumar   2.   I recognize that during the surgical operation/procedure, unforeseen conditions may necessitate additional or different procedures than those listed above.  I, therefore, further authorize and request that the above-named surgeon, assistants, or designees perform such procedures as are, in their judgment, necessary and desirable.    3.   My surgeon/physician has discussed prior to my surgery the potential benefits, risks and side effects of this procedure; the likelihood of achieving goals; and potential problems that might occur during recuperation.  They also discussed reasonable alternatives to the procedure, including risks, benefits, and side effects related to the alternatives and risks related to not receiving this procedure.  I have had all my questions answered and I acknowledge that no guarantee has been made as to the result that may be obtained.    4.   Should the need arise during my operation/procedure, which includes change of level of care prior to discharge, I also consent to the administration of blood and/or blood products.  Further, I understand that despite careful testing and screening of blood or blood products by collecting agencies, I may still be subject to ill effects as a result of receiving a blood transfusion and/or blood products.   The following are some, but not all, of the potential risks that can occur: fever and allergic reactions, hemolytic reactions, transmission of diseases such as Hepatitis, AIDS and Cytomegalovirus (CMV) and fluid overload.  In the event that I wish to have an autologous transfusion of my own blood, or a directed donor transfusion, I will discuss this with my physician.  Check only if Refusing Blood or Blood Products  I understand refusal of blood or blood products as deemed necessary by my physician may have serious consequences to my condition to include possible death. I hereby assume responsibility for my refusal and release the hospital, its personnel, and my physicians from any responsibility for the consequences of my refusal.          o  Refuse      5.   I authorize the use of any specimen, organs, tissues, body parts or foreign objects that may be removed from my body during the operation/procedure for diagnosis, research or teaching purposes and their subsequent disposal by hospital authorities.  I also authorize the release of specimen test results and/or written reports to my treating physician on the hospital medical staff or other referring or consulting physicians involved in my care, at the discretion of the Pathologist or my treating physician.    6.   I consent to the photographing or videotaping of the operations or procedures to be performed, including appropriate portions of my body for medical, scientific, or educational purposes, provided my identity is not revealed by the pictures or by descriptive texts accompanying them.  If the procedure has been photographed/videotaped, the surgeon will obtain the original picture, image, videotape or CD.  The hospital will not be responsible for storage, release or maintenance of the picture, image, tape or CD.    7.   I consent to the presence of a  or observers in the operating room as deemed necessary by my physician or their  designees.    8.   I recognize that in the event my procedure results in extended X-Ray/fluoroscopy time, I may develop a skin reaction.    9. If I have a Do Not Attempt Resuscitation (DNAR) order in place, that status will be suspended while in the operating room, procedural suite, and during the recovery period unless otherwise explicitly stated by me (or a person authorized to consent on my behalf). The surgeon or my attending physician will determine when the applicable recovery period ends for purposes of reinstating the DNAR order.  10. Patients having a sterilization procedure: I understand that if the procedure is successful the results will be permanent and it will therefore be impossible for me to inseminate, conceive, or bear children.  I also understand that the procedure is intended to result in sterility, although the result has not been guaranteed.   11. I acknowledge that my physician has explained sedation/analgesia administration to me including the risk and benefits I consent to the administration of sedation/analgesia as may be necessary or desirable in the judgment of my physician.    I CERTIFY THAT I HAVE READ AND FULLY UNDERSTAND THE ABOVE CONSENT TO OPERATION and/or OTHER PROCEDURE.    _________________________________________  __________________________________  Signature of Patient     Signature of Responsible Person         ___________________________________         Printed Name of Responsible Person           _________________________________                 Relationship to Patient  _________________________________________  ______________________________  Signature of Witness          Date  Time      Patient Name: Gabi Kumar     : 1945                 Printed: 2024     Medical Record #: [id number]                     Page 1 of [address] [place] [address]  [id number]    Consent for Anesthesia    Gabi PITTS  Renetta Kumar agree to be cared for by an anesthesiologist, who is specially trained to monitor me and give me medicine to put me to sleep or keep me comfortable during my procedure    I understand that my anesthesiologist is not an employee or agent of [facility] [employer]. He or she works for [employer].    As the patient asking for anesthesia services, I agree to:  Allow the anesthesiologist (anesthesia doctor) to give me medicine and do additional procedures as necessary. Some examples are: Starting or using an “IV” to give me medicine, fluids or blood during my procedure, and having a breathing tube placed to help me breathe when I’m asleep (intubation). In the event that my heart stops working properly, I understand that my anesthesiologist will make every effort to sustain my life, unless otherwise directed by [facility] Do Not Resuscitate documents.  Tell my anesthesia doctor before my procedure:  If I am pregnant.  The last time that I ate or drank.  All of the medicines I take (including prescriptions, herbal supplements, and pills I can buy without a prescription (including street drugs/illegal medications). Failure to inform my anesthesiologist about these medicines may increase my risk of anesthetic complications.  If I am allergic to anything or have had a reaction to anesthesia before.  I understand how the anesthesia medicine will help me (benefits).  I understand that with any type of anesthesia medicine there are risks:  The most common risks are: nausea, vomiting, sore throat, muscle soreness, damage to my eyes, mouth, or teeth (from breathing tube placement).  Rare risks include: remembering what happened during my procedure, allergic reactions to medications, injury to my airway, heart, lungs, vision, nerves, or muscles and in extremely rare instances death.  My doctor has explained to me other choices available to me for my care  (alternatives).  Pregnant Patients (“epidural”):  I understand that the risks of having an epidural (medicine given into my back to help control pain during labor), include itching, low blood pressure, difficulty urinating, headache or slowing of the baby’s heart. Very rare risks include infection, bleeding, seizure, irregular heart rhythms and nerve injury.  Regional Anesthesia (“spinal”, “epidural”, & “nerve blocks”):  I understand that rare but potential complications include headache, bleeding, infection, seizure, irregular heart rhythms, and nerve injury.    I can change my mind about having anesthesia services at any time before I get the medicine.    _____________________________________________________________________________  Patient (or Representative) Signature/Relationship to Patient  Date   Time    _____________________________________________________________________________   Name (if used)    Language/Organization   Time    _____________________________________________________________________________  Anesthesiologist Signature     Date   Time  I have discussed the procedure and information above with the patient (or patient’s representative) and answered their questions. The patient or their representative has agreed to have anesthesia services.    _____________________________________________________________________________  Witness        Date   Time  I have verified that the signature is that of the patient or patient’s representative, and that it was signed before the procedure  Patient Name: Gabi Kumar     : 1945                 Printed: 2024     Medical Record #: [id number]                     Page 2 of 2

## (undated) NOTE — Clinical Note
Hi Dr. 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 I am thinking the benefits of an immunomodulators such as CellCept would not outweigh the risk. Seems like her emphyema may be a bigger issue.   Let me know what you think. Thanks in advance, Coni

## (undated) NOTE — ED AVS SNAPSHOT
Asif Hernandez   MRN: [id number]    Department:  [facility] Emergency Department   Date of Visit:  3/25/2019           Disclosure     Insurance plans vary and the physician(s) referred by the ER may not be covered by your plan.  Please contact you tell this physician (or your personal doctor if your instructions are to return to your personal doctor) about any new or lasting problems. The primary care or specialist physician will see patients referred from the [facility] Emergency Department.  Vernell Meredith

## (undated) NOTE — LETTER
AUTHORIZATION FOR SURGICAL OPERATION OR OTHER PROCEDURE    1.  I hereby authorize Dr. Luis Enrique Rodriguez, and [facility] staff assigned to my case to perform the following operation and/or procedure at the [facility]:    ________________________________ ________ A. M.  P.M.        Patient Name:  ______________________________________________________  (please print)      Patient signature:  ___________________________________________________             Relationship to Patient:           []  Parent    Respon

## (undated) NOTE — ED AVS SNAPSHOT
Sara Flaherty   MRN: [id number]    Department:  [facility] Emergency Department   Date of Visit:  10/6/2017           Disclosure     Insurance plans vary and the physician(s) referred by the ER may not be covered by your plan.  Please contact you If you have been prescribed any medication(s), please fill your prescription right away and begin taking the medication(s) as directed    If the emergency physician has read X-rays, these will be re-interpreted by a radiologist.  If there is a significant

## (undated) NOTE — LETTER
Patient Name: Lashay Hayes  YOB: 1945          MRN number:  [id number]  Date:  8/15/2017  Referring Physician:  Delmy Adams          MUSCULOSKELETAL AND PELVIC FLOOR EVALUATION:    Referring Physician: Dr. Gil Fu  Diagnosis: Pelvic floor include Smoker: No , L lateral LE numbness/tingling secondary HNP, HTN, DB    ASSESSMENT  Pt presents with decreased pelvic floor coordination and relaxation, weakness, nocturia, poor toileting habits, UI, LUCIA, prolapse, and fecal incontinence with IBS dayana effect of increased intra-abdominal pressure, LUCIA, urge strategies, toileting, IBS and prolapse suggestions, \"ILU\" massage instructions and performed, therepeutic exercise-pelvic floor isolation.    Charges: PT Eval High Complexity, NM Rohini      Total Time Electronically signed by therapist: Jessica Lopez PT    Physician's certification required: Yes  I certify the need for these services furnished under this plan of treatment and while under my care.     X________________________________________________

## (undated) NOTE — LETTER
[address]  [address]  Authorization for Surgical Operation and Procedure     Date:___________                                                                                                         Time:__________  I hereby authorize Surgeon(s):  Jaswinder Covington MD, my physician and his/her assistants (if applicable), which may include medical students, residents, and/or fellows, to perform the following surgical operation/ procedure and administer such anesthesia as may be determined necessary by my physician:  Operation/Procedure name (s) Procedure(s):  COLONOSCOPY on Gabi Kumar   2.   I recognize that during the surgical operation/procedure, unforeseen conditions may necessitate additional or different procedures than those listed above.  I, therefore, further authorize and request that the above-named surgeon, assistants, or designees perform such procedures as are, in their judgment, necessary and desirable.    3.   My surgeon/physician has discussed prior to my surgery the potential benefits, risks and side effects of this procedure; the likelihood of achieving goals; and potential problems that might occur during recuperation.  They also discussed reasonable alternatives to the procedure, including risks, benefits, and side effects related to the alternatives and risks related to not receiving this procedure.  I have had all my questions answered and I acknowledge that no guarantee has been made as to the result that may be obtained.    4.   Should the need arise during my operation/procedure, which includes change of level of care prior to discharge, I also consent to the administration of blood and/or blood products.  Further, I understand that despite careful testing and screening of blood or blood products by collecting agencies, I may still be subject to ill effects as a result of receiving a blood transfusion and/or blood products.  The following are  some, but not all, of the potential risks that can occur: fever and allergic reactions, hemolytic reactions, transmission of diseases such as Hepatitis, AIDS and Cytomegalovirus (CMV) and fluid overload.  In the event that I wish to have an autologous transfusion of my own blood, or a directed donor transfusion, I will discuss this with my physician.  Check only if Refusing Blood or Blood Products  I understand refusal of blood or blood products as deemed necessary by my physician may have serious consequences to my condition to include possible death. I hereby assume responsibility for my refusal and release the hospital, its personnel, and my physicians from any responsibility for the consequences of my refusal.          o  Refuse      5.   I authorize the use of any specimen, organs, tissues, body parts or foreign objects that may be removed from my body during the operation/procedure for diagnosis, research or teaching purposes and their subsequent disposal by hospital authorities.  I also authorize the release of specimen test results and/or written reports to my treating physician on the hospital medical staff or other referring or consulting physicians involved in my care, at the discretion of the Pathologist or my treating physician.    6.   I consent to the photographing or videotaping of the operations or procedures to be performed, including appropriate portions of my body for medical, scientific, or educational purposes, provided my identity is not revealed by the pictures or by descriptive texts accompanying them.  If the procedure has been photographed/videotaped, the surgeon will obtain the original picture, image, videotape or CD.  The hospital will not be responsible for storage, release or maintenance of the picture, image, tape or CD.    7.   I consent to the presence of a  or observers in the operating room as deemed necessary by my physician or their designees.    8.   I  recognize that in the event my procedure results in extended X-Ray/fluoroscopy time, I may develop a skin reaction.    9. If I have a Do Not Attempt Resuscitation (DNAR) order in place, that status will be suspended while in the operating room, procedural suite, and during the recovery period unless otherwise explicitly stated by me (or a person authorized to consent on my behalf). The surgeon or my attending physician will determine when the applicable recovery period ends for purposes of reinstating the DNAR order.  10. Patients having a sterilization procedure: I understand that if the procedure is successful the results will be permanent and it will therefore be impossible for me to inseminate, conceive, or bear children.  I also understand that the procedure is intended to result in sterility, although the result has not been guaranteed.   11. I acknowledge that my physician has explained sedation/analgesia administration to me including the risk and benefits I consent to the administration of sedation/analgesia as may be necessary or desirable in the judgment of my physician.    I CERTIFY THAT I HAVE READ AND FULLY UNDERSTAND THE ABOVE CONSENT TO OPERATION and/or OTHER PROCEDURE.    _________________________________________  __________________________________  Signature of Patient     Signature of Responsible Person         ___________________________________         Printed Name of Responsible Person           _________________________________                 Relationship to Patient  _________________________________________  ______________________________  Signature of Witness          Date  Time      Patient Name: Gabi Kumar     : 1945                 Printed: 2024     Medical Record #: [id number]                     Page 1 [address]  [address]    Consent for Anesthesia    I, Gabi Kumar agree to be  cared for by an anesthesiologist, who is specially trained to monitor me and give me medicine to put me to sleep or keep me comfortable during my procedure    I understand that my anesthesiologist is not an employee or agent of [facility] or [employer]. He or she works for [employer].    As the patient asking for anesthesia services, I agree to:  Allow the anesthesiologist (anesthesia doctor) to give me medicine and do additional procedures as necessary. Some examples are: Starting or using an “IV” to give me medicine, fluids or blood during my procedure, and having a breathing tube placed to help me breathe when I’m asleep (intubation). In the event that my heart stops working properly, I understand that my anesthesiologist will make every effort to sustain my life, unless otherwise directed by [facility] Do Not Resuscitate documents.  Tell my anesthesia doctor before my procedure:  If I am pregnant.  The last time that I ate or drank.  All of the medicines I take (including prescriptions, herbal supplements, and pills I can buy without a prescription (including street drugs/illegal medications). Failure to inform my anesthesiologist about these medicines may increase my risk of anesthetic complications.  If I am allergic to anything or have had a reaction to anesthesia before.  I understand how the anesthesia medicine will help me (benefits).  I understand that with any type of anesthesia medicine there are risks:  The most common risks are: nausea, vomiting, sore throat, muscle soreness, damage to my eyes, mouth, or teeth (from breathing tube placement).  Rare risks include: remembering what happened during my procedure, allergic reactions to medications, injury to my airway, heart, lungs, vision, nerves, or muscles and in extremely rare instances death.  My doctor has explained to me other choices available to me for my care (alternatives).  Pregnant Patients  (“epidural”):  I understand that the risks of having an epidural (medicine given into my back to help control pain during labor), include itching, low blood pressure, difficulty urinating, headache or slowing of the baby’s heart. Very rare risks include infection, bleeding, seizure, irregular heart rhythms and nerve injury.  Regional Anesthesia (“spinal”, “epidural”, & “nerve blocks”):  I understand that rare but potential complications include headache, bleeding, infection, seizure, irregular heart rhythms, and nerve injury.    I can change my mind about having anesthesia services at any time before I get the medicine.    _____________________________________________________________________________  Patient (or Representative) Signature/Relationship to Patient  Date   Time    _____________________________________________________________________________   Name (if used)    Language/Organization   Time    _____________________________________________________________________________  Anesthesiologist Signature     Date   Time  I have discussed the procedure and information above with the patient (or patient’s representative) and answered their questions. The patient or their representative has agreed to have anesthesia services.    _____________________________________________________________________________  Witness        Date   Time  I have verified that the signature is that of the patient or patient’s representative, and that it was signed before the procedure  Patient Name: Gabi Kumar     : 1945                 Printed: 2024     Medical Record #: [id number]                     Page 2 of 2

## (undated) NOTE — LETTER
8/2/2017          [address]    Dear [facility],     I reviewed the results from your colonoscopy. You had one polyp removed. It is a benign polyp called a hyperplastic polyp.  This polyp has no clinical significanc